# Patient Record
Sex: MALE | Race: OTHER | NOT HISPANIC OR LATINO | URBAN - METROPOLITAN AREA
[De-identification: names, ages, dates, MRNs, and addresses within clinical notes are randomized per-mention and may not be internally consistent; named-entity substitution may affect disease eponyms.]

---

## 2017-05-23 ENCOUNTER — OUTPATIENT (OUTPATIENT)
Dept: OUTPATIENT SERVICES | Facility: HOSPITAL | Age: 67
LOS: 1 days | Discharge: HOME | End: 2017-05-23

## 2017-05-23 DIAGNOSIS — Z01.818 ENCOUNTER FOR OTHER PREPROCEDURAL EXAMINATION: ICD-10-CM

## 2017-05-23 DIAGNOSIS — E78.5 HYPERLIPIDEMIA, UNSPECIFIED: ICD-10-CM

## 2017-05-23 DIAGNOSIS — Z87.442 PERSONAL HISTORY OF URINARY CALCULI: ICD-10-CM

## 2017-05-23 DIAGNOSIS — I34.0 NONRHEUMATIC MITRAL (VALVE) INSUFFICIENCY: ICD-10-CM

## 2017-05-23 DIAGNOSIS — Z79.82 LONG TERM (CURRENT) USE OF ASPIRIN: ICD-10-CM

## 2017-05-23 DIAGNOSIS — Z98.890 OTHER SPECIFIED POSTPROCEDURAL STATES: Chronic | ICD-10-CM

## 2017-05-23 DIAGNOSIS — M10.9 GOUT, UNSPECIFIED: ICD-10-CM

## 2017-05-23 DIAGNOSIS — K80.10 CALCULUS OF GALLBLADDER WITH CHRONIC CHOLECYSTITIS WITHOUT OBSTRUCTION: ICD-10-CM

## 2017-05-23 DIAGNOSIS — I70.90 UNSPECIFIED ATHEROSCLEROSIS: ICD-10-CM

## 2017-05-23 DIAGNOSIS — K80.18 CALCULUS OF GALLBLADDER WITH OTHER CHOLECYSTITIS WITHOUT OBSTRUCTION: ICD-10-CM

## 2017-05-23 LAB
ABO RH CONFIRMATION: SIGNIFICANT CHANGE UP
ALBUMIN SERPL ELPH-MCNC: 4.2 G/DL — SIGNIFICANT CHANGE UP (ref 3.3–5)
ALBUMIN SERPL ELPH-MCNC: 4.3 G/DL — SIGNIFICANT CHANGE UP (ref 3.3–5)
ALP SERPL-CCNC: 88 U/L — SIGNIFICANT CHANGE UP (ref 40–120)
ALP SERPL-CCNC: 88 U/L — SIGNIFICANT CHANGE UP (ref 40–120)
ALT FLD-CCNC: 39 U/L — SIGNIFICANT CHANGE UP (ref 12–78)
ALT FLD-CCNC: 40 U/L — SIGNIFICANT CHANGE UP (ref 12–78)
AMYLASE P1 CFR SERPL: 59 U/L — SIGNIFICANT CHANGE UP (ref 25–115)
ANION GAP SERPL CALC-SCNC: 8 MMOL/L — SIGNIFICANT CHANGE UP (ref 5–17)
APTT BLD: 31.3 SEC — SIGNIFICANT CHANGE UP (ref 27.5–37.4)
AST SERPL-CCNC: 16 U/L — SIGNIFICANT CHANGE UP (ref 15–37)
AST SERPL-CCNC: 17 U/L — SIGNIFICANT CHANGE UP (ref 15–37)
BASOPHILS # BLD AUTO: 0.1 K/UL — SIGNIFICANT CHANGE UP (ref 0–0.2)
BASOPHILS NFR BLD AUTO: 1.3 % — SIGNIFICANT CHANGE UP (ref 0–2)
BILIRUB DIRECT SERPL-MCNC: 0.2 MG/DL — SIGNIFICANT CHANGE UP (ref 0–0.2)
BILIRUB INDIRECT FLD-MCNC: 0.8 MG/DL — SIGNIFICANT CHANGE UP (ref 0.2–1)
BILIRUB SERPL-MCNC: 1 MG/DL — SIGNIFICANT CHANGE UP (ref 0.2–1.2)
BILIRUB SERPL-MCNC: 1 MG/DL — SIGNIFICANT CHANGE UP (ref 0.2–1.2)
BLD GP AB SCN SERPL QL: SIGNIFICANT CHANGE UP
BUN SERPL-MCNC: 17 MG/DL — SIGNIFICANT CHANGE UP (ref 7–23)
CALCIUM SERPL-MCNC: 9.6 MG/DL — SIGNIFICANT CHANGE UP (ref 8.5–10.1)
CHLORIDE SERPL-SCNC: 104 MMOL/L — SIGNIFICANT CHANGE UP (ref 96–108)
CO2 SERPL-SCNC: 27 MMOL/L — SIGNIFICANT CHANGE UP (ref 22–31)
CREAT SERPL-MCNC: 0.96 MG/DL — SIGNIFICANT CHANGE UP (ref 0.5–1.3)
EOSINOPHIL # BLD AUTO: 0.2 K/UL — SIGNIFICANT CHANGE UP (ref 0–0.5)
EOSINOPHIL NFR BLD AUTO: 2.7 % — SIGNIFICANT CHANGE UP (ref 0–6)
GLUCOSE SERPL-MCNC: 91 MG/DL — SIGNIFICANT CHANGE UP (ref 70–99)
HCT VFR BLD CALC: 47.2 % — SIGNIFICANT CHANGE UP (ref 39–50)
HGB BLD-MCNC: 16.3 G/DL — SIGNIFICANT CHANGE UP (ref 13–17)
INR BLD: 1.08 RATIO — SIGNIFICANT CHANGE UP (ref 0.88–1.16)
LIDOCAIN IGE QN: 131 U/L — SIGNIFICANT CHANGE UP (ref 73–393)
LYMPHOCYTES # BLD AUTO: 1.5 K/UL — SIGNIFICANT CHANGE UP (ref 1–3.3)
LYMPHOCYTES # BLD AUTO: 24.1 % — SIGNIFICANT CHANGE UP (ref 13–44)
MCHC RBC-ENTMCNC: 30 PG — SIGNIFICANT CHANGE UP (ref 27–34)
MCHC RBC-ENTMCNC: 34.5 GM/DL — SIGNIFICANT CHANGE UP (ref 32–36)
MCV RBC AUTO: 87 FL — SIGNIFICANT CHANGE UP (ref 80–100)
MONOCYTES # BLD AUTO: 0.4 K/UL — SIGNIFICANT CHANGE UP (ref 0–0.9)
MONOCYTES NFR BLD AUTO: 6.7 % — SIGNIFICANT CHANGE UP (ref 2–14)
NEUTROPHILS # BLD AUTO: 4.2 K/UL — SIGNIFICANT CHANGE UP (ref 1.8–7.4)
NEUTROPHILS NFR BLD AUTO: 65.3 % — SIGNIFICANT CHANGE UP (ref 43–77)
PLATELET # BLD AUTO: 202 K/UL — SIGNIFICANT CHANGE UP (ref 150–400)
POTASSIUM SERPL-MCNC: 4.5 MMOL/L — SIGNIFICANT CHANGE UP (ref 3.5–5.3)
POTASSIUM SERPL-SCNC: 4.5 MMOL/L — SIGNIFICANT CHANGE UP (ref 3.5–5.3)
PROT SERPL-MCNC: 7.7 GM/DL — SIGNIFICANT CHANGE UP (ref 6–8.3)
PROT SERPL-MCNC: 7.7 GM/DL — SIGNIFICANT CHANGE UP (ref 6–8.3)
PROTHROM AB SERPL-ACNC: 11.7 SEC — SIGNIFICANT CHANGE UP (ref 9.8–12.7)
RBC # BLD: 5.42 M/UL — SIGNIFICANT CHANGE UP (ref 4.2–5.8)
RBC # FLD: 11.6 % — SIGNIFICANT CHANGE UP (ref 10.3–14.5)
SODIUM SERPL-SCNC: 139 MMOL/L — SIGNIFICANT CHANGE UP (ref 135–145)
TYPE + AB SCN PNL BLD: SIGNIFICANT CHANGE UP
WBC # BLD: 6.4 K/UL — SIGNIFICANT CHANGE UP (ref 3.8–10.5)
WBC # FLD AUTO: 6.4 K/UL — SIGNIFICANT CHANGE UP (ref 3.8–10.5)

## 2017-05-23 NOTE — CHART NOTE - NSCHARTNOTEFT_GEN_A_CORE
Patient seen in PST today:    V/S: T-98.9, p-72, R-14, B/P-135/76, a2kza-31% on room air.    Ht: 69"     Wt: 85kgs    EZ sponges, holistic sheet, pamphlet, and day of procedure instructions provided and reviewed with patient.

## 2017-05-23 NOTE — ASU PATIENT PROFILE, ADULT - PSH
S/P colonoscopy with polypectomy  Baseline; viv polyps; 2012  S/P hernia repair  right inguinal; 5/2015

## 2017-05-23 NOTE — ASU PATIENT PROFILE, ADULT - PMH
Cholecystitis    Cholelithiasis    History of kidney stones    Hypercholesterolemia    Inguinal hernia, left    Psoriasis    Valvular heart disease

## 2017-05-31 RX ORDER — ACETAMINOPHEN 500 MG
975 TABLET ORAL ONCE
Qty: 0 | Refills: 0 | Status: COMPLETED | OUTPATIENT
Start: 2017-06-01 | End: 2017-06-01

## 2017-05-31 RX ORDER — SODIUM CHLORIDE 9 MG/ML
3 INJECTION INTRAMUSCULAR; INTRAVENOUS; SUBCUTANEOUS EVERY 8 HOURS
Qty: 0 | Refills: 0 | Status: DISCONTINUED | OUTPATIENT
Start: 2017-06-01 | End: 2017-06-16

## 2017-05-31 RX ORDER — FAMOTIDINE 10 MG/ML
20 INJECTION INTRAVENOUS ONCE
Qty: 0 | Refills: 0 | Status: COMPLETED | OUTPATIENT
Start: 2017-06-01 | End: 2017-06-01

## 2017-05-31 RX ORDER — OXYCODONE HYDROCHLORIDE 5 MG/1
10 TABLET ORAL ONCE
Qty: 0 | Refills: 0 | Status: DISCONTINUED | OUTPATIENT
Start: 2017-06-01 | End: 2017-06-01

## 2017-06-01 ENCOUNTER — OUTPATIENT (OUTPATIENT)
Dept: OUTPATIENT SERVICES | Facility: HOSPITAL | Age: 67
LOS: 1 days | Discharge: HOME | End: 2017-06-01
Payer: MEDICARE

## 2017-06-01 VITALS
RESPIRATION RATE: 16 BRPM | HEART RATE: 73 BPM | TEMPERATURE: 97 F | DIASTOLIC BLOOD PRESSURE: 80 MMHG | SYSTOLIC BLOOD PRESSURE: 155 MMHG | OXYGEN SATURATION: 99 %

## 2017-06-01 VITALS
OXYGEN SATURATION: 99 % | WEIGHT: 182.98 LBS | TEMPERATURE: 98 F | HEIGHT: 69 IN | HEART RATE: 59 BPM | RESPIRATION RATE: 16 BRPM | SYSTOLIC BLOOD PRESSURE: 128 MMHG | DIASTOLIC BLOOD PRESSURE: 80 MMHG

## 2017-06-01 DIAGNOSIS — Z98.890 OTHER SPECIFIED POSTPROCEDURAL STATES: Chronic | ICD-10-CM

## 2017-06-01 PROCEDURE — 88304 TISSUE EXAM BY PATHOLOGIST: CPT | Mod: 26

## 2017-06-01 RX ORDER — FENTANYL CITRATE 50 UG/ML
50 INJECTION INTRAVENOUS
Qty: 0 | Refills: 0 | Status: DISCONTINUED | OUTPATIENT
Start: 2017-06-01 | End: 2017-06-01

## 2017-06-01 RX ORDER — MEPERIDINE HYDROCHLORIDE 50 MG/ML
12.5 INJECTION INTRAMUSCULAR; INTRAVENOUS; SUBCUTANEOUS
Qty: 0 | Refills: 0 | Status: DISCONTINUED | OUTPATIENT
Start: 2017-06-01 | End: 2017-06-01

## 2017-06-01 RX ORDER — OXYCODONE HYDROCHLORIDE 5 MG/1
5 TABLET ORAL EVERY 4 HOURS
Qty: 0 | Refills: 0 | Status: DISCONTINUED | OUTPATIENT
Start: 2017-06-01 | End: 2017-06-01

## 2017-06-01 RX ORDER — ONDANSETRON 8 MG/1
4 TABLET, FILM COATED ORAL ONCE
Qty: 0 | Refills: 0 | Status: DISCONTINUED | OUTPATIENT
Start: 2017-06-01 | End: 2017-06-01

## 2017-06-01 RX ORDER — OXYCODONE HYDROCHLORIDE 5 MG/1
1 TABLET ORAL
Qty: 30 | Refills: 0
Start: 2017-06-01 | End: 2017-06-06

## 2017-06-01 RX ORDER — SODIUM CHLORIDE 9 MG/ML
1000 INJECTION INTRAMUSCULAR; INTRAVENOUS; SUBCUTANEOUS
Qty: 0 | Refills: 0 | Status: DISCONTINUED | OUTPATIENT
Start: 2017-06-01 | End: 2017-06-01

## 2017-06-01 RX ADMIN — OXYCODONE HYDROCHLORIDE 10 MILLIGRAM(S): 5 TABLET ORAL at 07:45

## 2017-06-01 RX ADMIN — OXYCODONE HYDROCHLORIDE 5 MILLIGRAM(S): 5 TABLET ORAL at 11:03

## 2017-06-01 RX ADMIN — FAMOTIDINE 20 MILLIGRAM(S): 10 INJECTION INTRAVENOUS at 07:46

## 2017-06-01 RX ADMIN — Medication 975 MILLIGRAM(S): at 07:46

## 2017-06-01 NOTE — BRIEF OPERATIVE NOTE - POST-OP DX
Calculus of gallbladder with cholecystitis without biliary obstruction, unspecified cholecystitis acuity  06/01/2017    Active  Daniel Lay

## 2017-06-01 NOTE — ASU DISCHARGE PLAN (ADULT/PEDIATRIC). - MEDICATION SUMMARY - MEDICATIONS TO TAKE
I will START or STAY ON the medications listed below when I get home from the hospital:    aspirin 81 mg oral delayed release tablet  -- 1 tab(s) by mouth once a day  -- pt instructed to stop 1 week prior to procedure, as per Dr. Burks.  -- Indication: For CHOLELITHIASIS CHOLECYSTITIS    oxyCODONE 5 mg oral tablet  -- 1 tab(s) by mouth every 4 hours, As Needed -for moderate pain MDD:6  -- Caution federal law prohibits the transfer of this drug to any person other  than the person for whom it was prescribed.  It is very important that you take or use this exactly as directed.  Do not skip doses or discontinue unless directed by your doctor.  May cause drowsiness.  Alcohol may intensify this effect.  Use care when operating dangerous machinery.  This prescription cannot be refilled.  Using more of this medication than prescribed may cause serious breathing problems.    -- Indication: For CHOLELITHIASIS CHOLECYSTITIS    Pravachol 40 mg oral tablet  -- 1 tab(s) by mouth once a day  -- Indication: For CHOLELITHIASIS CHOLECYSTITIS

## 2017-06-07 DIAGNOSIS — Z79.82 LONG TERM (CURRENT) USE OF ASPIRIN: ICD-10-CM

## 2017-06-07 DIAGNOSIS — Z87.442 PERSONAL HISTORY OF URINARY CALCULI: ICD-10-CM

## 2017-06-07 DIAGNOSIS — I70.90 UNSPECIFIED ATHEROSCLEROSIS: ICD-10-CM

## 2017-06-07 DIAGNOSIS — E78.5 HYPERLIPIDEMIA, UNSPECIFIED: ICD-10-CM

## 2017-06-07 DIAGNOSIS — I34.0 NONRHEUMATIC MITRAL (VALVE) INSUFFICIENCY: ICD-10-CM

## 2017-06-07 DIAGNOSIS — K80.10 CALCULUS OF GALLBLADDER WITH CHRONIC CHOLECYSTITIS WITHOUT OBSTRUCTION: ICD-10-CM

## 2017-06-07 DIAGNOSIS — M10.9 GOUT, UNSPECIFIED: ICD-10-CM

## 2017-06-07 LAB — SURGICAL PATHOLOGY FINAL REPORT - CH: SIGNIFICANT CHANGE UP

## 2018-03-15 PROBLEM — Z00.00 ENCOUNTER FOR PREVENTIVE HEALTH EXAMINATION: Status: ACTIVE | Noted: 2018-03-15

## 2018-04-18 ENCOUNTER — RESULT REVIEW (OUTPATIENT)
Age: 68
End: 2018-04-18

## 2018-04-30 ENCOUNTER — APPOINTMENT (OUTPATIENT)
Dept: DERMATOLOGY | Facility: CLINIC | Age: 68
End: 2018-04-30
Payer: MEDICARE

## 2018-04-30 VITALS — HEIGHT: 68 IN | BODY MASS INDEX: 28.04 KG/M2 | WEIGHT: 185 LBS

## 2018-04-30 DIAGNOSIS — H54.7 UNSPECIFIED VISUAL LOSS: ICD-10-CM

## 2018-04-30 DIAGNOSIS — L81.4 OTHER MELANIN HYPERPIGMENTATION: ICD-10-CM

## 2018-04-30 DIAGNOSIS — Z84.0 FAMILY HISTORY OF DISEASES OF THE SKIN AND SUBCUTANEOUS TISSUE: ICD-10-CM

## 2018-04-30 DIAGNOSIS — L82.1 OTHER SEBORRHEIC KERATOSIS: ICD-10-CM

## 2018-04-30 DIAGNOSIS — Z87.2 PERSONAL HISTORY OF DISEASES OF THE SKIN AND SUBCUTANEOUS TISSUE: ICD-10-CM

## 2018-04-30 PROCEDURE — 99203 OFFICE O/P NEW LOW 30 MIN: CPT | Mod: 25

## 2018-04-30 PROCEDURE — 17000 DESTRUCT PREMALG LESION: CPT

## 2018-04-30 RX ORDER — ASPIRIN 81 MG
81 TABLET, DELAYED RELEASE (ENTERIC COATED) ORAL
Refills: 0 | Status: ACTIVE | COMMUNITY

## 2018-04-30 RX ORDER — PRAVASTATIN SODIUM 40 MG/1
40 TABLET ORAL
Refills: 0 | Status: ACTIVE | COMMUNITY

## 2018-11-02 PROBLEM — I38 ENDOCARDITIS, VALVE UNSPECIFIED: Chronic | Status: ACTIVE | Noted: 2017-05-23

## 2018-11-02 PROBLEM — Z87.442 PERSONAL HISTORY OF URINARY CALCULI: Chronic | Status: ACTIVE | Noted: 2017-05-23

## 2018-11-02 PROBLEM — K40.90 UNILATERAL INGUINAL HERNIA, WITHOUT OBSTRUCTION OR GANGRENE, NOT SPECIFIED AS RECURRENT: Chronic | Status: ACTIVE | Noted: 2017-05-23

## 2018-11-02 PROBLEM — K81.9 CHOLECYSTITIS, UNSPECIFIED: Chronic | Status: ACTIVE | Noted: 2017-05-23

## 2018-11-02 PROBLEM — L40.9 PSORIASIS, UNSPECIFIED: Chronic | Status: ACTIVE | Noted: 2017-05-23

## 2018-11-02 PROBLEM — E78.00 PURE HYPERCHOLESTEROLEMIA, UNSPECIFIED: Chronic | Status: ACTIVE | Noted: 2017-05-23

## 2018-11-02 PROBLEM — K80.20 CALCULUS OF GALLBLADDER WITHOUT CHOLECYSTITIS WITHOUT OBSTRUCTION: Chronic | Status: ACTIVE | Noted: 2017-05-23

## 2018-11-05 ENCOUNTER — APPOINTMENT (OUTPATIENT)
Dept: DERMATOLOGY | Facility: CLINIC | Age: 68
End: 2018-11-05
Payer: MEDICARE

## 2018-11-05 DIAGNOSIS — L21.9 SEBORRHEIC DERMATITIS, UNSPECIFIED: ICD-10-CM

## 2018-11-05 DIAGNOSIS — L57.0 ACTINIC KERATOSIS: ICD-10-CM

## 2018-11-05 PROCEDURE — 17000 DESTRUCT PREMALG LESION: CPT

## 2018-11-05 PROCEDURE — 99213 OFFICE O/P EST LOW 20 MIN: CPT | Mod: 25

## 2019-03-28 ENCOUNTER — APPOINTMENT (OUTPATIENT)
Age: 69
End: 2019-03-28
Payer: MEDICARE

## 2019-03-28 VITALS
SYSTOLIC BLOOD PRESSURE: 138 MMHG | HEART RATE: 72 BPM | HEIGHT: 68 IN | WEIGHT: 184 LBS | DIASTOLIC BLOOD PRESSURE: 81 MMHG | OXYGEN SATURATION: 98 % | BODY MASS INDEX: 27.89 KG/M2

## 2019-03-28 DIAGNOSIS — E78.00 PURE HYPERCHOLESTEROLEMIA, UNSPECIFIED: ICD-10-CM

## 2019-03-28 PROCEDURE — 99204 OFFICE O/P NEW MOD 45 MIN: CPT

## 2019-03-28 NOTE — REVIEW OF SYSTEMS
[Constipation] : constipation [History of kidney stones] : history of kidney stones [Slow urine stream] : slow urine stream [Negative] : Heme/Lymph

## 2019-03-29 RX ORDER — CIPROFLOXACIN HYDROCHLORIDE 500 MG/1
500 TABLET, FILM COATED ORAL TWICE DAILY
Qty: 6 | Refills: 0 | Status: ACTIVE | COMMUNITY
Start: 2019-03-29 | End: 1900-01-01

## 2019-03-29 RX ORDER — BISACODYL 10 MG/1
19-7 SUPPOSITORY RECTAL
Qty: 1 | Refills: 0 | Status: ACTIVE | COMMUNITY
Start: 2019-03-29 | End: 1900-01-01

## 2019-04-01 PROBLEM — E78.00 HIGH CHOLESTEROL: Status: ACTIVE | Noted: 2019-03-28

## 2019-04-01 RX ORDER — MAGNESIUM OXIDE/MAG AA CHELATE 300 MG
CAPSULE ORAL
Refills: 0 | Status: ACTIVE | COMMUNITY

## 2019-04-01 RX ORDER — CHOLECALCIFEROL (VITAMIN D3) 25 MCG
TABLET ORAL
Refills: 0 | Status: ACTIVE | COMMUNITY

## 2019-04-01 NOTE — ASSESSMENT
[FreeTextEntry1] : 69 yo M with rise in PSA. Discussed with patient that PSA is imprecise test. PSA can be elevated due to benign prostate enlargement, prostate stimulation, sexual activity, urinary tract infection, prostatitis, as well as prostate cancer. There is no value for PSA which is diagnostic for prostate cancer, nor is there value which conclusively excludes prostate cancer. PSA simply stratifies risk for prostate cancer and diagnosis of prostate cancer requires prostate biopsy. \par \par Pt opts for biopsy. The risks, including bleeding, infection, urinary retention were discussed with the patient. Pt was instructed to take Cipro 500 mg BID for 3 days beginning the day prior to procedure. Pt will administer Fleets enema the morning of procedure. He will eat a light breakfast. All questions and concerns addressed.

## 2019-04-01 NOTE — HISTORY OF PRESENT ILLNESS
[FreeTextEntry1] : 68 year old man seen 03/28/2019  with complaint of elevated PSA. This began 3/5/2019, where it was found to be 3.6.  Prior, the PSA was 2.5 on 11/2018.\par It is associated with mild LUTS: weak stream, frequency, intermittency. \par \par No hematuria, no dysuria, no urgency, no straining. No incontinence. No fevers, no chills, no nausea, no vomiting, no flank pain.

## 2019-04-05 ENCOUNTER — APPOINTMENT (OUTPATIENT)
Dept: UROLOGY | Facility: CLINIC | Age: 69
End: 2019-04-05
Payer: MEDICARE

## 2019-04-05 ENCOUNTER — LABORATORY RESULT (OUTPATIENT)
Age: 69
End: 2019-04-05

## 2019-04-05 VITALS
HEART RATE: 62 BPM | BODY MASS INDEX: 27.89 KG/M2 | SYSTOLIC BLOOD PRESSURE: 119 MMHG | TEMPERATURE: 97.9 F | OXYGEN SATURATION: 98 % | WEIGHT: 184 LBS | HEIGHT: 68 IN | DIASTOLIC BLOOD PRESSURE: 76 MMHG

## 2019-04-05 VITALS
SYSTOLIC BLOOD PRESSURE: 196 MMHG | HEIGHT: 68 IN | WEIGHT: 184 LBS | DIASTOLIC BLOOD PRESSURE: 109 MMHG | BODY MASS INDEX: 27.89 KG/M2

## 2019-04-05 PROCEDURE — 76942 ECHO GUIDE FOR BIOPSY: CPT | Mod: 59

## 2019-04-05 PROCEDURE — 55700: CPT

## 2019-04-05 PROCEDURE — 76872 US TRANSRECTAL: CPT

## 2019-04-05 RX ORDER — TAMSULOSIN HYDROCHLORIDE 0.4 MG/1
0.4 CAPSULE ORAL
Qty: 30 | Refills: 5 | Status: ACTIVE | COMMUNITY
Start: 2019-04-05 | End: 1900-01-01

## 2019-04-07 ENCOUNTER — EMERGENCY (EMERGENCY)
Facility: HOSPITAL | Age: 69
LOS: 0 days | Discharge: HOME | End: 2019-04-07
Attending: EMERGENCY MEDICINE | Admitting: EMERGENCY MEDICINE
Payer: MEDICARE

## 2019-04-07 VITALS
SYSTOLIC BLOOD PRESSURE: 121 MMHG | DIASTOLIC BLOOD PRESSURE: 73 MMHG | OXYGEN SATURATION: 100 % | TEMPERATURE: 98 F | HEART RATE: 89 BPM | RESPIRATION RATE: 18 BRPM

## 2019-04-07 VITALS
DIASTOLIC BLOOD PRESSURE: 91 MMHG | SYSTOLIC BLOOD PRESSURE: 144 MMHG | OXYGEN SATURATION: 97 % | TEMPERATURE: 98 F | RESPIRATION RATE: 18 BRPM | HEART RATE: 89 BPM

## 2019-04-07 VITALS — HEIGHT: 69 IN | WEIGHT: 184.09 LBS

## 2019-04-07 VITALS — WEIGHT: 149.91 LBS | HEIGHT: 67 IN
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
## 2019-04-07 DIAGNOSIS — I08.9 RHEUMATIC MULTIPLE VALVE DISEASE, UNSPECIFIED: ICD-10-CM

## 2019-04-07 DIAGNOSIS — Z98.890 OTHER SPECIFIED POSTPROCEDURAL STATES: Chronic | ICD-10-CM

## 2019-04-07 DIAGNOSIS — Z98.890 OTHER SPECIFIED POSTPROCEDURAL STATES: ICD-10-CM

## 2019-04-07 DIAGNOSIS — I01.9 ACUTE RHEUMATIC HEART DISEASE, UNSPECIFIED: ICD-10-CM

## 2019-04-07 DIAGNOSIS — Z79.2 LONG TERM (CURRENT) USE OF ANTIBIOTICS: ICD-10-CM

## 2019-04-07 DIAGNOSIS — L40.9 PSORIASIS, UNSPECIFIED: ICD-10-CM
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
## 2019-04-07 DIAGNOSIS — Z87.442 PERSONAL HISTORY OF URINARY CALCULI: ICD-10-CM

## 2019-04-07 DIAGNOSIS — T83.011A BREAKDOWN (MECHANICAL) OF INDWELLING URETHRAL CATHETER, INITIAL ENCOUNTER: ICD-10-CM

## 2019-04-07 DIAGNOSIS — I38 ENDOCARDITIS, VALVE UNSPECIFIED: ICD-10-CM
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
## 2019-04-07 DIAGNOSIS — Z87.19 PERSONAL HISTORY OF OTHER DISEASES OF THE DIGESTIVE SYSTEM: ICD-10-CM

## 2019-04-07 DIAGNOSIS — Z79.899 OTHER LONG TERM (CURRENT) DRUG THERAPY: ICD-10-CM

## 2019-04-07 DIAGNOSIS — R33.8 OTHER RETENTION OF URINE: ICD-10-CM

## 2019-04-07 DIAGNOSIS — Z79.82 LONG TERM (CURRENT) USE OF ASPIRIN: ICD-10-CM

## 2019-04-07 DIAGNOSIS — E78.00 PURE HYPERCHOLESTEROLEMIA, UNSPECIFIED: ICD-10-CM

## 2019-04-07 DIAGNOSIS — R10.9 UNSPECIFIED ABDOMINAL PAIN: ICD-10-CM

## 2019-04-07 DIAGNOSIS — T83.098A OTHER MECHANICAL COMPLICATION OF OTHER URINARY CATHETER, INITIAL ENCOUNTER: ICD-10-CM

## 2019-04-07 LAB
ANION GAP SERPL CALC-SCNC: 10 MMOL/L — SIGNIFICANT CHANGE UP (ref 5–17)
APPEARANCE UR: CLEAR — SIGNIFICANT CHANGE UP
BILIRUB UR-MCNC: NEGATIVE — SIGNIFICANT CHANGE UP
BUN SERPL-MCNC: 13 MG/DL — SIGNIFICANT CHANGE UP (ref 7–23)
CALCIUM SERPL-MCNC: 9.2 MG/DL — SIGNIFICANT CHANGE UP (ref 8.5–10.1)
CHLORIDE SERPL-SCNC: 105 MMOL/L — SIGNIFICANT CHANGE UP (ref 96–108)
CO2 SERPL-SCNC: 23 MMOL/L — SIGNIFICANT CHANGE UP (ref 22–31)
COLOR SPEC: ABNORMAL
CREAT SERPL-MCNC: 1.05 MG/DL — SIGNIFICANT CHANGE UP (ref 0.5–1.3)
DIFF PNL FLD: ABNORMAL
GLUCOSE SERPL-MCNC: 114 MG/DL — HIGH (ref 70–99)
GLUCOSE UR QL: NEGATIVE MG/DL — SIGNIFICANT CHANGE UP
KETONES UR-MCNC: NEGATIVE — SIGNIFICANT CHANGE UP
LEUKOCYTE ESTERASE UR-ACNC: ABNORMAL
NITRITE UR-MCNC: NEGATIVE — SIGNIFICANT CHANGE UP
PH UR: 6.5 — SIGNIFICANT CHANGE UP (ref 5–8)
POTASSIUM SERPL-MCNC: 3.8 MMOL/L — SIGNIFICANT CHANGE UP (ref 3.5–5.3)
POTASSIUM SERPL-SCNC: 3.8 MMOL/L — SIGNIFICANT CHANGE UP (ref 3.5–5.3)
PROT UR-MCNC: 100 MG/DL
SODIUM SERPL-SCNC: 138 MMOL/L — SIGNIFICANT CHANGE UP (ref 135–145)
SP GR SPEC: 1 — LOW (ref 1.01–1.02)
UROBILINOGEN FLD QL: NEGATIVE MG/DL — SIGNIFICANT CHANGE UP

## 2019-04-07 PROCEDURE — 99283 EMERGENCY DEPT VISIT LOW MDM: CPT

## 2019-04-07 PROCEDURE — ZZZZZ: CPT

## 2019-04-07 PROCEDURE — 99284 EMERGENCY DEPT VISIT MOD MDM: CPT

## 2019-04-07 NOTE — ED ADULT NURSE NOTE - NSIMPLEMENTINTERV_GEN_ALL_ED
Implemented All Universal Safety Interventions:  [place] to call system. Call bell, personal items and telephone within reach. Instruct patient to call for assistance. Room bathroom lighting operational. Non-slip footwear when patient is off stretcher. Physically safe environment: no spills, clutter or unnecessary equipment. Stretcher in lowest position, wheels locked, appropriate side rails in place.

## 2019-04-07 NOTE — ED STATDOCS - CLINICAL SUMMARY MEDICAL DECISION MAKING FREE TEXT BOX
67 y/o male with blood clots in Payton catheter and abd pressure. Since Payton draining again, this is either a positional issue, or pt will need frequent flushing. Recommend flush education for patient, likely discharge.

## 2019-04-07 NOTE — ED STATDOCS - PHYSICAL EXAMINATION
Constitutional: mild distress AAOx3  Eyes: PERRLA EOMI  Head: Normocephalic atraumatic  Mouth: MMM  Cardiac: regular rate   Resp: Lungs CTAB  GI: Abd s/nt/nd +palpable bladder with tenderness   Neuro: CN2-12 intact  Skin: No rashes Constitutional: mild distress AAOx3  Eyes: PERRLA EOMI  Head: Normocephalic atraumatic  Mouth: MMM  Cardiac: regular rate   Resp: Lungs CTAB  GI: Abd s/nd +palpable bladder with tenderness no rebound or guarding   Neuro: CN2-12 intact  Skin: No rashes

## 2019-04-07 NOTE — ED STATDOCS - NS_ ATTENDINGSCRIBEDETAILS _ED_A_ED_FT
I, Mychal Parker MD,  performed the initial face to face bedside interview with this patient regarding history of present illness, review of symptoms and relevant past medical, social and family history.  I completed an independent physical examination.  I was the initial provider who evaluated this patient.  The history, relevant review of systems, past medical and surgical history, medical decision making, and physical examination was documented by the scribe in my presence and I attest to the accuracy of the documentation.

## 2019-04-07 NOTE — ED STATDOCS - GASTROINTESTINAL, MLM
abdomen soft, non-tender, and non-distended. Bowel sounds present. +Payton bag finished with blood tinged urine about 200 cc

## 2019-04-07 NOTE — ED STATDOCS - NS ED ROS FT
Constitutional: No fever or chills  Eyes: No visual changes  HEENT: No throat pain  CV: No chest pain  Resp: No SOB no cough  GI: no nausea or vomiting +suprapubic pain   : No dysuria +urinary retention   MSK: No musculoskeletal pain  Skin: No rash  Neuro: No headache

## 2019-04-07 NOTE — ED STATDOCS - CLINICAL SUMMARY MEDICAL DECISION MAKING FREE TEXT BOX
69 y/o male with hx HLD presents to ED with urinary retention. Pt had catheter placed Friday after prostate biopsy, unable to urinate since 2PM. No fever, n/v. Exam with palpable blader and suprapubic TTP. Will attempt to flush Payton, otherwise replace Payton ,check creatinine, urine, reassess. 67 y/o male with hx HLD presents to ED with urinary retention. Pt had catheter placed Friday after prostate biopsy, unable to urinate since 2PM. No fever, n/v. Exam with palpable blader and suprapubic TTP. Will attempt to flush Payton, otherwise replace Payton ,check creatinine, urine, reassess.    Urine output after catheter flushed.  Patient feeling better and ready for d/c

## 2019-04-07 NOTE — ED STATDOCS - ATTENDING CONTRIBUTION TO CARE
Attending Contribution to Care: I, Bina Murillo, performed the initial face to face bedside interview with this patient regarding history of present illness, review of symptoms and relevant past medical, social and family history.  I completed an independent physical examination.  I was the initial provider who evaluated this patient. I have signed out the follow up of any pending tests (i.e. labs, radiological studies) to the ACP.  I have communicated the patient’s plan of care and disposition with the ACP.

## 2019-04-07 NOTE — ED STATDOCS - OBJECTIVE STATEMENT
67 y/o male with PMHx cholecystitis, HLD, cholelithiasis, kidney stones, valvular heart disease, inguinal hernia s/p repair presents to the ED c/o urinary catheter complication. Pt reports prostate biopsy on 4/5/19 morning, then had catheter placed that afternoon. Pt rpeorts no initial complications but around 2PM today he noticed the catheter was clogged. Pt not making any urine. Urology: Zia 67 y/o male with PMHx cholecystitis, HLD, cholelithiasis, kidney stones, valvular heart disease, inguinal hernia s/p repair presents to the ED c/o urinary catheter complication. Pt reports prostate biopsy on 4/5/19 morning, then had catheter placed that afternoon. Pt reports no initial complications but around 2PM today he noticed the catheter was clogged. Pt not making any urine and experiencing suprapubic pain. Urology: Zia 67 y/o male with PMHx HLD presents to the ED c/o urinary catheter complication. Pt reports prostate biopsy on 4/5/19 morning, then had catheter placed that afternoon. Pt reports no initial complications but around 2PM today he noticed the catheter was clogged. Pt not making any urine and experiencing suprapubic pain. Urology: Zia

## 2019-04-07 NOTE — ED STATDOCS - OBJECTIVE STATEMENT
69 y/o male with PMHx HLD presents to the ED c/o urinary catheter complication. Pt reports prostate biopsy on 4/5/19 morning, then had catheter placed that afternoon. Pt was at [facility] a few hours ago for Payton catheter clogged around 2PM today. Payton catheter was flushed, pt was discharged. When pt went back home, pt's catheter was clogged again, noticed blood clots, and experienced some abd pressure. Payton catheter is now draining properly. Pt not making any urine and experiencing suprapubic pain. Finished Cipro prescription yesterday. Urology: Zia

## 2019-04-07 NOTE — ED STATDOCS - NSFOLLOWUPINSTRUCTIONS_ED_ALL_ED_FT
Urinary Retention in Men    WHAT YOU NEED TO KNOW:    What is urinary retention? Urinary retention is a condition that develops when your bladder does not empty completely when you urinate.         What causes urinary retention?     An enlarged prostate      Blockages, such as a stone, growth, or narrowing of your urethra      A weak bladder muscle      Nerve damage from diabetes, stroke, or spinal cord injury      Bladder diverticula, which are pockets of urine that form in your bladder and do not empty      Certain medicines, such as narcotics, antihistamines, or antidepressants    What are the signs and symptoms of urinary retention?     Frequent urination, or the urge to urinate right after you finish      An urge to urinate, but your urine does not come out or dribbles out slowly and weakly      Frequent urine leaks that happen during the day or while you sleep      Pain or pressure when you urinate      Pain or stiffness in your abdomen, lower back, hips, or upper thighs      Blood in your urine    How is urinary retention diagnosed? Your healthcare provider will ask about your health history and the medicines you take. He will press or tap on your lower abdomen. You may need any of the following tests:     A digital rectal exam is when healthcare providers carefully feel the size of your prostate.      A post void residual test will show how much urine is left in your bladder after you urinate. You will be asked to urinate and then healthcare providers will use a small ultrasound machine to check how much urine is left in your bladder.      Blood or urine tests may show infection or prostate specific antigen (PSA) levels. PSA may be elevated in prostate cancer.      An ultrasound uses sound waves to show pictures on a monitor. An ultrasound may be done to show bladder stones, infection, or other problems.      A CT scan, or CAT scan, is a type of x-ray that is taken of your prostate, kidneys, and bladder. The pictures may show what is causing your urinary retention. You may be given a dye before the pictures are taken to help healthcare providers see the pictures better. Tell the healthcare provider if you have ever had an allergic reaction to contrast dye.    How is urinary retention treated?     A Payton catheter is a tube put into your bladder to drain urine into a bag. Keep the bag below your waist. This will prevent urine from flowing back into your bladder and causing an infection or other problems. Also, keep the tube free of kinks so the urine will drain properly. Do not pull on the catheter. This can cause pain and bleeding, and may cause the catheter to come out.       Medicines can help decrease the size of your prostate, fight infection, and help you urinate more easily.      Surgery may be needed to treat the condition that is causing your urinary retention.     When should I contact my healthcare provider?     You have a fever.      You have pain when you urinate.      You have blood in your urine.      You have problems with your catheter.      You have questions or concerns about your condition or care.    When should I seek immediate care or call 911?     You have severe abdominal pain.      You are breathing faster than usual.      Your heartbeat is faster than usual.      Your face, hands, feet, or ankles are swollen.     CARE AGREEMENT:    You have the right to help plan your care. Learn about your health condition and how it may be treated. Discuss treatment options with your healthcare providers to decide what care you want to receive. You always have the right to refuse treatment.

## 2019-04-07 NOTE — ED STATDOCS - PROGRESS NOTE DETAILS
Patient initially seen and evaluated with ED attending at intake.  Payton was able to flush and had 900cc outflow of urine as per nursing.  He is feeling much better.  He has been taking cipro and will follow up with Dr. Lizarraga tomorrow -Courtney Rose PA-C

## 2019-04-07 NOTE — ED STATDOCS - PROGRESS NOTE DETAILS
Patient to be taught flushing education by RN.  He has follow up with Dr. Lizarraga tomorrow -Courtney Rose PA-C

## 2019-04-08 ENCOUNTER — APPOINTMENT (OUTPATIENT)
Age: 69
End: 2019-04-08
Payer: MEDICARE

## 2019-04-08 VITALS
SYSTOLIC BLOOD PRESSURE: 138 MMHG | DIASTOLIC BLOOD PRESSURE: 87 MMHG | OXYGEN SATURATION: 98 % | HEART RATE: 78 BPM | HEIGHT: 68 IN | TEMPERATURE: 98 F | WEIGHT: 184 LBS | BODY MASS INDEX: 27.89 KG/M2

## 2019-04-08 PROCEDURE — 51798 US URINE CAPACITY MEASURE: CPT

## 2019-04-08 PROCEDURE — 51700 IRRIGATION OF BLADDER: CPT

## 2019-04-15 ENCOUNTER — APPOINTMENT (OUTPATIENT)
Dept: UROLOGY | Facility: CLINIC | Age: 69
End: 2019-04-15

## 2019-04-16 ENCOUNTER — APPOINTMENT (OUTPATIENT)
Dept: UROLOGY | Facility: CLINIC | Age: 69
End: 2019-04-16
Payer: MEDICARE

## 2019-04-16 PROCEDURE — 99213 OFFICE O/P EST LOW 20 MIN: CPT

## 2019-04-16 NOTE — HISTORY OF PRESENT ILLNESS
[FreeTextEntry1] : 68 year old man seen 03/28/2019  with complaint of elevated PSA. This began 3/5/2019, where it was found to be 3.6.  Prior, the PSA was 2.5 on 11/2018. It is associated with mild LUTS: weak stream, frequency, intermittency. No hematuria, no dysuria, no urgency, no straining. No incontinence. No fevers, no chills, no nausea, no vomiting, no flank pain. \par \par 04/16/2019: Patient presents for follow up. He is s/p prostate biopsy (4/5/19). He reports some blood in stool and urine 2-3 days post procedure but now resolved.  symptoms and other medical  issues remain unchanged from above. No hematuria, no dysuria, no hesitancy, no straining. No incontinence. No fevers, no chills, no nausea, no vomiting, no flank pain. \par \par Prostate Biopsy Path (4/5/19): 12 cores negative for malignancy

## 2019-04-16 NOTE — ASSESSMENT
[FreeTextEntry1] : 69 yo M with rise in PSA, prostate biopsy was negative. Pt otherwise without complaint. Recommended repeat PSA in 6 months to continue to trend. Pt agrees.

## 2019-04-16 NOTE — REASON FOR VISIT
[Follow-up Visit ___] : a follow-up visit  for [unfilled] [Initial Visit ___] : [unfilled] is here today for an initial visit  for [unfilled]

## 2019-04-16 NOTE — PHYSICAL EXAM
[General Appearance - Well Developed] : well developed [General Appearance - Well Nourished] : well nourished [Normal Appearance] : normal appearance [Well Groomed] : well groomed [General Appearance - In No Acute Distress] : no acute distress [Abdomen Soft] : soft [Abdomen Tenderness] : non-tender [Costovertebral Angle Tenderness] : no ~M costovertebral angle tenderness [Urinary Bladder Findings] : the bladder was normal on palpation [Edema] : no peripheral edema [Respiration, Rhythm And Depth] : normal respiratory rhythm and effort [] : no respiratory distress [Exaggerated Use Of Accessory Muscles For Inspiration] : no accessory muscle use [Oriented To Time, Place, And Person] : oriented to person, place, and time [Mood] : the mood was normal [Affect] : the affect was normal [Not Anxious] : not anxious [Normal Station and Gait] : the gait and station were normal for the patient's age [No Focal Deficits] : no focal deficits [No Palpable Adenopathy] : no palpable adenopathy

## 2019-06-25 ENCOUNTER — APPOINTMENT (OUTPATIENT)
Dept: UROLOGY | Facility: CLINIC | Age: 69
End: 2019-06-25
Payer: MEDICARE

## 2019-06-25 DIAGNOSIS — N40.1 BENIGN PROSTATIC HYPERPLASIA WITH LOWER URINARY TRACT SYMPMS: ICD-10-CM

## 2019-06-25 DIAGNOSIS — N13.8 BENIGN PROSTATIC HYPERPLASIA WITH LOWER URINARY TRACT SYMPMS: ICD-10-CM

## 2019-06-25 PROCEDURE — 99213 OFFICE O/P EST LOW 20 MIN: CPT

## 2019-06-25 NOTE — ASSESSMENT
[FreeTextEntry1] : 69 yo M with rise in PSA, prostate biopsy was negative. Pt otherwise without complaint. LUTS well controlled with tamsulosin. Will continue. Recommended repeat PSA in 6 months to continue to trend. Pt agrees.

## 2019-06-25 NOTE — HISTORY OF PRESENT ILLNESS
[FreeTextEntry1] : 68 year old man seen 03/28/2019  with complaint of elevated PSA. This began 3/5/2019, where it was found to be 3.6.  Prior, the PSA was 2.5 on 11/2018. It is associated with mild LUTS: weak stream, frequency, intermittency. No hematuria, no dysuria, no urgency, no straining. No incontinence. No fevers, no chills, no nausea, no vomiting, no flank pain. \par \par 04/16/2019: Patient presents for follow up. He is s/p prostate biopsy (4/5/19). He reports some blood in stool and urine 2-3 days post procedure but now resolved.  symptoms and other medical  issues remain unchanged from above. No hematuria, no dysuria, no hesitancy, no straining. No incontinence. No fevers, no chills, no nausea, no vomiting, no flank pain. \par \par 06/25/2019: Patient presents for follow up. He had been placed on tamsulosin, and reports LUTS of frequency, urgency, resolved. Other medical  issues remain unchanged from above. No hematuria, no dysuria, no frequency, no urgency, no hesitancy, no straining. No incontinence. No fevers, no chills, no nausea, no vomiting, no flank pain. \par \par Prostate Biopsy Path (4/5/19): 12 cores negative for malignancy

## 2020-01-09 ENCOUNTER — APPOINTMENT (OUTPATIENT)
Age: 70
End: 2020-01-09
Payer: MEDICARE

## 2020-01-09 PROCEDURE — 99213 OFFICE O/P EST LOW 20 MIN: CPT

## 2020-01-10 NOTE — REVIEW OF SYSTEMS
[Constipation] : constipation [History of kidney stones] : history of kidney stones [Slow urine stream] : slow urine stream [Negative] : Psychiatric

## 2020-01-10 NOTE — ASSESSMENT
[FreeTextEntry1] : 67 yo M with rise in PSA, prostate biopsy was negative. PSA rise with low free PSA. Recommended MRI.

## 2020-01-10 NOTE — HISTORY OF PRESENT ILLNESS
[FreeTextEntry1] : 68 year old man seen 03/28/2019  with complaint of elevated PSA. This began 3/5/2019, where it was found to be 3.6.  Prior, the PSA was 2.5 on 11/2018. It is associated with mild LUTS: weak stream, frequency, intermittency. No hematuria, no dysuria, no urgency, no straining. No incontinence. No fevers, no chills, no nausea, no vomiting, no flank pain. \par \par 04/16/2019: Patient presents for follow up. He is s/p prostate biopsy (4/5/19). He reports some blood in stool and urine 2-3 days post procedure but now resolved.  symptoms and other medical  issues remain unchanged from above. No hematuria, no dysuria, no hesitancy, no straining. No incontinence. No fevers, no chills, no nausea, no vomiting, no flank pain. \par \par 06/25/2019: Patient presents for follow up. He had been placed on tamsulosin, and reports LUTS of frequency, urgency, resolved. Other medical  issues remain unchanged from above. No hematuria, no dysuria, no frequency, no urgency, no hesitancy, no straining. No incontinence. No fevers, no chills, no nausea, no vomiting, no flank pain. \par \par 01/09/2020: Patient presents for follow up. He reports  symptoms remain well controlled with alpha blocker. He obtained repeat PSA and is here to discuss. No hematuria, no dysuria, no frequency, no urgency, no hesitancy, no straining. No incontinence. No fevers, no chills, no nausea, no vomiting, no flank pain. He obtained repeat PSA, and is here to discuss. For some reason, lab reported two results from same day. 6.1 (10%) and 5.3.\par \par Prostate Biopsy Path (4/5/19): 12 cores negative for malignancy

## 2020-01-10 NOTE — PHYSICAL EXAM
[General Appearance - Well Developed] : well developed [Normal Appearance] : normal appearance [General Appearance - Well Nourished] : well nourished [Abdomen Soft] : soft [Well Groomed] : well groomed [General Appearance - In No Acute Distress] : no acute distress [Abdomen Tenderness] : non-tender [Urinary Bladder Findings] : the bladder was normal on palpation [Costovertebral Angle Tenderness] : no ~M costovertebral angle tenderness [Edema] : no peripheral edema [] : no respiratory distress [Oriented To Time, Place, And Person] : oriented to person, place, and time [Exaggerated Use Of Accessory Muscles For Inspiration] : no accessory muscle use [Respiration, Rhythm And Depth] : normal respiratory rhythm and effort [Affect] : the affect was normal [Not Anxious] : not anxious [Mood] : the mood was normal [Normal Station and Gait] : the gait and station were normal for the patient's age [No Palpable Adenopathy] : no palpable adenopathy [No Focal Deficits] : no focal deficits

## 2020-01-12 ENCOUNTER — FORM ENCOUNTER (OUTPATIENT)
Age: 70
End: 2020-01-12

## 2020-01-13 ENCOUNTER — APPOINTMENT (OUTPATIENT)
Dept: MRI IMAGING | Facility: CLINIC | Age: 70
End: 2020-01-13
Payer: MEDICARE

## 2020-01-13 ENCOUNTER — OUTPATIENT (OUTPATIENT)
Dept: OUTPATIENT SERVICES | Facility: HOSPITAL | Age: 70
LOS: 1 days | End: 2020-01-13
Payer: MEDICARE

## 2020-01-13 DIAGNOSIS — Z98.890 OTHER SPECIFIED POSTPROCEDURAL STATES: Chronic | ICD-10-CM

## 2020-01-13 DIAGNOSIS — R97.20 ELEVATED PROSTATE SPECIFIC ANTIGEN [PSA]: ICD-10-CM

## 2020-01-13 DIAGNOSIS — Z00.8 ENCOUNTER FOR OTHER GENERAL EXAMINATION: ICD-10-CM

## 2020-01-13 PROCEDURE — A9585: CPT

## 2020-01-13 PROCEDURE — 72197 MRI PELVIS W/O & W/DYE: CPT

## 2020-01-13 PROCEDURE — 72197 MRI PELVIS W/O & W/DYE: CPT | Mod: 26

## 2020-01-18 ENCOUNTER — TRANSCRIPTION ENCOUNTER (OUTPATIENT)
Age: 70
End: 2020-01-18

## 2020-02-03 ENCOUNTER — APPOINTMENT (OUTPATIENT)
Dept: UROLOGY | Facility: CLINIC | Age: 70
End: 2020-02-03
Payer: MEDICARE

## 2020-02-03 DIAGNOSIS — R97.20 ELEVATED PROSTATE, SPECIFIC ANTIGEN [PSA]: ICD-10-CM

## 2020-02-03 PROCEDURE — 99213 OFFICE O/P EST LOW 20 MIN: CPT

## 2020-02-10 PROBLEM — R97.20 ELEVATED PSA: Status: ACTIVE | Noted: 2019-03-29

## 2020-02-10 NOTE — PHYSICAL EXAM
[General Appearance - Well Developed] : well developed [Normal Appearance] : normal appearance [General Appearance - Well Nourished] : well nourished [Well Groomed] : well groomed [Abdomen Soft] : soft [General Appearance - In No Acute Distress] : no acute distress [Abdomen Tenderness] : non-tender [Costovertebral Angle Tenderness] : no ~M costovertebral angle tenderness [Urinary Bladder Findings] : the bladder was normal on palpation [Edema] : no peripheral edema [Exaggerated Use Of Accessory Muscles For Inspiration] : no accessory muscle use [] : no respiratory distress [Respiration, Rhythm And Depth] : normal respiratory rhythm and effort [Oriented To Time, Place, And Person] : oriented to person, place, and time [Affect] : the affect was normal [Normal Station and Gait] : the gait and station were normal for the patient's age [Not Anxious] : not anxious [Mood] : the mood was normal [No Focal Deficits] : no focal deficits [No Palpable Adenopathy] : no palpable adenopathy

## 2020-02-10 NOTE — ASSESSMENT
[FreeTextEntry1] : 69 yo M with rise in PSA, prostate biopsy was negative. MRI now shows PIRADS 5 lesion. Recommended MRI Fusion biopsy. Pt states that he has appointment for second opinion. Encouraged pt to do this. If he decides to continue care here, will discuss MRI-fusion biopsy again.

## 2020-02-10 NOTE — REVIEW OF SYSTEMS
[see HPI] : see HPI [Negative] : Heme/Lymph [Constipation] : constipation [History of kidney stones] : history of kidney stones [Slow urine stream] : slow urine stream

## 2020-02-10 NOTE — PHYSICAL EXAM
[General Appearance - Well Nourished] : well nourished [General Appearance - Well Developed] : well developed [Normal Appearance] : normal appearance [Well Groomed] : well groomed [Abdomen Tenderness] : non-tender [Abdomen Soft] : soft [General Appearance - In No Acute Distress] : no acute distress [Urinary Bladder Findings] : the bladder was normal on palpation [Costovertebral Angle Tenderness] : no ~M costovertebral angle tenderness [Edema] : no peripheral edema [Respiration, Rhythm And Depth] : normal respiratory rhythm and effort [Exaggerated Use Of Accessory Muscles For Inspiration] : no accessory muscle use [] : no respiratory distress [Oriented To Time, Place, And Person] : oriented to person, place, and time [Affect] : the affect was normal [Normal Station and Gait] : the gait and station were normal for the patient's age [Not Anxious] : not anxious [Mood] : the mood was normal [No Focal Deficits] : no focal deficits [No Palpable Adenopathy] : no palpable adenopathy

## 2020-02-10 NOTE — REVIEW OF SYSTEMS
[see HPI] : see HPI [Negative] : Heme/Lymph [History of kidney stones] : history of kidney stones [Constipation] : constipation [Slow urine stream] : slow urine stream

## 2020-02-10 NOTE — HISTORY OF PRESENT ILLNESS
[FreeTextEntry1] : 68 year old man seen 03/28/2019  with complaint of elevated PSA. This began 3/5/2019, where it was found to be 3.6.  Prior, the PSA was 2.5 on 11/2018. It is associated with mild LUTS: weak stream, frequency, intermittency. No hematuria, no dysuria, no urgency, no straining. No incontinence. No fevers, no chills, no nausea, no vomiting, no flank pain. \par \par 04/16/2019: Patient presents for follow up. He is s/p prostate biopsy (4/5/19). He reports some blood in stool and urine 2-3 days post procedure but now resolved.  symptoms and other medical  issues remain unchanged from above. No hematuria, no dysuria, no hesitancy, no straining. No incontinence. No fevers, no chills, no nausea, no vomiting, no flank pain. \par \par 06/25/2019: Patient presents for follow up. He had been placed on tamsulosin, and reports LUTS of frequency, urgency, resolved. Other medical  issues remain unchanged from above. No hematuria, no dysuria, no frequency, no urgency, no hesitancy, no straining. No incontinence. No fevers, no chills, no nausea, no vomiting, no flank pain. \par \par 01/09/2020: Patient presents for follow up. He reports  symptoms remain well controlled with alpha blocker. He obtained repeat PSA and is here to discuss. No hematuria, no dysuria, no frequency, no urgency, no hesitancy, no straining. No incontinence. No fevers, no chills, no nausea, no vomiting, no flank pain. He obtained repeat PSA, and is here to discuss. For some reason, lab reported two results from same day. 6.1 (10%) and 5.3.\par \par Prostate Biopsy Path (4/5/19): 12 cores negative for malignancy\par \par 02/03/2020: Patient presents for follow up. He obtained prostate MRI and is here to discuss. He denies new  symptoms and other medical  issues remain unchanged from above. No hematuria, no dysuria, no frequency, no urgency, no hesitancy, no straining. No incontinence. No fevers, no chills, no nausea, no vomiting, no flank pain. \par \par Prostate MRI (01/2020): 51 cc volume prostate, PIRADS 5, 1.6 mm lesion, no LÓPEZ, no SVI, no LAD

## 2020-02-13 ENCOUNTER — APPOINTMENT (OUTPATIENT)
Dept: UROLOGY | Facility: CLINIC | Age: 70
End: 2020-02-13

## 2021-01-01 ENCOUNTER — RESULT REVIEW (OUTPATIENT)
Age: 71
End: 2021-01-01

## 2021-01-01 ENCOUNTER — OUTPATIENT (OUTPATIENT)
Dept: OUTPATIENT SERVICES | Facility: HOSPITAL | Age: 71
LOS: 1 days | End: 2021-01-01
Payer: MEDICARE

## 2021-01-01 ENCOUNTER — APPOINTMENT (OUTPATIENT)
Dept: GASTROENTEROLOGY | Facility: AMBULATORY MEDICAL SERVICES | Age: 71
End: 2021-01-01
Payer: MEDICARE

## 2021-01-01 ENCOUNTER — APPOINTMENT (OUTPATIENT)
Dept: GASTROENTEROLOGY | Facility: CLINIC | Age: 71
End: 2021-01-01
Payer: MEDICARE

## 2021-01-01 ENCOUNTER — APPOINTMENT (OUTPATIENT)
Dept: CT IMAGING | Facility: CLINIC | Age: 71
End: 2021-01-01
Payer: MEDICARE

## 2021-01-01 ENCOUNTER — APPOINTMENT (OUTPATIENT)
Dept: DISASTER EMERGENCY | Facility: CLINIC | Age: 71
End: 2021-01-01

## 2021-01-01 ENCOUNTER — NON-APPOINTMENT (OUTPATIENT)
Age: 71
End: 2021-01-01

## 2021-01-01 ENCOUNTER — OUTPATIENT (OUTPATIENT)
Dept: INPATIENT UNIT | Facility: HOSPITAL | Age: 71
LOS: 1 days | Discharge: HOME | End: 2021-01-01
Payer: MEDICARE

## 2021-01-01 VITALS
TEMPERATURE: 98 F | SYSTOLIC BLOOD PRESSURE: 121 MMHG | OXYGEN SATURATION: 96 % | RESPIRATION RATE: 16 BRPM | DIASTOLIC BLOOD PRESSURE: 76 MMHG | HEART RATE: 68 BPM

## 2021-01-01 VITALS
SYSTOLIC BLOOD PRESSURE: 125 MMHG | RESPIRATION RATE: 16 BRPM | HEART RATE: 63 BPM | HEIGHT: 68 IN | OXYGEN SATURATION: 95 % | DIASTOLIC BLOOD PRESSURE: 86 MMHG | TEMPERATURE: 98 F | WEIGHT: 184.97 LBS

## 2021-01-01 VITALS — BODY MASS INDEX: 28.25 KG/M2 | WEIGHT: 180 LBS | HEIGHT: 67 IN

## 2021-01-01 VITALS
BODY MASS INDEX: 27.89 KG/M2 | WEIGHT: 184 LBS | SYSTOLIC BLOOD PRESSURE: 162 MMHG | HEIGHT: 68 IN | HEART RATE: 70 BPM | DIASTOLIC BLOOD PRESSURE: 87 MMHG

## 2021-01-01 DIAGNOSIS — E78.00 PURE HYPERCHOLESTEROLEMIA, UNSPECIFIED: ICD-10-CM

## 2021-01-01 DIAGNOSIS — F17.200 NICOTINE DEPENDENCE, UNSPECIFIED, UNCOMPLICATED: ICD-10-CM

## 2021-01-01 DIAGNOSIS — Z87.442 PERSONAL HISTORY OF URINARY CALCULI: ICD-10-CM

## 2021-01-01 DIAGNOSIS — Z98.890 OTHER SPECIFIED POSTPROCEDURAL STATES: Chronic | ICD-10-CM

## 2021-01-01 DIAGNOSIS — Z90.49 ACQUIRED ABSENCE OF OTHER SPECIFIED PARTS OF DIGESTIVE TRACT: Chronic | ICD-10-CM

## 2021-01-01 DIAGNOSIS — K40.90 UNILATERAL INGUINAL HERNIA, WITHOUT OBSTRUCTION OR GANGRENE, NOT SPECIFIED AS RECURRENT: ICD-10-CM

## 2021-01-01 DIAGNOSIS — D17.6 BENIGN LIPOMATOUS NEOPLASM OF SPERMATIC CORD: ICD-10-CM

## 2021-01-01 DIAGNOSIS — Z90.49 ACQUIRED ABSENCE OF OTHER SPECIFIED PARTS OF DIGESTIVE TRACT: ICD-10-CM

## 2021-01-01 DIAGNOSIS — I70.90 UNSPECIFIED ATHEROSCLEROSIS: ICD-10-CM

## 2021-01-01 DIAGNOSIS — L40.9 PSORIASIS, UNSPECIFIED: ICD-10-CM

## 2021-01-01 DIAGNOSIS — Z90.79 ACQUIRED ABSENCE OF OTHER GENITAL ORGAN(S): Chronic | ICD-10-CM

## 2021-01-01 DIAGNOSIS — M10.9 GOUT, UNSPECIFIED: ICD-10-CM

## 2021-01-01 DIAGNOSIS — Z87.891 PERSONAL HISTORY OF NICOTINE DEPENDENCE: ICD-10-CM

## 2021-01-01 DIAGNOSIS — Z86.010 PERSONAL HISTORY OF COLONIC POLYPS: ICD-10-CM

## 2021-01-01 DIAGNOSIS — Z01.818 ENCOUNTER FOR OTHER PREPROCEDURAL EXAMINATION: ICD-10-CM

## 2021-01-01 DIAGNOSIS — Z85.46 PERSONAL HISTORY OF MALIGNANT NEOPLASM OF PROSTATE: Chronic | ICD-10-CM

## 2021-01-01 DIAGNOSIS — I34.0 NONRHEUMATIC MITRAL (VALVE) INSUFFICIENCY: ICD-10-CM

## 2021-01-01 LAB
ANION GAP SERPL CALC-SCNC: 6 MMOL/L — SIGNIFICANT CHANGE UP (ref 5–17)
BASOPHILS # BLD AUTO: 0.04 K/UL — SIGNIFICANT CHANGE UP (ref 0–0.2)
BASOPHILS NFR BLD AUTO: 0.7 % — SIGNIFICANT CHANGE UP (ref 0–2)
BUN SERPL-MCNC: 19 MG/DL — SIGNIFICANT CHANGE UP (ref 7–23)
CALCIUM SERPL-MCNC: 9.1 MG/DL — SIGNIFICANT CHANGE UP (ref 8.5–10.1)
CHLORIDE SERPL-SCNC: 105 MMOL/L — SIGNIFICANT CHANGE UP (ref 96–108)
CO2 SERPL-SCNC: 26 MMOL/L — SIGNIFICANT CHANGE UP (ref 22–31)
CREAT SERPL-MCNC: 0.9 MG/DL — SIGNIFICANT CHANGE UP (ref 0.5–1.3)
EOSINOPHIL # BLD AUTO: 0.11 K/UL — SIGNIFICANT CHANGE UP (ref 0–0.5)
EOSINOPHIL NFR BLD AUTO: 2 % — SIGNIFICANT CHANGE UP (ref 0–6)
GLUCOSE SERPL-MCNC: 103 MG/DL — HIGH (ref 70–99)
HCT VFR BLD CALC: 46.4 % — SIGNIFICANT CHANGE UP (ref 39–50)
HGB BLD-MCNC: 15.2 G/DL — SIGNIFICANT CHANGE UP (ref 13–17)
IMM GRANULOCYTES NFR BLD AUTO: 1.7 % — HIGH (ref 0–1.5)
LYMPHOCYTES # BLD AUTO: 0.82 K/UL — LOW (ref 1–3.3)
LYMPHOCYTES # BLD AUTO: 15 % — SIGNIFICANT CHANGE UP (ref 13–44)
MCHC RBC-ENTMCNC: 28.4 PG — SIGNIFICANT CHANGE UP (ref 27–34)
MCHC RBC-ENTMCNC: 32.8 GM/DL — SIGNIFICANT CHANGE UP (ref 32–36)
MCV RBC AUTO: 86.6 FL — SIGNIFICANT CHANGE UP (ref 80–100)
MONOCYTES # BLD AUTO: 0.47 K/UL — SIGNIFICANT CHANGE UP (ref 0–0.9)
MONOCYTES NFR BLD AUTO: 8.6 % — SIGNIFICANT CHANGE UP (ref 2–14)
MRSA PCR RESULT.: SIGNIFICANT CHANGE UP
NEUTROPHILS # BLD AUTO: 3.92 K/UL — SIGNIFICANT CHANGE UP (ref 1.8–7.4)
NEUTROPHILS NFR BLD AUTO: 72 % — SIGNIFICANT CHANGE UP (ref 43–77)
PLATELET # BLD AUTO: 173 K/UL — SIGNIFICANT CHANGE UP (ref 150–400)
POTASSIUM SERPL-MCNC: 4.3 MMOL/L — SIGNIFICANT CHANGE UP (ref 3.5–5.3)
POTASSIUM SERPL-SCNC: 4.3 MMOL/L — SIGNIFICANT CHANGE UP (ref 3.5–5.3)
RBC # BLD: 5.36 M/UL — SIGNIFICANT CHANGE UP (ref 4.2–5.8)
RBC # FLD: 13.2 % — SIGNIFICANT CHANGE UP (ref 10.3–14.5)
S AUREUS DNA NOSE QL NAA+PROBE: SIGNIFICANT CHANGE UP
SARS-COV-2 N GENE NPH QL NAA+PROBE: NOT DETECTED
SARS-COV-2 N GENE NPH QL NAA+PROBE: NOT DETECTED
SODIUM SERPL-SCNC: 137 MMOL/L — SIGNIFICANT CHANGE UP (ref 135–145)
WBC # BLD: 5.45 K/UL — SIGNIFICANT CHANGE UP (ref 3.8–10.5)
WBC # FLD AUTO: 5.45 K/UL — SIGNIFICANT CHANGE UP (ref 3.8–10.5)

## 2021-01-01 PROCEDURE — 85025 COMPLETE CBC W/AUTO DIFF WBC: CPT

## 2021-01-01 PROCEDURE — 99202 OFFICE O/P NEW SF 15 MIN: CPT

## 2021-01-01 PROCEDURE — 87640 STAPH A DNA AMP PROBE: CPT

## 2021-01-01 PROCEDURE — 36415 COLL VENOUS BLD VENIPUNCTURE: CPT

## 2021-01-01 PROCEDURE — 88304 TISSUE EXAM BY PATHOLOGIST: CPT | Mod: 26

## 2021-01-01 PROCEDURE — 71271 CT THORAX LUNG CANCER SCR C-: CPT | Mod: 26,MH

## 2021-01-01 PROCEDURE — 80048 BASIC METABOLIC PNL TOTAL CA: CPT

## 2021-01-01 PROCEDURE — 87641 MR-STAPH DNA AMP PROBE: CPT

## 2021-01-01 PROCEDURE — 45385 COLONOSCOPY W/LESION REMOVAL: CPT

## 2021-01-01 PROCEDURE — 71271 CT THORAX LUNG CANCER SCR C-: CPT | Mod: MH

## 2021-01-01 PROCEDURE — C1781: CPT

## 2021-01-01 PROCEDURE — 45380 COLONOSCOPY AND BIOPSY: CPT | Mod: 59

## 2021-01-01 PROCEDURE — 93010 ELECTROCARDIOGRAM REPORT: CPT

## 2021-01-01 PROCEDURE — 88304 TISSUE EXAM BY PATHOLOGIST: CPT

## 2021-01-01 PROCEDURE — 93005 ELECTROCARDIOGRAM TRACING: CPT

## 2021-01-01 RX ORDER — FENTANYL CITRATE 50 UG/ML
50 INJECTION INTRAVENOUS
Refills: 0 | Status: DISCONTINUED | OUTPATIENT
Start: 2021-01-01 | End: 2021-01-01

## 2021-01-01 RX ORDER — SODIUM CHLORIDE 9 MG/ML
1000 INJECTION, SOLUTION INTRAVENOUS
Refills: 0 | Status: DISCONTINUED | OUTPATIENT
Start: 2021-01-01 | End: 2021-01-01

## 2021-01-01 RX ORDER — ONDANSETRON 8 MG/1
4 TABLET, FILM COATED ORAL ONCE
Refills: 0 | Status: DISCONTINUED | OUTPATIENT
Start: 2021-01-01 | End: 2021-01-01

## 2021-01-01 RX ORDER — SODIUM SULFATE, POTASSIUM SULFATE, MAGNESIUM SULFATE 17.5; 3.13; 1.6 G/ML; G/ML; G/ML
17.5-3.13-1.6 SOLUTION, CONCENTRATE ORAL
Qty: 1 | Refills: 0 | Status: ACTIVE | COMMUNITY
Start: 2021-01-01 | End: 1900-01-01

## 2021-01-01 RX ORDER — ASPIRIN/CALCIUM CARB/MAGNESIUM 324 MG
1 TABLET ORAL
Qty: 0 | Refills: 0 | DISCHARGE

## 2021-01-01 RX ORDER — OXYCODONE HYDROCHLORIDE 5 MG/1
5 TABLET ORAL ONCE
Refills: 0 | Status: DISCONTINUED | OUTPATIENT
Start: 2021-01-01 | End: 2021-01-01

## 2021-04-15 PROBLEM — Z86.010 PERSONAL HISTORY OF COLONIC POLYPS: Status: ACTIVE | Noted: 2021-01-01

## 2021-04-15 NOTE — HISTORY OF PRESENT ILLNESS
[FreeTextEntry1] : recently had total prostatectomy and radiation for prostate cancer. He has otherwise been well No rectall bleeding no change in bowel habits

## 2021-04-15 NOTE — PHYSICAL EXAM
[General Appearance - Alert] : alert [General Appearance - In No Acute Distress] : in no acute distress [Sclera] : the sclera and conjunctiva were normal [PERRL With Normal Accommodation] : pupils were equal in size, round, and reactive to light [Extraocular Movements] : extraocular movements were intact [Outer Ear] : the ears and nose were normal in appearance [Oropharynx] : the oropharynx was normal [Neck Appearance] : the appearance of the neck was normal [Neck Cervical Mass (___cm)] : no neck mass was observed [Jugular Venous Distention Increased] : there was no jugular-venous distention [Thyroid Diffuse Enlargement] : the thyroid was not enlarged [Thyroid Nodule] : there were no palpable thyroid nodules [Bowel Sounds] : normal bowel sounds [Auscultation Breath Sounds / Voice Sounds] : lungs were clear to auscultation bilaterally [Abdomen Tenderness] : non-tender [Abdomen Soft] : soft [] : no hepato-splenomegaly [Abdomen Mass (___ Cm)] : no abdominal mass palpated [No CVA Tenderness] : no ~M costovertebral angle tenderness [No Spinal Tenderness] : no spinal tenderness [Deep Tendon Reflexes (DTR)] : deep tendon reflexes were 2+ and symmetric [Sensation] : the sensory exam was normal to light touch and pinprick [No Focal Deficits] : no focal deficits [Oriented To Time, Place, And Person] : oriented to person, place, and time [Impaired Insight] : insight and judgment were intact [Affect] : the affect was normal

## 2021-05-05 PROBLEM — Z01.818 PREOP TESTING: Status: ACTIVE | Noted: 2021-01-01

## 2021-09-09 NOTE — ASU PATIENT PROFILE, ADULT - NSICDXPASTMEDICALHX_GEN_ALL_CORE_FT
PAST MEDICAL HISTORY:  Cholecystitis     Cholelithiasis     History of kidney stones     History of renal calculi     Hypercholesterolemia     Inguinal hernia, left     Prostate cancer s/p XRT 1-6-2020 to 1/27/2021    Psoriasis     Valvular heart disease

## 2021-09-09 NOTE — CHART NOTE - NSCHARTNOTEFT_GEN_A_CORE
71 year old male presents to PST for planned left inguinal hernia repair    Plan:  1. PST instructions given ; NPO status instructions to be given by ASU   2. Pt instructed to take following meds with sip of water : none   3. Pt instructed to take routine evening medications unless indicated   4. Stop NSAIDS ( Aspirin Alev Motrin Mobic Diclofenac), herbal supplements , MVI , Vitamin fish oil 7 days prior to surgery  unless   directed by surgeon or cardiologist;   5. Medical Optimization  with Dr Geraldo Burks   6. EZ wash instructions given & mupirocin instructions given  7. Labs EKG CXR as per surgeon request   8. Pt instructed to self quarantine after Covid test   9. Covid Testing scheduled Pt notified and aware  10. Pt denies covid symptoms shortness of breath fever cough

## 2021-09-09 NOTE — ASU PATIENT PROFILE, ADULT - NSICDXPASTSURGICALHX_GEN_ALL_CORE_FT
PAST SURGICAL HISTORY:  History of cholecystectomy 2017    History of lithotripsy     History of prostatectomy 5/2020    S/P colonoscopy with polypectomy Baseline; viv polyps; 2012    S/P hernia repair right inguinal; 5/2015

## 2021-09-16 NOTE — ASU PATIENT PROFILE, ADULT - IS PATIENT PREGNANT?
Dexamethasone Sodium  [] Manual Therapy             Phosphate 40-80 mAmin  [] Aquatic Therapy                   [x] Vasocompression/    [] Other:             Game Ready    Discharge Status:     [] Pt recovered from conditions. Treatment goals were met. [] Pt received maximum benefit. No further therapy indicated at this time. [] Pt to continue exercise/home instructions independently. [] Therapy interrupted due to:    [] Pt has 2 or more no shows/cancels, is discontinued per our policy. [] Pt has completed prescribed number of treatment sessions. [x] Other:  Self Discharge, symptoms have resolved. Electronically signed by Christophe Dickey PT on 9/12/2019 at 7:35 AM      If you have any questions or concerns, please don't hesitate to call.   Thank you for your referral.
not applicable (Male)

## 2021-09-16 NOTE — ASU DISCHARGE PLAN (ADULT/PEDIATRIC) - ASU DC SPECIAL INSTRUCTIONSFT
- Remove the dressing in POD 2.  - Keep stristrips in place.  - Wash the wound with soap and water daily from POD 2.  - Stristrips will fall by themselves.  - No heavy lifting more than 10 Ib for at least 8 weeks.  - Please come for follow up with Dr. Lay in his office after 2 weeks.

## 2021-09-16 NOTE — ASU DISCHARGE PLAN (ADULT/PEDIATRIC) - CARE PROVIDER_API CALL
Daniel Lay)  Surgery; Surgical Critical Care  [address] [place] [address]  Phone: [phone]  Fax: [phone]  Follow Up Time: 2 weeks

## 2021-09-16 NOTE — BRIEF OPERATIVE NOTE - NSICDXBRIEFPROCEDURE_GEN_ALL_CORE_FT
PROCEDURES:  Repair, hernia, inguinal, open, using mesh, adult 16-Sep-2021 09:26:27  Gideon Rodrigues

## 2021-10-14 PROBLEM — Z87.442 PERSONAL HISTORY OF URINARY CALCULI: Chronic | Status: ACTIVE | Noted: 2021-01-01

## 2021-10-14 PROBLEM — C61 MALIGNANT NEOPLASM OF PROSTATE: Chronic | Status: ACTIVE | Noted: 2021-01-01

## 2021-10-20 PROBLEM — Z87.891 FORMER SMOKER: Status: ACTIVE | Noted: 2018-04-30

## 2021-10-20 NOTE — HISTORY OF PRESENT ILLNESS
[TextBox_13] : Referred by Dr. Dwain Burks.\par \par Mr. PENNY is a 71 year old male with a history of high cholesterol, prostate CA s/p total prostatectomy and RT.\par \par He was called to review eligibility for Low-Dose CT lung cancer screening.  Reviewed and confirmed that the patient meets screening eligibility criteria:\par \par 71 years old \par \par Smoking Status: Former smoker\par \par Number of pack(s) per day: 1\par Number of years smoked: 40\par Number of pack years smokin\par \par Number of years since quitting smokin\par Quit year: \par \par Mr. PENNY denies any symptoms of lung cancer, including new cough, change in cough, hemoptysis, and unintentional weight loss.\par \par Mr. PENNY denies any personal history of lung cancer.  No lung cancer in a first degree relative.  Denies any history of lung disease or any history of occupational exposures.

## 2022-01-01 ENCOUNTER — INPATIENT (INPATIENT)
Facility: HOSPITAL | Age: 72
LOS: 32 days | DRG: 870 | End: 2022-04-02
Attending: INTERNAL MEDICINE | Admitting: FAMILY MEDICINE
Payer: MEDICARE

## 2022-01-01 VITALS
SYSTOLIC BLOOD PRESSURE: 169 MMHG | RESPIRATION RATE: 20 BRPM | HEIGHT: 68 IN | HEART RATE: 108 BPM | TEMPERATURE: 98 F | OXYGEN SATURATION: 98 % | DIASTOLIC BLOOD PRESSURE: 88 MMHG | WEIGHT: 179.9 LBS

## 2022-01-01 VITALS — RESPIRATION RATE: 34 BRPM | HEART RATE: 91 BPM | OXYGEN SATURATION: 62 %

## 2022-01-01 DIAGNOSIS — Z98.890 OTHER SPECIFIED POSTPROCEDURAL STATES: Chronic | ICD-10-CM

## 2022-01-01 DIAGNOSIS — U07.1 COVID-19: ICD-10-CM

## 2022-01-01 DIAGNOSIS — Z90.79 ACQUIRED ABSENCE OF OTHER GENITAL ORGAN(S): Chronic | ICD-10-CM

## 2022-01-01 DIAGNOSIS — Z90.49 ACQUIRED ABSENCE OF OTHER SPECIFIED PARTS OF DIGESTIVE TRACT: Chronic | ICD-10-CM

## 2022-01-01 LAB
24R-OH-CALCIDIOL SERPL-MCNC: 54.2 NG/ML — SIGNIFICANT CHANGE UP (ref 30–80)
ADD ON TEST-SPECIMEN IN LAB: SIGNIFICANT CHANGE UP
ALBUMIN SERPL ELPH-MCNC: 2.6 G/DL — LOW (ref 3.3–5)
ALBUMIN SERPL ELPH-MCNC: 2.6 G/DL — LOW (ref 3.3–5)
ALBUMIN SERPL ELPH-MCNC: 2.7 G/DL — LOW (ref 3.3–5)
ALBUMIN SERPL ELPH-MCNC: 2.8 G/DL — LOW (ref 3.3–5)
ALBUMIN SERPL ELPH-MCNC: 2.9 G/DL — LOW (ref 3.3–5)
ALBUMIN SERPL ELPH-MCNC: 2.9 G/DL — LOW (ref 3.3–5)
ALBUMIN SERPL ELPH-MCNC: 3 G/DL — LOW (ref 3.3–5)
ALBUMIN SERPL ELPH-MCNC: 3.3 G/DL — SIGNIFICANT CHANGE UP (ref 3.3–5)
ALDOST SERPL-MCNC: 24.7 NG/DL — HIGH
ALP SERPL-CCNC: 108 U/L — SIGNIFICANT CHANGE UP (ref 40–120)
ALP SERPL-CCNC: 109 U/L — SIGNIFICANT CHANGE UP (ref 40–120)
ALP SERPL-CCNC: 162 U/L — HIGH (ref 40–120)
ALP SERPL-CCNC: 68 U/L — SIGNIFICANT CHANGE UP (ref 40–120)
ALP SERPL-CCNC: 77 U/L — SIGNIFICANT CHANGE UP (ref 40–120)
ALP SERPL-CCNC: 78 U/L — SIGNIFICANT CHANGE UP (ref 40–120)
ALP SERPL-CCNC: 81 U/L — SIGNIFICANT CHANGE UP (ref 40–120)
ALP SERPL-CCNC: 82 U/L — SIGNIFICANT CHANGE UP (ref 40–120)
ALP SERPL-CCNC: 85 U/L — SIGNIFICANT CHANGE UP (ref 40–120)
ALP SERPL-CCNC: 95 U/L — SIGNIFICANT CHANGE UP (ref 40–120)
ALP SERPL-CCNC: 95 U/L — SIGNIFICANT CHANGE UP (ref 40–120)
ALT FLD-CCNC: 118 U/L — HIGH (ref 12–78)
ALT FLD-CCNC: 132 U/L — HIGH (ref 12–78)
ALT FLD-CCNC: 61 U/L — SIGNIFICANT CHANGE UP (ref 12–78)
ALT FLD-CCNC: 62 U/L — SIGNIFICANT CHANGE UP (ref 12–78)
ALT FLD-CCNC: 65 U/L — SIGNIFICANT CHANGE UP (ref 12–78)
ALT FLD-CCNC: 66 U/L — SIGNIFICANT CHANGE UP (ref 12–78)
ALT FLD-CCNC: 68 U/L — SIGNIFICANT CHANGE UP (ref 12–78)
ALT FLD-CCNC: 74 U/L — SIGNIFICANT CHANGE UP (ref 12–78)
ALT FLD-CCNC: 88 U/L — HIGH (ref 12–78)
ALT FLD-CCNC: 88 U/L — HIGH (ref 12–78)
ALT FLD-CCNC: 95 U/L — HIGH (ref 12–78)
ANION GAP SERPL CALC-SCNC: -1 MMOL/L — LOW (ref 5–17)
ANION GAP SERPL CALC-SCNC: 0 MMOL/L — LOW (ref 5–17)
ANION GAP SERPL CALC-SCNC: 1 MMOL/L — LOW (ref 5–17)
ANION GAP SERPL CALC-SCNC: 1 MMOL/L — LOW (ref 5–17)
ANION GAP SERPL CALC-SCNC: 2 MMOL/L — LOW (ref 5–17)
ANION GAP SERPL CALC-SCNC: 4 MMOL/L — LOW (ref 5–17)
ANION GAP SERPL CALC-SCNC: 5 MMOL/L — SIGNIFICANT CHANGE UP (ref 5–17)
ANION GAP SERPL CALC-SCNC: 6 MMOL/L — SIGNIFICANT CHANGE UP (ref 5–17)
ANION GAP SERPL CALC-SCNC: 7 MMOL/L — SIGNIFICANT CHANGE UP (ref 5–17)
ANION GAP SERPL CALC-SCNC: 8 MMOL/L — SIGNIFICANT CHANGE UP (ref 5–17)
ANION GAP SERPL CALC-SCNC: 9 MMOL/L — SIGNIFICANT CHANGE UP (ref 5–17)
ANION GAP SERPL CALC-SCNC: 9 MMOL/L — SIGNIFICANT CHANGE UP (ref 5–17)
APTT BLD: 25.3 SEC — LOW (ref 27.5–35.5)
APTT BLD: 35.9 SEC — HIGH (ref 27.5–35.5)
APTT BLD: 53.7 SEC — HIGH (ref 27.5–35.5)
APTT BLD: 69 SEC — HIGH (ref 27.5–35.5)
APTT BLD: 86.1 SEC — HIGH (ref 27.5–35.5)
AST SERPL-CCNC: 24 U/L — SIGNIFICANT CHANGE UP (ref 15–37)
AST SERPL-CCNC: 25 U/L — SIGNIFICANT CHANGE UP (ref 15–37)
AST SERPL-CCNC: 25 U/L — SIGNIFICANT CHANGE UP (ref 15–37)
AST SERPL-CCNC: 26 U/L — SIGNIFICANT CHANGE UP (ref 15–37)
AST SERPL-CCNC: 27 U/L — SIGNIFICANT CHANGE UP (ref 15–37)
AST SERPL-CCNC: 30 U/L — SIGNIFICANT CHANGE UP (ref 15–37)
AST SERPL-CCNC: 30 U/L — SIGNIFICANT CHANGE UP (ref 15–37)
AST SERPL-CCNC: 37 U/L — SIGNIFICANT CHANGE UP (ref 15–37)
AST SERPL-CCNC: 40 U/L — HIGH (ref 15–37)
AST SERPL-CCNC: 51 U/L — HIGH (ref 15–37)
AST SERPL-CCNC: 55 U/L — HIGH (ref 15–37)
BASE EXCESS BLDA CALC-SCNC: -1 MMOL/L — SIGNIFICANT CHANGE UP (ref -2–3)
BASE EXCESS BLDA CALC-SCNC: -2.1 MMOL/L — LOW (ref -2–3)
BASE EXCESS BLDA CALC-SCNC: 0.8 MMOL/L — SIGNIFICANT CHANGE UP (ref -2–3)
BASE EXCESS BLDA CALC-SCNC: 10.1 MMOL/L — HIGH (ref -2–3)
BASE EXCESS BLDA CALC-SCNC: 10.6 MMOL/L — HIGH (ref -2–3)
BASE EXCESS BLDA CALC-SCNC: 12.1 MMOL/L — HIGH (ref -2–3)
BASE EXCESS BLDA CALC-SCNC: 13.7 MMOL/L — HIGH (ref -2–3)
BASE EXCESS BLDA CALC-SCNC: 15.1 MMOL/L — HIGH (ref -2–3)
BASE EXCESS BLDA CALC-SCNC: 15.3 MMOL/L — HIGH (ref -2–3)
BASE EXCESS BLDA CALC-SCNC: 15.9 MMOL/L — HIGH (ref -2–3)
BASE EXCESS BLDA CALC-SCNC: 17.5 MMOL/L — HIGH (ref -2–3)
BASE EXCESS BLDA CALC-SCNC: 17.6 MMOL/L — HIGH (ref -2–3)
BASE EXCESS BLDA CALC-SCNC: 2.1 MMOL/L — SIGNIFICANT CHANGE UP (ref -2–3)
BASE EXCESS BLDA CALC-SCNC: 2.4 MMOL/L — SIGNIFICANT CHANGE UP (ref -2–3)
BASE EXCESS BLDA CALC-SCNC: 6.9 MMOL/L — HIGH (ref -2–3)
BASE EXCESS BLDA CALC-SCNC: SIGNIFICANT CHANGE UP MMOL/L (ref -2–3)
BASOPHILS # BLD AUTO: 0 K/UL — SIGNIFICANT CHANGE UP (ref 0–0.2)
BASOPHILS # BLD AUTO: 0.04 K/UL — SIGNIFICANT CHANGE UP (ref 0–0.2)
BASOPHILS NFR BLD AUTO: 0 % — SIGNIFICANT CHANGE UP (ref 0–2)
BASOPHILS NFR BLD AUTO: 0.4 % — SIGNIFICANT CHANGE UP (ref 0–2)
BILIRUB DIRECT SERPL-MCNC: 0.2 MG/DL — SIGNIFICANT CHANGE UP (ref 0–0.3)
BILIRUB DIRECT SERPL-MCNC: 0.3 MG/DL — SIGNIFICANT CHANGE UP (ref 0–0.3)
BILIRUB DIRECT SERPL-MCNC: 0.3 MG/DL — SIGNIFICANT CHANGE UP (ref 0–0.3)
BILIRUB DIRECT SERPL-MCNC: 0.4 MG/DL — HIGH (ref 0–0.3)
BILIRUB INDIRECT FLD-MCNC: 0.6 MG/DL — SIGNIFICANT CHANGE UP (ref 0.2–1)
BILIRUB INDIRECT FLD-MCNC: 0.6 MG/DL — SIGNIFICANT CHANGE UP (ref 0.2–1)
BILIRUB INDIRECT FLD-MCNC: 0.7 MG/DL — SIGNIFICANT CHANGE UP (ref 0.2–1)
BILIRUB INDIRECT FLD-MCNC: 0.7 MG/DL — SIGNIFICANT CHANGE UP (ref 0.2–1)
BILIRUB INDIRECT FLD-MCNC: 0.9 MG/DL — SIGNIFICANT CHANGE UP (ref 0.2–1)
BILIRUB INDIRECT FLD-MCNC: 1.1 MG/DL — HIGH (ref 0.2–1)
BILIRUB INDIRECT FLD-MCNC: 1.1 MG/DL — HIGH (ref 0.2–1)
BILIRUB SERPL-MCNC: 0.8 MG/DL — SIGNIFICANT CHANGE UP (ref 0.2–1.2)
BILIRUB SERPL-MCNC: 0.8 MG/DL — SIGNIFICANT CHANGE UP (ref 0.2–1.2)
BILIRUB SERPL-MCNC: 0.9 MG/DL — SIGNIFICANT CHANGE UP (ref 0.2–1.2)
BILIRUB SERPL-MCNC: 1 MG/DL — SIGNIFICANT CHANGE UP (ref 0.2–1.2)
BILIRUB SERPL-MCNC: 1.2 MG/DL — SIGNIFICANT CHANGE UP (ref 0.2–1.2)
BILIRUB SERPL-MCNC: 1.3 MG/DL — HIGH (ref 0.2–1.2)
BILIRUB SERPL-MCNC: 1.5 MG/DL — HIGH (ref 0.2–1.2)
BILIRUB SERPL-MCNC: 1.9 MG/DL — HIGH (ref 0.2–1.2)
BILIRUB SERPL-MCNC: 1.9 MG/DL — HIGH (ref 0.2–1.2)
BLD GP AB SCN SERPL QL: SIGNIFICANT CHANGE UP
BLOOD GAS COMMENTS ARTERIAL: SIGNIFICANT CHANGE UP
BUN SERPL-MCNC: 10 MG/DL — SIGNIFICANT CHANGE UP (ref 7–23)
BUN SERPL-MCNC: 12 MG/DL — SIGNIFICANT CHANGE UP (ref 7–23)
BUN SERPL-MCNC: 16 MG/DL — SIGNIFICANT CHANGE UP (ref 7–23)
BUN SERPL-MCNC: 17 MG/DL — SIGNIFICANT CHANGE UP (ref 7–23)
BUN SERPL-MCNC: 18 MG/DL — SIGNIFICANT CHANGE UP (ref 7–23)
BUN SERPL-MCNC: 19 MG/DL — SIGNIFICANT CHANGE UP (ref 7–23)
BUN SERPL-MCNC: 19 MG/DL — SIGNIFICANT CHANGE UP (ref 7–23)
BUN SERPL-MCNC: 20 MG/DL — SIGNIFICANT CHANGE UP (ref 7–23)
BUN SERPL-MCNC: 21 MG/DL — SIGNIFICANT CHANGE UP (ref 7–23)
BUN SERPL-MCNC: 22 MG/DL — SIGNIFICANT CHANGE UP (ref 7–23)
BUN SERPL-MCNC: 23 MG/DL — SIGNIFICANT CHANGE UP (ref 7–23)
BUN SERPL-MCNC: 24 MG/DL — HIGH (ref 7–23)
BUN SERPL-MCNC: 25 MG/DL — HIGH (ref 7–23)
BUN SERPL-MCNC: 27 MG/DL — HIGH (ref 7–23)
BUN SERPL-MCNC: 34 MG/DL — HIGH (ref 7–23)
BUN SERPL-MCNC: 63 MG/DL — HIGH (ref 7–23)
BUN SERPL-MCNC: 80 MG/DL — HIGH (ref 7–23)
CALCIUM SERPL-MCNC: 8.2 MG/DL — LOW (ref 8.5–10.1)
CALCIUM SERPL-MCNC: 8.2 MG/DL — LOW (ref 8.5–10.1)
CALCIUM SERPL-MCNC: 8.3 MG/DL — LOW (ref 8.5–10.1)
CALCIUM SERPL-MCNC: 8.3 MG/DL — LOW (ref 8.5–10.1)
CALCIUM SERPL-MCNC: 8.4 MG/DL — LOW (ref 8.5–10.1)
CALCIUM SERPL-MCNC: 8.4 MG/DL — LOW (ref 8.5–10.1)
CALCIUM SERPL-MCNC: 8.5 MG/DL — SIGNIFICANT CHANGE UP (ref 8.5–10.1)
CALCIUM SERPL-MCNC: 8.6 MG/DL — SIGNIFICANT CHANGE UP (ref 8.5–10.1)
CALCIUM SERPL-MCNC: 8.7 MG/DL — SIGNIFICANT CHANGE UP (ref 8.5–10.1)
CALCIUM SERPL-MCNC: 8.8 MG/DL — SIGNIFICANT CHANGE UP (ref 8.5–10.1)
CALCIUM SERPL-MCNC: 8.9 MG/DL — SIGNIFICANT CHANGE UP (ref 8.5–10.1)
CALCIUM SERPL-MCNC: 8.9 MG/DL — SIGNIFICANT CHANGE UP (ref 8.5–10.1)
CALCIUM SERPL-MCNC: 9 MG/DL — SIGNIFICANT CHANGE UP (ref 8.5–10.1)
CALCIUM SERPL-MCNC: 9.1 MG/DL — SIGNIFICANT CHANGE UP (ref 8.5–10.1)
CALCIUM SERPL-MCNC: 9.1 MG/DL — SIGNIFICANT CHANGE UP (ref 8.5–10.1)
CALCIUM SERPL-MCNC: 9.2 MG/DL — SIGNIFICANT CHANGE UP (ref 8.5–10.1)
CALCIUM SERPL-MCNC: 9.2 MG/DL — SIGNIFICANT CHANGE UP (ref 8.5–10.1)
CALCIUM SERPL-MCNC: 9.3 MG/DL — SIGNIFICANT CHANGE UP (ref 8.5–10.1)
CALCIUM SERPL-MCNC: 9.4 MG/DL — SIGNIFICANT CHANGE UP (ref 8.5–10.1)
CALCIUM SERPL-MCNC: 9.6 MG/DL — SIGNIFICANT CHANGE UP (ref 8.5–10.1)
CHLORIDE SERPL-SCNC: 100 MMOL/L — SIGNIFICANT CHANGE UP (ref 96–108)
CHLORIDE SERPL-SCNC: 101 MMOL/L — SIGNIFICANT CHANGE UP (ref 96–108)
CHLORIDE SERPL-SCNC: 102 MMOL/L — SIGNIFICANT CHANGE UP (ref 96–108)
CHLORIDE SERPL-SCNC: 104 MMOL/L — SIGNIFICANT CHANGE UP (ref 96–108)
CHLORIDE SERPL-SCNC: 105 MMOL/L — SIGNIFICANT CHANGE UP (ref 96–108)
CHLORIDE SERPL-SCNC: 105 MMOL/L — SIGNIFICANT CHANGE UP (ref 96–108)
CHLORIDE SERPL-SCNC: 106 MMOL/L — SIGNIFICANT CHANGE UP (ref 96–108)
CHLORIDE SERPL-SCNC: 106 MMOL/L — SIGNIFICANT CHANGE UP (ref 96–108)
CHLORIDE SERPL-SCNC: 107 MMOL/L — SIGNIFICANT CHANGE UP (ref 96–108)
CHLORIDE SERPL-SCNC: 108 MMOL/L — SIGNIFICANT CHANGE UP (ref 96–108)
CHLORIDE SERPL-SCNC: 94 MMOL/L — LOW (ref 96–108)
CHLORIDE SERPL-SCNC: 94 MMOL/L — LOW (ref 96–108)
CHLORIDE SERPL-SCNC: 95 MMOL/L — LOW (ref 96–108)
CHLORIDE SERPL-SCNC: 95 MMOL/L — LOW (ref 96–108)
CHLORIDE SERPL-SCNC: 96 MMOL/L — SIGNIFICANT CHANGE UP (ref 96–108)
CHLORIDE SERPL-SCNC: 97 MMOL/L — SIGNIFICANT CHANGE UP (ref 96–108)
CHLORIDE SERPL-SCNC: 98 MMOL/L — SIGNIFICANT CHANGE UP (ref 96–108)
CHLORIDE SERPL-SCNC: 98 MMOL/L — SIGNIFICANT CHANGE UP (ref 96–108)
CHOLEST SERPL-MCNC: 147 MG/DL — SIGNIFICANT CHANGE UP
CO2 BLDA-SCNC: 25 MMOL/L — HIGH (ref 19–24)
CO2 BLDA-SCNC: 27 MMOL/L — HIGH (ref 19–24)
CO2 BLDA-SCNC: 29 MMOL/L — HIGH (ref 19–24)
CO2 BLDA-SCNC: 29 MMOL/L — HIGH (ref 19–24)
CO2 BLDA-SCNC: 32 MMOL/L — HIGH (ref 19–24)
CO2 BLDA-SCNC: 40 MMOL/L — HIGH (ref 19–24)
CO2 BLDA-SCNC: 41 MMOL/L — HIGH (ref 19–24)
CO2 BLDA-SCNC: 42 MMOL/L — HIGH (ref 19–24)
CO2 BLDA-SCNC: 42 MMOL/L — HIGH (ref 19–24)
CO2 BLDA-SCNC: 46 MMOL/L — HIGH (ref 19–24)
CO2 BLDA-SCNC: 47 MMOL/L — HIGH (ref 19–24)
CO2 BLDA-SCNC: 47 MMOL/L — HIGH (ref 19–24)
CO2 BLDA-SCNC: 48 MMOL/L — HIGH (ref 19–24)
CO2 BLDA-SCNC: 49 MMOL/L — HIGH (ref 19–24)
CO2 BLDA-SCNC: 50 MMOL/L — HIGH (ref 19–24)
CO2 BLDA-SCNC: SIGNIFICANT CHANGE UP MMOL/L (ref 19–24)
CO2 SERPL-SCNC: 24 MMOL/L — SIGNIFICANT CHANGE UP (ref 22–31)
CO2 SERPL-SCNC: 25 MMOL/L — SIGNIFICANT CHANGE UP (ref 22–31)
CO2 SERPL-SCNC: 25 MMOL/L — SIGNIFICANT CHANGE UP (ref 22–31)
CO2 SERPL-SCNC: 26 MMOL/L — SIGNIFICANT CHANGE UP (ref 22–31)
CO2 SERPL-SCNC: 27 MMOL/L — SIGNIFICANT CHANGE UP (ref 22–31)
CO2 SERPL-SCNC: 28 MMOL/L — SIGNIFICANT CHANGE UP (ref 22–31)
CO2 SERPL-SCNC: 29 MMOL/L — SIGNIFICANT CHANGE UP (ref 22–31)
CO2 SERPL-SCNC: 29 MMOL/L — SIGNIFICANT CHANGE UP (ref 22–31)
CO2 SERPL-SCNC: 31 MMOL/L — SIGNIFICANT CHANGE UP (ref 22–31)
CO2 SERPL-SCNC: 31 MMOL/L — SIGNIFICANT CHANGE UP (ref 22–31)
CO2 SERPL-SCNC: 34 MMOL/L — HIGH (ref 22–31)
CO2 SERPL-SCNC: 38 MMOL/L — HIGH (ref 22–31)
CO2 SERPL-SCNC: 40 MMOL/L — HIGH (ref 22–31)
CO2 SERPL-SCNC: 40 MMOL/L — HIGH (ref 22–31)
CO2 SERPL-SCNC: 41 MMOL/L — HIGH (ref 22–31)
CO2 SERPL-SCNC: 42 MMOL/L — HIGH (ref 22–31)
CO2 SERPL-SCNC: 43 MMOL/L — HIGH (ref 22–31)
CO2 SERPL-SCNC: 44 MMOL/L — HIGH (ref 22–31)
CO2 SERPL-SCNC: 45 MMOL/L — CRITICAL HIGH (ref 22–31)
CO2 SERPL-SCNC: 45 MMOL/L — CRITICAL HIGH (ref 22–31)
CO2 SERPL-SCNC: >45 MMOL/L — CRITICAL HIGH (ref 22–31)
CREAT ?TM UR-MCNC: 68 MG/DL — SIGNIFICANT CHANGE UP
CREAT SERPL-MCNC: 0.31 MG/DL — LOW (ref 0.5–1.3)
CREAT SERPL-MCNC: 0.35 MG/DL — LOW (ref 0.5–1.3)
CREAT SERPL-MCNC: 0.35 MG/DL — LOW (ref 0.5–1.3)
CREAT SERPL-MCNC: 0.36 MG/DL — LOW (ref 0.5–1.3)
CREAT SERPL-MCNC: 0.38 MG/DL — LOW (ref 0.5–1.3)
CREAT SERPL-MCNC: 0.39 MG/DL — LOW (ref 0.5–1.3)
CREAT SERPL-MCNC: 0.4 MG/DL — LOW (ref 0.5–1.3)
CREAT SERPL-MCNC: 0.43 MG/DL — LOW (ref 0.5–1.3)
CREAT SERPL-MCNC: 0.44 MG/DL — LOW (ref 0.5–1.3)
CREAT SERPL-MCNC: 0.53 MG/DL — SIGNIFICANT CHANGE UP (ref 0.5–1.3)
CREAT SERPL-MCNC: 0.54 MG/DL — SIGNIFICANT CHANGE UP (ref 0.5–1.3)
CREAT SERPL-MCNC: 0.59 MG/DL — SIGNIFICANT CHANGE UP (ref 0.5–1.3)
CREAT SERPL-MCNC: 0.6 MG/DL — SIGNIFICANT CHANGE UP (ref 0.5–1.3)
CREAT SERPL-MCNC: 0.61 MG/DL — SIGNIFICANT CHANGE UP (ref 0.5–1.3)
CREAT SERPL-MCNC: 0.61 MG/DL — SIGNIFICANT CHANGE UP (ref 0.5–1.3)
CREAT SERPL-MCNC: 0.64 MG/DL — SIGNIFICANT CHANGE UP (ref 0.5–1.3)
CREAT SERPL-MCNC: 0.65 MG/DL — SIGNIFICANT CHANGE UP (ref 0.5–1.3)
CREAT SERPL-MCNC: 0.67 MG/DL — SIGNIFICANT CHANGE UP (ref 0.5–1.3)
CREAT SERPL-MCNC: 0.67 MG/DL — SIGNIFICANT CHANGE UP (ref 0.5–1.3)
CREAT SERPL-MCNC: 0.74 MG/DL — SIGNIFICANT CHANGE UP (ref 0.5–1.3)
CREAT SERPL-MCNC: 0.76 MG/DL — SIGNIFICANT CHANGE UP (ref 0.5–1.3)
CREAT SERPL-MCNC: 0.79 MG/DL — SIGNIFICANT CHANGE UP (ref 0.5–1.3)
CREAT SERPL-MCNC: 0.79 MG/DL — SIGNIFICANT CHANGE UP (ref 0.5–1.3)
CREAT SERPL-MCNC: 0.8 MG/DL — SIGNIFICANT CHANGE UP (ref 0.5–1.3)
CREAT SERPL-MCNC: 0.8 MG/DL — SIGNIFICANT CHANGE UP (ref 0.5–1.3)
CREAT SERPL-MCNC: 0.84 MG/DL — SIGNIFICANT CHANGE UP (ref 0.5–1.3)
CREAT SERPL-MCNC: 0.85 MG/DL — SIGNIFICANT CHANGE UP (ref 0.5–1.3)
CREAT SERPL-MCNC: 0.86 MG/DL — SIGNIFICANT CHANGE UP (ref 0.5–1.3)
CREAT SERPL-MCNC: 0.88 MG/DL — SIGNIFICANT CHANGE UP (ref 0.5–1.3)
CREAT SERPL-MCNC: 0.89 MG/DL — SIGNIFICANT CHANGE UP (ref 0.5–1.3)
CREAT SERPL-MCNC: 0.9 MG/DL — SIGNIFICANT CHANGE UP (ref 0.5–1.3)
CREAT SERPL-MCNC: 1.03 MG/DL — SIGNIFICANT CHANGE UP (ref 0.5–1.3)
CREAT SERPL-MCNC: 1.74 MG/DL — HIGH (ref 0.5–1.3)
CREAT SERPL-MCNC: 2.74 MG/DL — HIGH (ref 0.5–1.3)
CRP SERPL-MCNC: 40 MG/L — HIGH
CRP SERPL-MCNC: 48 MG/L — HIGH
CRP SERPL-MCNC: 79 MG/L — HIGH
CRP SERPL-MCNC: 8 MG/L — HIGH
CRP SERPL-MCNC: 99 MG/L — HIGH
CRP SERPL-MCNC: <3 MG/L — SIGNIFICANT CHANGE UP
CULTURE RESULTS: SIGNIFICANT CHANGE UP
D DIMER BLD IA.RAPID-MCNC: 324 NG/ML DDU — HIGH
D DIMER BLD IA.RAPID-MCNC: 365 NG/ML DDU — HIGH
D DIMER BLD IA.RAPID-MCNC: 378 NG/ML DDU — HIGH
D DIMER BLD IA.RAPID-MCNC: 538 NG/ML DDU — HIGH
D DIMER BLD IA.RAPID-MCNC: 567 NG/ML DDU — HIGH
D DIMER BLD IA.RAPID-MCNC: 622 NG/ML DDU — HIGH
D DIMER BLD IA.RAPID-MCNC: 733 NG/ML DDU — HIGH
EGFR: 100 ML/MIN/1.73M2 — SIGNIFICANT CHANGE UP
EGFR: 100 ML/MIN/1.73M2 — SIGNIFICANT CHANGE UP
EGFR: 101 ML/MIN/1.73M2 — SIGNIFICANT CHANGE UP
EGFR: 102 ML/MIN/1.73M2 — SIGNIFICANT CHANGE UP
EGFR: 103 ML/MIN/1.73M2 — SIGNIFICANT CHANGE UP
EGFR: 103 ML/MIN/1.73M2 — SIGNIFICANT CHANGE UP
EGFR: 104 ML/MIN/1.73M2 — SIGNIFICANT CHANGE UP
EGFR: 104 ML/MIN/1.73M2 — SIGNIFICANT CHANGE UP
EGFR: 107 ML/MIN/1.73M2 — SIGNIFICANT CHANGE UP
EGFR: 108 ML/MIN/1.73M2 — SIGNIFICANT CHANGE UP
EGFR: 114 ML/MIN/1.73M2 — SIGNIFICANT CHANGE UP
EGFR: 115 ML/MIN/1.73M2 — SIGNIFICANT CHANGE UP
EGFR: 117 ML/MIN/1.73M2 — SIGNIFICANT CHANGE UP
EGFR: 118 ML/MIN/1.73M2 — SIGNIFICANT CHANGE UP
EGFR: 119 ML/MIN/1.73M2 — SIGNIFICANT CHANGE UP
EGFR: 121 ML/MIN/1.73M2 — SIGNIFICANT CHANGE UP
EGFR: 122 ML/MIN/1.73M2 — SIGNIFICANT CHANGE UP
EGFR: 122 ML/MIN/1.73M2 — SIGNIFICANT CHANGE UP
EGFR: 127 ML/MIN/1.73M2 — SIGNIFICANT CHANGE UP
EGFR: 24 ML/MIN/1.73M2 — LOW
EGFR: 42 ML/MIN/1.73M2 — LOW
EGFR: 92 ML/MIN/1.73M2 — SIGNIFICANT CHANGE UP
EGFR: 92 ML/MIN/1.73M2 — SIGNIFICANT CHANGE UP
EGFR: 93 ML/MIN/1.73M2 — SIGNIFICANT CHANGE UP
EGFR: 94 ML/MIN/1.73M2 — SIGNIFICANT CHANGE UP
EGFR: 95 ML/MIN/1.73M2 — SIGNIFICANT CHANGE UP
EGFR: 95 ML/MIN/1.73M2 — SIGNIFICANT CHANGE UP
EGFR: 96 ML/MIN/1.73M2 — SIGNIFICANT CHANGE UP
EGFR: 96 ML/MIN/1.73M2 — SIGNIFICANT CHANGE UP
EGFR: 97 ML/MIN/1.73M2 — SIGNIFICANT CHANGE UP
EGFR: 97 ML/MIN/1.73M2 — SIGNIFICANT CHANGE UP
EOSINOPHIL # BLD AUTO: 0 K/UL — SIGNIFICANT CHANGE UP (ref 0–0.5)
EOSINOPHIL # BLD AUTO: 0.06 K/UL — SIGNIFICANT CHANGE UP (ref 0–0.5)
EOSINOPHIL NFR BLD AUTO: 0 % — SIGNIFICANT CHANGE UP (ref 0–6)
EOSINOPHIL NFR BLD AUTO: 0.6 % — SIGNIFICANT CHANGE UP (ref 0–6)
ERYTHROCYTE [SEDIMENTATION RATE] IN BLOOD: 3 MM/HR — SIGNIFICANT CHANGE UP (ref 0–20)
ERYTHROCYTE [SEDIMENTATION RATE] IN BLOOD: 66 MM/HR — HIGH (ref 0–20)
FERRITIN SERPL-MCNC: 1055 NG/ML — HIGH (ref 30–400)
FERRITIN SERPL-MCNC: 1068 NG/ML — HIGH (ref 30–400)
FERRITIN SERPL-MCNC: 1102 NG/ML — HIGH (ref 30–400)
FERRITIN SERPL-MCNC: 1176 NG/ML — HIGH (ref 30–400)
FERRITIN SERPL-MCNC: 1224 NG/ML — HIGH (ref 30–400)
FERRITIN SERPL-MCNC: 972 NG/ML — HIGH (ref 30–400)
FERRITIN SERPL-MCNC: 973 NG/ML — HIGH (ref 30–400)
FLUAV AG NPH QL: SIGNIFICANT CHANGE UP
FLUBV AG NPH QL: SIGNIFICANT CHANGE UP
FUNGITELL: <31 PG/ML — SIGNIFICANT CHANGE UP
GAS PNL BLDA: SIGNIFICANT CHANGE UP
GLUCOSE SERPL-MCNC: 100 MG/DL — HIGH (ref 70–99)
GLUCOSE SERPL-MCNC: 107 MG/DL — HIGH (ref 70–99)
GLUCOSE SERPL-MCNC: 108 MG/DL — HIGH (ref 70–99)
GLUCOSE SERPL-MCNC: 108 MG/DL — HIGH (ref 70–99)
GLUCOSE SERPL-MCNC: 112 MG/DL — HIGH (ref 70–99)
GLUCOSE SERPL-MCNC: 113 MG/DL — HIGH (ref 70–99)
GLUCOSE SERPL-MCNC: 116 MG/DL — HIGH (ref 70–99)
GLUCOSE SERPL-MCNC: 120 MG/DL — HIGH (ref 70–99)
GLUCOSE SERPL-MCNC: 121 MG/DL — HIGH (ref 70–99)
GLUCOSE SERPL-MCNC: 129 MG/DL — HIGH (ref 70–99)
GLUCOSE SERPL-MCNC: 143 MG/DL — HIGH (ref 70–99)
GLUCOSE SERPL-MCNC: 144 MG/DL — HIGH (ref 70–99)
GLUCOSE SERPL-MCNC: 145 MG/DL — HIGH (ref 70–99)
GLUCOSE SERPL-MCNC: 145 MG/DL — HIGH (ref 70–99)
GLUCOSE SERPL-MCNC: 146 MG/DL — HIGH (ref 70–99)
GLUCOSE SERPL-MCNC: 154 MG/DL — HIGH (ref 70–99)
GLUCOSE SERPL-MCNC: 155 MG/DL — HIGH (ref 70–99)
GLUCOSE SERPL-MCNC: 156 MG/DL — HIGH (ref 70–99)
GLUCOSE SERPL-MCNC: 162 MG/DL — HIGH (ref 70–99)
GLUCOSE SERPL-MCNC: 164 MG/DL — HIGH (ref 70–99)
GLUCOSE SERPL-MCNC: 165 MG/DL — HIGH (ref 70–99)
GLUCOSE SERPL-MCNC: 169 MG/DL — HIGH (ref 70–99)
GLUCOSE SERPL-MCNC: 170 MG/DL — HIGH (ref 70–99)
GLUCOSE SERPL-MCNC: 171 MG/DL — HIGH (ref 70–99)
GLUCOSE SERPL-MCNC: 176 MG/DL — HIGH (ref 70–99)
GLUCOSE SERPL-MCNC: 177 MG/DL — HIGH (ref 70–99)
GLUCOSE SERPL-MCNC: 179 MG/DL — HIGH (ref 70–99)
GLUCOSE SERPL-MCNC: 98 MG/DL — SIGNIFICANT CHANGE UP (ref 70–99)
GRAM STN FLD: SIGNIFICANT CHANGE UP
HCO3 BLDA-SCNC: 24 MMOL/L — SIGNIFICANT CHANGE UP (ref 21–28)
HCO3 BLDA-SCNC: 26 MMOL/L — SIGNIFICANT CHANGE UP (ref 21–28)
HCO3 BLDA-SCNC: 27 MMOL/L — SIGNIFICANT CHANGE UP (ref 21–28)
HCO3 BLDA-SCNC: 27 MMOL/L — SIGNIFICANT CHANGE UP (ref 21–28)
HCO3 BLDA-SCNC: 30 MMOL/L — HIGH (ref 21–28)
HCO3 BLDA-SCNC: 38 MMOL/L — HIGH (ref 21–28)
HCO3 BLDA-SCNC: 39 MMOL/L — HIGH (ref 21–28)
HCO3 BLDA-SCNC: 39 MMOL/L — HIGH (ref 21–28)
HCO3 BLDA-SCNC: 40 MMOL/L — HIGH (ref 21–28)
HCO3 BLDA-SCNC: 43 MMOL/L — HIGH (ref 21–28)
HCO3 BLDA-SCNC: 44 MMOL/L — HIGH (ref 21–28)
HCO3 BLDA-SCNC: 45 MMOL/L — CRITICAL HIGH (ref 21–28)
HCO3 BLDA-SCNC: 45 MMOL/L — CRITICAL HIGH (ref 21–28)
HCO3 BLDA-SCNC: 46 MMOL/L — CRITICAL HIGH (ref 21–28)
HCO3 BLDA-SCNC: 48 MMOL/L — CRITICAL HIGH (ref 21–28)
HCO3 BLDA-SCNC: SIGNIFICANT CHANGE UP MMOL/L (ref 21–28)
HCT VFR BLD CALC: 23.9 % — LOW (ref 39–50)
HCT VFR BLD CALC: 24.3 % — LOW (ref 39–50)
HCT VFR BLD CALC: 24.9 % — LOW (ref 39–50)
HCT VFR BLD CALC: 27.2 % — LOW (ref 39–50)
HCT VFR BLD CALC: 27.5 % — LOW (ref 39–50)
HCT VFR BLD CALC: 27.9 % — LOW (ref 39–50)
HCT VFR BLD CALC: 28.6 % — LOW (ref 39–50)
HCT VFR BLD CALC: 29.4 % — LOW (ref 39–50)
HCT VFR BLD CALC: 29.9 % — LOW (ref 39–50)
HCT VFR BLD CALC: 30.3 % — LOW (ref 39–50)
HCT VFR BLD CALC: 30.4 % — LOW (ref 39–50)
HCT VFR BLD CALC: 32.9 % — LOW (ref 39–50)
HCT VFR BLD CALC: 34.8 % — LOW (ref 39–50)
HCT VFR BLD CALC: 36.9 % — LOW (ref 39–50)
HCT VFR BLD CALC: 37.4 % — LOW (ref 39–50)
HCT VFR BLD CALC: 40.2 % — SIGNIFICANT CHANGE UP (ref 39–50)
HCT VFR BLD CALC: 40.3 % — SIGNIFICANT CHANGE UP (ref 39–50)
HCT VFR BLD CALC: 40.5 % — SIGNIFICANT CHANGE UP (ref 39–50)
HCT VFR BLD CALC: 40.6 % — SIGNIFICANT CHANGE UP (ref 39–50)
HCT VFR BLD CALC: 40.8 % — SIGNIFICANT CHANGE UP (ref 39–50)
HCT VFR BLD CALC: 41.5 % — SIGNIFICANT CHANGE UP (ref 39–50)
HCT VFR BLD CALC: 41.8 % — SIGNIFICANT CHANGE UP (ref 39–50)
HCT VFR BLD CALC: 42.2 % — SIGNIFICANT CHANGE UP (ref 39–50)
HCT VFR BLD CALC: 42.3 % — SIGNIFICANT CHANGE UP (ref 39–50)
HCT VFR BLD CALC: 44.2 % — SIGNIFICANT CHANGE UP (ref 39–50)
HCT VFR BLD CALC: 45.2 % — SIGNIFICANT CHANGE UP (ref 39–50)
HCT VFR BLD CALC: 46 % — SIGNIFICANT CHANGE UP (ref 39–50)
HCT VFR BLD CALC: 47 % — SIGNIFICANT CHANGE UP (ref 39–50)
HCT VFR BLD CALC: 48.4 % — SIGNIFICANT CHANGE UP (ref 39–50)
HCV AB S/CO SERPL IA: 0.08 S/CO — SIGNIFICANT CHANGE UP (ref 0–0.99)
HCV AB SERPL-IMP: SIGNIFICANT CHANGE UP
HDLC SERPL-MCNC: 37 MG/DL — LOW
HGB BLD-MCNC: 10 G/DL — LOW (ref 13–17)
HGB BLD-MCNC: 10.4 G/DL — LOW (ref 13–17)
HGB BLD-MCNC: 11 G/DL — LOW (ref 13–17)
HGB BLD-MCNC: 11.7 G/DL — LOW (ref 13–17)
HGB BLD-MCNC: 12.6 G/DL — LOW (ref 13–17)
HGB BLD-MCNC: 13.2 G/DL — SIGNIFICANT CHANGE UP (ref 13–17)
HGB BLD-MCNC: 13.3 G/DL — SIGNIFICANT CHANGE UP (ref 13–17)
HGB BLD-MCNC: 13.5 G/DL — SIGNIFICANT CHANGE UP (ref 13–17)
HGB BLD-MCNC: 13.8 G/DL — SIGNIFICANT CHANGE UP (ref 13–17)
HGB BLD-MCNC: 13.9 G/DL — SIGNIFICANT CHANGE UP (ref 13–17)
HGB BLD-MCNC: 14 G/DL — SIGNIFICANT CHANGE UP (ref 13–17)
HGB BLD-MCNC: 14.3 G/DL — SIGNIFICANT CHANGE UP (ref 13–17)
HGB BLD-MCNC: 15 G/DL — SIGNIFICANT CHANGE UP (ref 13–17)
HGB BLD-MCNC: 15.1 G/DL — SIGNIFICANT CHANGE UP (ref 13–17)
HGB BLD-MCNC: 15.5 G/DL — SIGNIFICANT CHANGE UP (ref 13–17)
HGB BLD-MCNC: 16.4 G/DL — SIGNIFICANT CHANGE UP (ref 13–17)
HGB BLD-MCNC: 16.5 G/DL — SIGNIFICANT CHANGE UP (ref 13–17)
HGB BLD-MCNC: 7.1 G/DL — LOW (ref 13–17)
HGB BLD-MCNC: 7.2 G/DL — LOW (ref 13–17)
HGB BLD-MCNC: 7.4 G/DL — LOW (ref 13–17)
HGB BLD-MCNC: 8 G/DL — LOW (ref 13–17)
HGB BLD-MCNC: 8.3 G/DL — LOW (ref 13–17)
HGB BLD-MCNC: 8.5 G/DL — LOW (ref 13–17)
HGB BLD-MCNC: 8.6 G/DL — LOW (ref 13–17)
HGB BLD-MCNC: 9 G/DL — LOW (ref 13–17)
HGB BLD-MCNC: 9 G/DL — LOW (ref 13–17)
HGB BLD-MCNC: 9.5 G/DL — LOW (ref 13–17)
IMM GRANULOCYTES NFR BLD AUTO: 3.9 % — HIGH (ref 0–1.5)
INR BLD: 1.15 RATIO — SIGNIFICANT CHANGE UP (ref 0.88–1.16)
INR BLD: 1.16 RATIO — SIGNIFICANT CHANGE UP (ref 0.88–1.16)
INR BLD: 1.17 RATIO — HIGH (ref 0.88–1.16)
INR BLD: 1.17 RATIO — HIGH (ref 0.88–1.16)
INR BLD: 1.18 RATIO — HIGH (ref 0.88–1.16)
INR BLD: 1.19 RATIO — HIGH (ref 0.88–1.16)
INR BLD: 1.2 RATIO — HIGH (ref 0.88–1.16)
INR BLD: 1.21 RATIO — HIGH (ref 0.88–1.16)
INR BLD: 1.22 RATIO — HIGH (ref 0.88–1.16)
INR BLD: 1.22 RATIO — HIGH (ref 0.88–1.16)
INR BLD: 1.23 RATIO — HIGH (ref 0.88–1.16)
INR BLD: 1.23 RATIO — HIGH (ref 0.88–1.16)
INR BLD: 1.3 RATIO — HIGH (ref 0.88–1.16)
LACTATE SERPL-SCNC: 3.4 MMOL/L — HIGH (ref 0.7–2)
LDH SERPL L TO P-CCNC: 493 U/L — HIGH (ref 84–241)
LDH SERPL L TO P-CCNC: 571 U/L — HIGH (ref 84–241)
LDH SERPL L TO P-CCNC: 583 U/L — HIGH (ref 84–241)
LDH SERPL L TO P-CCNC: 589 U/L — HIGH (ref 84–241)
LDH SERPL L TO P-CCNC: 614 U/L — HIGH (ref 84–241)
LDH SERPL L TO P-CCNC: 628 U/L — HIGH (ref 84–241)
LDH SERPL L TO P-CCNC: 652 U/L — HIGH (ref 84–241)
LIPID PNL WITH DIRECT LDL SERPL: 56 MG/DL — SIGNIFICANT CHANGE UP
LYMPHOCYTES # BLD AUTO: 0.25 K/UL — LOW (ref 1–3.3)
LYMPHOCYTES # BLD AUTO: 0.47 K/UL — LOW (ref 1–3.3)
LYMPHOCYTES # BLD AUTO: 0.49 K/UL — LOW (ref 1–3.3)
LYMPHOCYTES # BLD AUTO: 0.53 K/UL — LOW (ref 1–3.3)
LYMPHOCYTES # BLD AUTO: 0.57 K/UL — LOW (ref 1–3.3)
LYMPHOCYTES # BLD AUTO: 2 % — LOW (ref 13–44)
LYMPHOCYTES # BLD AUTO: 2 % — LOW (ref 13–44)
LYMPHOCYTES # BLD AUTO: 4 % — LOW (ref 13–44)
LYMPHOCYTES # BLD AUTO: 5 % — LOW (ref 13–44)
LYMPHOCYTES # BLD AUTO: 5.2 % — LOW (ref 13–44)
MAGNESIUM SERPL-MCNC: 2.5 MG/DL — SIGNIFICANT CHANGE UP (ref 1.6–2.6)
MAGNESIUM SERPL-MCNC: 2.6 MG/DL — SIGNIFICANT CHANGE UP (ref 1.6–2.6)
MAGNESIUM SERPL-MCNC: 2.7 MG/DL — HIGH (ref 1.6–2.6)
MAGNESIUM SERPL-MCNC: 2.8 MG/DL — HIGH (ref 1.6–2.6)
MAGNESIUM SERPL-MCNC: 2.9 MG/DL — HIGH (ref 1.6–2.6)
MAGNESIUM SERPL-MCNC: 3.2 MG/DL — HIGH (ref 1.6–2.6)
MAGNESIUM SERPL-MCNC: 3.3 MG/DL — HIGH (ref 1.6–2.6)
MCHC RBC-ENTMCNC: 28.1 GM/DL — LOW (ref 32–36)
MCHC RBC-ENTMCNC: 28.7 PG — SIGNIFICANT CHANGE UP (ref 27–34)
MCHC RBC-ENTMCNC: 28.8 PG — SIGNIFICANT CHANGE UP (ref 27–34)
MCHC RBC-ENTMCNC: 28.8 PG — SIGNIFICANT CHANGE UP (ref 27–34)
MCHC RBC-ENTMCNC: 28.9 PG — SIGNIFICANT CHANGE UP (ref 27–34)
MCHC RBC-ENTMCNC: 29 PG — SIGNIFICANT CHANGE UP (ref 27–34)
MCHC RBC-ENTMCNC: 29.1 PG — SIGNIFICANT CHANGE UP (ref 27–34)
MCHC RBC-ENTMCNC: 29.2 PG — SIGNIFICANT CHANGE UP (ref 27–34)
MCHC RBC-ENTMCNC: 29.3 PG — SIGNIFICANT CHANGE UP (ref 27–34)
MCHC RBC-ENTMCNC: 29.4 GM/DL — LOW (ref 32–36)
MCHC RBC-ENTMCNC: 29.4 PG — SIGNIFICANT CHANGE UP (ref 27–34)
MCHC RBC-ENTMCNC: 29.5 PG — SIGNIFICANT CHANGE UP (ref 27–34)
MCHC RBC-ENTMCNC: 29.6 GM/DL — LOW (ref 32–36)
MCHC RBC-ENTMCNC: 29.6 PG — SIGNIFICANT CHANGE UP (ref 27–34)
MCHC RBC-ENTMCNC: 29.7 GM/DL — LOW (ref 32–36)
MCHC RBC-ENTMCNC: 29.7 GM/DL — LOW (ref 32–36)
MCHC RBC-ENTMCNC: 29.7 PG — SIGNIFICANT CHANGE UP (ref 27–34)
MCHC RBC-ENTMCNC: 29.8 PG — SIGNIFICANT CHANGE UP (ref 27–34)
MCHC RBC-ENTMCNC: 29.8 PG — SIGNIFICANT CHANGE UP (ref 27–34)
MCHC RBC-ENTMCNC: 29.9 PG — SIGNIFICANT CHANGE UP (ref 27–34)
MCHC RBC-ENTMCNC: 30.2 GM/DL — LOW (ref 32–36)
MCHC RBC-ENTMCNC: 30.5 PG — SIGNIFICANT CHANGE UP (ref 27–34)
MCHC RBC-ENTMCNC: 30.6 GM/DL — LOW (ref 32–36)
MCHC RBC-ENTMCNC: 30.8 GM/DL — LOW (ref 32–36)
MCHC RBC-ENTMCNC: 31.3 GM/DL — LOW (ref 32–36)
MCHC RBC-ENTMCNC: 31.5 GM/DL — LOW (ref 32–36)
MCHC RBC-ENTMCNC: 31.6 GM/DL — LOW (ref 32–36)
MCHC RBC-ENTMCNC: 31.6 GM/DL — LOW (ref 32–36)
MCHC RBC-ENTMCNC: 31.8 GM/DL — LOW (ref 32–36)
MCHC RBC-ENTMCNC: 32.8 GM/DL — SIGNIFICANT CHANGE UP (ref 32–36)
MCHC RBC-ENTMCNC: 32.9 GM/DL — SIGNIFICANT CHANGE UP (ref 32–36)
MCHC RBC-ENTMCNC: 33 GM/DL — SIGNIFICANT CHANGE UP (ref 32–36)
MCHC RBC-ENTMCNC: 33.1 GM/DL — SIGNIFICANT CHANGE UP (ref 32–36)
MCHC RBC-ENTMCNC: 33.2 GM/DL — SIGNIFICANT CHANGE UP (ref 32–36)
MCHC RBC-ENTMCNC: 33.3 GM/DL — SIGNIFICANT CHANGE UP (ref 32–36)
MCHC RBC-ENTMCNC: 33.4 GM/DL — SIGNIFICANT CHANGE UP (ref 32–36)
MCHC RBC-ENTMCNC: 33.7 GM/DL — SIGNIFICANT CHANGE UP (ref 32–36)
MCHC RBC-ENTMCNC: 33.7 GM/DL — SIGNIFICANT CHANGE UP (ref 32–36)
MCHC RBC-ENTMCNC: 33.9 GM/DL — SIGNIFICANT CHANGE UP (ref 32–36)
MCHC RBC-ENTMCNC: 34 GM/DL — SIGNIFICANT CHANGE UP (ref 32–36)
MCHC RBC-ENTMCNC: 34.1 GM/DL — SIGNIFICANT CHANGE UP (ref 32–36)
MCHC RBC-ENTMCNC: 34.2 GM/DL — SIGNIFICANT CHANGE UP (ref 32–36)
MCHC RBC-ENTMCNC: 34.9 GM/DL — SIGNIFICANT CHANGE UP (ref 32–36)
MCV RBC AUTO: 100.8 FL — HIGH (ref 80–100)
MCV RBC AUTO: 103.8 FL — HIGH (ref 80–100)
MCV RBC AUTO: 84.7 FL — SIGNIFICANT CHANGE UP (ref 80–100)
MCV RBC AUTO: 84.9 FL — SIGNIFICANT CHANGE UP (ref 80–100)
MCV RBC AUTO: 85 FL — SIGNIFICANT CHANGE UP (ref 80–100)
MCV RBC AUTO: 85.3 FL — SIGNIFICANT CHANGE UP (ref 80–100)
MCV RBC AUTO: 85.4 FL — SIGNIFICANT CHANGE UP (ref 80–100)
MCV RBC AUTO: 85.8 FL — SIGNIFICANT CHANGE UP (ref 80–100)
MCV RBC AUTO: 86 FL — SIGNIFICANT CHANGE UP (ref 80–100)
MCV RBC AUTO: 86.3 FL — SIGNIFICANT CHANGE UP (ref 80–100)
MCV RBC AUTO: 86.4 FL — SIGNIFICANT CHANGE UP (ref 80–100)
MCV RBC AUTO: 86.6 FL — SIGNIFICANT CHANGE UP (ref 80–100)
MCV RBC AUTO: 86.9 FL — SIGNIFICANT CHANGE UP (ref 80–100)
MCV RBC AUTO: 87.2 FL — SIGNIFICANT CHANGE UP (ref 80–100)
MCV RBC AUTO: 87.2 FL — SIGNIFICANT CHANGE UP (ref 80–100)
MCV RBC AUTO: 87.6 FL — SIGNIFICANT CHANGE UP (ref 80–100)
MCV RBC AUTO: 88.8 FL — SIGNIFICANT CHANGE UP (ref 80–100)
MCV RBC AUTO: 91.3 FL — SIGNIFICANT CHANGE UP (ref 80–100)
MCV RBC AUTO: 92 FL — SIGNIFICANT CHANGE UP (ref 80–100)
MCV RBC AUTO: 92.2 FL — SIGNIFICANT CHANGE UP (ref 80–100)
MCV RBC AUTO: 92.6 FL — SIGNIFICANT CHANGE UP (ref 80–100)
MCV RBC AUTO: 92.7 FL — SIGNIFICANT CHANGE UP (ref 80–100)
MCV RBC AUTO: 94.4 FL — SIGNIFICANT CHANGE UP (ref 80–100)
MCV RBC AUTO: 96.5 FL — SIGNIFICANT CHANGE UP (ref 80–100)
MCV RBC AUTO: 97.4 FL — SIGNIFICANT CHANGE UP (ref 80–100)
MCV RBC AUTO: 97.5 FL — SIGNIFICANT CHANGE UP (ref 80–100)
MCV RBC AUTO: 97.5 FL — SIGNIFICANT CHANGE UP (ref 80–100)
MCV RBC AUTO: 98.4 FL — SIGNIFICANT CHANGE UP (ref 80–100)
MCV RBC AUTO: 99.6 FL — SIGNIFICANT CHANGE UP (ref 80–100)
MONOCYTES # BLD AUTO: 0.28 K/UL — SIGNIFICANT CHANGE UP (ref 0–0.9)
MONOCYTES # BLD AUTO: 0.28 K/UL — SIGNIFICANT CHANGE UP (ref 0–0.9)
MONOCYTES # BLD AUTO: 0.4 K/UL — SIGNIFICANT CHANGE UP (ref 0–0.9)
MONOCYTES # BLD AUTO: 0.51 K/UL — SIGNIFICANT CHANGE UP (ref 0–0.9)
MONOCYTES # BLD AUTO: 0.74 K/UL — SIGNIFICANT CHANGE UP (ref 0–0.9)
MONOCYTES NFR BLD AUTO: 2 % — SIGNIFICANT CHANGE UP (ref 2–14)
MONOCYTES NFR BLD AUTO: 3 % — SIGNIFICANT CHANGE UP (ref 2–14)
MONOCYTES NFR BLD AUTO: 3 % — SIGNIFICANT CHANGE UP (ref 2–14)
MONOCYTES NFR BLD AUTO: 3.9 % — SIGNIFICANT CHANGE UP (ref 2–14)
MONOCYTES NFR BLD AUTO: 4 % — SIGNIFICANT CHANGE UP (ref 2–14)
NEUTROPHILS # BLD AUTO: 11.93 K/UL — HIGH (ref 1.8–7.4)
NEUTROPHILS # BLD AUTO: 13.28 K/UL — HIGH (ref 1.8–7.4)
NEUTROPHILS # BLD AUTO: 22.62 K/UL — HIGH (ref 1.8–7.4)
NEUTROPHILS # BLD AUTO: 8.68 K/UL — HIGH (ref 1.8–7.4)
NEUTROPHILS # BLD AUTO: 8.86 K/UL — HIGH (ref 1.8–7.4)
NEUTROPHILS NFR BLD AUTO: 86 % — HIGH (ref 43–77)
NEUTROPHILS NFR BLD AUTO: 92 % — HIGH (ref 43–77)
NEUTROPHILS NFR BLD AUTO: 92 % — HIGH (ref 43–77)
NEUTROPHILS NFR BLD AUTO: 93 % — HIGH (ref 43–77)
NEUTROPHILS NFR BLD AUTO: 94 % — HIGH (ref 43–77)
NON HDL CHOLESTEROL: 110 MG/DL — SIGNIFICANT CHANGE UP
NRBC # BLD: 1 /100 WBCS — HIGH (ref 0–0)
NRBC # BLD: 2 /100 WBCS — HIGH (ref 0–0)
NRBC # BLD: SIGNIFICANT CHANGE UP /100 WBCS (ref 0–0)
OSMOLALITY SERPL: 284 MOS/KG — SIGNIFICANT CHANGE UP (ref 280–301)
OSMOLALITY UR: 609 MOSM/KG — SIGNIFICANT CHANGE UP (ref 50–1200)
PCO2 BLDA: 34 MMHG — LOW (ref 35–48)
PCO2 BLDA: 35 MMHG — SIGNIFICANT CHANGE UP (ref 35–48)
PCO2 BLDA: 59 MMHG — HIGH (ref 35–48)
PCO2 BLDA: 60 MMHG — HIGH (ref 35–48)
PCO2 BLDA: 64 MMHG — HIGH (ref 35–48)
PCO2 BLDA: 66 MMHG — HIGH (ref 35–48)
PCO2 BLDA: 67 MMHG — HIGH (ref 35–48)
PCO2 BLDA: 68 MMHG — HIGH (ref 35–48)
PCO2 BLDA: 75 MMHG — CRITICAL HIGH (ref 35–48)
PCO2 BLDA: 76 MMHG — CRITICAL HIGH (ref 35–48)
PCO2 BLDA: 77 MMHG — CRITICAL HIGH (ref 35–48)
PCO2 BLDA: 78 MMHG — CRITICAL HIGH (ref 35–48)
PCO2 BLDA: 84 MMHG — CRITICAL HIGH (ref 35–48)
PCO2 BLDA: 86 MMHG — CRITICAL HIGH (ref 35–48)
PCO2 BLDA: 99 MMHG — CRITICAL HIGH (ref 35–48)
PCO2 BLDA: >125 MMHG — SIGNIFICANT CHANGE UP (ref 35–48)
PH BLDA: 7.18 — CRITICAL LOW (ref 7.35–7.45)
PH BLDA: 7.2 — CRITICAL LOW (ref 7.35–7.45)
PH BLDA: 7.23 — LOW (ref 7.35–7.45)
PH BLDA: 7.27 — LOW (ref 7.35–7.45)
PH BLDA: 7.31 — LOW (ref 7.35–7.45)
PH BLDA: 7.33 — LOW (ref 7.35–7.45)
PH BLDA: 7.35 — SIGNIFICANT CHANGE UP (ref 7.35–7.45)
PH BLDA: 7.36 — SIGNIFICANT CHANGE UP (ref 7.35–7.45)
PH BLDA: 7.36 — SIGNIFICANT CHANGE UP (ref 7.35–7.45)
PH BLDA: 7.37 — SIGNIFICANT CHANGE UP (ref 7.35–7.45)
PH BLDA: 7.37 — SIGNIFICANT CHANGE UP (ref 7.35–7.45)
PH BLDA: 7.38 — SIGNIFICANT CHANGE UP (ref 7.35–7.45)
PH BLDA: 7.39 — SIGNIFICANT CHANGE UP (ref 7.35–7.45)
PH BLDA: 7.4 — SIGNIFICANT CHANGE UP (ref 7.35–7.45)
PH BLDA: 7.46 — HIGH (ref 7.35–7.45)
PH BLDA: 7.47 — HIGH (ref 7.35–7.45)
PHOSPHATE SERPL-MCNC: 2 MG/DL — LOW (ref 2.5–4.5)
PHOSPHATE SERPL-MCNC: 2.2 MG/DL — LOW (ref 2.5–4.5)
PHOSPHATE SERPL-MCNC: 2.5 MG/DL — SIGNIFICANT CHANGE UP (ref 2.5–4.5)
PHOSPHATE SERPL-MCNC: 2.6 MG/DL — SIGNIFICANT CHANGE UP (ref 2.5–4.5)
PHOSPHATE SERPL-MCNC: 2.7 MG/DL — SIGNIFICANT CHANGE UP (ref 2.5–4.5)
PHOSPHATE SERPL-MCNC: 2.8 MG/DL — SIGNIFICANT CHANGE UP (ref 2.5–4.5)
PHOSPHATE SERPL-MCNC: 2.9 MG/DL — SIGNIFICANT CHANGE UP (ref 2.5–4.5)
PHOSPHATE SERPL-MCNC: 3 MG/DL — SIGNIFICANT CHANGE UP (ref 2.5–4.5)
PHOSPHATE SERPL-MCNC: 3.3 MG/DL — SIGNIFICANT CHANGE UP (ref 2.5–4.5)
PHOSPHATE SERPL-MCNC: 3.4 MG/DL — SIGNIFICANT CHANGE UP (ref 2.5–4.5)
PHOSPHATE SERPL-MCNC: 3.5 MG/DL — SIGNIFICANT CHANGE UP (ref 2.5–4.5)
PHOSPHATE SERPL-MCNC: 3.6 MG/DL — SIGNIFICANT CHANGE UP (ref 2.5–4.5)
PHOSPHATE SERPL-MCNC: 5.6 MG/DL — HIGH (ref 2.5–4.5)
PHOSPHATE SERPL-MCNC: 6.2 MG/DL — HIGH (ref 2.5–4.5)
PLATELET # BLD AUTO: 101 K/UL — LOW (ref 150–400)
PLATELET # BLD AUTO: 110 K/UL — LOW (ref 150–400)
PLATELET # BLD AUTO: 110 K/UL — LOW (ref 150–400)
PLATELET # BLD AUTO: 124 K/UL — LOW (ref 150–400)
PLATELET # BLD AUTO: 125 K/UL — LOW (ref 150–400)
PLATELET # BLD AUTO: 128 K/UL — LOW (ref 150–400)
PLATELET # BLD AUTO: 140 K/UL — LOW (ref 150–400)
PLATELET # BLD AUTO: 144 K/UL — LOW (ref 150–400)
PLATELET # BLD AUTO: 144 K/UL — LOW (ref 150–400)
PLATELET # BLD AUTO: 146 K/UL — LOW (ref 150–400)
PLATELET # BLD AUTO: 149 K/UL — LOW (ref 150–400)
PLATELET # BLD AUTO: 152 K/UL — SIGNIFICANT CHANGE UP (ref 150–400)
PLATELET # BLD AUTO: 153 K/UL — SIGNIFICANT CHANGE UP (ref 150–400)
PLATELET # BLD AUTO: 158 K/UL — SIGNIFICANT CHANGE UP (ref 150–400)
PLATELET # BLD AUTO: 164 K/UL — SIGNIFICANT CHANGE UP (ref 150–400)
PLATELET # BLD AUTO: 164 K/UL — SIGNIFICANT CHANGE UP (ref 150–400)
PLATELET # BLD AUTO: 165 K/UL — SIGNIFICANT CHANGE UP (ref 150–400)
PLATELET # BLD AUTO: 166 K/UL — SIGNIFICANT CHANGE UP (ref 150–400)
PLATELET # BLD AUTO: 187 K/UL — SIGNIFICANT CHANGE UP (ref 150–400)
PLATELET # BLD AUTO: 216 K/UL — SIGNIFICANT CHANGE UP (ref 150–400)
PLATELET # BLD AUTO: 220 K/UL — SIGNIFICANT CHANGE UP (ref 150–400)
PLATELET # BLD AUTO: 224 K/UL — SIGNIFICANT CHANGE UP (ref 150–400)
PLATELET # BLD AUTO: 259 K/UL — SIGNIFICANT CHANGE UP (ref 150–400)
PLATELET # BLD AUTO: 272 K/UL — SIGNIFICANT CHANGE UP (ref 150–400)
PLATELET # BLD AUTO: 294 K/UL — SIGNIFICANT CHANGE UP (ref 150–400)
PLATELET # BLD AUTO: 299 K/UL — SIGNIFICANT CHANGE UP (ref 150–400)
PLATELET # BLD AUTO: 333 K/UL — SIGNIFICANT CHANGE UP (ref 150–400)
PLATELET # BLD AUTO: 92 K/UL — LOW (ref 150–400)
PLATELET # BLD AUTO: 99 K/UL — LOW (ref 150–400)
PO2 BLDA: 109 MMHG — HIGH (ref 83–108)
PO2 BLDA: 47 MMHG — CRITICAL LOW (ref 83–108)
PO2 BLDA: 53 MMHG — LOW (ref 83–108)
PO2 BLDA: 55 MMHG — LOW (ref 83–108)
PO2 BLDA: 62 MMHG — LOW (ref 83–108)
PO2 BLDA: 65 MMHG — LOW (ref 83–108)
PO2 BLDA: 71 MMHG — LOW (ref 83–108)
PO2 BLDA: 74 MMHG — LOW (ref 83–108)
PO2 BLDA: 77 MMHG — LOW (ref 83–108)
PO2 BLDA: 80 MMHG — LOW (ref 83–108)
PO2 BLDA: 82 MMHG — LOW (ref 83–108)
PO2 BLDA: 84 MMHG — SIGNIFICANT CHANGE UP (ref 83–108)
PO2 BLDA: 90 MMHG — SIGNIFICANT CHANGE UP (ref 83–108)
PO2 BLDA: 91 MMHG — SIGNIFICANT CHANGE UP (ref 83–108)
PO2 BLDA: 93 MMHG — SIGNIFICANT CHANGE UP (ref 83–108)
PO2 BLDA: 97 MMHG — SIGNIFICANT CHANGE UP (ref 83–108)
POTASSIUM SERPL-MCNC: 3.5 MMOL/L — SIGNIFICANT CHANGE UP (ref 3.5–5.3)
POTASSIUM SERPL-MCNC: 3.5 MMOL/L — SIGNIFICANT CHANGE UP (ref 3.5–5.3)
POTASSIUM SERPL-MCNC: 3.6 MMOL/L — SIGNIFICANT CHANGE UP (ref 3.5–5.3)
POTASSIUM SERPL-MCNC: 3.7 MMOL/L — SIGNIFICANT CHANGE UP (ref 3.5–5.3)
POTASSIUM SERPL-MCNC: 3.8 MMOL/L — SIGNIFICANT CHANGE UP (ref 3.5–5.3)
POTASSIUM SERPL-MCNC: 3.8 MMOL/L — SIGNIFICANT CHANGE UP (ref 3.5–5.3)
POTASSIUM SERPL-MCNC: 3.9 MMOL/L — SIGNIFICANT CHANGE UP (ref 3.5–5.3)
POTASSIUM SERPL-MCNC: 4.2 MMOL/L — SIGNIFICANT CHANGE UP (ref 3.5–5.3)
POTASSIUM SERPL-MCNC: 4.3 MMOL/L — SIGNIFICANT CHANGE UP (ref 3.5–5.3)
POTASSIUM SERPL-MCNC: 4.4 MMOL/L — SIGNIFICANT CHANGE UP (ref 3.5–5.3)
POTASSIUM SERPL-MCNC: 4.5 MMOL/L — SIGNIFICANT CHANGE UP (ref 3.5–5.3)
POTASSIUM SERPL-MCNC: 4.7 MMOL/L — SIGNIFICANT CHANGE UP (ref 3.5–5.3)
POTASSIUM SERPL-MCNC: 4.9 MMOL/L — SIGNIFICANT CHANGE UP (ref 3.5–5.3)
POTASSIUM SERPL-MCNC: 4.9 MMOL/L — SIGNIFICANT CHANGE UP (ref 3.5–5.3)
POTASSIUM SERPL-MCNC: 5 MMOL/L — SIGNIFICANT CHANGE UP (ref 3.5–5.3)
POTASSIUM SERPL-MCNC: 5.1 MMOL/L — SIGNIFICANT CHANGE UP (ref 3.5–5.3)
POTASSIUM SERPL-MCNC: 5.1 MMOL/L — SIGNIFICANT CHANGE UP (ref 3.5–5.3)
POTASSIUM SERPL-MCNC: 5.2 MMOL/L — SIGNIFICANT CHANGE UP (ref 3.5–5.3)
POTASSIUM SERPL-SCNC: 3.5 MMOL/L — SIGNIFICANT CHANGE UP (ref 3.5–5.3)
POTASSIUM SERPL-SCNC: 3.5 MMOL/L — SIGNIFICANT CHANGE UP (ref 3.5–5.3)
POTASSIUM SERPL-SCNC: 3.6 MMOL/L — SIGNIFICANT CHANGE UP (ref 3.5–5.3)
POTASSIUM SERPL-SCNC: 3.7 MMOL/L — SIGNIFICANT CHANGE UP (ref 3.5–5.3)
POTASSIUM SERPL-SCNC: 3.8 MMOL/L — SIGNIFICANT CHANGE UP (ref 3.5–5.3)
POTASSIUM SERPL-SCNC: 3.8 MMOL/L — SIGNIFICANT CHANGE UP (ref 3.5–5.3)
POTASSIUM SERPL-SCNC: 3.9 MMOL/L — SIGNIFICANT CHANGE UP (ref 3.5–5.3)
POTASSIUM SERPL-SCNC: 4.2 MMOL/L — SIGNIFICANT CHANGE UP (ref 3.5–5.3)
POTASSIUM SERPL-SCNC: 4.3 MMOL/L — SIGNIFICANT CHANGE UP (ref 3.5–5.3)
POTASSIUM SERPL-SCNC: 4.4 MMOL/L — SIGNIFICANT CHANGE UP (ref 3.5–5.3)
POTASSIUM SERPL-SCNC: 4.5 MMOL/L — SIGNIFICANT CHANGE UP (ref 3.5–5.3)
POTASSIUM SERPL-SCNC: 4.7 MMOL/L — SIGNIFICANT CHANGE UP (ref 3.5–5.3)
POTASSIUM SERPL-SCNC: 4.9 MMOL/L — SIGNIFICANT CHANGE UP (ref 3.5–5.3)
POTASSIUM SERPL-SCNC: 4.9 MMOL/L — SIGNIFICANT CHANGE UP (ref 3.5–5.3)
POTASSIUM SERPL-SCNC: 5 MMOL/L — SIGNIFICANT CHANGE UP (ref 3.5–5.3)
POTASSIUM SERPL-SCNC: 5.1 MMOL/L — SIGNIFICANT CHANGE UP (ref 3.5–5.3)
POTASSIUM SERPL-SCNC: 5.1 MMOL/L — SIGNIFICANT CHANGE UP (ref 3.5–5.3)
POTASSIUM SERPL-SCNC: 5.2 MMOL/L — SIGNIFICANT CHANGE UP (ref 3.5–5.3)
PROCALCITONIN SERPL-MCNC: 0.11 NG/ML — HIGH (ref 0.02–0.1)
PROCALCITONIN SERPL-MCNC: 0.18 NG/ML — HIGH (ref 0.02–0.1)
PROT SERPL-MCNC: 5.8 GM/DL — LOW (ref 6–8.3)
PROT SERPL-MCNC: 5.8 GM/DL — LOW (ref 6–8.3)
PROT SERPL-MCNC: 5.9 GM/DL — LOW (ref 6–8.3)
PROT SERPL-MCNC: 6.3 GM/DL — SIGNIFICANT CHANGE UP (ref 6–8.3)
PROT SERPL-MCNC: 6.6 GM/DL — SIGNIFICANT CHANGE UP (ref 6–8.3)
PROT SERPL-MCNC: 6.7 GM/DL — SIGNIFICANT CHANGE UP (ref 6–8.3)
PROT SERPL-MCNC: 6.8 GM/DL — SIGNIFICANT CHANGE UP (ref 6–8.3)
PROT SERPL-MCNC: 6.9 GM/DL — SIGNIFICANT CHANGE UP (ref 6–8.3)
PROT SERPL-MCNC: 7 GM/DL — SIGNIFICANT CHANGE UP (ref 6–8.3)
PROT SERPL-MCNC: 7.1 GM/DL — SIGNIFICANT CHANGE UP (ref 6–8.3)
PROT SERPL-MCNC: 7.9 GM/DL — SIGNIFICANT CHANGE UP (ref 6–8.3)
PROTHROM AB SERPL-ACNC: 13.4 SEC — SIGNIFICANT CHANGE UP (ref 10.5–13.4)
PROTHROM AB SERPL-ACNC: 13.5 SEC — HIGH (ref 10.5–13.4)
PROTHROM AB SERPL-ACNC: 13.6 SEC — HIGH (ref 10.5–13.4)
PROTHROM AB SERPL-ACNC: 13.6 SEC — HIGH (ref 10.5–13.4)
PROTHROM AB SERPL-ACNC: 13.7 SEC — HIGH (ref 10.5–13.4)
PROTHROM AB SERPL-ACNC: 13.8 SEC — HIGH (ref 10.5–13.4)
PROTHROM AB SERPL-ACNC: 13.9 SEC — HIGH (ref 10.5–13.4)
PROTHROM AB SERPL-ACNC: 14.1 SEC — HIGH (ref 10.5–13.4)
PROTHROM AB SERPL-ACNC: 14.2 SEC — HIGH (ref 10.5–13.4)
PROTHROM AB SERPL-ACNC: 14.2 SEC — HIGH (ref 10.5–13.4)
PROTHROM AB SERPL-ACNC: 14.3 SEC — HIGH (ref 10.5–13.4)
PROTHROM AB SERPL-ACNC: 14.3 SEC — HIGH (ref 10.5–13.4)
PROTHROM AB SERPL-ACNC: 15.1 SEC — HIGH (ref 10.5–13.4)
RAPID RVP RESULT: DETECTED
RBC # BLD: 2.4 M/UL — LOW (ref 4.2–5.8)
RBC # BLD: 2.41 M/UL — LOW (ref 4.2–5.8)
RBC # BLD: 2.53 M/UL — LOW (ref 4.2–5.8)
RBC # BLD: 2.79 M/UL — LOW (ref 4.2–5.8)
RBC # BLD: 2.82 M/UL — LOW (ref 4.2–5.8)
RBC # BLD: 2.89 M/UL — LOW (ref 4.2–5.8)
RBC # BLD: 2.92 M/UL — LOW (ref 4.2–5.8)
RBC # BLD: 3.02 M/UL — LOW (ref 4.2–5.8)
RBC # BLD: 3.03 M/UL — LOW (ref 4.2–5.8)
RBC # BLD: 3.25 M/UL — LOW (ref 4.2–5.8)
RBC # BLD: 3.28 M/UL — LOW (ref 4.2–5.8)
RBC # BLD: 3.57 M/UL — LOW (ref 4.2–5.8)
RBC # BLD: 3.81 M/UL — LOW (ref 4.2–5.8)
RBC # BLD: 4.04 M/UL — LOW (ref 4.2–5.8)
RBC # BLD: 4.29 M/UL — SIGNIFICANT CHANGE UP (ref 4.2–5.8)
RBC # BLD: 4.54 M/UL — SIGNIFICANT CHANGE UP (ref 4.2–5.8)
RBC # BLD: 4.59 M/UL — SIGNIFICANT CHANGE UP (ref 4.2–5.8)
RBC # BLD: 4.66 M/UL — SIGNIFICANT CHANGE UP (ref 4.2–5.8)
RBC # BLD: 4.72 M/UL — SIGNIFICANT CHANGE UP (ref 4.2–5.8)
RBC # BLD: 4.78 M/UL — SIGNIFICANT CHANGE UP (ref 4.2–5.8)
RBC # BLD: 4.79 M/UL — SIGNIFICANT CHANGE UP (ref 4.2–5.8)
RBC # BLD: 4.84 M/UL — SIGNIFICANT CHANGE UP (ref 4.2–5.8)
RBC # BLD: 4.85 M/UL — SIGNIFICANT CHANGE UP (ref 4.2–5.8)
RBC # BLD: 4.92 M/UL — SIGNIFICANT CHANGE UP (ref 4.2–5.8)
RBC # BLD: 5.18 M/UL — SIGNIFICANT CHANGE UP (ref 4.2–5.8)
RBC # BLD: 5.24 M/UL — SIGNIFICANT CHANGE UP (ref 4.2–5.8)
RBC # BLD: 5.36 M/UL — SIGNIFICANT CHANGE UP (ref 4.2–5.8)
RBC # BLD: 5.55 M/UL — SIGNIFICANT CHANGE UP (ref 4.2–5.8)
RBC # BLD: 5.67 M/UL — SIGNIFICANT CHANGE UP (ref 4.2–5.8)
RBC # FLD: 12.9 % — SIGNIFICANT CHANGE UP (ref 10.3–14.5)
RBC # FLD: 13 % — SIGNIFICANT CHANGE UP (ref 10.3–14.5)
RBC # FLD: 13.2 % — SIGNIFICANT CHANGE UP (ref 10.3–14.5)
RBC # FLD: 13.4 % — SIGNIFICANT CHANGE UP (ref 10.3–14.5)
RBC # FLD: 13.5 % — SIGNIFICANT CHANGE UP (ref 10.3–14.5)
RBC # FLD: 13.7 % — SIGNIFICANT CHANGE UP (ref 10.3–14.5)
RBC # FLD: 13.8 % — SIGNIFICANT CHANGE UP (ref 10.3–14.5)
RBC # FLD: 13.9 % — SIGNIFICANT CHANGE UP (ref 10.3–14.5)
RBC # FLD: 13.9 % — SIGNIFICANT CHANGE UP (ref 10.3–14.5)
RBC # FLD: 14.1 % — SIGNIFICANT CHANGE UP (ref 10.3–14.5)
RBC # FLD: 14.2 % — SIGNIFICANT CHANGE UP (ref 10.3–14.5)
RBC # FLD: 14.4 % — SIGNIFICANT CHANGE UP (ref 10.3–14.5)
RBC # FLD: 14.6 % — HIGH (ref 10.3–14.5)
RBC # FLD: 15.2 % — HIGH (ref 10.3–14.5)
RBC # FLD: 15.8 % — HIGH (ref 10.3–14.5)
RBC # FLD: 15.9 % — HIGH (ref 10.3–14.5)
RBC # FLD: 15.9 % — HIGH (ref 10.3–14.5)
RBC # FLD: 16 % — HIGH (ref 10.3–14.5)
RSV RNA NPH QL NAA+NON-PROBE: SIGNIFICANT CHANGE UP
SAO2 % BLDA: 100 % — SIGNIFICANT CHANGE UP
SAO2 % BLDA: 100 % — SIGNIFICANT CHANGE UP
SAO2 % BLDA: 79 % — SIGNIFICANT CHANGE UP
SAO2 % BLDA: 84 % — SIGNIFICANT CHANGE UP
SAO2 % BLDA: 88 % — SIGNIFICANT CHANGE UP
SAO2 % BLDA: 94 % — SIGNIFICANT CHANGE UP
SAO2 % BLDA: 94 % — SIGNIFICANT CHANGE UP
SAO2 % BLDA: 95 % — SIGNIFICANT CHANGE UP
SAO2 % BLDA: 98 % — SIGNIFICANT CHANGE UP
SAO2 % BLDA: 99 % — SIGNIFICANT CHANGE UP
SAO2 % BLDA: 99 % — SIGNIFICANT CHANGE UP
SARS-COV-2 RNA SPEC QL NAA+PROBE: DETECTED
SODIUM SERPL-SCNC: 130 MMOL/L — LOW (ref 135–145)
SODIUM SERPL-SCNC: 131 MMOL/L — LOW (ref 135–145)
SODIUM SERPL-SCNC: 132 MMOL/L — LOW (ref 135–145)
SODIUM SERPL-SCNC: 132 MMOL/L — LOW (ref 135–145)
SODIUM SERPL-SCNC: 133 MMOL/L — LOW (ref 135–145)
SODIUM SERPL-SCNC: 134 MMOL/L — LOW (ref 135–145)
SODIUM SERPL-SCNC: 135 MMOL/L — SIGNIFICANT CHANGE UP (ref 135–145)
SODIUM SERPL-SCNC: 136 MMOL/L — SIGNIFICANT CHANGE UP (ref 135–145)
SODIUM SERPL-SCNC: 137 MMOL/L — SIGNIFICANT CHANGE UP (ref 135–145)
SODIUM SERPL-SCNC: 138 MMOL/L — SIGNIFICANT CHANGE UP (ref 135–145)
SODIUM SERPL-SCNC: 139 MMOL/L — SIGNIFICANT CHANGE UP (ref 135–145)
SODIUM SERPL-SCNC: 140 MMOL/L — SIGNIFICANT CHANGE UP (ref 135–145)
SODIUM SERPL-SCNC: 141 MMOL/L — SIGNIFICANT CHANGE UP (ref 135–145)
SODIUM SERPL-SCNC: 142 MMOL/L — SIGNIFICANT CHANGE UP (ref 135–145)
SODIUM UR-SCNC: 74 MMOL/L — SIGNIFICANT CHANGE UP
SPECIMEN SOURCE: SIGNIFICANT CHANGE UP
TRIGL SERPL-MCNC: 201 MG/DL — HIGH
TRIGL SERPL-MCNC: 269 MG/DL — HIGH
TRIGL SERPL-MCNC: 303 MG/DL — HIGH
TRIGL SERPL-MCNC: 530 MG/DL — HIGH
TROPONIN I, HIGH SENSITIVITY RESULT: 6.4 NG/L — SIGNIFICANT CHANGE UP
VANCOMYCIN TROUGH SERPL-MCNC: 10.8 UG/ML — SIGNIFICANT CHANGE UP (ref 10–20)
VANCOMYCIN TROUGH SERPL-MCNC: 24.2 UG/ML — HIGH (ref 10–20)
VANCOMYCIN TROUGH SERPL-MCNC: 6 UG/ML — LOW (ref 10–20)
WBC # BLD: 10.29 K/UL — SIGNIFICANT CHANGE UP (ref 3.8–10.5)
WBC # BLD: 12.4 K/UL — HIGH (ref 3.8–10.5)
WBC # BLD: 12.4 K/UL — HIGH (ref 3.8–10.5)
WBC # BLD: 12.69 K/UL — HIGH (ref 3.8–10.5)
WBC # BLD: 12.74 K/UL — HIGH (ref 3.8–10.5)
WBC # BLD: 12.79 K/UL — HIGH (ref 3.8–10.5)
WBC # BLD: 13.59 K/UL — HIGH (ref 3.8–10.5)
WBC # BLD: 13.9 K/UL — HIGH (ref 3.8–10.5)
WBC # BLD: 14.05 K/UL — HIGH (ref 3.8–10.5)
WBC # BLD: 14.13 K/UL — HIGH (ref 3.8–10.5)
WBC # BLD: 14.44 K/UL — HIGH (ref 3.8–10.5)
WBC # BLD: 15.12 K/UL — HIGH (ref 3.8–10.5)
WBC # BLD: 15.19 K/UL — HIGH (ref 3.8–10.5)
WBC # BLD: 15.57 K/UL — HIGH (ref 3.8–10.5)
WBC # BLD: 15.94 K/UL — HIGH (ref 3.8–10.5)
WBC # BLD: 16.29 K/UL — HIGH (ref 3.8–10.5)
WBC # BLD: 16.99 K/UL — HIGH (ref 3.8–10.5)
WBC # BLD: 17.66 K/UL — HIGH (ref 3.8–10.5)
WBC # BLD: 18.29 K/UL — HIGH (ref 3.8–10.5)
WBC # BLD: 20.14 K/UL — HIGH (ref 3.8–10.5)
WBC # BLD: 20.3 K/UL — HIGH (ref 3.8–10.5)
WBC # BLD: 20.59 K/UL — HIGH (ref 3.8–10.5)
WBC # BLD: 20.77 K/UL — HIGH (ref 3.8–10.5)
WBC # BLD: 21.14 K/UL — HIGH (ref 3.8–10.5)
WBC # BLD: 24.59 K/UL — HIGH (ref 3.8–10.5)
WBC # BLD: 28.18 K/UL — HIGH (ref 3.8–10.5)
WBC # BLD: 32.85 K/UL — HIGH (ref 3.8–10.5)
WBC # BLD: 9.44 K/UL — SIGNIFICANT CHANGE UP (ref 3.8–10.5)
WBC # BLD: 9.95 K/UL — SIGNIFICANT CHANGE UP (ref 3.8–10.5)
WBC # FLD AUTO: 10.29 K/UL — SIGNIFICANT CHANGE UP (ref 3.8–10.5)
WBC # FLD AUTO: 12.4 K/UL — HIGH (ref 3.8–10.5)
WBC # FLD AUTO: 12.4 K/UL — HIGH (ref 3.8–10.5)
WBC # FLD AUTO: 12.69 K/UL — HIGH (ref 3.8–10.5)
WBC # FLD AUTO: 12.74 K/UL — HIGH (ref 3.8–10.5)
WBC # FLD AUTO: 12.79 K/UL — HIGH (ref 3.8–10.5)
WBC # FLD AUTO: 13.59 K/UL — HIGH (ref 3.8–10.5)
WBC # FLD AUTO: 13.9 K/UL — HIGH (ref 3.8–10.5)
WBC # FLD AUTO: 14.05 K/UL — HIGH (ref 3.8–10.5)
WBC # FLD AUTO: 14.13 K/UL — HIGH (ref 3.8–10.5)
WBC # FLD AUTO: 14.44 K/UL — HIGH (ref 3.8–10.5)
WBC # FLD AUTO: 15.12 K/UL — HIGH (ref 3.8–10.5)
WBC # FLD AUTO: 15.19 K/UL — HIGH (ref 3.8–10.5)
WBC # FLD AUTO: 15.57 K/UL — HIGH (ref 3.8–10.5)
WBC # FLD AUTO: 15.94 K/UL — HIGH (ref 3.8–10.5)
WBC # FLD AUTO: 16.29 K/UL — HIGH (ref 3.8–10.5)
WBC # FLD AUTO: 16.99 K/UL — HIGH (ref 3.8–10.5)
WBC # FLD AUTO: 17.66 K/UL — HIGH (ref 3.8–10.5)
WBC # FLD AUTO: 18.29 K/UL — HIGH (ref 3.8–10.5)
WBC # FLD AUTO: 20.14 K/UL — HIGH (ref 3.8–10.5)
WBC # FLD AUTO: 20.3 K/UL — HIGH (ref 3.8–10.5)
WBC # FLD AUTO: 20.59 K/UL — HIGH (ref 3.8–10.5)
WBC # FLD AUTO: 20.77 K/UL — HIGH (ref 3.8–10.5)
WBC # FLD AUTO: 21.14 K/UL — HIGH (ref 3.8–10.5)
WBC # FLD AUTO: 24.59 K/UL — HIGH (ref 3.8–10.5)
WBC # FLD AUTO: 28.18 K/UL — HIGH (ref 3.8–10.5)
WBC # FLD AUTO: 32.85 K/UL — HIGH (ref 3.8–10.5)
WBC # FLD AUTO: 9.44 K/UL — SIGNIFICANT CHANGE UP (ref 3.8–10.5)
WBC # FLD AUTO: 9.95 K/UL — SIGNIFICANT CHANGE UP (ref 3.8–10.5)

## 2022-01-01 PROCEDURE — C9113: CPT

## 2022-01-01 PROCEDURE — 99291 CRITICAL CARE FIRST HOUR: CPT

## 2022-01-01 PROCEDURE — 99232 SBSQ HOSP IP/OBS MODERATE 35: CPT

## 2022-01-01 PROCEDURE — 99233 SBSQ HOSP IP/OBS HIGH 50: CPT

## 2022-01-01 PROCEDURE — 99292 CRITICAL CARE ADDL 30 MIN: CPT

## 2022-01-01 PROCEDURE — 97116 GAIT TRAINING THERAPY: CPT | Mod: GP

## 2022-01-01 PROCEDURE — 82088 ASSAY OF ALDOSTERONE: CPT

## 2022-01-01 PROCEDURE — 36600 WITHDRAWAL OF ARTERIAL BLOOD: CPT

## 2022-01-01 PROCEDURE — 83880 ASSAY OF NATRIURETIC PEPTIDE: CPT

## 2022-01-01 PROCEDURE — 97162 PT EVAL MOD COMPLEX 30 MIN: CPT | Mod: GP

## 2022-01-01 PROCEDURE — 82565 ASSAY OF CREATININE: CPT

## 2022-01-01 PROCEDURE — 76705 ECHO EXAM OF ABDOMEN: CPT

## 2022-01-01 PROCEDURE — 80053 COMPREHEN METABOLIC PANEL: CPT

## 2022-01-01 PROCEDURE — 36415 COLL VENOUS BLD VENIPUNCTURE: CPT

## 2022-01-01 PROCEDURE — 86803 HEPATITIS C AB TEST: CPT

## 2022-01-01 PROCEDURE — 71045 X-RAY EXAM CHEST 1 VIEW: CPT | Mod: 26

## 2022-01-01 PROCEDURE — 87070 CULTURE OTHR SPECIMN AEROBIC: CPT

## 2022-01-01 PROCEDURE — 82248 BILIRUBIN DIRECT: CPT

## 2022-01-01 PROCEDURE — 85025 COMPLETE CBC W/AUTO DIFF WBC: CPT

## 2022-01-01 PROCEDURE — 99291 CRITICAL CARE FIRST HOUR: CPT | Mod: CS

## 2022-01-01 PROCEDURE — 71045 X-RAY EXAM CHEST 1 VIEW: CPT

## 2022-01-01 PROCEDURE — 94640 AIRWAY INHALATION TREATMENT: CPT

## 2022-01-01 PROCEDURE — 83735 ASSAY OF MAGNESIUM: CPT

## 2022-01-01 PROCEDURE — 80076 HEPATIC FUNCTION PANEL: CPT

## 2022-01-01 PROCEDURE — 97530 THERAPEUTIC ACTIVITIES: CPT | Mod: GP

## 2022-01-01 PROCEDURE — 82803 BLOOD GASES ANY COMBINATION: CPT

## 2022-01-01 PROCEDURE — 83930 ASSAY OF BLOOD OSMOLALITY: CPT

## 2022-01-01 PROCEDURE — 84300 ASSAY OF URINE SODIUM: CPT

## 2022-01-01 PROCEDURE — 83615 LACTATE (LD) (LDH) ENZYME: CPT

## 2022-01-01 PROCEDURE — 82306 VITAMIN D 25 HYDROXY: CPT

## 2022-01-01 PROCEDURE — 71275 CT ANGIOGRAPHY CHEST: CPT

## 2022-01-01 PROCEDURE — 94002 VENT MGMT INPAT INIT DAY: CPT

## 2022-01-01 PROCEDURE — 82570 ASSAY OF URINE CREATININE: CPT

## 2022-01-01 PROCEDURE — 83605 ASSAY OF LACTIC ACID: CPT

## 2022-01-01 PROCEDURE — 80048 BASIC METABOLIC PNL TOTAL CA: CPT

## 2022-01-01 PROCEDURE — 93306 TTE W/DOPPLER COMPLETE: CPT | Mod: 26

## 2022-01-01 PROCEDURE — 80061 LIPID PANEL: CPT

## 2022-01-01 PROCEDURE — 84478 ASSAY OF TRIGLYCERIDES: CPT

## 2022-01-01 PROCEDURE — 85730 THROMBOPLASTIN TIME PARTIAL: CPT

## 2022-01-01 PROCEDURE — 87449 NOS EACH ORGANISM AG IA: CPT

## 2022-01-01 PROCEDURE — U0005: CPT

## 2022-01-01 PROCEDURE — 84100 ASSAY OF PHOSPHORUS: CPT

## 2022-01-01 PROCEDURE — 99223 1ST HOSP IP/OBS HIGH 75: CPT

## 2022-01-01 PROCEDURE — 86901 BLOOD TYPING SEROLOGIC RH(D): CPT

## 2022-01-01 PROCEDURE — 94003 VENT MGMT INPAT SUBQ DAY: CPT

## 2022-01-01 PROCEDURE — 86900 BLOOD TYPING SEROLOGIC ABO: CPT

## 2022-01-01 PROCEDURE — 87040 BLOOD CULTURE FOR BACTERIA: CPT

## 2022-01-01 PROCEDURE — 85027 COMPLETE CBC AUTOMATED: CPT

## 2022-01-01 PROCEDURE — 86140 C-REACTIVE PROTEIN: CPT

## 2022-01-01 PROCEDURE — 71275 CT ANGIOGRAPHY CHEST: CPT | Mod: 26

## 2022-01-01 PROCEDURE — P9016: CPT

## 2022-01-01 PROCEDURE — 83935 ASSAY OF URINE OSMOLALITY: CPT

## 2022-01-01 PROCEDURE — 36430 TRANSFUSION BLD/BLD COMPNT: CPT

## 2022-01-01 PROCEDURE — 82728 ASSAY OF FERRITIN: CPT

## 2022-01-01 PROCEDURE — 93306 TTE W/DOPPLER COMPLETE: CPT

## 2022-01-01 PROCEDURE — 86850 RBC ANTIBODY SCREEN: CPT

## 2022-01-01 PROCEDURE — 76705 ECHO EXAM OF ABDOMEN: CPT | Mod: 26

## 2022-01-01 PROCEDURE — 84145 PROCALCITONIN (PCT): CPT

## 2022-01-01 PROCEDURE — 80202 ASSAY OF VANCOMYCIN: CPT

## 2022-01-01 PROCEDURE — 85610 PROTHROMBIN TIME: CPT

## 2022-01-01 PROCEDURE — 85379 FIBRIN DEGRADATION QUANT: CPT

## 2022-01-01 PROCEDURE — 94660 CPAP INITIATION&MGMT: CPT

## 2022-01-01 PROCEDURE — 85652 RBC SED RATE AUTOMATED: CPT

## 2022-01-01 PROCEDURE — U0003: CPT

## 2022-01-01 PROCEDURE — 71045 X-RAY EXAM CHEST 1 VIEW: CPT | Mod: 26,76

## 2022-01-01 PROCEDURE — 86923 COMPATIBILITY TEST ELECTRIC: CPT

## 2022-01-01 RX ORDER — ROCURONIUM BROMIDE 10 MG/ML
8 VIAL (ML) INTRAVENOUS
Qty: 500 | Refills: 0 | Status: DISCONTINUED | OUTPATIENT
Start: 2022-01-01 | End: 2022-01-01

## 2022-01-01 RX ORDER — FUROSEMIDE 40 MG
20 TABLET ORAL ONCE
Refills: 0 | Status: COMPLETED | OUTPATIENT
Start: 2022-01-01 | End: 2022-01-01

## 2022-01-01 RX ORDER — CISATRACURIUM BESYLATE 2 MG/ML
3 INJECTION INTRAVENOUS
Qty: 200 | Refills: 0 | Status: DISCONTINUED | OUTPATIENT
Start: 2022-01-01 | End: 2022-01-01

## 2022-01-01 RX ORDER — CISATRACURIUM BESYLATE 2 MG/ML
10 INJECTION INTRAVENOUS ONCE
Refills: 0 | Status: COMPLETED | OUTPATIENT
Start: 2022-01-01 | End: 2022-01-01

## 2022-01-01 RX ORDER — ALBUTEROL 90 UG/1
2 AEROSOL, METERED ORAL EVERY 6 HOURS
Refills: 0 | Status: DISCONTINUED | OUTPATIENT
Start: 2022-01-01 | End: 2022-01-01

## 2022-01-01 RX ORDER — ROCURONIUM BROMIDE 10 MG/ML
50 VIAL (ML) INTRAVENOUS ONCE
Refills: 0 | Status: COMPLETED | OUTPATIENT
Start: 2022-01-01 | End: 2022-01-01

## 2022-01-01 RX ORDER — CEFEPIME 1 G/1
INJECTION, POWDER, FOR SOLUTION INTRAMUSCULAR; INTRAVENOUS
Refills: 0 | Status: DISCONTINUED | OUTPATIENT
Start: 2022-01-01 | End: 2022-01-01

## 2022-01-01 RX ORDER — MIDAZOLAM HYDROCHLORIDE 1 MG/ML
2 INJECTION, SOLUTION INTRAMUSCULAR; INTRAVENOUS ONCE
Refills: 0 | Status: DISCONTINUED | OUTPATIENT
Start: 2022-01-01 | End: 2022-01-01

## 2022-01-01 RX ORDER — PROPOFOL 10 MG/ML
25 INJECTION, EMULSION INTRAVENOUS
Qty: 1000 | Refills: 0 | Status: DISCONTINUED | OUTPATIENT
Start: 2022-01-01 | End: 2022-01-01

## 2022-01-01 RX ORDER — MORPHINE SULFATE 50 MG/1
2 CAPSULE, EXTENDED RELEASE ORAL EVERY 4 HOURS
Refills: 0 | Status: DISCONTINUED | OUTPATIENT
Start: 2022-01-01 | End: 2022-01-01

## 2022-01-01 RX ORDER — MEROPENEM 1 G/30ML
1000 INJECTION INTRAVENOUS EVERY 8 HOURS
Refills: 0 | Status: DISCONTINUED | OUTPATIENT
Start: 2022-01-01 | End: 2022-01-01

## 2022-01-01 RX ORDER — CEFEPIME 1 G/1
1000 INJECTION, POWDER, FOR SOLUTION INTRAMUSCULAR; INTRAVENOUS EVERY 12 HOURS
Refills: 0 | Status: DISCONTINUED | OUTPATIENT
Start: 2022-01-01 | End: 2022-01-01

## 2022-01-01 RX ORDER — FENTANYL CITRATE 50 UG/ML
0.5 INJECTION INTRAVENOUS
Qty: 5000 | Refills: 0 | Status: DISCONTINUED | OUTPATIENT
Start: 2022-01-01 | End: 2022-01-01

## 2022-01-01 RX ORDER — PROPOFOL 10 MG/ML
10 INJECTION, EMULSION INTRAVENOUS
Qty: 1000 | Refills: 0 | Status: DISCONTINUED | OUTPATIENT
Start: 2022-01-01 | End: 2022-01-01

## 2022-01-01 RX ORDER — ACETAMINOPHEN 500 MG
2 TABLET ORAL
Qty: 0 | Refills: 0 | DISCHARGE

## 2022-01-01 RX ORDER — TOCILIZUMAB 20 MG/ML
800 INJECTION, SOLUTION, CONCENTRATE INTRAVENOUS ONCE
Refills: 0 | Status: DISCONTINUED | OUTPATIENT
Start: 2022-01-01 | End: 2022-01-01

## 2022-01-01 RX ORDER — DEXMEDETOMIDINE HYDROCHLORIDE IN 0.9% SODIUM CHLORIDE 4 UG/ML
1 INJECTION INTRAVENOUS
Qty: 400 | Refills: 0 | Status: DISCONTINUED | OUTPATIENT
Start: 2022-01-01 | End: 2022-01-01

## 2022-01-01 RX ORDER — SENNA PLUS 8.6 MG/1
2 TABLET ORAL AT BEDTIME
Refills: 0 | Status: DISCONTINUED | OUTPATIENT
Start: 2022-01-01 | End: 2022-01-01

## 2022-01-01 RX ORDER — MORPHINE SULFATE 50 MG/1
1 CAPSULE, EXTENDED RELEASE ORAL EVERY 4 HOURS
Refills: 0 | Status: DISCONTINUED | OUTPATIENT
Start: 2022-01-01 | End: 2022-01-01

## 2022-01-01 RX ORDER — FENTANYL CITRATE 50 UG/ML
50 INJECTION INTRAVENOUS ONCE
Refills: 0 | Status: DISCONTINUED | OUTPATIENT
Start: 2022-01-01 | End: 2022-01-01

## 2022-01-01 RX ORDER — HEPARIN SODIUM 5000 [USP'U]/ML
3000 INJECTION INTRAVENOUS; SUBCUTANEOUS EVERY 6 HOURS
Refills: 0 | Status: DISCONTINUED | OUTPATIENT
Start: 2022-01-01 | End: 2022-01-01

## 2022-01-01 RX ORDER — TOCILIZUMAB 20 MG/ML
650 INJECTION, SOLUTION, CONCENTRATE INTRAVENOUS ONCE
Refills: 0 | Status: COMPLETED | OUTPATIENT
Start: 2022-01-01 | End: 2022-01-01

## 2022-01-01 RX ORDER — MIDAZOLAM HYDROCHLORIDE 1 MG/ML
0.02 INJECTION, SOLUTION INTRAMUSCULAR; INTRAVENOUS
Qty: 100 | Refills: 0 | Status: DISCONTINUED | OUTPATIENT
Start: 2022-01-01 | End: 2022-01-01

## 2022-01-01 RX ORDER — LANOLIN ALCOHOL/MO/W.PET/CERES
3 CREAM (GRAM) TOPICAL AT BEDTIME
Refills: 0 | Status: DISCONTINUED | OUTPATIENT
Start: 2022-01-01 | End: 2022-01-01

## 2022-01-01 RX ORDER — REMDESIVIR 5 MG/ML
100 INJECTION INTRAVENOUS EVERY 24 HOURS
Refills: 0 | Status: COMPLETED | OUTPATIENT
Start: 2022-01-01 | End: 2022-01-01

## 2022-01-01 RX ORDER — NOREPINEPHRINE BITARTRATE/D5W 8 MG/250ML
0.05 PLASTIC BAG, INJECTION (ML) INTRAVENOUS
Qty: 8 | Refills: 0 | Status: DISCONTINUED | OUTPATIENT
Start: 2022-01-01 | End: 2022-01-01

## 2022-01-01 RX ORDER — DEXMEDETOMIDINE HYDROCHLORIDE IN 0.9% SODIUM CHLORIDE 4 UG/ML
0.5 INJECTION INTRAVENOUS
Qty: 200 | Refills: 0 | Status: DISCONTINUED | OUTPATIENT
Start: 2022-01-01 | End: 2022-01-01

## 2022-01-01 RX ORDER — REMDESIVIR 5 MG/ML
INJECTION INTRAVENOUS
Refills: 0 | Status: COMPLETED | OUTPATIENT
Start: 2022-01-01 | End: 2022-01-01

## 2022-01-01 RX ORDER — MORPHINE SULFATE 50 MG/1
1 CAPSULE, EXTENDED RELEASE ORAL ONCE
Refills: 0 | Status: DISCONTINUED | OUTPATIENT
Start: 2022-01-01 | End: 2022-01-01

## 2022-01-01 RX ORDER — MIDODRINE HYDROCHLORIDE 2.5 MG/1
10 TABLET ORAL EVERY 8 HOURS
Refills: 0 | Status: DISCONTINUED | OUTPATIENT
Start: 2022-01-01 | End: 2022-01-01

## 2022-01-01 RX ORDER — DEXAMETHASONE 0.5 MG/5ML
6 ELIXIR ORAL DAILY
Refills: 0 | Status: DISCONTINUED | OUTPATIENT
Start: 2022-01-01 | End: 2022-01-01

## 2022-01-01 RX ORDER — TIOTROPIUM BROMIDE 18 UG/1
1 CAPSULE ORAL; RESPIRATORY (INHALATION) DAILY
Refills: 0 | Status: DISCONTINUED | OUTPATIENT
Start: 2022-01-01 | End: 2022-01-01

## 2022-01-01 RX ORDER — ROCURONIUM BROMIDE 10 MG/ML
100 VIAL (ML) INTRAVENOUS ONCE
Refills: 0 | Status: COMPLETED | OUTPATIENT
Start: 2022-01-01 | End: 2022-01-01

## 2022-01-01 RX ORDER — CEFEPIME 1 G/1
1000 INJECTION, POWDER, FOR SOLUTION INTRAMUSCULAR; INTRAVENOUS ONCE
Refills: 0 | Status: DISCONTINUED | OUTPATIENT
Start: 2022-01-01 | End: 2022-01-01

## 2022-01-01 RX ORDER — VANCOMYCIN HCL 1 G
1250 VIAL (EA) INTRAVENOUS EVERY 12 HOURS
Refills: 0 | Status: DISCONTINUED | OUTPATIENT
Start: 2022-01-01 | End: 2022-01-01

## 2022-01-01 RX ORDER — ENOXAPARIN SODIUM 100 MG/ML
40 INJECTION SUBCUTANEOUS ONCE
Refills: 0 | Status: COMPLETED | OUTPATIENT
Start: 2022-01-01 | End: 2022-01-01

## 2022-01-01 RX ORDER — DEXAMETHASONE 0.5 MG/5ML
6 ELIXIR ORAL ONCE
Refills: 0 | Status: COMPLETED | OUTPATIENT
Start: 2022-01-01 | End: 2022-01-01

## 2022-01-01 RX ORDER — SODIUM CHLORIDE 9 MG/ML
1000 INJECTION, SOLUTION INTRAVENOUS
Refills: 0 | Status: DISCONTINUED | OUTPATIENT
Start: 2022-01-01 | End: 2022-01-01

## 2022-01-01 RX ORDER — RNA INGREDIENT BNT-162B2 0.23 G/1.8ML
0.3 INJECTION, SUSPENSION INTRAMUSCULAR
Qty: 0 | Refills: 0 | DISCHARGE

## 2022-01-01 RX ORDER — TADALAFIL 10 MG/1
1 TABLET, FILM COATED ORAL
Qty: 0 | Refills: 0 | DISCHARGE

## 2022-01-01 RX ORDER — CEFEPIME 1 G/1
2000 INJECTION, POWDER, FOR SOLUTION INTRAMUSCULAR; INTRAVENOUS EVERY 12 HOURS
Refills: 0 | Status: COMPLETED | OUTPATIENT
Start: 2022-01-01 | End: 2022-01-01

## 2022-01-01 RX ORDER — ALPRAZOLAM 0.25 MG
0.25 TABLET ORAL EVERY 8 HOURS
Refills: 0 | Status: DISCONTINUED | OUTPATIENT
Start: 2022-01-01 | End: 2022-01-01

## 2022-01-01 RX ORDER — ENOXAPARIN SODIUM 100 MG/ML
80 INJECTION SUBCUTANEOUS EVERY 12 HOURS
Refills: 0 | Status: DISCONTINUED | OUTPATIENT
Start: 2022-01-01 | End: 2022-01-01

## 2022-01-01 RX ORDER — CHOLECALCIFEROL (VITAMIN D3) 125 MCG
1 CAPSULE ORAL
Qty: 0 | Refills: 0 | DISCHARGE

## 2022-01-01 RX ORDER — MIDAZOLAM HYDROCHLORIDE 1 MG/ML
5 INJECTION, SOLUTION INTRAMUSCULAR; INTRAVENOUS ONCE
Refills: 0 | Status: DISCONTINUED | OUTPATIENT
Start: 2022-01-01 | End: 2022-01-01

## 2022-01-01 RX ORDER — POLYETHYLENE GLYCOL 3350 17 G/17G
17 POWDER, FOR SOLUTION ORAL DAILY
Refills: 0 | Status: DISCONTINUED | OUTPATIENT
Start: 2022-01-01 | End: 2022-01-01

## 2022-01-01 RX ORDER — VANCOMYCIN HCL 1 G
1000 VIAL (EA) INTRAVENOUS EVERY 12 HOURS
Refills: 0 | Status: DISCONTINUED | OUTPATIENT
Start: 2022-01-01 | End: 2022-01-01

## 2022-01-01 RX ORDER — VECURONIUM BROMIDE 20 MG/1
10 INJECTION, POWDER, FOR SOLUTION INTRAVENOUS ONCE
Refills: 0 | Status: DISCONTINUED | OUTPATIENT
Start: 2022-01-01 | End: 2022-01-01

## 2022-01-01 RX ORDER — SODIUM,POTASSIUM PHOSPHATES 278-250MG
1 POWDER IN PACKET (EA) ORAL ONCE
Refills: 0 | Status: COMPLETED | OUTPATIENT
Start: 2022-01-01 | End: 2022-01-01

## 2022-01-01 RX ORDER — REMDESIVIR 5 MG/ML
200 INJECTION INTRAVENOUS EVERY 24 HOURS
Refills: 0 | Status: COMPLETED | OUTPATIENT
Start: 2022-01-01 | End: 2022-01-01

## 2022-01-01 RX ORDER — SODIUM CHLORIDE 9 MG/ML
10 INJECTION INTRAMUSCULAR; INTRAVENOUS; SUBCUTANEOUS
Refills: 0 | Status: DISCONTINUED | OUTPATIENT
Start: 2022-01-01 | End: 2022-01-01

## 2022-01-01 RX ORDER — FENTANYL CITRATE 50 UG/ML
0.75 INJECTION INTRAVENOUS
Qty: 2500 | Refills: 0 | Status: DISCONTINUED | OUTPATIENT
Start: 2022-01-01 | End: 2022-01-01

## 2022-01-01 RX ORDER — CHLORHEXIDINE GLUCONATE 213 G/1000ML
1 SOLUTION TOPICAL
Refills: 0 | Status: DISCONTINUED | OUTPATIENT
Start: 2022-01-01 | End: 2022-01-01

## 2022-01-01 RX ORDER — CEFEPIME 1 G/1
2000 INJECTION, POWDER, FOR SOLUTION INTRAMUSCULAR; INTRAVENOUS EVERY 12 HOURS
Refills: 0 | Status: DISCONTINUED | OUTPATIENT
Start: 2022-01-01 | End: 2022-01-01

## 2022-01-01 RX ORDER — ONDANSETRON 8 MG/1
4 TABLET, FILM COATED ORAL EVERY 8 HOURS
Refills: 0 | Status: DISCONTINUED | OUTPATIENT
Start: 2022-01-01 | End: 2022-01-01

## 2022-01-01 RX ORDER — HEPARIN SODIUM 5000 [USP'U]/ML
INJECTION INTRAVENOUS; SUBCUTANEOUS
Qty: 25000 | Refills: 0 | Status: DISCONTINUED | OUTPATIENT
Start: 2022-01-01 | End: 2022-01-01

## 2022-01-01 RX ORDER — ACETAMINOPHEN 500 MG
650 TABLET ORAL EVERY 6 HOURS
Refills: 0 | Status: DISCONTINUED | OUTPATIENT
Start: 2022-01-01 | End: 2022-01-01

## 2022-01-01 RX ORDER — BUDESONIDE AND FORMOTEROL FUMARATE DIHYDRATE 160; 4.5 UG/1; UG/1
2 AEROSOL RESPIRATORY (INHALATION)
Refills: 0 | Status: DISCONTINUED | OUTPATIENT
Start: 2022-01-01 | End: 2022-01-01

## 2022-01-01 RX ORDER — PANTOPRAZOLE SODIUM 20 MG/1
40 TABLET, DELAYED RELEASE ORAL DAILY
Refills: 0 | Status: DISCONTINUED | OUTPATIENT
Start: 2022-01-01 | End: 2022-01-01

## 2022-01-01 RX ORDER — CEFEPIME 1 G/1
1000 INJECTION, POWDER, FOR SOLUTION INTRAMUSCULAR; INTRAVENOUS ONCE
Refills: 0 | Status: COMPLETED | OUTPATIENT
Start: 2022-01-01 | End: 2022-01-01

## 2022-01-01 RX ORDER — OXYMETAZOLINE HYDROCHLORIDE 0.5 MG/ML
2 SPRAY NASAL
Refills: 0 | Status: DISCONTINUED | OUTPATIENT
Start: 2022-01-01 | End: 2022-01-01

## 2022-01-01 RX ORDER — ENOXAPARIN SODIUM 100 MG/ML
40 INJECTION SUBCUTANEOUS DAILY
Refills: 0 | Status: DISCONTINUED | OUTPATIENT
Start: 2022-01-01 | End: 2022-01-01

## 2022-01-01 RX ORDER — MORPHINE SULFATE 50 MG/1
2 CAPSULE, EXTENDED RELEASE ORAL ONCE
Refills: 0 | Status: DISCONTINUED | OUTPATIENT
Start: 2022-01-01 | End: 2022-01-01

## 2022-01-01 RX ORDER — UBIDECARENONE 100 MG
0 CAPSULE ORAL
Qty: 0 | Refills: 0 | DISCHARGE

## 2022-01-01 RX ORDER — VANCOMYCIN HCL 1 G
1000 VIAL (EA) INTRAVENOUS ONCE
Refills: 0 | Status: COMPLETED | OUTPATIENT
Start: 2022-01-01 | End: 2022-01-01

## 2022-01-01 RX ORDER — CHLORHEXIDINE GLUCONATE 213 G/1000ML
15 SOLUTION TOPICAL EVERY 12 HOURS
Refills: 0 | Status: DISCONTINUED | OUTPATIENT
Start: 2022-01-01 | End: 2022-01-01

## 2022-01-01 RX ORDER — ATORVASTATIN CALCIUM 80 MG/1
10 TABLET, FILM COATED ORAL AT BEDTIME
Refills: 0 | Status: DISCONTINUED | OUTPATIENT
Start: 2022-01-01 | End: 2022-01-01

## 2022-01-01 RX ORDER — MIDAZOLAM HYDROCHLORIDE 1 MG/ML
2 INJECTION, SOLUTION INTRAMUSCULAR; INTRAVENOUS
Refills: 0 | Status: DISCONTINUED | OUTPATIENT
Start: 2022-01-01 | End: 2022-01-01

## 2022-01-01 RX ORDER — HEPARIN SODIUM 5000 [USP'U]/ML
6500 INJECTION INTRAVENOUS; SUBCUTANEOUS EVERY 6 HOURS
Refills: 0 | Status: DISCONTINUED | OUTPATIENT
Start: 2022-01-01 | End: 2022-01-01

## 2022-01-01 RX ORDER — MIDAZOLAM HYDROCHLORIDE 1 MG/ML
0.1 INJECTION, SOLUTION INTRAMUSCULAR; INTRAVENOUS
Qty: 100 | Refills: 0 | Status: DISCONTINUED | OUTPATIENT
Start: 2022-01-01 | End: 2022-01-01

## 2022-01-01 RX ORDER — SODIUM,POTASSIUM PHOSPHATES 278-250MG
2 POWDER IN PACKET (EA) ORAL ONCE
Refills: 0 | Status: COMPLETED | OUTPATIENT
Start: 2022-01-01 | End: 2022-01-01

## 2022-01-01 RX ORDER — SODIUM CHLORIDE 0.65 %
1 AEROSOL, SPRAY (ML) NASAL
Refills: 0 | Status: DISCONTINUED | OUTPATIENT
Start: 2022-01-01 | End: 2022-01-01

## 2022-01-01 RX ORDER — SODIUM CHLORIDE 9 MG/ML
1000 INJECTION INTRAMUSCULAR; INTRAVENOUS; SUBCUTANEOUS ONCE
Refills: 0 | Status: COMPLETED | OUTPATIENT
Start: 2022-01-01 | End: 2022-01-01

## 2022-01-01 RX ORDER — ALPRAZOLAM 0.25 MG
0.25 TABLET ORAL ONCE
Refills: 0 | Status: DISCONTINUED | OUTPATIENT
Start: 2022-01-01 | End: 2022-01-01

## 2022-01-01 RX ADMIN — Medication 1200 MILLIGRAM(S): at 21:21

## 2022-01-01 RX ADMIN — Medication 250 MILLIGRAM(S): at 21:33

## 2022-01-01 RX ADMIN — CHLORHEXIDINE GLUCONATE 15 MILLILITER(S): 213 SOLUTION TOPICAL at 18:48

## 2022-01-01 RX ADMIN — SENNA PLUS 2 TABLET(S): 8.6 TABLET ORAL at 21:50

## 2022-01-01 RX ADMIN — Medication 1200 MILLIGRAM(S): at 09:27

## 2022-01-01 RX ADMIN — CHLORHEXIDINE GLUCONATE 15 MILLILITER(S): 213 SOLUTION TOPICAL at 05:00

## 2022-01-01 RX ADMIN — MIDODRINE HYDROCHLORIDE 10 MILLIGRAM(S): 2.5 TABLET ORAL at 21:30

## 2022-01-01 RX ADMIN — CEFEPIME 100 MILLIGRAM(S): 1 INJECTION, POWDER, FOR SOLUTION INTRAMUSCULAR; INTRAVENOUS at 21:35

## 2022-01-01 RX ADMIN — ALBUTEROL 2 PUFF(S): 90 AEROSOL, METERED ORAL at 20:49

## 2022-01-01 RX ADMIN — CHLORHEXIDINE GLUCONATE 15 MILLILITER(S): 213 SOLUTION TOPICAL at 06:07

## 2022-01-01 RX ADMIN — PANTOPRAZOLE SODIUM 40 MILLIGRAM(S): 20 TABLET, DELAYED RELEASE ORAL at 11:13

## 2022-01-01 RX ADMIN — ATORVASTATIN CALCIUM 10 MILLIGRAM(S): 80 TABLET, FILM COATED ORAL at 21:19

## 2022-01-01 RX ADMIN — ENOXAPARIN SODIUM 80 MILLIGRAM(S): 100 INJECTION SUBCUTANEOUS at 10:18

## 2022-01-01 RX ADMIN — ATORVASTATIN CALCIUM 10 MILLIGRAM(S): 80 TABLET, FILM COATED ORAL at 20:53

## 2022-01-01 RX ADMIN — ENOXAPARIN SODIUM 80 MILLIGRAM(S): 100 INJECTION SUBCUTANEOUS at 11:16

## 2022-01-01 RX ADMIN — Medication 50 MILLIGRAM(S): at 02:00

## 2022-01-01 RX ADMIN — Medication 10 MILLIGRAM(S): at 14:15

## 2022-01-01 RX ADMIN — ENOXAPARIN SODIUM 80 MILLIGRAM(S): 100 INJECTION SUBCUTANEOUS at 11:08

## 2022-01-01 RX ADMIN — Medication 20 MILLIGRAM(S): at 21:52

## 2022-01-01 RX ADMIN — MEROPENEM 100 MILLIGRAM(S): 1 INJECTION INTRAVENOUS at 21:22

## 2022-01-01 RX ADMIN — Medication 1200 MILLIGRAM(S): at 09:35

## 2022-01-01 RX ADMIN — TIOTROPIUM BROMIDE 1 CAPSULE(S): 18 CAPSULE ORAL; RESPIRATORY (INHALATION) at 10:39

## 2022-01-01 RX ADMIN — Medication 1 PACKET(S): at 08:58

## 2022-01-01 RX ADMIN — Medication 1200 MILLIGRAM(S): at 22:28

## 2022-01-01 RX ADMIN — MEROPENEM 100 MILLIGRAM(S): 1 INJECTION INTRAVENOUS at 05:04

## 2022-01-01 RX ADMIN — ALBUTEROL 2 PUFF(S): 90 AEROSOL, METERED ORAL at 09:40

## 2022-01-01 RX ADMIN — Medication 166.67 MILLIGRAM(S): at 21:14

## 2022-01-01 RX ADMIN — Medication 1200 MILLIGRAM(S): at 22:05

## 2022-01-01 RX ADMIN — MIDODRINE HYDROCHLORIDE 10 MILLIGRAM(S): 2.5 TABLET ORAL at 14:00

## 2022-01-01 RX ADMIN — CEFEPIME 100 MILLIGRAM(S): 1 INJECTION, POWDER, FOR SOLUTION INTRAMUSCULAR; INTRAVENOUS at 11:47

## 2022-01-01 RX ADMIN — ENOXAPARIN SODIUM 80 MILLIGRAM(S): 100 INJECTION SUBCUTANEOUS at 21:24

## 2022-01-01 RX ADMIN — ENOXAPARIN SODIUM 80 MILLIGRAM(S): 100 INJECTION SUBCUTANEOUS at 10:35

## 2022-01-01 RX ADMIN — FENTANYL CITRATE 2.07 MICROGRAM(S)/KG/HR: 50 INJECTION INTRAVENOUS at 12:00

## 2022-01-01 RX ADMIN — SENNA PLUS 2 TABLET(S): 8.6 TABLET ORAL at 22:04

## 2022-01-01 RX ADMIN — Medication 30 MILLIGRAM(S): at 11:15

## 2022-01-01 RX ADMIN — MIDAZOLAM HYDROCHLORIDE 1.66 MG/KG/HR: 1 INJECTION, SOLUTION INTRAMUSCULAR; INTRAVENOUS at 11:14

## 2022-01-01 RX ADMIN — FENTANYL CITRATE 2.07 MICROGRAM(S)/KG/HR: 50 INJECTION INTRAVENOUS at 10:56

## 2022-01-01 RX ADMIN — MIDAZOLAM HYDROCHLORIDE 1.66 MG/KG/HR: 1 INJECTION, SOLUTION INTRAMUSCULAR; INTRAVENOUS at 01:21

## 2022-01-01 RX ADMIN — Medication 2 MILLIGRAM(S): at 20:14

## 2022-01-01 RX ADMIN — CHLORHEXIDINE GLUCONATE 15 MILLILITER(S): 213 SOLUTION TOPICAL at 17:46

## 2022-01-01 RX ADMIN — MIDODRINE HYDROCHLORIDE 10 MILLIGRAM(S): 2.5 TABLET ORAL at 05:04

## 2022-01-01 RX ADMIN — MEROPENEM 100 MILLIGRAM(S): 1 INJECTION INTRAVENOUS at 05:14

## 2022-01-01 RX ADMIN — MORPHINE SULFATE 2 MILLIGRAM(S): 50 CAPSULE, EXTENDED RELEASE ORAL at 11:25

## 2022-01-01 RX ADMIN — PANTOPRAZOLE SODIUM 40 MILLIGRAM(S): 20 TABLET, DELAYED RELEASE ORAL at 10:57

## 2022-01-01 RX ADMIN — MEROPENEM 100 MILLIGRAM(S): 1 INJECTION INTRAVENOUS at 21:29

## 2022-01-01 RX ADMIN — SODIUM CHLORIDE 50 MILLILITER(S): 9 INJECTION, SOLUTION INTRAVENOUS at 05:56

## 2022-01-01 RX ADMIN — Medication 2 MILLIGRAM(S): at 22:23

## 2022-01-01 RX ADMIN — Medication 1200 MILLIGRAM(S): at 21:36

## 2022-01-01 RX ADMIN — CHLORHEXIDINE GLUCONATE 1 APPLICATION(S): 213 SOLUTION TOPICAL at 05:41

## 2022-01-01 RX ADMIN — Medication 166.67 MILLIGRAM(S): at 11:35

## 2022-01-01 RX ADMIN — Medication 2 MILLIGRAM(S): at 21:53

## 2022-01-01 RX ADMIN — Medication 1200 MILLIGRAM(S): at 10:47

## 2022-01-01 RX ADMIN — POLYETHYLENE GLYCOL 3350 17 GRAM(S): 17 POWDER, FOR SOLUTION ORAL at 11:38

## 2022-01-01 RX ADMIN — PROPOFOL 12.4 MICROGRAM(S)/KG/MIN: 10 INJECTION, EMULSION INTRAVENOUS at 08:39

## 2022-01-01 RX ADMIN — Medication 1 MILLIGRAM(S): at 03:17

## 2022-01-01 RX ADMIN — Medication 1 APPLICATION(S): at 18:29

## 2022-01-01 RX ADMIN — ENOXAPARIN SODIUM 80 MILLIGRAM(S): 100 INJECTION SUBCUTANEOUS at 09:28

## 2022-01-01 RX ADMIN — ENOXAPARIN SODIUM 80 MILLIGRAM(S): 100 INJECTION SUBCUTANEOUS at 21:09

## 2022-01-01 RX ADMIN — ENOXAPARIN SODIUM 80 MILLIGRAM(S): 100 INJECTION SUBCUTANEOUS at 11:22

## 2022-01-01 RX ADMIN — MIDODRINE HYDROCHLORIDE 10 MILLIGRAM(S): 2.5 TABLET ORAL at 15:58

## 2022-01-01 RX ADMIN — Medication 0.25 MILLIGRAM(S): at 01:08

## 2022-01-01 RX ADMIN — ATORVASTATIN CALCIUM 10 MILLIGRAM(S): 80 TABLET, FILM COATED ORAL at 21:54

## 2022-01-01 RX ADMIN — MIDODRINE HYDROCHLORIDE 10 MILLIGRAM(S): 2.5 TABLET ORAL at 05:16

## 2022-01-01 RX ADMIN — ATORVASTATIN CALCIUM 10 MILLIGRAM(S): 80 TABLET, FILM COATED ORAL at 21:50

## 2022-01-01 RX ADMIN — CEFEPIME 100 MILLIGRAM(S): 1 INJECTION, POWDER, FOR SOLUTION INTRAMUSCULAR; INTRAVENOUS at 21:53

## 2022-01-01 RX ADMIN — CEFEPIME 100 MILLIGRAM(S): 1 INJECTION, POWDER, FOR SOLUTION INTRAMUSCULAR; INTRAVENOUS at 21:05

## 2022-01-01 RX ADMIN — TIOTROPIUM BROMIDE 1 CAPSULE(S): 18 CAPSULE ORAL; RESPIRATORY (INHALATION) at 09:54

## 2022-01-01 RX ADMIN — PROPOFOL 12.4 MICROGRAM(S)/KG/MIN: 10 INJECTION, EMULSION INTRAVENOUS at 02:58

## 2022-01-01 RX ADMIN — ENOXAPARIN SODIUM 80 MILLIGRAM(S): 100 INJECTION SUBCUTANEOUS at 21:50

## 2022-01-01 RX ADMIN — ALBUTEROL 2 PUFF(S): 90 AEROSOL, METERED ORAL at 09:15

## 2022-01-01 RX ADMIN — PANTOPRAZOLE SODIUM 40 MILLIGRAM(S): 20 TABLET, DELAYED RELEASE ORAL at 09:29

## 2022-01-01 RX ADMIN — POLYETHYLENE GLYCOL 3350 17 GRAM(S): 17 POWDER, FOR SOLUTION ORAL at 11:21

## 2022-01-01 RX ADMIN — SENNA PLUS 2 TABLET(S): 8.6 TABLET ORAL at 20:37

## 2022-01-01 RX ADMIN — Medication 40 MILLIGRAM(S): at 21:12

## 2022-01-01 RX ADMIN — CHLORHEXIDINE GLUCONATE 15 MILLILITER(S): 213 SOLUTION TOPICAL at 06:57

## 2022-01-01 RX ADMIN — PANTOPRAZOLE SODIUM 40 MILLIGRAM(S): 20 TABLET, DELAYED RELEASE ORAL at 10:08

## 2022-01-01 RX ADMIN — ENOXAPARIN SODIUM 80 MILLIGRAM(S): 100 INJECTION SUBCUTANEOUS at 21:32

## 2022-01-01 RX ADMIN — ENOXAPARIN SODIUM 80 MILLIGRAM(S): 100 INJECTION SUBCUTANEOUS at 22:09

## 2022-01-01 RX ADMIN — ENOXAPARIN SODIUM 80 MILLIGRAM(S): 100 INJECTION SUBCUTANEOUS at 21:43

## 2022-01-01 RX ADMIN — MORPHINE SULFATE 2 MILLIGRAM(S): 50 CAPSULE, EXTENDED RELEASE ORAL at 10:35

## 2022-01-01 RX ADMIN — ENOXAPARIN SODIUM 80 MILLIGRAM(S): 100 INJECTION SUBCUTANEOUS at 11:12

## 2022-01-01 RX ADMIN — CHLORHEXIDINE GLUCONATE 15 MILLILITER(S): 213 SOLUTION TOPICAL at 18:30

## 2022-01-01 RX ADMIN — REMDESIVIR 500 MILLIGRAM(S): 5 INJECTION INTRAVENOUS at 11:11

## 2022-01-01 RX ADMIN — PANTOPRAZOLE SODIUM 40 MILLIGRAM(S): 20 TABLET, DELAYED RELEASE ORAL at 09:22

## 2022-01-01 RX ADMIN — Medication 40 MILLIGRAM(S): at 09:36

## 2022-01-01 RX ADMIN — ENOXAPARIN SODIUM 80 MILLIGRAM(S): 100 INJECTION SUBCUTANEOUS at 21:11

## 2022-01-01 RX ADMIN — PROPOFOL 12.4 MICROGRAM(S)/KG/MIN: 10 INJECTION, EMULSION INTRAVENOUS at 16:25

## 2022-01-01 RX ADMIN — Medication 250 MILLIGRAM(S): at 10:36

## 2022-01-01 RX ADMIN — TIOTROPIUM BROMIDE 1 CAPSULE(S): 18 CAPSULE ORAL; RESPIRATORY (INHALATION) at 09:00

## 2022-01-01 RX ADMIN — SENNA PLUS 2 TABLET(S): 8.6 TABLET ORAL at 22:00

## 2022-01-01 RX ADMIN — BUDESONIDE AND FORMOTEROL FUMARATE DIHYDRATE 2 PUFF(S): 160; 4.5 AEROSOL RESPIRATORY (INHALATION) at 18:37

## 2022-01-01 RX ADMIN — DEXMEDETOMIDINE HYDROCHLORIDE IN 0.9% SODIUM CHLORIDE 20.7 MICROGRAM(S)/KG/HR: 4 INJECTION INTRAVENOUS at 00:30

## 2022-01-01 RX ADMIN — Medication 20 MILLIGRAM(S): at 21:19

## 2022-01-01 RX ADMIN — FENTANYL CITRATE 6.21 MICROGRAM(S)/KG/HR: 50 INJECTION INTRAVENOUS at 10:25

## 2022-01-01 RX ADMIN — Medication 2 MILLIGRAM(S): at 17:23

## 2022-01-01 RX ADMIN — Medication 1 APPLICATION(S): at 12:01

## 2022-01-01 RX ADMIN — MIDAZOLAM HYDROCHLORIDE 2 MILLIGRAM(S): 1 INJECTION, SOLUTION INTRAMUSCULAR; INTRAVENOUS at 08:34

## 2022-01-01 RX ADMIN — Medication 650 MILLIGRAM(S): at 21:00

## 2022-01-01 RX ADMIN — PANTOPRAZOLE SODIUM 40 MILLIGRAM(S): 20 TABLET, DELAYED RELEASE ORAL at 09:01

## 2022-01-01 RX ADMIN — Medication 650 MILLIGRAM(S): at 10:08

## 2022-01-01 RX ADMIN — CHLORHEXIDINE GLUCONATE 15 MILLILITER(S): 213 SOLUTION TOPICAL at 05:16

## 2022-01-01 RX ADMIN — BUDESONIDE AND FORMOTEROL FUMARATE DIHYDRATE 2 PUFF(S): 160; 4.5 AEROSOL RESPIRATORY (INHALATION) at 21:09

## 2022-01-01 RX ADMIN — Medication 40 MILLIGRAM(S): at 11:20

## 2022-01-01 RX ADMIN — ENOXAPARIN SODIUM 80 MILLIGRAM(S): 100 INJECTION SUBCUTANEOUS at 10:12

## 2022-01-01 RX ADMIN — MIDAZOLAM HYDROCHLORIDE 1.66 MG/KG/HR: 1 INJECTION, SOLUTION INTRAMUSCULAR; INTRAVENOUS at 09:53

## 2022-01-01 RX ADMIN — ALBUTEROL 2 PUFF(S): 90 AEROSOL, METERED ORAL at 21:15

## 2022-01-01 RX ADMIN — MEROPENEM 100 MILLIGRAM(S): 1 INJECTION INTRAVENOUS at 13:57

## 2022-01-01 RX ADMIN — Medication 40 MILLIGRAM(S): at 21:25

## 2022-01-01 RX ADMIN — Medication 2 MILLIGRAM(S): at 13:39

## 2022-01-01 RX ADMIN — MORPHINE SULFATE 1 MILLIGRAM(S): 50 CAPSULE, EXTENDED RELEASE ORAL at 05:56

## 2022-01-01 RX ADMIN — ATORVASTATIN CALCIUM 10 MILLIGRAM(S): 80 TABLET, FILM COATED ORAL at 21:11

## 2022-01-01 RX ADMIN — ALBUTEROL 2 PUFF(S): 90 AEROSOL, METERED ORAL at 08:07

## 2022-01-01 RX ADMIN — SENNA PLUS 2 TABLET(S): 8.6 TABLET ORAL at 21:39

## 2022-01-01 RX ADMIN — ENOXAPARIN SODIUM 80 MILLIGRAM(S): 100 INJECTION SUBCUTANEOUS at 21:29

## 2022-01-01 RX ADMIN — BUDESONIDE AND FORMOTEROL FUMARATE DIHYDRATE 2 PUFF(S): 160; 4.5 AEROSOL RESPIRATORY (INHALATION) at 21:21

## 2022-01-01 RX ADMIN — Medication 1 APPLICATION(S): at 06:49

## 2022-01-01 RX ADMIN — HEPARIN SODIUM 1700 UNIT(S)/HR: 5000 INJECTION INTRAVENOUS; SUBCUTANEOUS at 10:01

## 2022-01-01 RX ADMIN — Medication 40 MILLIGRAM(S): at 22:03

## 2022-01-01 RX ADMIN — PROPOFOL 4.97 MICROGRAM(S)/KG/MIN: 10 INJECTION, EMULSION INTRAVENOUS at 04:26

## 2022-01-01 RX ADMIN — MORPHINE SULFATE 1 MILLIGRAM(S): 50 CAPSULE, EXTENDED RELEASE ORAL at 03:30

## 2022-01-01 RX ADMIN — Medication 2 MILLIGRAM(S): at 05:08

## 2022-01-01 RX ADMIN — TIOTROPIUM BROMIDE 1 CAPSULE(S): 18 CAPSULE ORAL; RESPIRATORY (INHALATION) at 08:06

## 2022-01-01 RX ADMIN — MEROPENEM 100 MILLIGRAM(S): 1 INJECTION INTRAVENOUS at 15:02

## 2022-01-01 RX ADMIN — ENOXAPARIN SODIUM 80 MILLIGRAM(S): 100 INJECTION SUBCUTANEOUS at 10:59

## 2022-01-01 RX ADMIN — ENOXAPARIN SODIUM 80 MILLIGRAM(S): 100 INJECTION SUBCUTANEOUS at 11:21

## 2022-01-01 RX ADMIN — HEPARIN SODIUM 1700 UNIT(S)/HR: 5000 INJECTION INTRAVENOUS; SUBCUTANEOUS at 20:25

## 2022-01-01 RX ADMIN — DEXMEDETOMIDINE HYDROCHLORIDE IN 0.9% SODIUM CHLORIDE 20.7 MICROGRAM(S)/KG/HR: 4 INJECTION INTRAVENOUS at 21:19

## 2022-01-01 RX ADMIN — ALBUTEROL 2 PUFF(S): 90 AEROSOL, METERED ORAL at 04:16

## 2022-01-01 RX ADMIN — Medication 166.67 MILLIGRAM(S): at 21:27

## 2022-01-01 RX ADMIN — ENOXAPARIN SODIUM 80 MILLIGRAM(S): 100 INJECTION SUBCUTANEOUS at 21:19

## 2022-01-01 RX ADMIN — FENTANYL CITRATE 6.21 MICROGRAM(S)/KG/HR: 50 INJECTION INTRAVENOUS at 06:17

## 2022-01-01 RX ADMIN — Medication 40 MILLIGRAM(S): at 21:32

## 2022-01-01 RX ADMIN — BUDESONIDE AND FORMOTEROL FUMARATE DIHYDRATE 2 PUFF(S): 160; 4.5 AEROSOL RESPIRATORY (INHALATION) at 20:53

## 2022-01-01 RX ADMIN — Medication 40 MILLIGRAM(S): at 21:38

## 2022-01-01 RX ADMIN — Medication 1200 MILLIGRAM(S): at 20:38

## 2022-01-01 RX ADMIN — Medication 1200 MILLIGRAM(S): at 21:39

## 2022-01-01 RX ADMIN — MIDODRINE HYDROCHLORIDE 10 MILLIGRAM(S): 2.5 TABLET ORAL at 13:33

## 2022-01-01 RX ADMIN — Medication 1200 MILLIGRAM(S): at 11:41

## 2022-01-01 RX ADMIN — BUDESONIDE AND FORMOTEROL FUMARATE DIHYDRATE 2 PUFF(S): 160; 4.5 AEROSOL RESPIRATORY (INHALATION) at 21:06

## 2022-01-01 RX ADMIN — CEFEPIME 100 MILLIGRAM(S): 1 INJECTION, POWDER, FOR SOLUTION INTRAMUSCULAR; INTRAVENOUS at 11:08

## 2022-01-01 RX ADMIN — BUDESONIDE AND FORMOTEROL FUMARATE DIHYDRATE 2 PUFF(S): 160; 4.5 AEROSOL RESPIRATORY (INHALATION) at 21:01

## 2022-01-01 RX ADMIN — MORPHINE SULFATE 2 MILLIGRAM(S): 50 CAPSULE, EXTENDED RELEASE ORAL at 00:53

## 2022-01-01 RX ADMIN — TIOTROPIUM BROMIDE 1 CAPSULE(S): 18 CAPSULE ORAL; RESPIRATORY (INHALATION) at 09:13

## 2022-01-01 RX ADMIN — REMDESIVIR 500 MILLIGRAM(S): 5 INJECTION INTRAVENOUS at 18:25

## 2022-01-01 RX ADMIN — Medication 1200 MILLIGRAM(S): at 21:25

## 2022-01-01 RX ADMIN — PANTOPRAZOLE SODIUM 40 MILLIGRAM(S): 20 TABLET, DELAYED RELEASE ORAL at 10:20

## 2022-01-01 RX ADMIN — CHLORHEXIDINE GLUCONATE 15 MILLILITER(S): 213 SOLUTION TOPICAL at 05:41

## 2022-01-01 RX ADMIN — MORPHINE SULFATE 1 MILLIGRAM(S): 50 CAPSULE, EXTENDED RELEASE ORAL at 01:48

## 2022-01-01 RX ADMIN — Medication 650 MILLIGRAM(S): at 01:00

## 2022-01-01 RX ADMIN — Medication 1200 MILLIGRAM(S): at 21:52

## 2022-01-01 RX ADMIN — ENOXAPARIN SODIUM 80 MILLIGRAM(S): 100 INJECTION SUBCUTANEOUS at 21:47

## 2022-01-01 RX ADMIN — PROPOFOL 12.4 MICROGRAM(S)/KG/MIN: 10 INJECTION, EMULSION INTRAVENOUS at 07:47

## 2022-01-01 RX ADMIN — CEFEPIME 100 MILLIGRAM(S): 1 INJECTION, POWDER, FOR SOLUTION INTRAMUSCULAR; INTRAVENOUS at 21:43

## 2022-01-01 RX ADMIN — MORPHINE SULFATE 1 MILLIGRAM(S): 50 CAPSULE, EXTENDED RELEASE ORAL at 03:02

## 2022-01-01 RX ADMIN — OXYMETAZOLINE HYDROCHLORIDE 2 SPRAY(S): 0.5 SPRAY NASAL at 10:31

## 2022-01-01 RX ADMIN — ENOXAPARIN SODIUM 80 MILLIGRAM(S): 100 INJECTION SUBCUTANEOUS at 21:15

## 2022-01-01 RX ADMIN — CHLORHEXIDINE GLUCONATE 1 APPLICATION(S): 213 SOLUTION TOPICAL at 06:32

## 2022-01-01 RX ADMIN — MIDODRINE HYDROCHLORIDE 10 MILLIGRAM(S): 2.5 TABLET ORAL at 15:04

## 2022-01-01 RX ADMIN — ATORVASTATIN CALCIUM 10 MILLIGRAM(S): 80 TABLET, FILM COATED ORAL at 21:25

## 2022-01-01 RX ADMIN — TOCILIZUMAB 100 MILLIGRAM(S): 20 INJECTION, SOLUTION, CONCENTRATE INTRAVENOUS at 12:39

## 2022-01-01 RX ADMIN — MEROPENEM 100 MILLIGRAM(S): 1 INJECTION INTRAVENOUS at 22:08

## 2022-01-01 RX ADMIN — MIDODRINE HYDROCHLORIDE 10 MILLIGRAM(S): 2.5 TABLET ORAL at 05:41

## 2022-01-01 RX ADMIN — Medication 1200 MILLIGRAM(S): at 21:43

## 2022-01-01 RX ADMIN — FENTANYL CITRATE 2.07 MICROGRAM(S)/KG/HR: 50 INJECTION INTRAVENOUS at 22:20

## 2022-01-01 RX ADMIN — Medication 1200 MILLIGRAM(S): at 10:31

## 2022-01-01 RX ADMIN — SODIUM CHLORIDE 50 MILLILITER(S): 9 INJECTION, SOLUTION INTRAVENOUS at 21:21

## 2022-01-01 RX ADMIN — ENOXAPARIN SODIUM 80 MILLIGRAM(S): 100 INJECTION SUBCUTANEOUS at 21:54

## 2022-01-01 RX ADMIN — ENOXAPARIN SODIUM 80 MILLIGRAM(S): 100 INJECTION SUBCUTANEOUS at 21:39

## 2022-01-01 RX ADMIN — MIDODRINE HYDROCHLORIDE 10 MILLIGRAM(S): 2.5 TABLET ORAL at 13:40

## 2022-01-01 RX ADMIN — Medication 1200 MILLIGRAM(S): at 10:52

## 2022-01-01 RX ADMIN — ATORVASTATIN CALCIUM 10 MILLIGRAM(S): 80 TABLET, FILM COATED ORAL at 20:37

## 2022-01-01 RX ADMIN — Medication 650 MILLIGRAM(S): at 20:01

## 2022-01-01 RX ADMIN — Medication 250 MILLIGRAM(S): at 21:52

## 2022-01-01 RX ADMIN — POLYETHYLENE GLYCOL 3350 17 GRAM(S): 17 POWDER, FOR SOLUTION ORAL at 11:02

## 2022-01-01 RX ADMIN — Medication 166.67 MILLIGRAM(S): at 10:50

## 2022-01-01 RX ADMIN — PANTOPRAZOLE SODIUM 40 MILLIGRAM(S): 20 TABLET, DELAYED RELEASE ORAL at 09:07

## 2022-01-01 RX ADMIN — TIOTROPIUM BROMIDE 1 CAPSULE(S): 18 CAPSULE ORAL; RESPIRATORY (INHALATION) at 09:07

## 2022-01-01 RX ADMIN — BUDESONIDE AND FORMOTEROL FUMARATE DIHYDRATE 2 PUFF(S): 160; 4.5 AEROSOL RESPIRATORY (INHALATION) at 09:06

## 2022-01-01 RX ADMIN — Medication 166.67 MILLIGRAM(S): at 09:23

## 2022-01-01 RX ADMIN — CEFEPIME 1000 MILLIGRAM(S): 1 INJECTION, POWDER, FOR SOLUTION INTRAMUSCULAR; INTRAVENOUS at 09:12

## 2022-01-01 RX ADMIN — MORPHINE SULFATE 2 MILLIGRAM(S): 50 CAPSULE, EXTENDED RELEASE ORAL at 14:43

## 2022-01-01 RX ADMIN — ENOXAPARIN SODIUM 80 MILLIGRAM(S): 100 INJECTION SUBCUTANEOUS at 10:55

## 2022-01-01 RX ADMIN — Medication 1200 MILLIGRAM(S): at 08:41

## 2022-01-01 RX ADMIN — Medication 1200 MILLIGRAM(S): at 11:10

## 2022-01-01 RX ADMIN — PROPOFOL 12.4 MICROGRAM(S)/KG/MIN: 10 INJECTION, EMULSION INTRAVENOUS at 04:35

## 2022-01-01 RX ADMIN — BUDESONIDE AND FORMOTEROL FUMARATE DIHYDRATE 2 PUFF(S): 160; 4.5 AEROSOL RESPIRATORY (INHALATION) at 20:50

## 2022-01-01 RX ADMIN — DEXMEDETOMIDINE HYDROCHLORIDE IN 0.9% SODIUM CHLORIDE 20.7 MICROGRAM(S)/KG/HR: 4 INJECTION INTRAVENOUS at 06:47

## 2022-01-01 RX ADMIN — CHLORHEXIDINE GLUCONATE 1 APPLICATION(S): 213 SOLUTION TOPICAL at 06:58

## 2022-01-01 RX ADMIN — CHLORHEXIDINE GLUCONATE 15 MILLILITER(S): 213 SOLUTION TOPICAL at 06:36

## 2022-01-01 RX ADMIN — Medication 20 MILLIGRAM(S): at 21:30

## 2022-01-01 RX ADMIN — Medication 6 MILLIGRAM(S): at 09:28

## 2022-01-01 RX ADMIN — ENOXAPARIN SODIUM 80 MILLIGRAM(S): 100 INJECTION SUBCUTANEOUS at 10:48

## 2022-01-01 RX ADMIN — Medication 20 MILLIGRAM(S): at 09:54

## 2022-01-01 RX ADMIN — Medication 166.67 MILLIGRAM(S): at 21:28

## 2022-01-01 RX ADMIN — Medication 40 MILLIGRAM(S): at 22:04

## 2022-01-01 RX ADMIN — Medication 2 MILLIGRAM(S): at 17:20

## 2022-01-01 RX ADMIN — PANTOPRAZOLE SODIUM 40 MILLIGRAM(S): 20 TABLET, DELAYED RELEASE ORAL at 11:02

## 2022-01-01 RX ADMIN — CEFEPIME 100 MILLIGRAM(S): 1 INJECTION, POWDER, FOR SOLUTION INTRAMUSCULAR; INTRAVENOUS at 10:07

## 2022-01-01 RX ADMIN — ONDANSETRON 4 MILLIGRAM(S): 8 TABLET, FILM COATED ORAL at 05:17

## 2022-01-01 RX ADMIN — TIOTROPIUM BROMIDE 1 CAPSULE(S): 18 CAPSULE ORAL; RESPIRATORY (INHALATION) at 07:59

## 2022-01-01 RX ADMIN — Medication 6 MILLIGRAM(S): at 08:42

## 2022-01-01 RX ADMIN — Medication 20 MILLIGRAM(S): at 09:01

## 2022-01-01 RX ADMIN — CISATRACURIUM BESYLATE 14.9 MICROGRAM(S)/KG/MIN: 2 INJECTION INTRAVENOUS at 06:18

## 2022-01-01 RX ADMIN — PROPOFOL 12.4 MICROGRAM(S)/KG/MIN: 10 INJECTION, EMULSION INTRAVENOUS at 13:37

## 2022-01-01 RX ADMIN — MEROPENEM 100 MILLIGRAM(S): 1 INJECTION INTRAVENOUS at 21:31

## 2022-01-01 RX ADMIN — Medication 40 MILLIGRAM(S): at 11:09

## 2022-01-01 RX ADMIN — MIDODRINE HYDROCHLORIDE 10 MILLIGRAM(S): 2.5 TABLET ORAL at 06:07

## 2022-01-01 RX ADMIN — TIOTROPIUM BROMIDE 1 CAPSULE(S): 18 CAPSULE ORAL; RESPIRATORY (INHALATION) at 09:10

## 2022-01-01 RX ADMIN — MIDAZOLAM HYDROCHLORIDE 1.66 MG/KG/HR: 1 INJECTION, SOLUTION INTRAMUSCULAR; INTRAVENOUS at 17:34

## 2022-01-01 RX ADMIN — FENTANYL CITRATE 2.07 MICROGRAM(S)/KG/HR: 50 INJECTION INTRAVENOUS at 09:50

## 2022-01-01 RX ADMIN — BUDESONIDE AND FORMOTEROL FUMARATE DIHYDRATE 2 PUFF(S): 160; 4.5 AEROSOL RESPIRATORY (INHALATION) at 09:15

## 2022-01-01 RX ADMIN — CHLORHEXIDINE GLUCONATE 15 MILLILITER(S): 213 SOLUTION TOPICAL at 05:36

## 2022-01-01 RX ADMIN — MEROPENEM 100 MILLIGRAM(S): 1 INJECTION INTRAVENOUS at 13:54

## 2022-01-01 RX ADMIN — POLYETHYLENE GLYCOL 3350 17 GRAM(S): 17 POWDER, FOR SOLUTION ORAL at 11:42

## 2022-01-01 RX ADMIN — BUDESONIDE AND FORMOTEROL FUMARATE DIHYDRATE 2 PUFF(S): 160; 4.5 AEROSOL RESPIRATORY (INHALATION) at 10:40

## 2022-01-01 RX ADMIN — Medication 40 MILLIGRAM(S): at 21:19

## 2022-01-01 RX ADMIN — Medication 20 MILLIGRAM(S): at 21:10

## 2022-01-01 RX ADMIN — PROPOFOL 12.4 MICROGRAM(S)/KG/MIN: 10 INJECTION, EMULSION INTRAVENOUS at 13:56

## 2022-01-01 RX ADMIN — FENTANYL CITRATE 2.07 MICROGRAM(S)/KG/HR: 50 INJECTION INTRAVENOUS at 14:19

## 2022-01-01 RX ADMIN — MEROPENEM 100 MILLIGRAM(S): 1 INJECTION INTRAVENOUS at 21:25

## 2022-01-01 RX ADMIN — MORPHINE SULFATE 2 MILLIGRAM(S): 50 CAPSULE, EXTENDED RELEASE ORAL at 05:36

## 2022-01-01 RX ADMIN — CISATRACURIUM BESYLATE 14.9 MICROGRAM(S)/KG/MIN: 2 INJECTION INTRAVENOUS at 10:50

## 2022-01-01 RX ADMIN — Medication 2 MILLIGRAM(S): at 11:25

## 2022-01-01 RX ADMIN — Medication 40 MILLIGRAM(S): at 15:19

## 2022-01-01 RX ADMIN — ATORVASTATIN CALCIUM 10 MILLIGRAM(S): 80 TABLET, FILM COATED ORAL at 21:47

## 2022-01-01 RX ADMIN — Medication 20 MILLIGRAM(S): at 09:07

## 2022-01-01 RX ADMIN — PROPOFOL 12.4 MICROGRAM(S)/KG/MIN: 10 INJECTION, EMULSION INTRAVENOUS at 06:25

## 2022-01-01 RX ADMIN — MIDAZOLAM HYDROCHLORIDE 2 MILLIGRAM(S): 1 INJECTION, SOLUTION INTRAMUSCULAR; INTRAVENOUS at 23:08

## 2022-01-01 RX ADMIN — CHLORHEXIDINE GLUCONATE 1 APPLICATION(S): 213 SOLUTION TOPICAL at 05:16

## 2022-01-01 RX ADMIN — Medication 1 APPLICATION(S): at 23:25

## 2022-01-01 RX ADMIN — REMDESIVIR 500 MILLIGRAM(S): 5 INJECTION INTRAVENOUS at 15:25

## 2022-01-01 RX ADMIN — Medication 250 MILLIGRAM(S): at 09:35

## 2022-01-01 RX ADMIN — CEFEPIME 100 MILLIGRAM(S): 1 INJECTION, POWDER, FOR SOLUTION INTRAMUSCULAR; INTRAVENOUS at 09:28

## 2022-01-01 RX ADMIN — Medication 3 MILLIGRAM(S): at 21:12

## 2022-01-01 RX ADMIN — PROPOFOL 12.4 MICROGRAM(S)/KG/MIN: 10 INJECTION, EMULSION INTRAVENOUS at 11:35

## 2022-01-01 RX ADMIN — Medication 1 APPLICATION(S): at 06:50

## 2022-01-01 RX ADMIN — Medication 1 APPLICATION(S): at 18:02

## 2022-01-01 RX ADMIN — Medication 40 MILLIGRAM(S): at 21:22

## 2022-01-01 RX ADMIN — CISATRACURIUM BESYLATE 14.9 MICROGRAM(S)/KG/MIN: 2 INJECTION INTRAVENOUS at 22:08

## 2022-01-01 RX ADMIN — Medication 40 MILLIGRAM(S): at 10:48

## 2022-01-01 RX ADMIN — CEFEPIME 100 MILLIGRAM(S): 1 INJECTION, POWDER, FOR SOLUTION INTRAMUSCULAR; INTRAVENOUS at 09:37

## 2022-01-01 RX ADMIN — BUDESONIDE AND FORMOTEROL FUMARATE DIHYDRATE 2 PUFF(S): 160; 4.5 AEROSOL RESPIRATORY (INHALATION) at 09:40

## 2022-01-01 RX ADMIN — MIDAZOLAM HYDROCHLORIDE 5 MILLIGRAM(S): 1 INJECTION, SOLUTION INTRAMUSCULAR; INTRAVENOUS at 02:42

## 2022-01-01 RX ADMIN — CHLORHEXIDINE GLUCONATE 15 MILLILITER(S): 213 SOLUTION TOPICAL at 17:34

## 2022-01-01 RX ADMIN — Medication 7.76 MICROGRAM(S)/KG/MIN: at 09:30

## 2022-01-01 RX ADMIN — Medication 650 MILLIGRAM(S): at 04:00

## 2022-01-01 RX ADMIN — Medication 1200 MILLIGRAM(S): at 21:19

## 2022-01-01 RX ADMIN — ENOXAPARIN SODIUM 80 MILLIGRAM(S): 100 INJECTION SUBCUTANEOUS at 21:05

## 2022-01-01 RX ADMIN — Medication 166.67 MILLIGRAM(S): at 21:15

## 2022-01-01 RX ADMIN — PROPOFOL 12.4 MICROGRAM(S)/KG/MIN: 10 INJECTION, EMULSION INTRAVENOUS at 14:46

## 2022-01-01 RX ADMIN — PANTOPRAZOLE SODIUM 40 MILLIGRAM(S): 20 TABLET, DELAYED RELEASE ORAL at 11:35

## 2022-01-01 RX ADMIN — SENNA PLUS 2 TABLET(S): 8.6 TABLET ORAL at 21:20

## 2022-01-01 RX ADMIN — MEROPENEM 100 MILLIGRAM(S): 1 INJECTION INTRAVENOUS at 14:13

## 2022-01-01 RX ADMIN — MIDODRINE HYDROCHLORIDE 10 MILLIGRAM(S): 2.5 TABLET ORAL at 13:26

## 2022-01-01 RX ADMIN — MIDODRINE HYDROCHLORIDE 10 MILLIGRAM(S): 2.5 TABLET ORAL at 13:30

## 2022-01-01 RX ADMIN — POLYETHYLENE GLYCOL 3350 17 GRAM(S): 17 POWDER, FOR SOLUTION ORAL at 09:00

## 2022-01-01 RX ADMIN — MIDODRINE HYDROCHLORIDE 10 MILLIGRAM(S): 2.5 TABLET ORAL at 09:11

## 2022-01-01 RX ADMIN — ENOXAPARIN SODIUM 80 MILLIGRAM(S): 100 INJECTION SUBCUTANEOUS at 11:36

## 2022-01-01 RX ADMIN — CHLORHEXIDINE GLUCONATE 15 MILLILITER(S): 213 SOLUTION TOPICAL at 18:15

## 2022-01-01 RX ADMIN — HEPARIN SODIUM 1700 UNIT(S)/HR: 5000 INJECTION INTRAVENOUS; SUBCUTANEOUS at 04:26

## 2022-01-01 RX ADMIN — ENOXAPARIN SODIUM 80 MILLIGRAM(S): 100 INJECTION SUBCUTANEOUS at 21:22

## 2022-01-01 RX ADMIN — ATORVASTATIN CALCIUM 10 MILLIGRAM(S): 80 TABLET, FILM COATED ORAL at 21:08

## 2022-01-01 RX ADMIN — ENOXAPARIN SODIUM 80 MILLIGRAM(S): 100 INJECTION SUBCUTANEOUS at 21:52

## 2022-01-01 RX ADMIN — REMDESIVIR 500 MILLIGRAM(S): 5 INJECTION INTRAVENOUS at 10:58

## 2022-01-01 RX ADMIN — HEPARIN SODIUM 1700 UNIT(S)/HR: 5000 INJECTION INTRAVENOUS; SUBCUTANEOUS at 07:19

## 2022-01-01 RX ADMIN — TIOTROPIUM BROMIDE 1 CAPSULE(S): 18 CAPSULE ORAL; RESPIRATORY (INHALATION) at 07:48

## 2022-01-01 RX ADMIN — HEPARIN SODIUM 1700 UNIT(S)/HR: 5000 INJECTION INTRAVENOUS; SUBCUTANEOUS at 02:53

## 2022-01-01 RX ADMIN — FENTANYL CITRATE 2.07 MICROGRAM(S)/KG/HR: 50 INJECTION INTRAVENOUS at 23:18

## 2022-01-01 RX ADMIN — CEFEPIME 100 MILLIGRAM(S): 1 INJECTION, POWDER, FOR SOLUTION INTRAMUSCULAR; INTRAVENOUS at 22:01

## 2022-01-01 RX ADMIN — DEXMEDETOMIDINE HYDROCHLORIDE IN 0.9% SODIUM CHLORIDE 20.7 MICROGRAM(S)/KG/HR: 4 INJECTION INTRAVENOUS at 02:10

## 2022-01-01 RX ADMIN — PROPOFOL 12.4 MICROGRAM(S)/KG/MIN: 10 INJECTION, EMULSION INTRAVENOUS at 11:03

## 2022-01-01 RX ADMIN — BUDESONIDE AND FORMOTEROL FUMARATE DIHYDRATE 2 PUFF(S): 160; 4.5 AEROSOL RESPIRATORY (INHALATION) at 07:49

## 2022-01-01 RX ADMIN — DEXMEDETOMIDINE HYDROCHLORIDE IN 0.9% SODIUM CHLORIDE 20.7 MICROGRAM(S)/KG/HR: 4 INJECTION INTRAVENOUS at 20:06

## 2022-01-01 RX ADMIN — ENOXAPARIN SODIUM 80 MILLIGRAM(S): 100 INJECTION SUBCUTANEOUS at 10:56

## 2022-01-01 RX ADMIN — Medication 1200 MILLIGRAM(S): at 11:09

## 2022-01-01 RX ADMIN — MIDODRINE HYDROCHLORIDE 10 MILLIGRAM(S): 2.5 TABLET ORAL at 21:20

## 2022-01-01 RX ADMIN — Medication 40 MILLIGRAM(S): at 09:03

## 2022-01-01 RX ADMIN — Medication 250 MILLIGRAM(S): at 10:28

## 2022-01-01 RX ADMIN — MIDAZOLAM HYDROCHLORIDE 1.66 MG/KG/HR: 1 INJECTION, SOLUTION INTRAMUSCULAR; INTRAVENOUS at 10:56

## 2022-01-01 RX ADMIN — ENOXAPARIN SODIUM 80 MILLIGRAM(S): 100 INJECTION SUBCUTANEOUS at 21:37

## 2022-01-01 RX ADMIN — POLYETHYLENE GLYCOL 3350 17 GRAM(S): 17 POWDER, FOR SOLUTION ORAL at 10:47

## 2022-01-01 RX ADMIN — FENTANYL CITRATE 6.21 MICROGRAM(S)/KG/HR: 50 INJECTION INTRAVENOUS at 07:17

## 2022-01-01 RX ADMIN — ENOXAPARIN SODIUM 80 MILLIGRAM(S): 100 INJECTION SUBCUTANEOUS at 09:35

## 2022-01-01 RX ADMIN — MIDAZOLAM HYDROCHLORIDE 8.28 MG/KG/HR: 1 INJECTION, SOLUTION INTRAMUSCULAR; INTRAVENOUS at 07:18

## 2022-01-01 RX ADMIN — CISATRACURIUM BESYLATE 14.9 MICROGRAM(S)/KG/MIN: 2 INJECTION INTRAVENOUS at 19:25

## 2022-01-01 RX ADMIN — Medication 40 MILLIGRAM(S): at 09:22

## 2022-01-01 RX ADMIN — ENOXAPARIN SODIUM 40 MILLIGRAM(S): 100 INJECTION SUBCUTANEOUS at 11:10

## 2022-01-01 RX ADMIN — ENOXAPARIN SODIUM 80 MILLIGRAM(S): 100 INJECTION SUBCUTANEOUS at 10:29

## 2022-01-01 RX ADMIN — MIDAZOLAM HYDROCHLORIDE 2 MILLIGRAM(S): 1 INJECTION, SOLUTION INTRAMUSCULAR; INTRAVENOUS at 00:15

## 2022-01-01 RX ADMIN — CEFEPIME 1000 MILLIGRAM(S): 1 INJECTION, POWDER, FOR SOLUTION INTRAMUSCULAR; INTRAVENOUS at 06:06

## 2022-01-01 RX ADMIN — PROPOFOL 12.4 MICROGRAM(S)/KG/MIN: 10 INJECTION, EMULSION INTRAVENOUS at 15:59

## 2022-01-01 RX ADMIN — Medication 6 MILLIGRAM(S): at 18:34

## 2022-01-01 RX ADMIN — BUDESONIDE AND FORMOTEROL FUMARATE DIHYDRATE 2 PUFF(S): 160; 4.5 AEROSOL RESPIRATORY (INHALATION) at 19:13

## 2022-01-01 RX ADMIN — Medication 40 MILLIGRAM(S): at 11:41

## 2022-01-01 RX ADMIN — PROPOFOL 12.4 MICROGRAM(S)/KG/MIN: 10 INJECTION, EMULSION INTRAVENOUS at 02:30

## 2022-01-01 RX ADMIN — Medication 1200 MILLIGRAM(S): at 10:12

## 2022-01-01 RX ADMIN — Medication 1200 MILLIGRAM(S): at 21:38

## 2022-01-01 RX ADMIN — FENTANYL CITRATE 6.21 MICROGRAM(S)/KG/HR: 50 INJECTION INTRAVENOUS at 08:41

## 2022-01-01 RX ADMIN — SODIUM CHLORIDE 50 MILLILITER(S): 9 INJECTION, SOLUTION INTRAVENOUS at 21:20

## 2022-01-01 RX ADMIN — SENNA PLUS 2 TABLET(S): 8.6 TABLET ORAL at 21:08

## 2022-01-01 RX ADMIN — MORPHINE SULFATE 2 MILLIGRAM(S): 50 CAPSULE, EXTENDED RELEASE ORAL at 06:23

## 2022-01-01 RX ADMIN — POLYETHYLENE GLYCOL 3350 17 GRAM(S): 17 POWDER, FOR SOLUTION ORAL at 10:31

## 2022-01-01 RX ADMIN — MIDAZOLAM HYDROCHLORIDE 2 MILLIGRAM(S): 1 INJECTION, SOLUTION INTRAMUSCULAR; INTRAVENOUS at 00:03

## 2022-01-01 RX ADMIN — Medication 1 SPRAY(S): at 00:53

## 2022-01-01 RX ADMIN — ALBUTEROL 2 PUFF(S): 90 AEROSOL, METERED ORAL at 08:59

## 2022-01-01 RX ADMIN — Medication 40 MILLIGRAM(S): at 04:02

## 2022-01-01 RX ADMIN — TIOTROPIUM BROMIDE 1 CAPSULE(S): 18 CAPSULE ORAL; RESPIRATORY (INHALATION) at 08:59

## 2022-01-01 RX ADMIN — POLYETHYLENE GLYCOL 3350 17 GRAM(S): 17 POWDER, FOR SOLUTION ORAL at 11:17

## 2022-01-01 RX ADMIN — Medication 1 APPLICATION(S): at 12:03

## 2022-01-01 RX ADMIN — SENNA PLUS 2 TABLET(S): 8.6 TABLET ORAL at 22:03

## 2022-01-01 RX ADMIN — ATORVASTATIN CALCIUM 10 MILLIGRAM(S): 80 TABLET, FILM COATED ORAL at 21:38

## 2022-01-01 RX ADMIN — Medication 1200 MILLIGRAM(S): at 09:34

## 2022-01-01 RX ADMIN — Medication 1200 MILLIGRAM(S): at 10:56

## 2022-01-01 RX ADMIN — Medication 3 MILLIGRAM(S): at 22:05

## 2022-01-01 RX ADMIN — CHLORHEXIDINE GLUCONATE 15 MILLILITER(S): 213 SOLUTION TOPICAL at 15:11

## 2022-01-01 RX ADMIN — ALBUTEROL 2 PUFF(S): 90 AEROSOL, METERED ORAL at 09:07

## 2022-01-01 RX ADMIN — SENNA PLUS 2 TABLET(S): 8.6 TABLET ORAL at 21:13

## 2022-01-01 RX ADMIN — Medication 1200 MILLIGRAM(S): at 21:34

## 2022-01-01 RX ADMIN — Medication 1 APPLICATION(S): at 06:08

## 2022-01-01 RX ADMIN — MORPHINE SULFATE 1 MILLIGRAM(S): 50 CAPSULE, EXTENDED RELEASE ORAL at 23:42

## 2022-01-01 RX ADMIN — PROPOFOL 12.4 MICROGRAM(S)/KG/MIN: 10 INJECTION, EMULSION INTRAVENOUS at 04:23

## 2022-01-01 RX ADMIN — BUDESONIDE AND FORMOTEROL FUMARATE DIHYDRATE 2 PUFF(S): 160; 4.5 AEROSOL RESPIRATORY (INHALATION) at 09:55

## 2022-01-01 RX ADMIN — POLYETHYLENE GLYCOL 3350 17 GRAM(S): 17 POWDER, FOR SOLUTION ORAL at 10:12

## 2022-01-01 RX ADMIN — Medication 166.67 MILLIGRAM(S): at 09:00

## 2022-01-01 RX ADMIN — CEFEPIME 100 MILLIGRAM(S): 1 INJECTION, POWDER, FOR SOLUTION INTRAMUSCULAR; INTRAVENOUS at 21:51

## 2022-01-01 RX ADMIN — BUDESONIDE AND FORMOTEROL FUMARATE DIHYDRATE 2 PUFF(S): 160; 4.5 AEROSOL RESPIRATORY (INHALATION) at 08:29

## 2022-01-01 RX ADMIN — MIDAZOLAM HYDROCHLORIDE 1.66 MG/KG/HR: 1 INJECTION, SOLUTION INTRAMUSCULAR; INTRAVENOUS at 18:03

## 2022-01-01 RX ADMIN — REMDESIVIR 500 MILLIGRAM(S): 5 INJECTION INTRAVENOUS at 14:33

## 2022-01-01 RX ADMIN — REMDESIVIR 500 MILLIGRAM(S): 5 INJECTION INTRAVENOUS at 15:47

## 2022-01-01 RX ADMIN — Medication 1200 MILLIGRAM(S): at 21:11

## 2022-01-01 RX ADMIN — PROPOFOL 12.4 MICROGRAM(S)/KG/MIN: 10 INJECTION, EMULSION INTRAVENOUS at 13:40

## 2022-01-01 RX ADMIN — MIDAZOLAM HYDROCHLORIDE 1.66 MG/KG/HR: 1 INJECTION, SOLUTION INTRAMUSCULAR; INTRAVENOUS at 09:20

## 2022-01-01 RX ADMIN — ALBUTEROL 2 PUFF(S): 90 AEROSOL, METERED ORAL at 01:24

## 2022-01-01 RX ADMIN — Medication 2 MILLIGRAM(S): at 03:03

## 2022-01-01 RX ADMIN — SENNA PLUS 2 TABLET(S): 8.6 TABLET ORAL at 21:01

## 2022-01-01 RX ADMIN — POLYETHYLENE GLYCOL 3350 17 GRAM(S): 17 POWDER, FOR SOLUTION ORAL at 09:36

## 2022-01-01 RX ADMIN — ENOXAPARIN SODIUM 80 MILLIGRAM(S): 100 INJECTION SUBCUTANEOUS at 21:00

## 2022-01-01 RX ADMIN — CHLORHEXIDINE GLUCONATE 15 MILLILITER(S): 213 SOLUTION TOPICAL at 06:32

## 2022-01-01 RX ADMIN — CHLORHEXIDINE GLUCONATE 15 MILLILITER(S): 213 SOLUTION TOPICAL at 18:03

## 2022-01-01 RX ADMIN — Medication 20 MILLIGRAM(S): at 21:08

## 2022-01-01 RX ADMIN — MIDAZOLAM HYDROCHLORIDE 1.66 MG/KG/HR: 1 INJECTION, SOLUTION INTRAMUSCULAR; INTRAVENOUS at 19:13

## 2022-01-01 RX ADMIN — BUDESONIDE AND FORMOTEROL FUMARATE DIHYDRATE 2 PUFF(S): 160; 4.5 AEROSOL RESPIRATORY (INHALATION) at 09:00

## 2022-01-01 RX ADMIN — POLYETHYLENE GLYCOL 3350 17 GRAM(S): 17 POWDER, FOR SOLUTION ORAL at 09:03

## 2022-01-01 RX ADMIN — Medication 2 MILLIGRAM(S): at 10:25

## 2022-01-01 RX ADMIN — Medication 40 MILLIGRAM(S): at 21:21

## 2022-01-01 RX ADMIN — MIDODRINE HYDROCHLORIDE 10 MILLIGRAM(S): 2.5 TABLET ORAL at 21:11

## 2022-01-01 RX ADMIN — PROPOFOL 12.4 MICROGRAM(S)/KG/MIN: 10 INJECTION, EMULSION INTRAVENOUS at 20:51

## 2022-01-01 RX ADMIN — Medication 1200 MILLIGRAM(S): at 22:04

## 2022-01-01 RX ADMIN — Medication 1200 MILLIGRAM(S): at 22:01

## 2022-01-01 RX ADMIN — REMDESIVIR 500 MILLIGRAM(S): 5 INJECTION INTRAVENOUS at 11:38

## 2022-01-01 RX ADMIN — HEPARIN SODIUM 3000 UNIT(S): 5000 INJECTION INTRAVENOUS; SUBCUTANEOUS at 20:27

## 2022-01-01 RX ADMIN — Medication 1200 MILLIGRAM(S): at 11:21

## 2022-01-01 RX ADMIN — ENOXAPARIN SODIUM 80 MILLIGRAM(S): 100 INJECTION SUBCUTANEOUS at 10:52

## 2022-01-01 RX ADMIN — BUDESONIDE AND FORMOTEROL FUMARATE DIHYDRATE 2 PUFF(S): 160; 4.5 AEROSOL RESPIRATORY (INHALATION) at 21:13

## 2022-01-01 RX ADMIN — Medication 250 MILLIGRAM(S): at 21:31

## 2022-01-01 RX ADMIN — MORPHINE SULFATE 2 MILLIGRAM(S): 50 CAPSULE, EXTENDED RELEASE ORAL at 01:23

## 2022-01-01 RX ADMIN — PROPOFOL 12.4 MICROGRAM(S)/KG/MIN: 10 INJECTION, EMULSION INTRAVENOUS at 19:58

## 2022-01-01 RX ADMIN — Medication 650 MILLIGRAM(S): at 10:30

## 2022-01-01 RX ADMIN — MEROPENEM 100 MILLIGRAM(S): 1 INJECTION INTRAVENOUS at 21:13

## 2022-01-01 RX ADMIN — Medication 1200 MILLIGRAM(S): at 21:01

## 2022-01-01 RX ADMIN — ATORVASTATIN CALCIUM 10 MILLIGRAM(S): 80 TABLET, FILM COATED ORAL at 22:04

## 2022-01-01 RX ADMIN — Medication 10 MILLIGRAM(S): at 04:33

## 2022-01-01 RX ADMIN — ATORVASTATIN CALCIUM 10 MILLIGRAM(S): 80 TABLET, FILM COATED ORAL at 22:05

## 2022-01-01 RX ADMIN — ATORVASTATIN CALCIUM 10 MILLIGRAM(S): 80 TABLET, FILM COATED ORAL at 21:36

## 2022-01-01 RX ADMIN — BUDESONIDE AND FORMOTEROL FUMARATE DIHYDRATE 2 PUFF(S): 160; 4.5 AEROSOL RESPIRATORY (INHALATION) at 20:29

## 2022-01-01 RX ADMIN — MIDODRINE HYDROCHLORIDE 10 MILLIGRAM(S): 2.5 TABLET ORAL at 21:47

## 2022-01-01 RX ADMIN — CEFEPIME 100 MILLIGRAM(S): 1 INJECTION, POWDER, FOR SOLUTION INTRAMUSCULAR; INTRAVENOUS at 11:17

## 2022-01-01 RX ADMIN — CEFEPIME 100 MILLIGRAM(S): 1 INJECTION, POWDER, FOR SOLUTION INTRAMUSCULAR; INTRAVENOUS at 22:02

## 2022-01-01 RX ADMIN — CEFEPIME 100 MILLIGRAM(S): 1 INJECTION, POWDER, FOR SOLUTION INTRAMUSCULAR; INTRAVENOUS at 21:14

## 2022-01-01 RX ADMIN — CHLORHEXIDINE GLUCONATE 1 APPLICATION(S): 213 SOLUTION TOPICAL at 06:08

## 2022-01-01 RX ADMIN — Medication 40 MILLIGRAM(S): at 08:41

## 2022-01-01 RX ADMIN — Medication 100 MILLIGRAM(S): at 11:17

## 2022-01-01 RX ADMIN — MEROPENEM 100 MILLIGRAM(S): 1 INJECTION INTRAVENOUS at 06:57

## 2022-01-01 RX ADMIN — ATORVASTATIN CALCIUM 10 MILLIGRAM(S): 80 TABLET, FILM COATED ORAL at 21:52

## 2022-01-01 RX ADMIN — Medication 20 MILLIGRAM(S): at 09:27

## 2022-01-01 RX ADMIN — BUDESONIDE AND FORMOTEROL FUMARATE DIHYDRATE 2 PUFF(S): 160; 4.5 AEROSOL RESPIRATORY (INHALATION) at 12:35

## 2022-01-01 RX ADMIN — FENTANYL CITRATE 2.07 MICROGRAM(S)/KG/HR: 50 INJECTION INTRAVENOUS at 02:01

## 2022-01-01 RX ADMIN — BUDESONIDE AND FORMOTEROL FUMARATE DIHYDRATE 2 PUFF(S): 160; 4.5 AEROSOL RESPIRATORY (INHALATION) at 08:07

## 2022-01-01 RX ADMIN — MIDODRINE HYDROCHLORIDE 10 MILLIGRAM(S): 2.5 TABLET ORAL at 14:52

## 2022-01-01 RX ADMIN — MEROPENEM 100 MILLIGRAM(S): 1 INJECTION INTRAVENOUS at 15:11

## 2022-01-01 RX ADMIN — Medication 40 MILLIGRAM(S): at 11:47

## 2022-01-01 RX ADMIN — CHLORHEXIDINE GLUCONATE 15 MILLILITER(S): 213 SOLUTION TOPICAL at 05:04

## 2022-01-01 RX ADMIN — CHLORHEXIDINE GLUCONATE 1 APPLICATION(S): 213 SOLUTION TOPICAL at 05:37

## 2022-01-01 RX ADMIN — CISATRACURIUM BESYLATE 14.9 MICROGRAM(S)/KG/MIN: 2 INJECTION INTRAVENOUS at 04:26

## 2022-01-01 RX ADMIN — ENOXAPARIN SODIUM 80 MILLIGRAM(S): 100 INJECTION SUBCUTANEOUS at 11:48

## 2022-01-01 RX ADMIN — PROPOFOL 4.97 MICROGRAM(S)/KG/MIN: 10 INJECTION, EMULSION INTRAVENOUS at 02:40

## 2022-01-01 RX ADMIN — DEXMEDETOMIDINE HYDROCHLORIDE IN 0.9% SODIUM CHLORIDE 20.7 MICROGRAM(S)/KG/HR: 4 INJECTION INTRAVENOUS at 04:06

## 2022-01-01 RX ADMIN — Medication 1 APPLICATION(S): at 05:18

## 2022-01-01 RX ADMIN — BUDESONIDE AND FORMOTEROL FUMARATE DIHYDRATE 2 PUFF(S): 160; 4.5 AEROSOL RESPIRATORY (INHALATION) at 20:30

## 2022-01-01 RX ADMIN — Medication 20 MILLIGRAM(S): at 11:13

## 2022-01-01 RX ADMIN — ENOXAPARIN SODIUM 80 MILLIGRAM(S): 100 INJECTION SUBCUTANEOUS at 09:23

## 2022-01-01 RX ADMIN — Medication 20 MILLIGRAM(S): at 10:31

## 2022-01-01 RX ADMIN — CEFEPIME 1000 MILLIGRAM(S): 1 INJECTION, POWDER, FOR SOLUTION INTRAMUSCULAR; INTRAVENOUS at 18:32

## 2022-01-01 RX ADMIN — ENOXAPARIN SODIUM 80 MILLIGRAM(S): 100 INJECTION SUBCUTANEOUS at 22:04

## 2022-01-01 RX ADMIN — MIDODRINE HYDROCHLORIDE 10 MILLIGRAM(S): 2.5 TABLET ORAL at 13:04

## 2022-01-01 RX ADMIN — CHLORHEXIDINE GLUCONATE 15 MILLILITER(S): 213 SOLUTION TOPICAL at 17:22

## 2022-01-01 RX ADMIN — ATORVASTATIN CALCIUM 10 MILLIGRAM(S): 80 TABLET, FILM COATED ORAL at 21:01

## 2022-01-01 RX ADMIN — Medication 1 SPRAY(S): at 21:19

## 2022-01-01 RX ADMIN — MIDAZOLAM HYDROCHLORIDE 2 MILLIGRAM(S): 1 INJECTION, SOLUTION INTRAMUSCULAR; INTRAVENOUS at 21:19

## 2022-01-01 RX ADMIN — FENTANYL CITRATE 2.07 MICROGRAM(S)/KG/HR: 50 INJECTION INTRAVENOUS at 12:15

## 2022-01-01 RX ADMIN — CISATRACURIUM BESYLATE 14.9 MICROGRAM(S)/KG/MIN: 2 INJECTION INTRAVENOUS at 07:42

## 2022-01-01 RX ADMIN — FENTANYL CITRATE 2.07 MICROGRAM(S)/KG/HR: 50 INJECTION INTRAVENOUS at 01:42

## 2022-01-01 RX ADMIN — CISATRACURIUM BESYLATE 14.9 MICROGRAM(S)/KG/MIN: 2 INJECTION INTRAVENOUS at 21:15

## 2022-01-01 RX ADMIN — SENNA PLUS 2 TABLET(S): 8.6 TABLET ORAL at 21:53

## 2022-01-01 RX ADMIN — ALBUTEROL 2 PUFF(S): 90 AEROSOL, METERED ORAL at 16:22

## 2022-01-01 RX ADMIN — FENTANYL CITRATE 2.07 MICROGRAM(S)/KG/HR: 50 INJECTION INTRAVENOUS at 03:34

## 2022-01-01 RX ADMIN — Medication 6 MILLIGRAM(S): at 05:22

## 2022-01-01 RX ADMIN — ENOXAPARIN SODIUM 80 MILLIGRAM(S): 100 INJECTION SUBCUTANEOUS at 21:12

## 2022-01-01 RX ADMIN — MORPHINE SULFATE 1 MILLIGRAM(S): 50 CAPSULE, EXTENDED RELEASE ORAL at 23:57

## 2022-01-01 RX ADMIN — MORPHINE SULFATE 2 MILLIGRAM(S): 50 CAPSULE, EXTENDED RELEASE ORAL at 16:29

## 2022-01-01 RX ADMIN — MIDAZOLAM HYDROCHLORIDE 1.66 MG/KG/HR: 1 INJECTION, SOLUTION INTRAMUSCULAR; INTRAVENOUS at 18:12

## 2022-01-01 RX ADMIN — Medication 1 APPLICATION(S): at 23:46

## 2022-01-01 RX ADMIN — MEROPENEM 100 MILLIGRAM(S): 1 INJECTION INTRAVENOUS at 13:55

## 2022-01-01 RX ADMIN — BUDESONIDE AND FORMOTEROL FUMARATE DIHYDRATE 2 PUFF(S): 160; 4.5 AEROSOL RESPIRATORY (INHALATION) at 20:28

## 2022-01-01 RX ADMIN — Medication 0.25 MILLIGRAM(S): at 20:38

## 2022-01-01 RX ADMIN — CHLORHEXIDINE GLUCONATE 1 APPLICATION(S): 213 SOLUTION TOPICAL at 05:17

## 2022-01-01 RX ADMIN — Medication 1 APPLICATION(S): at 12:15

## 2022-01-01 RX ADMIN — Medication 40 MILLIGRAM(S): at 10:17

## 2022-01-01 RX ADMIN — Medication 20 MILLIGRAM(S): at 08:58

## 2022-01-01 RX ADMIN — MIDODRINE HYDROCHLORIDE 10 MILLIGRAM(S): 2.5 TABLET ORAL at 21:08

## 2022-01-01 RX ADMIN — MIDAZOLAM HYDROCHLORIDE 1.66 MG/KG/HR: 1 INJECTION, SOLUTION INTRAMUSCULAR; INTRAVENOUS at 10:07

## 2022-01-01 RX ADMIN — MORPHINE SULFATE 1 MILLIGRAM(S): 50 CAPSULE, EXTENDED RELEASE ORAL at 06:11

## 2022-01-01 RX ADMIN — DEXMEDETOMIDINE HYDROCHLORIDE IN 0.9% SODIUM CHLORIDE 20.7 MICROGRAM(S)/KG/HR: 4 INJECTION INTRAVENOUS at 15:01

## 2022-01-01 RX ADMIN — SENNA PLUS 2 TABLET(S): 8.6 TABLET ORAL at 21:11

## 2022-01-01 RX ADMIN — DEXMEDETOMIDINE HYDROCHLORIDE IN 0.9% SODIUM CHLORIDE 20.7 MICROGRAM(S)/KG/HR: 4 INJECTION INTRAVENOUS at 18:29

## 2022-01-01 RX ADMIN — ATORVASTATIN CALCIUM 10 MILLIGRAM(S): 80 TABLET, FILM COATED ORAL at 21:13

## 2022-01-01 RX ADMIN — POLYETHYLENE GLYCOL 3350 17 GRAM(S): 17 POWDER, FOR SOLUTION ORAL at 12:15

## 2022-01-01 RX ADMIN — MIDODRINE HYDROCHLORIDE 10 MILLIGRAM(S): 2.5 TABLET ORAL at 13:14

## 2022-01-01 RX ADMIN — TIOTROPIUM BROMIDE 1 CAPSULE(S): 18 CAPSULE ORAL; RESPIRATORY (INHALATION) at 09:51

## 2022-01-01 RX ADMIN — ENOXAPARIN SODIUM 80 MILLIGRAM(S): 100 INJECTION SUBCUTANEOUS at 10:32

## 2022-01-01 RX ADMIN — POLYETHYLENE GLYCOL 3350 17 GRAM(S): 17 POWDER, FOR SOLUTION ORAL at 11:36

## 2022-01-01 RX ADMIN — ENOXAPARIN SODIUM 80 MILLIGRAM(S): 100 INJECTION SUBCUTANEOUS at 21:18

## 2022-01-01 RX ADMIN — MIDODRINE HYDROCHLORIDE 10 MILLIGRAM(S): 2.5 TABLET ORAL at 20:52

## 2022-01-01 RX ADMIN — PROPOFOL 12.4 MICROGRAM(S)/KG/MIN: 10 INJECTION, EMULSION INTRAVENOUS at 21:08

## 2022-01-01 RX ADMIN — FENTANYL CITRATE 6.21 MICROGRAM(S)/KG/HR: 50 INJECTION INTRAVENOUS at 02:30

## 2022-01-01 RX ADMIN — ATORVASTATIN CALCIUM 10 MILLIGRAM(S): 80 TABLET, FILM COATED ORAL at 22:00

## 2022-01-01 RX ADMIN — CISATRACURIUM BESYLATE 14.9 MICROGRAM(S)/KG/MIN: 2 INJECTION INTRAVENOUS at 16:37

## 2022-01-01 RX ADMIN — ENOXAPARIN SODIUM 80 MILLIGRAM(S): 100 INJECTION SUBCUTANEOUS at 22:03

## 2022-01-01 RX ADMIN — CISATRACURIUM BESYLATE 14.9 MICROGRAM(S)/KG/MIN: 2 INJECTION INTRAVENOUS at 05:32

## 2022-01-01 RX ADMIN — ALBUTEROL 2 PUFF(S): 90 AEROSOL, METERED ORAL at 21:31

## 2022-01-01 RX ADMIN — CHLORHEXIDINE GLUCONATE 1 APPLICATION(S): 213 SOLUTION TOPICAL at 05:05

## 2022-01-01 RX ADMIN — BUDESONIDE AND FORMOTEROL FUMARATE DIHYDRATE 2 PUFF(S): 160; 4.5 AEROSOL RESPIRATORY (INHALATION) at 09:11

## 2022-01-01 RX ADMIN — SENNA PLUS 2 TABLET(S): 8.6 TABLET ORAL at 21:29

## 2022-01-01 RX ADMIN — Medication 40 MILLIGRAM(S): at 10:12

## 2022-01-01 RX ADMIN — MORPHINE SULFATE 1 MILLIGRAM(S): 50 CAPSULE, EXTENDED RELEASE ORAL at 01:59

## 2022-01-01 RX ADMIN — PROPOFOL 12.4 MICROGRAM(S)/KG/MIN: 10 INJECTION, EMULSION INTRAVENOUS at 09:22

## 2022-01-01 RX ADMIN — MIDODRINE HYDROCHLORIDE 10 MILLIGRAM(S): 2.5 TABLET ORAL at 21:12

## 2022-01-01 RX ADMIN — BUDESONIDE AND FORMOTEROL FUMARATE DIHYDRATE 2 PUFF(S): 160; 4.5 AEROSOL RESPIRATORY (INHALATION) at 20:51

## 2022-01-01 RX ADMIN — Medication 10 MILLIGRAM(S): at 18:00

## 2022-01-01 RX ADMIN — PANTOPRAZOLE SODIUM 40 MILLIGRAM(S): 20 TABLET, DELAYED RELEASE ORAL at 10:27

## 2022-01-01 RX ADMIN — ENOXAPARIN SODIUM 40 MILLIGRAM(S): 100 INJECTION SUBCUTANEOUS at 10:48

## 2022-01-01 RX ADMIN — Medication 20 MILLIGRAM(S): at 11:03

## 2022-01-01 RX ADMIN — POLYETHYLENE GLYCOL 3350 17 GRAM(S): 17 POWDER, FOR SOLUTION ORAL at 08:41

## 2022-01-01 RX ADMIN — ENOXAPARIN SODIUM 80 MILLIGRAM(S): 100 INJECTION SUBCUTANEOUS at 21:26

## 2022-01-01 RX ADMIN — Medication 1200 MILLIGRAM(S): at 11:15

## 2022-01-01 RX ADMIN — Medication 20 MILLIGRAM(S): at 13:06

## 2022-01-01 RX ADMIN — TIOTROPIUM BROMIDE 1 CAPSULE(S): 18 CAPSULE ORAL; RESPIRATORY (INHALATION) at 09:40

## 2022-01-01 RX ADMIN — MIDAZOLAM HYDROCHLORIDE 1.66 MG/KG/HR: 1 INJECTION, SOLUTION INTRAMUSCULAR; INTRAVENOUS at 10:59

## 2022-01-01 RX ADMIN — SENNA PLUS 2 TABLET(S): 8.6 TABLET ORAL at 21:51

## 2022-01-01 RX ADMIN — CHLORHEXIDINE GLUCONATE 1 APPLICATION(S): 213 SOLUTION TOPICAL at 05:01

## 2022-01-01 RX ADMIN — POLYETHYLENE GLYCOL 3350 17 GRAM(S): 17 POWDER, FOR SOLUTION ORAL at 09:22

## 2022-01-01 RX ADMIN — TIOTROPIUM BROMIDE 1 CAPSULE(S): 18 CAPSULE ORAL; RESPIRATORY (INHALATION) at 08:29

## 2022-01-01 RX ADMIN — MIDAZOLAM HYDROCHLORIDE 1.66 MG/KG/HR: 1 INJECTION, SOLUTION INTRAMUSCULAR; INTRAVENOUS at 18:20

## 2022-01-01 RX ADMIN — MEROPENEM 100 MILLIGRAM(S): 1 INJECTION INTRAVENOUS at 06:20

## 2022-01-01 RX ADMIN — SENNA PLUS 2 TABLET(S): 8.6 TABLET ORAL at 21:38

## 2022-01-01 RX ADMIN — ENOXAPARIN SODIUM 80 MILLIGRAM(S): 100 INJECTION SUBCUTANEOUS at 08:41

## 2022-01-01 RX ADMIN — POLYETHYLENE GLYCOL 3350 17 GRAM(S): 17 POWDER, FOR SOLUTION ORAL at 10:59

## 2022-01-01 RX ADMIN — FENTANYL CITRATE 6.21 MICROGRAM(S)/KG/HR: 50 INJECTION INTRAVENOUS at 21:14

## 2022-01-01 RX ADMIN — ENOXAPARIN SODIUM 80 MILLIGRAM(S): 100 INJECTION SUBCUTANEOUS at 21:35

## 2022-01-01 RX ADMIN — MIDAZOLAM HYDROCHLORIDE 1.66 MG/KG/HR: 1 INJECTION, SOLUTION INTRAMUSCULAR; INTRAVENOUS at 02:02

## 2022-01-01 RX ADMIN — PROPOFOL 12.4 MICROGRAM(S)/KG/MIN: 10 INJECTION, EMULSION INTRAVENOUS at 08:27

## 2022-01-01 RX ADMIN — PROPOFOL 12.4 MICROGRAM(S)/KG/MIN: 10 INJECTION, EMULSION INTRAVENOUS at 09:24

## 2022-01-01 RX ADMIN — CEFEPIME 1000 MILLIGRAM(S): 1 INJECTION, POWDER, FOR SOLUTION INTRAMUSCULAR; INTRAVENOUS at 20:52

## 2022-01-01 RX ADMIN — Medication 650 MILLIGRAM(S): at 03:05

## 2022-01-01 RX ADMIN — MIDAZOLAM HYDROCHLORIDE 1.66 MG/KG/HR: 1 INJECTION, SOLUTION INTRAMUSCULAR; INTRAVENOUS at 03:00

## 2022-01-01 RX ADMIN — PROPOFOL 12.4 MICROGRAM(S)/KG/MIN: 10 INJECTION, EMULSION INTRAVENOUS at 18:25

## 2022-01-01 RX ADMIN — ENOXAPARIN SODIUM 80 MILLIGRAM(S): 100 INJECTION SUBCUTANEOUS at 11:29

## 2022-01-01 RX ADMIN — PROPOFOL 12.4 MICROGRAM(S)/KG/MIN: 10 INJECTION, EMULSION INTRAVENOUS at 04:05

## 2022-01-01 RX ADMIN — Medication 40 MILLIGRAM(S): at 21:00

## 2022-01-01 RX ADMIN — MORPHINE SULFATE 2 MILLIGRAM(S): 50 CAPSULE, EXTENDED RELEASE ORAL at 06:06

## 2022-01-01 RX ADMIN — CHLORHEXIDINE GLUCONATE 15 MILLILITER(S): 213 SOLUTION TOPICAL at 18:04

## 2022-01-01 RX ADMIN — Medication 40 MILLIGRAM(S): at 11:22

## 2022-01-01 RX ADMIN — ATORVASTATIN CALCIUM 10 MILLIGRAM(S): 80 TABLET, FILM COATED ORAL at 21:34

## 2022-01-01 RX ADMIN — Medication 10 MILLIGRAM(S): at 15:47

## 2022-01-01 RX ADMIN — FENTANYL CITRATE 6.21 MICROGRAM(S)/KG/HR: 50 INJECTION INTRAVENOUS at 06:57

## 2022-01-01 RX ADMIN — FENTANYL CITRATE 6.21 MICROGRAM(S)/KG/HR: 50 INJECTION INTRAVENOUS at 23:49

## 2022-01-01 RX ADMIN — CEFEPIME 100 MILLIGRAM(S): 1 INJECTION, POWDER, FOR SOLUTION INTRAMUSCULAR; INTRAVENOUS at 21:36

## 2022-01-01 RX ADMIN — SENNA PLUS 2 TABLET(S): 8.6 TABLET ORAL at 21:25

## 2022-01-01 RX ADMIN — FENTANYL CITRATE 2.07 MICROGRAM(S)/KG/HR: 50 INJECTION INTRAVENOUS at 01:19

## 2022-01-01 RX ADMIN — Medication 1 APPLICATION(S): at 12:30

## 2022-01-01 RX ADMIN — Medication 650 MILLIGRAM(S): at 18:07

## 2022-01-01 RX ADMIN — DEXMEDETOMIDINE HYDROCHLORIDE IN 0.9% SODIUM CHLORIDE 20.7 MICROGRAM(S)/KG/HR: 4 INJECTION INTRAVENOUS at 15:37

## 2022-01-01 RX ADMIN — Medication 166.67 MILLIGRAM(S): at 09:01

## 2022-01-01 RX ADMIN — PROPOFOL 12.4 MICROGRAM(S)/KG/MIN: 10 INJECTION, EMULSION INTRAVENOUS at 23:36

## 2022-01-01 RX ADMIN — Medication 100 MILLIGRAM(S): at 09:34

## 2022-01-01 RX ADMIN — MIDODRINE HYDROCHLORIDE 10 MILLIGRAM(S): 2.5 TABLET ORAL at 21:54

## 2022-01-01 RX ADMIN — Medication 1 APPLICATION(S): at 09:13

## 2022-01-01 RX ADMIN — MORPHINE SULFATE 2 MILLIGRAM(S): 50 CAPSULE, EXTENDED RELEASE ORAL at 21:49

## 2022-01-01 RX ADMIN — Medication 100 MILLIGRAM(S): at 10:48

## 2022-01-01 RX ADMIN — Medication 1200 MILLIGRAM(S): at 21:50

## 2022-01-01 RX ADMIN — ATORVASTATIN CALCIUM 10 MILLIGRAM(S): 80 TABLET, FILM COATED ORAL at 22:01

## 2022-01-01 RX ADMIN — REMDESIVIR 500 MILLIGRAM(S): 5 INJECTION INTRAVENOUS at 14:49

## 2022-01-01 RX ADMIN — POLYETHYLENE GLYCOL 3350 17 GRAM(S): 17 POWDER, FOR SOLUTION ORAL at 11:15

## 2022-01-01 RX ADMIN — SENNA PLUS 2 TABLET(S): 8.6 TABLET ORAL at 21:47

## 2022-01-01 RX ADMIN — SENNA PLUS 2 TABLET(S): 8.6 TABLET ORAL at 21:22

## 2022-01-01 RX ADMIN — Medication 1200 MILLIGRAM(S): at 09:29

## 2022-01-01 RX ADMIN — DEXMEDETOMIDINE HYDROCHLORIDE IN 0.9% SODIUM CHLORIDE 20.7 MICROGRAM(S)/KG/HR: 4 INJECTION INTRAVENOUS at 18:00

## 2022-01-01 RX ADMIN — CHLORHEXIDINE GLUCONATE 15 MILLILITER(S): 213 SOLUTION TOPICAL at 17:17

## 2022-01-01 RX ADMIN — PROPOFOL 12.4 MICROGRAM(S)/KG/MIN: 10 INJECTION, EMULSION INTRAVENOUS at 23:13

## 2022-01-01 RX ADMIN — Medication 2 MILLIGRAM(S): at 05:05

## 2022-01-01 RX ADMIN — Medication 20 MILLIGRAM(S): at 13:58

## 2022-01-01 RX ADMIN — ATORVASTATIN CALCIUM 10 MILLIGRAM(S): 80 TABLET, FILM COATED ORAL at 21:20

## 2022-01-01 RX ADMIN — MIDAZOLAM HYDROCHLORIDE 2 MILLIGRAM(S): 1 INJECTION, SOLUTION INTRAMUSCULAR; INTRAVENOUS at 03:27

## 2022-01-01 RX ADMIN — PROPOFOL 12.4 MICROGRAM(S)/KG/MIN: 10 INJECTION, EMULSION INTRAVENOUS at 23:21

## 2022-01-01 RX ADMIN — BUDESONIDE AND FORMOTEROL FUMARATE DIHYDRATE 2 PUFF(S): 160; 4.5 AEROSOL RESPIRATORY (INHALATION) at 21:04

## 2022-01-01 RX ADMIN — Medication 1 APPLICATION(S): at 17:31

## 2022-01-01 RX ADMIN — POLYETHYLENE GLYCOL 3350 17 GRAM(S): 17 POWDER, FOR SOLUTION ORAL at 11:09

## 2022-01-01 RX ADMIN — DEXMEDETOMIDINE HYDROCHLORIDE IN 0.9% SODIUM CHLORIDE 10.4 MICROGRAM(S)/KG/HR: 4 INJECTION INTRAVENOUS at 03:09

## 2022-01-01 RX ADMIN — DEXMEDETOMIDINE HYDROCHLORIDE IN 0.9% SODIUM CHLORIDE 20.7 MICROGRAM(S)/KG/HR: 4 INJECTION INTRAVENOUS at 12:02

## 2022-01-01 RX ADMIN — BUDESONIDE AND FORMOTEROL FUMARATE DIHYDRATE 2 PUFF(S): 160; 4.5 AEROSOL RESPIRATORY (INHALATION) at 09:52

## 2022-01-01 RX ADMIN — SENNA PLUS 2 TABLET(S): 8.6 TABLET ORAL at 22:09

## 2022-01-01 RX ADMIN — REMDESIVIR 500 MILLIGRAM(S): 5 INJECTION INTRAVENOUS at 12:19

## 2022-01-01 RX ADMIN — Medication 40 MILLIGRAM(S): at 11:00

## 2022-01-01 RX ADMIN — MIDODRINE HYDROCHLORIDE 10 MILLIGRAM(S): 2.5 TABLET ORAL at 05:05

## 2022-01-01 RX ADMIN — Medication 3 MILLIGRAM(S): at 21:17

## 2022-01-01 RX ADMIN — ENOXAPARIN SODIUM 80 MILLIGRAM(S): 100 INJECTION SUBCUTANEOUS at 10:20

## 2022-01-01 RX ADMIN — ATORVASTATIN CALCIUM 10 MILLIGRAM(S): 80 TABLET, FILM COATED ORAL at 21:53

## 2022-01-01 RX ADMIN — DEXMEDETOMIDINE HYDROCHLORIDE IN 0.9% SODIUM CHLORIDE 20.7 MICROGRAM(S)/KG/HR: 4 INJECTION INTRAVENOUS at 21:32

## 2022-01-01 RX ADMIN — PANTOPRAZOLE SODIUM 40 MILLIGRAM(S): 20 TABLET, DELAYED RELEASE ORAL at 09:12

## 2022-01-01 RX ADMIN — CEFEPIME 100 MILLIGRAM(S): 1 INJECTION, POWDER, FOR SOLUTION INTRAMUSCULAR; INTRAVENOUS at 09:53

## 2022-01-01 RX ADMIN — ATORVASTATIN CALCIUM 10 MILLIGRAM(S): 80 TABLET, FILM COATED ORAL at 21:39

## 2022-01-01 RX ADMIN — ATORVASTATIN CALCIUM 10 MILLIGRAM(S): 80 TABLET, FILM COATED ORAL at 21:22

## 2022-01-01 RX ADMIN — PROPOFOL 12.4 MICROGRAM(S)/KG/MIN: 10 INJECTION, EMULSION INTRAVENOUS at 12:21

## 2022-01-01 RX ADMIN — PROPOFOL 12.4 MICROGRAM(S)/KG/MIN: 10 INJECTION, EMULSION INTRAVENOUS at 05:05

## 2022-01-01 RX ADMIN — DEXMEDETOMIDINE HYDROCHLORIDE IN 0.9% SODIUM CHLORIDE 20.7 MICROGRAM(S)/KG/HR: 4 INJECTION INTRAVENOUS at 17:52

## 2022-01-01 RX ADMIN — POLYETHYLENE GLYCOL 3350 17 GRAM(S): 17 POWDER, FOR SOLUTION ORAL at 11:23

## 2022-01-01 RX ADMIN — Medication 2 PACKET(S): at 07:41

## 2022-01-01 RX ADMIN — ENOXAPARIN SODIUM 80 MILLIGRAM(S): 100 INJECTION SUBCUTANEOUS at 21:53

## 2022-01-01 RX ADMIN — TIOTROPIUM BROMIDE 1 CAPSULE(S): 18 CAPSULE ORAL; RESPIRATORY (INHALATION) at 16:21

## 2022-01-01 RX ADMIN — POLYETHYLENE GLYCOL 3350 17 GRAM(S): 17 POWDER, FOR SOLUTION ORAL at 13:03

## 2022-01-01 RX ADMIN — DEXMEDETOMIDINE HYDROCHLORIDE IN 0.9% SODIUM CHLORIDE 20.7 MICROGRAM(S)/KG/HR: 4 INJECTION INTRAVENOUS at 09:35

## 2022-01-01 RX ADMIN — SODIUM CHLORIDE 2000 MILLILITER(S): 9 INJECTION INTRAMUSCULAR; INTRAVENOUS; SUBCUTANEOUS at 09:22

## 2022-01-01 RX ADMIN — ALBUTEROL 2 PUFF(S): 90 AEROSOL, METERED ORAL at 21:09

## 2022-01-01 RX ADMIN — Medication 3 MILLIGRAM(S): at 22:04

## 2022-01-01 RX ADMIN — Medication 166.67 MILLIGRAM(S): at 22:08

## 2022-01-01 RX ADMIN — BUDESONIDE AND FORMOTEROL FUMARATE DIHYDRATE 2 PUFF(S): 160; 4.5 AEROSOL RESPIRATORY (INHALATION) at 16:22

## 2022-01-01 RX ADMIN — CHLORHEXIDINE GLUCONATE 15 MILLILITER(S): 213 SOLUTION TOPICAL at 17:30

## 2022-01-01 RX ADMIN — MEROPENEM 100 MILLIGRAM(S): 1 INJECTION INTRAVENOUS at 21:33

## 2022-01-01 RX ADMIN — ATORVASTATIN CALCIUM 10 MILLIGRAM(S): 80 TABLET, FILM COATED ORAL at 21:29

## 2022-01-01 RX ADMIN — PROPOFOL 12.4 MICROGRAM(S)/KG/MIN: 10 INJECTION, EMULSION INTRAVENOUS at 21:26

## 2022-01-01 RX ADMIN — ATORVASTATIN CALCIUM 10 MILLIGRAM(S): 80 TABLET, FILM COATED ORAL at 21:43

## 2022-01-01 RX ADMIN — Medication 1 SPRAY(S): at 11:06

## 2022-01-01 RX ADMIN — PROPOFOL 12.4 MICROGRAM(S)/KG/MIN: 10 INJECTION, EMULSION INTRAVENOUS at 00:26

## 2022-01-01 RX ADMIN — Medication 40 MILLIGRAM(S): at 10:35

## 2022-01-01 RX ADMIN — DEXMEDETOMIDINE HYDROCHLORIDE IN 0.9% SODIUM CHLORIDE 20.7 MICROGRAM(S)/KG/HR: 4 INJECTION INTRAVENOUS at 01:49

## 2022-01-01 RX ADMIN — ENOXAPARIN SODIUM 80 MILLIGRAM(S): 100 INJECTION SUBCUTANEOUS at 09:08

## 2022-01-01 RX ADMIN — ALBUTEROL 2 PUFF(S): 90 AEROSOL, METERED ORAL at 19:14

## 2022-01-01 RX ADMIN — ENOXAPARIN SODIUM 80 MILLIGRAM(S): 100 INJECTION SUBCUTANEOUS at 09:54

## 2022-01-01 RX ADMIN — CHLORHEXIDINE GLUCONATE 1 APPLICATION(S): 213 SOLUTION TOPICAL at 05:38

## 2022-01-01 RX ADMIN — BUDESONIDE AND FORMOTEROL FUMARATE DIHYDRATE 2 PUFF(S): 160; 4.5 AEROSOL RESPIRATORY (INHALATION) at 21:19

## 2022-01-01 RX ADMIN — MIDAZOLAM HYDROCHLORIDE 1.66 MG/KG/HR: 1 INJECTION, SOLUTION INTRAMUSCULAR; INTRAVENOUS at 02:35

## 2022-01-01 RX ADMIN — BUDESONIDE AND FORMOTEROL FUMARATE DIHYDRATE 2 PUFF(S): 160; 4.5 AEROSOL RESPIRATORY (INHALATION) at 07:59

## 2022-01-01 RX ADMIN — Medication 7.76 MICROGRAM(S)/KG/MIN: at 13:28

## 2022-01-01 RX ADMIN — CISATRACURIUM BESYLATE 14.9 MICROGRAM(S)/KG/MIN: 2 INJECTION INTRAVENOUS at 18:12

## 2022-01-01 RX ADMIN — ENOXAPARIN SODIUM 80 MILLIGRAM(S): 100 INJECTION SUBCUTANEOUS at 09:01

## 2022-01-01 RX ADMIN — CEFEPIME 100 MILLIGRAM(S): 1 INJECTION, POWDER, FOR SOLUTION INTRAMUSCULAR; INTRAVENOUS at 10:30

## 2022-01-01 RX ADMIN — MIDAZOLAM HYDROCHLORIDE 2 MILLIGRAM(S): 1 INJECTION, SOLUTION INTRAMUSCULAR; INTRAVENOUS at 05:16

## 2022-01-01 RX ADMIN — Medication 1200 MILLIGRAM(S): at 11:48

## 2022-01-01 RX ADMIN — HEPARIN SODIUM 1500 UNIT(S)/HR: 5000 INJECTION INTRAVENOUS; SUBCUTANEOUS at 13:08

## 2022-01-01 RX ADMIN — Medication 40 MILLIGRAM(S): at 10:52

## 2022-01-01 RX ADMIN — ALBUTEROL 2 PUFF(S): 90 AEROSOL, METERED ORAL at 09:56

## 2022-01-01 RX ADMIN — Medication 1 APPLICATION(S): at 23:50

## 2022-01-01 RX ADMIN — PROPOFOL 12.4 MICROGRAM(S)/KG/MIN: 10 INJECTION, EMULSION INTRAVENOUS at 19:57

## 2022-01-01 RX ADMIN — CHLORHEXIDINE GLUCONATE 15 MILLILITER(S): 213 SOLUTION TOPICAL at 05:38

## 2022-01-01 RX ADMIN — REMDESIVIR 500 MILLIGRAM(S): 5 INJECTION INTRAVENOUS at 11:58

## 2022-01-01 RX ADMIN — POLYETHYLENE GLYCOL 3350 17 GRAM(S): 17 POWDER, FOR SOLUTION ORAL at 09:07

## 2022-01-01 RX ADMIN — FENTANYL CITRATE 6.21 MICROGRAM(S)/KG/HR: 50 INJECTION INTRAVENOUS at 16:06

## 2022-01-01 RX ADMIN — MIDODRINE HYDROCHLORIDE 10 MILLIGRAM(S): 2.5 TABLET ORAL at 05:36

## 2022-01-01 RX ADMIN — MIDODRINE HYDROCHLORIDE 10 MILLIGRAM(S): 2.5 TABLET ORAL at 21:52

## 2022-01-01 RX ADMIN — MIDODRINE HYDROCHLORIDE 10 MILLIGRAM(S): 2.5 TABLET ORAL at 21:25

## 2022-01-01 RX ADMIN — PROPOFOL 12.4 MICROGRAM(S)/KG/MIN: 10 INJECTION, EMULSION INTRAVENOUS at 07:22

## 2022-01-01 RX ADMIN — ATORVASTATIN CALCIUM 10 MILLIGRAM(S): 80 TABLET, FILM COATED ORAL at 22:09

## 2022-01-01 RX ADMIN — Medication 2 MILLIGRAM(S): at 00:47

## 2022-01-01 RX ADMIN — MEROPENEM 100 MILLIGRAM(S): 1 INJECTION INTRAVENOUS at 05:37

## 2022-01-01 RX ADMIN — Medication 1200 MILLIGRAM(S): at 10:59

## 2022-01-01 RX ADMIN — SODIUM CHLORIDE 50 MILLILITER(S): 9 INJECTION, SOLUTION INTRAVENOUS at 15:37

## 2022-01-01 RX ADMIN — Medication 650 MILLIGRAM(S): at 18:13

## 2022-01-01 RX ADMIN — ENOXAPARIN SODIUM 80 MILLIGRAM(S): 100 INJECTION SUBCUTANEOUS at 22:51

## 2022-01-01 RX ADMIN — FENTANYL CITRATE 6.21 MICROGRAM(S)/KG/HR: 50 INJECTION INTRAVENOUS at 14:50

## 2022-01-01 RX ADMIN — ENOXAPARIN SODIUM 40 MILLIGRAM(S): 100 INJECTION SUBCUTANEOUS at 15:01

## 2022-01-01 RX ADMIN — BUDESONIDE AND FORMOTEROL FUMARATE DIHYDRATE 2 PUFF(S): 160; 4.5 AEROSOL RESPIRATORY (INHALATION) at 21:30

## 2022-01-01 RX ADMIN — DEXMEDETOMIDINE HYDROCHLORIDE IN 0.9% SODIUM CHLORIDE 20.7 MICROGRAM(S)/KG/HR: 4 INJECTION INTRAVENOUS at 04:40

## 2022-01-01 RX ADMIN — PROPOFOL 12.4 MICROGRAM(S)/KG/MIN: 10 INJECTION, EMULSION INTRAVENOUS at 06:08

## 2022-01-01 RX ADMIN — DEXMEDETOMIDINE HYDROCHLORIDE IN 0.9% SODIUM CHLORIDE 20.7 MICROGRAM(S)/KG/HR: 4 INJECTION INTRAVENOUS at 06:50

## 2022-01-01 RX ADMIN — ALBUTEROL 2 PUFF(S): 90 AEROSOL, METERED ORAL at 09:11

## 2022-01-01 RX ADMIN — POLYETHYLENE GLYCOL 3350 17 GRAM(S): 17 POWDER, FOR SOLUTION ORAL at 10:57

## 2022-01-01 RX ADMIN — MIDAZOLAM HYDROCHLORIDE 1.66 MG/KG/HR: 1 INJECTION, SOLUTION INTRAMUSCULAR; INTRAVENOUS at 02:40

## 2022-01-01 RX ADMIN — PROPOFOL 4.97 MICROGRAM(S)/KG/MIN: 10 INJECTION, EMULSION INTRAVENOUS at 18:04

## 2022-01-01 RX ADMIN — Medication 1200 MILLIGRAM(S): at 21:12

## 2022-01-01 RX ADMIN — ENOXAPARIN SODIUM 80 MILLIGRAM(S): 100 INJECTION SUBCUTANEOUS at 11:03

## 2022-01-01 RX ADMIN — MIDAZOLAM HYDROCHLORIDE 2 MILLIGRAM(S): 1 INJECTION, SOLUTION INTRAMUSCULAR; INTRAVENOUS at 20:54

## 2022-01-01 RX ADMIN — PROPOFOL 4.97 MICROGRAM(S)/KG/MIN: 10 INJECTION, EMULSION INTRAVENOUS at 18:05

## 2022-01-01 RX ADMIN — Medication 650 MILLIGRAM(S): at 00:31

## 2022-01-01 RX ADMIN — POLYETHYLENE GLYCOL 3350 17 GRAM(S): 17 POWDER, FOR SOLUTION ORAL at 11:48

## 2022-01-01 RX ADMIN — ENOXAPARIN SODIUM 80 MILLIGRAM(S): 100 INJECTION SUBCUTANEOUS at 11:43

## 2022-01-01 RX ADMIN — MORPHINE SULFATE 2 MILLIGRAM(S): 50 CAPSULE, EXTENDED RELEASE ORAL at 21:19

## 2022-01-01 RX ADMIN — Medication 1 APPLICATION(S): at 20:53

## 2022-01-01 RX ADMIN — Medication 40 MILLIGRAM(S): at 10:26

## 2022-01-01 RX ADMIN — MEROPENEM 100 MILLIGRAM(S): 1 INJECTION INTRAVENOUS at 05:16

## 2022-01-01 RX ADMIN — Medication 1 APPLICATION(S): at 21:56

## 2022-01-01 RX ADMIN — FENTANYL CITRATE 2.07 MICROGRAM(S)/KG/HR: 50 INJECTION INTRAVENOUS at 08:35

## 2022-01-01 RX ADMIN — Medication 250 MILLIGRAM(S): at 10:16

## 2022-01-01 RX ADMIN — PROPOFOL 12.4 MICROGRAM(S)/KG/MIN: 10 INJECTION, EMULSION INTRAVENOUS at 23:49

## 2022-01-01 RX ADMIN — PANTOPRAZOLE SODIUM 40 MILLIGRAM(S): 20 TABLET, DELAYED RELEASE ORAL at 09:53

## 2022-01-01 RX ADMIN — CHLORHEXIDINE GLUCONATE 1 APPLICATION(S): 213 SOLUTION TOPICAL at 06:36

## 2022-01-01 RX ADMIN — Medication 2 MILLIGRAM(S): at 08:55

## 2022-01-01 RX ADMIN — Medication 1 APPLICATION(S): at 10:54

## 2022-01-01 RX ADMIN — TIOTROPIUM BROMIDE 1 CAPSULE(S): 18 CAPSULE ORAL; RESPIRATORY (INHALATION) at 09:05

## 2022-01-01 RX ADMIN — TIOTROPIUM BROMIDE 1 CAPSULE(S): 18 CAPSULE ORAL; RESPIRATORY (INHALATION) at 08:49

## 2022-01-01 RX ADMIN — MEROPENEM 100 MILLIGRAM(S): 1 INJECTION INTRAVENOUS at 13:29

## 2022-01-01 RX ADMIN — BUDESONIDE AND FORMOTEROL FUMARATE DIHYDRATE 2 PUFF(S): 160; 4.5 AEROSOL RESPIRATORY (INHALATION) at 08:49

## 2022-01-01 RX ADMIN — Medication 40 MILLIGRAM(S): at 22:09

## 2022-01-01 RX ADMIN — FENTANYL CITRATE 2.07 MICROGRAM(S)/KG/HR: 50 INJECTION INTRAVENOUS at 23:20

## 2022-01-01 RX ADMIN — MEROPENEM 100 MILLIGRAM(S): 1 INJECTION INTRAVENOUS at 05:56

## 2022-01-01 RX ADMIN — ENOXAPARIN SODIUM 80 MILLIGRAM(S): 100 INJECTION SUBCUTANEOUS at 09:02

## 2022-01-01 RX ADMIN — Medication 6 MILLIGRAM(S): at 10:54

## 2022-01-01 RX ADMIN — MIDAZOLAM HYDROCHLORIDE 2 MILLIGRAM(S): 1 INJECTION, SOLUTION INTRAMUSCULAR; INTRAVENOUS at 12:57

## 2022-01-01 RX ADMIN — Medication 20 MILLIGRAM(S): at 11:35

## 2022-01-01 RX ADMIN — MIDODRINE HYDROCHLORIDE 10 MILLIGRAM(S): 2.5 TABLET ORAL at 05:00

## 2022-01-01 RX ADMIN — CISATRACURIUM BESYLATE 10 MILLIGRAM(S): 2 INJECTION INTRAVENOUS at 10:12

## 2022-01-01 RX ADMIN — CEFEPIME 100 MILLIGRAM(S): 1 INJECTION, POWDER, FOR SOLUTION INTRAMUSCULAR; INTRAVENOUS at 10:33

## 2022-01-01 RX ADMIN — SENNA PLUS 2 TABLET(S): 8.6 TABLET ORAL at 20:52

## 2022-01-01 RX ADMIN — PROPOFOL 12.4 MICROGRAM(S)/KG/MIN: 10 INJECTION, EMULSION INTRAVENOUS at 21:51

## 2022-01-01 RX ADMIN — Medication 1 APPLICATION(S): at 19:18

## 2022-02-28 NOTE — H&P ADULT - NSHPPHYSICALEXAM_GEN_ALL_CORE
Vital Signs Last 24 Hrs  T(C): 36.7 (28 Feb 2022 08:17), Max: 36.7 (28 Feb 2022 08:17)  T(F): 98.1 (28 Feb 2022 08:17), Max: 98.1 (28 Feb 2022 08:17)  HR: 108 (28 Feb 2022 08:17) (108 - 108)  BP: 169/88 (28 Feb 2022 08:17) (169/88 - 169/88)  RR: 20 (28 Feb 2022 08:17) (20 - 20)  SpO2: 98% (28 Feb 2022 08:17) (98% - 98%)      General: Well developed; well nourished; in no acute distress, on NC  Eyes: PERRLA, EOMI; conjunctiva and sclera clear  Head: Normocephalic; atraumatic  ENMT: No nasal discharge; airway clear  Neck: Supple; non tender; no masses  Respiratory: Coarse breath sounds at bases b/l. No wheezing   Cardiovascular: Regular rate and rhythm. S1 and S2 Normal;  Gastrointestinal: Soft non-tender non-distended; Normal bowel sounds  Genitourinary: No  suprapubic  tenderness  Extremities: Normal range of motion, No  edema  Vascular: Peripheral pulses palpable 2+ bilaterally  Neurological: Alert and oriented x3, non focal   Skin: Warm and dry. No acute rash  Lymph Nodes: No acute cervical adenopathy  Musculoskeletal: Normal muscle tone and strength   Psychiatric: Cooperative and appropriate

## 2022-02-28 NOTE — ED PROVIDER NOTE - OBJECTIVE STATEMENT
72 yo pt with PMH for high cholesterol, kidney stone, valvular heart dz, prostate ca presents to ED for SOB.  Pt states he was sick starting feb 19, 2022 and tested positive for covid19 on 2/21/22.  Pt has had 2 shots of pfizer vaccine with second one in april 2021.  pt wife also sick at home with covid19.  Pt notices that he is more sob with exertion and o2 sats drop to mid 70's after walking.  No travel, no n/v/d.

## 2022-02-28 NOTE — ED ADULT TRIAGE NOTE - CHIEF COMPLAINT QUOTE
Pt tested + covid with [facility] rapid test last monday with worsening dyspnea over past few days. Pt hypoxic at home with moderate relief of symptoms with NRB mask by EMS.

## 2022-02-28 NOTE — PATIENT PROFILE ADULT - FALL HARM RISK - UNIVERSAL INTERVENTIONS
Bed in lowest position, wheels locked, appropriate side rails in place/Call bell, personal items and telephone in reach/Instruct patient to call for assistance before getting out of bed or chair/Non-slip footwear when patient is out of bed/[place] to call system/Physically safe environment - no spills, clutter or unnecessary equipment/Purposeful Proactive Rounding/Room/bathroom lighting operational, light cord in reach

## 2022-02-28 NOTE — ED PROVIDER NOTE - PROGRESS NOTE DETAILS
Ty Whitt: discussed with Dr. Vivas for admission. Ty Whitt: discussed with Dr. Vivas for admission.    Pt updated on results of tests and plan

## 2022-02-28 NOTE — H&P ADULT - ASSESSMENT
70 yo pt with PMH of HLD,  kidney stone, valvular heart dz, prostate ca s/p Radiation admitted for:     1. Acute  Hypoxic respiratory failure due to  Sespsis/ COVID 19 PNA  Admit to med surge  Continuous pulse ox   Lactate elevated, improved with IVF Bolus  C/w IV dexamethasone and Remdesivir  F/u BCX  Supportive care: Tylenol, Micunex, Tessalon, Albuterol  Trend proinflammatory markers, D-Dimers mildly elevated, if trending  up will need further workup   ID eval       2. HLD  C/w Statins  Monitor LFTS      3. Mildly elevated LFTs  Likely related to viral infection   Trend daily       4. DVT PPX: Lovenox         Total time 60 min

## 2022-02-28 NOTE — ED PROVIDER NOTE - CRITICAL CARE ATTENDING CONTRIBUTION TO CARE
Elements for critical care include direct patient care (not related to procedure), additional history taking, interpretation of diagnostic studies, documentation, consultation with other physicians

## 2022-02-28 NOTE — H&P ADULT - HISTORY OF PRESENT ILLNESS
72 yo pt with PMH of HLD,  kidney stone, valvular heart dz, prostate ca s/p Radiation presented  to ED c/o progressive  SOB.  Pt reports he got sich with runny nose and cough 2/19 and tested positive for covid19 on 2/21.  Pt reports that he did have fevers but resolved  but SOB got worse, had difficulty breathing with minimal exertion. he checked his O2 and was below 80.  No CP, no palpitations, no leg pain.   Pt is s/p  2 shots of Pfizer  vaccine with second one in april 2021. No Booster.   Pts wife also sick at home with covid19.   In ED: Pt is hypoxic down to 70s on RA, was initially placed on NRB, later weaned of to 5L NC. Pt received IV dexamethasone and Remdesivir. S/p 1L IVF

## 2022-02-28 NOTE — ED ADULT NURSE REASSESSMENT NOTE - NS ED NURSE REASSESS COMMENT FT1
Pt A+Ox4. VSS. Pt becomes SOB on exertion. At rest pt is comfortable after getting decadron. Sat 92-94 on 5L. Occasional cough, tessalon given as ordered. RSR on monitor. Monitored closely. Call bell in reach.

## 2022-03-01 NOTE — CONSULT NOTE ADULT - SUBJECTIVE AND OBJECTIVE BOX
Patient is a 71y old  Male who presents with a chief complaint of SOB    HPI:  72 y/o male with h/o HLD,  kidney stone, valvular heart disease, prostate Ca s/p radiation was admitted on2/28 for progressive  SOB.  Pt reports that on 2/19 he got sick with runny nose, fever and cough and tested positive for covid-19 on 2/21. His fever improved, but his SOB got worse, had difficulty breathing with minimal exertion. He checked his O2 and was below 80. In ER he received remdesivir.     He received 2 shots of Pfizer  vaccine with second one in April 2021. No Booster.       PMH: as above  PSH: as above  Meds: per reconciliation sheet, noted below  MEDICATIONS  (STANDING):  atorvastatin 10 milliGRAM(s) Oral at bedtime  dexAMETHasone  Injectable 6 milliGRAM(s) IV Push daily  enoxaparin Injectable 40 milliGRAM(s) SubCutaneous daily  guaiFENesin ER 1200 milliGRAM(s) Oral every 12 hours  remdesivir  IVPB   IV Intermittent   remdesivir  IVPB 100 milliGRAM(s) IV Intermittent every 24 hours    MEDICATIONS  (PRN):  acetaminophen     Tablet .. 650 milliGRAM(s) Oral every 6 hours PRN Temp greater or equal to 38C (100.4F), Mild Pain (1 - 3)  ALBUTerol    90 MICROgram(s) HFA Inhaler 2 Puff(s) Inhalation every 6 hours PRN Shortness of Breath and/or Wheezing  aluminum hydroxide/magnesium hydroxide/simethicone Suspension 30 milliLiter(s) Oral every 4 hours PRN Dyspepsia  benzonatate 100 milliGRAM(s) Oral every 8 hours PRN Cough  melatonin 3 milliGRAM(s) Oral at bedtime PRN Insomnia  ondansetron Injectable 4 milliGRAM(s) IV Push every 8 hours PRN Nausea and/or Vomiting    Allergies    No Known Allergies    Intolerances      Social: no smoking, no alcohol, no illegal drugs; no recent travel, no exposure to TB  Pts wife also sick at home with covid-19  FAMILY HISTORY:  FHx: stroke (Father)      no history of premature cardiovascular disease in first degree relatives    ROS: the patient denies fever, no chills, no HA, no seizures, no dizziness, no sore throat, no nasal congestion, no blurry vision, no CP, no palpitations, has SOB, has cough, no abdominal pain, no diarrhea, no N/V, no dysuria, no leg pain, no claudication, no rash, no joint aches, no rectal pain or bleeding, no night sweats; has increased weakness  All other systems reviewed and are negative    Vital Signs Last 24 Hrs  T(C): 36 (01 Mar 2022 08:06), Max: 36.9 (28 Feb 2022 10:19)  T(F): 96.8 (01 Mar 2022 08:06), Max: 98.5 (28 Feb 2022 10:19)  HR: 78 (01 Mar 2022 08:06) (78 - 100)  BP: 125/63 (01 Mar 2022 08:06) (125/63 - 171/86)  BP(mean): 93 (01 Mar 2022 05:07) (83 - 99)  RR: 18 (01 Mar 2022 08:06) (18 - 25)  SpO2: 96% (01 Mar 2022 08:06) (85% - 98%)  Daily     Daily     PE:    Constitutional:  No acute distress  HEENT: NC/AT, EOMI, PERRLA, conjunctivae clear; ears and nose atraumatic; pharynx benign  Neck: supple; thyroid not palpable  Back: no tenderness  Respiratory: respiratory effort normal; crackles at bases  Cardiovascular: S1S2 regular, no murmurs  Abdomen: soft, not tender, not distended, positive BS; no liver or spleen organomegaly  Genitourinary: no suprapubic tenderness  Lymphatic: no LN palpable  Musculoskeletal: no muscle tenderness, no joint swelling or tenderness  Extremities: no pedal edema  Neurological/ Psychiatric: AxOx3, judgement and insight normal; moving all extremities  Skin: no rashes; no palpable lesions    Labs: all available labs reviewed                        13.2   9.95  )-----------( 259      ( 01 Mar 2022 07:00 )             40.2     03-01    139  |  108  |  19  ----------------------------<  164<H>  4.5   |  26  |  0.88    Ca    9.3      01 Mar 2022 07:00    TPro  7.1  /  Alb  2.9<L>  /  TBili  0.8  /  DBili  0.2  /  AST  34  /  ALT  85<H>  /  AlkPhos  72  03-01     LIVER FUNCTIONS - ( 01 Mar 2022 07:00 )  Alb: 2.9 g/dL / Pro: 7.1 gm/dL / ALK PHOS: 72 U/L / ALT: 85 U/L / AST: 34 U/L / GGT: x           COVID (02-28 @ 08:16)  Detected      Radiology: all available radiological tests reviewed    < from: Xray Chest 1 View- PORTABLE-Urgent (02.28.22 @ 09:00) >  IMPRESSION: Mild bibasilar infiltrates left greater than right.  < end of copied text >    Advanced directives addressed: full resuscitation

## 2022-03-01 NOTE — PROGRESS NOTE ADULT - SUBJECTIVE AND OBJECTIVE BOX
CC: SOB (01 Mar 2022 10:03)    HPI:  72 yo pt with PMH of HLD,  kidney stone, valvular heart dz, prostate ca s/p Radiation presented  to ED c/o progressive  SOB.  Pt reports he got sich with runny nose and cough 2/19 and tested positive for covid19 on 2/21.  Pt reports that he did have fevers but resolved  but SOB got worse, had difficulty breathing with minimal exertion. he checked his O2 and was below 80.  No CP, no palpitations, no leg pain.   Pt is s/p  2 shots of Pfizer  vaccine with second one in april 2021. No Booster.   Pts wife also sick at home with covid19.   In ED: Pt is hypoxic down to 70s on RA, was initially placed on NRB, later weaned of to 5L NC. Pt received IV dexamethasone and Remdesivir. S/p 1L IVF (28 Feb 2022 10:13)    INTERVAL HPI/ OVERNIGHT EVENTS: Pt was seen and examined, overnight events noted, Pt reports that feels more SOB today, especially this am also was lightheaded, feels better now, on NRB. Denies CP. OK PO intake. POC discussed Pt agrees with CT     Vital Signs Last 24 Hrs  T(C): 36 (01 Mar 2022 08:06), Max: 36 (28 Feb 2022 22:15)  T(F): 96.8 (01 Mar 2022 08:06), Max: 96.8 (28 Feb 2022 22:15)  HR: 78 (01 Mar 2022 08:06) (78 - 95)  BP: 125/63 (01 Mar 2022 08:06) (125/63 - 144/85)  BP(mean): 93 (01 Mar 2022 05:07) (83 - 99)  RR: 18 (01 Mar 2022 08:06) (18 - 20)  SpO2: 96% (01 Mar 2022 08:06) (88% - 98%)        REVIEW OF SYSTEMS:  All other review of systems is negative unless indicated above.      PHYSICAL EXAM:  General: Well developed; well nourished; in no acute distress, on NRB  Eyes: EOMI; conjunctiva and sclera clear  Head: Normocephalic; atraumatic  ENMT: No nasal discharge; airway clear  Neck: Supple; non tender; no masses  Respiratory: Coarse breath sounds at bases b/l. No wheezing   Cardiovascular: Regular rate and rhythm. S1 and S2 Normal;  Gastrointestinal: Soft non-tender non-distended; Normal bowel sounds  Genitourinary: No  suprapubic  tenderness  Extremities: Normal range of motion, No  edema  Vascular: Peripheral pulses palpable 2+ bilaterally  Neurological: Alert and oriented x3, non focal   Skin: Warm and dry. No acute rash, face flashed   Musculoskeletal: Normal muscle tone and strength   Psychiatric: Cooperative and appropriate      LABS:                           13.2   9.95  )-----------( 259      ( 01 Mar 2022 07:00 )             40.2     03-01    139  |  108  |  19  ----------------------------<  164<H>  4.5   |  26  |  0.88    Ca    9.3      01 Mar 2022 07:00    TPro  7.1  /  Alb  2.9<L>  /  TBili  0.8  /  DBili  0.2  /  AST  34  /  ALT  85<H>  /  AlkPhos  72  03-01    LIVER FUNCTIONS - ( 01 Mar 2022 07:00 )  Alb: 2.9 g/dL / Pro: 7.1 gm/dL / ALK PHOS: 72 U/L / ALT: 85 U/L / AST: 34 U/L / GGT: x           PT/INR - ( 01 Mar 2022 07:00 )   PT: 14.2 sec;   INR: 1.22 ratio    PTT - ( 28 Feb 2022 08:35 )  PTT:25.3 sec                              13.2   9.95  )-----------( 259      ( 01 Mar 2022 07:00 )             40.2     01 Mar 2022 07:00    139    |  108    |  19     ----------------------------<  164    4.5     |  26     |  0.88     Ca    9.3        01 Mar 2022 07:00    TPro  7.1    /  Alb  2.9    /  TBili  0.8    /  DBili  0.2    /  AST  34     /  ALT  85     /  AlkPhos  72     01 Mar 2022 07:00    PT/INR - ( 01 Mar 2022 07:00 )   PT: 14.2 sec;   INR: 1.22 ratio    PTT - ( 28 Feb 2022 08:35 )  PTT:25.3 sec    LIVER FUNCTIONS - ( 01 Mar 2022 07:00 )  Alb: 2.9 g/dL / Pro: 7.1 gm/dL / ALK PHOS: 72 U/L / ALT: 85 U/L / AST: 34 U/L / GGT: x               MEDICATIONS  (STANDING):  atorvastatin 10 milliGRAM(s) Oral at bedtime  dexAMETHasone  Injectable 6 milliGRAM(s) IV Push daily  enoxaparin Injectable 40 milliGRAM(s) SubCutaneous daily  guaiFENesin ER 1200 milliGRAM(s) Oral every 12 hours  remdesivir  IVPB   IV Intermittent   remdesivir  IVPB 100 milliGRAM(s) IV Intermittent every 24 hours    MEDICATIONS  (PRN):  acetaminophen     Tablet .. 650 milliGRAM(s) Oral every 6 hours PRN Temp greater or equal to 38C (100.4F), Mild Pain (1 - 3)  ALBUTerol    90 MICROgram(s) HFA Inhaler 2 Puff(s) Inhalation every 6 hours PRN Shortness of Breath and/or Wheezing  aluminum hydroxide/magnesium hydroxide/simethicone Suspension 30 milliLiter(s) Oral every 4 hours PRN Dyspepsia  benzonatate 100 milliGRAM(s) Oral every 8 hours PRN Cough  melatonin 3 milliGRAM(s) Oral at bedtime PRN Insomnia  ondansetron Injectable 4 milliGRAM(s) IV Push every 8 hours PRN Nausea and/or Vomiting        RADIOLOGY & ADDITIONAL TESTS:      ACC: [id number] EXAM:  XR CHEST PORTABLE URGENT 1V                        PROCEDURE DATE:  02/28/2022    INTERPRETATION:  AP semierect chest on February 28, 2022 at 8:53 AM.   Patient is short of breath with cough and fever.    Heart normal for projection.    Present film shows mild bibasilar infiltrates left greater than right   which are new since CAT scan of October 26, 2021.    IMPRESSION: Mild bibasilar infiltrates left greater than right.          ACC: [id number] EXAM:  XR CHEST PORTABLE IMMED 1V                        PROCEDURE DATE:  03/01/2022      INTERPRETATION:  Clinical history: 71-year-old male, Covid.    Single, rotated view of the chest is compared to 2/28/2022 and   demonstrates moderate right interstitial infiltrate, markedly increased.   Patchy infiltrate at the left base also more prominent.    Cardiac silhouette and pulmonary vasculature are within normal limits   with no lobar consolidation, effusion, pneumothorax or acute osseous   finding.    IMPRESSION:  Moderate interstitial infiltrates, new on the right and increased at the   left base.

## 2022-03-01 NOTE — PROGRESS NOTE ADULT - ASSESSMENT
72 yo pt with PMH of HLD,  kidney stone, valvular heart dz, prostate ca s/p Radiation admitted for:     1. Acute  Hypoxic respiratory failure due to  Sespsis/ COVID 19 PNA  Overnight had few episodes of desaturation, now on NRB, sats in low 90s   transfer to [facility] surge with  Continuous pulse ox   On presentation Lactate elevated, improved with IVF Bolus  F/u BCX: NGTD   D-Dimer is  trending up, will order CTA to r/o PE   C/w IV dexamethasone and Remdesivir DAy 2   Supportive care: Tylenol, Micunex, Tessalon, Albuterol  Trend proinflammatory markers, D-Dimers mildly elevated, if trending  up will need further workup   D/w Dr Brody      2. HLD  C/w Statins  Monitor LFTS      3. Mildly elevated LFTs  Likely related to viral infection   Trend daily       4. DVT PPX: Lovenox       Dispo: transfer to med surg with continuous pulse ox. CTA stat, Pulm eval

## 2022-03-01 NOTE — CONSULT NOTE ADULT - ASSESSMENT
70 y/o male with h/o HLD,  kidney stone, valvular heart disease, prostate Ca s/p radiation was admitted on2/28 for progressive  SOB.  Pt reports that on 2/19 he got sick with runny nose, fever and cough and tested positive for covid-19 on 2/21. His fever improved, but his SOB got worse, had difficulty breathing with minimal exertion. He checked his O2 and was below 80. In ER he received remdesivir.    1. Acute respiratory failure. COVID-19 breakthrough viral syndrome. Multifocal pneumonia.   -respiratory frail; no COVID booster  -remdesivir risks and benefits reviewed with patient and he agreed with the use of the antiviral medication  -start remdesivir protocol  -O2 therapy  -steroids  -AC  -droplet isolation  -respiratory care  -old chart reviewed to assess prior cultures  -monitor temps  -f/u CBC  -supportive care  2. Other issues:   -care per medicine

## 2022-03-02 NOTE — PHARMACOTHERAPY INTERVENTION NOTE - COMMENTS
Med history complete, reviewed medications and allergies with patients wife via phone and confirmed medication list with doctor first med profile, all medication related questions answered
per protocol dose 8 mg/kg for weight > 30 kg
as per covid protocol

## 2022-03-02 NOTE — CONSULT NOTE ADULT - ASSESSMENT
1) COVID Pneumonia  2) Hypoxemic Respiratory Failure  3) Pulmonary Embolism  4) Dyspnea   5) Combined Pulmonary Fibrosis/Emphysema   6) Abnormal CT Chest       72 yo pt with PMH of HLD,  kidney stone, valvular heart dz, prostate ca s/p Radiation presented  to ED c/o progressive SOB.    Diagnosed with COVID Pneumonia.   CTPE revealed pulmonary emboli in the right upper lobe pulmonary artery and bilateral segmental and subsegmental branches.   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   Not seen Pulmonary in the past.   Currently on NRB, Solumedrol 40mg daily, Remdesivir  Lovenox 80 q 12  Independently reviewed CT Chest   Ordered ABG     Continue Spiriva/Symbicort   Will discuss further with hospitalist but benefit from HFNC (to be discussed with RT)

## 2022-03-02 NOTE — PROGRESS NOTE ADULT - ASSESSMENT
70 y/o male with h/o HLD,  kidney stone, valvular heart disease, prostate Ca s/p radiation was admitted on2/28 for progressive  SOB.  Pt reports that on 2/19 he got sick with runny nose, fever and cough and tested positive for covid-19 on 2/21. His fever improved, but his SOB got worse, had difficulty breathing with minimal exertion. He checked his O2 and was below 80. In ER he received remdesivir.    1. Acute respiratory failure. COVID-19 breakthrough viral syndrome. Multifocal pneumonia. PE.  -respiratory frail; no COVID booster  -respiratory is worse  -on remdesivir protocol # 2  -O2 therapy  -steroids  -AC  -droplet isolation  -respiratory care  -give torsiluzumab infusion  -continue antiviral therapy  -monitor temps  -f/u CBC  -supportive care    d/w Dr. Butt  2. Other issues:   -care per medicine   72 y/o male with h/o HLD,  kidney stone, valvular heart disease, prostate Ca s/p radiation was admitted on2/28 for progressive  SOB.  Pt reports that on 2/19 he got sick with runny nose, fever and cough and tested positive for covid-19 on 2/21. His fever improved, but his SOB got worse, had difficulty breathing with minimal exertion. He checked his O2 and was below 80. In ER he received remdesivir.    1. Acute respiratory failure. COVID-19 breakthrough viral syndrome. Multifocal pneumonia. PE.  -respiratory frail; no COVID booster  -respiratory is worse  -on remdesivir protocol # 2  -O2 therapy  -steroids  -AC  -droplet isolation  -respiratory care  -give tociluzumab infusion  -treatment options d/w patient; risks and benefits reviewed  -continue antiviral therapy  -monitor temps  -f/u CBC  -supportive care    d/w Dr. Butt  2. Other issues:   -care per medicine

## 2022-03-02 NOTE — PROGRESS NOTE ADULT - ASSESSMENT
70 yo pt with PMH of HLD,  kidney stone, valvular heart dz, prostate ca s/p Radiation admitted for:     1. Acute  Hypoxic respiratory failure due to  Sespsis/ COVID 19 PNA  Overnight had few episodes of desaturation, now on NRB, sats in low 90s   transfer to [facility] with  Continuous pulse ox   On presentation Lactate elevated, improved with IVF Bolus  F/u BCX: NGTD   D-Dimer is  trending up, will order CTA to r/o PE   C/w IV dexamethasone and Remdesivir  (tentative last dose 3/10; Tentative last ramona.   Supportive care: Tylenol, Micunex, Tessalon, Albuterol  Trend proinflammatory markers, D-Dimers mildly elevated, if trending  up will need further workup       2. HLD  C/w Statins  Monitor LFTS      3. Mildly elevated LFTs  Likely related to viral infection   Trend daily       4. DVT PPX: Lovenox       Chronically ill appearing. Too soon to guage progress. Continue to follow closely.         70 yo pt with PMH of HLD,  kidney stone, valvular heart dz, prostate ca s/p Radiation admitted for:     1. Acute  Hypoxic respiratory failure due to  Sespsis/ COVID 19 PNA and Acute Bilateral Pulmonary Embolism which could be provoked by the COVID-19 infection  Overnight had few episodes of desaturation, now on NRB, sats in low 90s   transfer to [facility] with  Continuous pulse ox   On presentation Lactate elevated, improved with IVF Bolus. Lactic Acidosis due to hypoxia/sepsis.   F/u BCX: NGTD   D-Dimer elevated-CTA Chest with bilateral PE->Therapeutic Lovenox  C/w IV dexamethasone and Remdesivir  (tentative last dose 3/5); May require additional 5 day course depending on clinical course.   Supportive care: Tylenol, Micunex, Tessalon, Albuterol  Switched to High Flow 3/2 because of worsening hypoxia  ID on board. Appreciate recs  Actemra administered x 1 3/2 for rapidly progressively worsening hypoxia due to COVID-19 pneumonia. EUA explained in detail and patient verbalized understanding and agreed to administration.         2. HLD  C/w Statins  Monitor LFTS      3. Mildly elevated LFTs  Likely related to viral infection   Trend daily       4. DVT PPX: Lovenox       Chronically ill appearing. Condition guarded.

## 2022-03-02 NOTE — PROGRESS NOTE ADULT - SUBJECTIVE AND OBJECTIVE BOX
CC: SOB (01 Mar 2022 10:03)    HPI:  72 yo pt with PMH of HLD,  kidney stone, valvular heart dz, prostate ca s/p Radiation presented  to ED c/o progressive  SOB.  Pt reports he got sich with runny nose and cough 2/19 and tested positive for covid19 on 2/21.  Pt reports that he did have fevers but resolved  but SOB got worse, had difficulty breathing with minimal exertion. he checked his O2 and was below 80.  No CP, no palpitations, no leg pain.   Pt is s/p  2 shots of Pfizer  vaccine with second one in april 2021. No Booster.   Pts wife also sick at home with covid19.   In ED: Pt is hypoxic down to 70s on RA, was initially placed on NRB, later weaned of to 5L NC. Pt received IV dexamethasone and Remdesivir. S/p 1L IVF (28 Feb 2022 10:13)    Subjective: Mild interval improvement in SOB but still significant. Decreased appetite    INTERVAL HPI/ OVERNIGHT EVENTS: Pt was seen and examined, overnight events noted, Pt reports that feels more SOB today, especially this am also was lightheaded, feels better now, on NRB. Denies CP. OK PO intake. POC discussed Pt agrees with CT    3/2: Worsening hypoxia on NRB. Switched to HiHjefii      Vital Signs Last 24 Hrs  T(C): 37.1 (03-02-22 @ 08:22), Max: 37.1 (03-02-22 @ 08:22)  HR: 86 (03-02-22 @ 10:08) (85 - 100)  BP: 143/70 (03-02-22 @ 08:22) (127/65 - 143/70)  RR: 22 (03-02-22 @ 13:53) (18 - 22)  SpO2: 96% (03-02-22 @ 13:53) (90% - 97%)        REVIEW OF SYSTEMS:  All other review of systems is negative unless indicated above.      PHYSICAL EXAM:  General: Well developed; well nourished; in no acute distress, on NRB  Eyes: EOMI; conjunctiva and sclera clear  Head: Normocephalic; atraumatic  ENMT: No nasal discharge; airway clear  Neck: Supple; non tender; no masses  Respiratory: Coarse breath sounds at bases b/l. No wheezing   Cardiovascular: Regular rate and rhythm. S1 and S2 Normal;  Gastrointestinal: Soft non-tender non-distended; Normal bowel sounds  Genitourinary: No  suprapubic  tenderness  Extremities: Normal range of motion, No  edema  Vascular: Peripheral pulses palpable 2+ bilaterally  Neurological: Alert and oriented x3, non focal   Skin: Warm and dry. No acute rash, face flashed   Musculoskeletal: Normal muscle tone and strength   Psychiatric: Cooperative and appropriate      LABS:                         13.5   12.69 )-----------( 272      ( 02 Mar 2022 08:58 )             40.5     03-02    139  |  107  |  20  ----------------------------<  93  3.8   |  25  |  0.79    Ca    8.9      02 Mar 2022 08:57    TPro  6.7  /  Alb  2.7<L>  /  TBili  0.8  /  DBili  0.2  /  AST  24  /  ALT  68  /  AlkPhos  77  03-02    SARS-CoV-2: Detected (28 Feb 2022 08:16)    CAPILLARY BLOOD GLUCOSE          Culture - Blood (collected 28 Feb 2022 08:35)  Source: .Blood None  Preliminary Report (01 Mar 2022 13:02):    No growth to date.                            13.2   9.95  )-----------( 259      ( 01 Mar 2022 07:00 )             40.2     03-01    139  |  108  |  19  ----------------------------<  164<H>  4.5   |  26  |  0.88    Ca    9.3      01 Mar 2022 07:00    TPro  7.1  /  Alb  2.9<L>  /  TBili  0.8  /  DBili  0.2  /  AST  34  /  ALT  85<H>  /  AlkPhos  72  03-01    LIVER FUNCTIONS - ( 01 Mar 2022 07:00 )  Alb: 2.9 g/dL / Pro: 7.1 gm/dL / ALK PHOS: 72 U/L / ALT: 85 U/L / AST: 34 U/L / GGT: x           PT/INR - ( 01 Mar 2022 07:00 )   PT: 14.2 sec;   INR: 1.22 ratio    PTT - ( 28 Feb 2022 08:35 )  PTT:25.3 sec                              13.2   9.95  )-----------( 259      ( 01 Mar 2022 07:00 )             40.2     01 Mar 2022 07:00    139    |  108    |  19     ----------------------------<  164    4.5     |  26     |  0.88     Ca    9.3        01 Mar 2022 07:00    TPro  7.1    /  Alb  2.9    /  TBili  0.8    /  DBili  0.2    /  AST  34     /  ALT  85     /  AlkPhos  72     01 Mar 2022 07:00    PT/INR - ( 01 Mar 2022 07:00 )   PT: 14.2 sec;   INR: 1.22 ratio    PTT - ( 28 Feb 2022 08:35 )  PTT:25.3 sec    LIVER FUNCTIONS - ( 01 Mar 2022 07:00 )  Alb: 2.9 g/dL / Pro: 7.1 gm/dL / ALK PHOS: 72 U/L / ALT: 85 U/L / AST: 34 U/L / GGT: x               MEDICATIONS  (STANDING):  atorvastatin 10 milliGRAM(s) Oral at bedtime  dexAMETHasone  Injectable 6 milliGRAM(s) IV Push daily  enoxaparin Injectable 40 milliGRAM(s) SubCutaneous daily  guaiFENesin ER 1200 milliGRAM(s) Oral every 12 hours  remdesivir  IVPB   IV Intermittent   remdesivir  IVPB 100 milliGRAM(s) IV Intermittent every 24 hours    MEDICATIONS  (PRN):  acetaminophen     Tablet .. 650 milliGRAM(s) Oral every 6 hours PRN Temp greater or equal to 38C (100.4F), Mild Pain (1 - 3)  ALBUTerol    90 MICROgram(s) HFA Inhaler 2 Puff(s) Inhalation every 6 hours PRN Shortness of Breath and/or Wheezing  aluminum hydroxide/magnesium hydroxide/simethicone Suspension 30 milliLiter(s) Oral every 4 hours PRN Dyspepsia  benzonatate 100 milliGRAM(s) Oral every 8 hours PRN Cough  melatonin 3 milliGRAM(s) Oral at bedtime PRN Insomnia  ondansetron Injectable 4 milliGRAM(s) IV Push every 8 hours PRN Nausea and/or Vomiting        RADIOLOGY & ADDITIONAL TESTS:      ACC: [id number] EXAM:  XR CHEST PORTABLE URGENT 1V                        PROCEDURE DATE:  02/28/2022    INTERPRETATION:  AP semierect chest on February 28, 2022 at 8:53 AM.   Patient is short of breath with cough and fever.    Heart normal for projection.    Present film shows mild bibasilar infiltrates left greater than right   which are new since CAT scan of October 26, 2021.    IMPRESSION: Mild bibasilar infiltrates left greater than right.          ACC: [id number] EXAM:  XR CHEST PORTABLE IMMED 1V                        PROCEDURE DATE:  03/01/2022      INTERPRETATION:  Clinical history: 71-year-old male, Covid.    Single, rotated view of the chest is compared to 2/28/2022 and   demonstrates moderate right interstitial infiltrate, markedly increased.   Patchy infiltrate at the left base also more prominent.    Cardiac silhouette and pulmonary vasculature are within normal limits   with no lobar consolidation, effusion, pneumothorax or acute osseous   finding.    IMPRESSION:  Moderate interstitial infiltrates, new on the right and increased at the   left base.

## 2022-03-02 NOTE — PROGRESS NOTE ADULT - SUBJECTIVE AND OBJECTIVE BOX
Date of service: 03-02-22 @ 10:34    Sitting in bed in NAD  More SOB  Placed on high flow O2    ROS: no fever or chills; poorly verbal    MEDICATIONS  (STANDING):  atorvastatin 10 milliGRAM(s) Oral at bedtime  budesonide 160 MICROgram(s)/formoterol 4.5 MICROgram(s) Inhaler 2 Puff(s) Inhalation two times a day  dexAMETHasone  Injectable 6 milliGRAM(s) IV Push daily  enoxaparin Injectable 80 milliGRAM(s) SubCutaneous every 12 hours  guaiFENesin ER 1200 milliGRAM(s) Oral every 12 hours  remdesivir  IVPB   IV Intermittent   remdesivir  IVPB 100 milliGRAM(s) IV Intermittent every 24 hours  tiotropium 18 MICROgram(s) Capsule 1 Capsule(s) Inhalation daily    Vital Signs Last 24 Hrs  T(C): 37.1 (02 Mar 2022 08:22), Max: 37.1 (02 Mar 2022 08:22)  T(F): 98.7 (02 Mar 2022 08:22), Max: 98.7 (02 Mar 2022 08:22)  HR: 86 (02 Mar 2022 10:08) (85 - 100)  BP: 143/70 (02 Mar 2022 08:22) (127/65 - 143/74)  BP(mean): 87 (01 Mar 2022 16:27) (87 - 87)  RR: 20 (02 Mar 2022 09:21) (18 - 20)  SpO2: 93% (02 Mar 2022 10:08) (90% - 97%)     Physical exam:    Constitutional:  No acute distress  HEENT: NC/AT, EOMI, PERRLA, conjunctivae clear; ears and nose atraumatic  Neck: supple; thyroid not palpable  Back: no tenderness  Respiratory: respiratory effort normal; crackles at bases  Cardiovascular: S1S2 regular, no murmurs  Abdomen: soft, not tender, not distended, positive BS  Genitourinary: no suprapubic tenderness  Lymphatic: no LN palpable  Musculoskeletal: no muscle tenderness, no joint swelling or tenderness  Extremities: no pedal edema  Neurological/ Psychiatric: Alert; moving all extremities  Skin: no rashes; no palpable lesions    Labs: reviewed                        13.2   9.95  )-----------( 259      ( 01 Mar 2022 07:00 )             40.2     03-02    x   |  x   |  x   ----------------------------<  x   x    |  x   |  0.79    Ca    9.3      01 Mar 2022 07:00    TPro  6.7  /  Alb  2.7<L>  /  TBili  0.8  /  DBili  0.2  /  AST  24  /  ALT  68  /  AlkPhos  77  03-02    Ferritin, Serum: 1055 ng/mL (03-01-22 @ 07:00)  C-Reactive Protein, Serum: 48 mg/L (03-01-22 @ 07:00)  D-Dimer Assay, Quantitative: 567 ng/mL DDU (03-01-22 @ 07:00)  D-Dimer Assay, Quantitative: 365 ng/mL DDU (02-28-22 @ 11:59)  C-Reactive Protein, Serum: 79 mg/L (02-28-22 @ 08:35)  D-Dimer Assay, Quantitative: 324 ng/mL DDU (02-28-22 @ 08:35)  Ferritin, Serum: 1224 ng/mL (02-28-22 @ 08:35)                        13.2   9.95  )-----------( 259      ( 01 Mar 2022 07:00 )             40.2     03-01    139  |  108  |  19  ----------------------------<  164<H>  4.5   |  26  |  0.88    Ca    9.3      01 Mar 2022 07:00    TPro  7.1  /  Alb  2.9<L>  /  TBili  0.8  /  DBili  0.2  /  AST  34  /  ALT  85<H>  /  AlkPhos  72  03-01     LIVER FUNCTIONS - ( 01 Mar 2022 07:00 )  Alb: 2.9 g/dL / Pro: 7.1 gm/dL / ALK PHOS: 72 U/L / ALT: 85 U/L / AST: 34 U/L / GGT: x           COVID (02-28 @ 08:16)  Detected      Culture - Blood (collected 28 Feb 2022 08:35)  Source: .Blood None  Preliminary Report (01 Mar 2022 13:02):    No growth to date.    Radiology: all available radiological tests reviewed    < from: Xray Chest 1 View- PORTABLE-Urgent (02.28.22 @ 09:00) >  IMPRESSION: Mild bibasilar infiltrates left greater than right.  < end of copied text >    < from: CT Angio Chest PE Protocol w/ IV Cont (03.01.22 @ 14:14) >  Pulmonary emboli in the right upper lobe pulmonary artery and bilateral   segmental and subsegmental branches. This was discussed with Dr. Hazel   at 2:36 PM 3/1/2022.  Diffuse lung abnormality consistent with COVID.  < end of copied text >      Advanced directives addressed: full resuscitation

## 2022-03-02 NOTE — CONSULT NOTE ADULT - SUBJECTIVE AND OBJECTIVE BOX
Patient is a 71y old  Male who presents with a chief complaint of SOB (01 Mar 2022 13:52)      HPI:  72 yo pt with PMH of HLD,  kidney stone, valvular heart dz, prostate ca s/p Radiation presented  to ED c/o progressive SOB.    Diagnosed with COVID Pneumonia.   CTPE revealed pulmonary emboli in the right upper lobe pulmonary artery and bilateral segmental and subsegmental branches.   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   Currently on NRB         PAST MEDICAL & SURGICAL HISTORY:  Hypercholesterolemia    Cholelithiasis    Cholecystitis    Valvular heart disease    Psoriasis    History of kidney stones    Inguinal hernia, left    Prostate cancer  s/p XRT 1-6-2020 to 1/27/2021    History of renal calculi    S/P hernia repair  right inguinal; 5/2015    S/P colonoscopy with polypectomy  Baseline; viv polyps; 2012    History of cholecystectomy  2017    History of prostatectomy  5/2020    History of lithotripsy        PREVIOUS DIAGNOSTIC TESTING:      MEDICATIONS  (STANDING):  atorvastatin 10 milliGRAM(s) Oral at bedtime  budesonide 160 MICROgram(s)/formoterol 4.5 MICROgram(s) Inhaler 2 Puff(s) Inhalation two times a day  enoxaparin Injectable 80 milliGRAM(s) SubCutaneous every 12 hours  guaiFENesin ER 1200 milliGRAM(s) Oral every 12 hours  methylPREDNISolone sodium succinate Injectable 40 milliGRAM(s) IV Push daily  remdesivir  IVPB   IV Intermittent   remdesivir  IVPB 100 milliGRAM(s) IV Intermittent every 24 hours  tiotropium 18 MICROgram(s) Capsule 1 Capsule(s) Inhalation daily    MEDICATIONS  (PRN):  acetaminophen     Tablet .. 650 milliGRAM(s) Oral every 6 hours PRN Temp greater or equal to 38C (100.4F), Mild Pain (1 - 3)  ALBUTerol    90 MICROgram(s) HFA Inhaler 2 Puff(s) Inhalation every 6 hours PRN Shortness of Breath and/or Wheezing  aluminum hydroxide/magnesium hydroxide/simethicone Suspension 30 milliLiter(s) Oral every 4 hours PRN Dyspepsia  benzonatate 100 milliGRAM(s) Oral every 8 hours PRN Cough  melatonin 3 milliGRAM(s) Oral at bedtime PRN Insomnia  ondansetron Injectable 4 milliGRAM(s) IV Push every 8 hours PRN Nausea and/or Vomiting      FAMILY HISTORY:  FHx: stroke (Father)        SOCIAL HISTORY:  ***    REVIEW OF SYSTEM:  dyspnea    Vital Signs Last 24 Hrs  T(C): 36.8 (01 Mar 2022 20:35), Max: 36.8 (01 Mar 2022 20:35)  T(F): 98.3 (01 Mar 2022 20:35), Max: 98.3 (01 Mar 2022 20:35)  HR: 95 (01 Mar 2022 20:35) (78 - 100)  BP: 127/65 (01 Mar 2022 20:35) (125/63 - 143/74)  BP(mean): 87 (01 Mar 2022 16:27) (87 - 87)  RR: 18 (01 Mar 2022 20:35) (18 - 18)  SpO2: 97% (01 Mar 2022 20:35) (90% - 97%)    I&O's Summary    PHYSICAL EXAM  General Appearance: cooperative, no acute distress,   HEENT: PERRL, conjunctiva clear, EOM's intact, non injected pharynx, no exudate, TM   normal  Neck: Supple, , no adenopathy, thyroid: not enlarged, no carotid bruit or JVD  Back: Symmetric, no  tenderness,no soft tissue tenderness  Lungs: Clear to auscultation bilateral,no adventitious breath sounds, normal   expiratory phase  Heart: Regular rate and rhythm, S1, S2 normal, no murmur, rub or gallop  Abdomen: Soft, non-tender, bowel sounds active , no hepatosplenomegaly  Extremities: no cyanosis or edema, no joint swelling  Skin: Skin color, texture normal, no rashes   Neurologic: Alert and oriented X3 , cranial nerves intact, sensory and motor normal,    ECG:    LABS:                          13.2   9.95  )-----------( 259      ( 01 Mar 2022 07:00 )             40.2     03-01    139  |  108  |  19  ----------------------------<  164<H>  4.5   |  26  |  0.88    Ca    9.3      01 Mar 2022 07:00    TPro  7.1  /  Alb  2.9<L>  /  TBili  0.8  /  DBili  0.2  /  AST  34  /  ALT  85<H>  /  AlkPhos  72  03-01          Pro BNP  292 03-01 @ 07:00  D Dimer  567 03-01 @ 07:00  Pro BNP  -- 02-28 @ 11:59  D Dimer  365 02-28 @ 11:59  Pro BNP  -- 02-28 @ 08:35  D Dimer  324 02-28 @ 08:35    PT/INR - ( 01 Mar 2022 07:00 )   PT: 14.2 sec;   INR: 1.22 ratio         PTT - ( 28 Feb 2022 08:35 )  PTT:25.3 sec          RADIOLOGY & ADDITIONAL STUDIES:

## 2022-03-03 NOTE — PROGRESS NOTE ADULT - SUBJECTIVE AND OBJECTIVE BOX
Date of service: 03-03-22 @ 09:21    SItting in bed in NAD  Has dry cough  Has SOB with light exercise    ROS: no fever or chills; denies dizziness, no HA, no abdominal pain, no diarrhea or constipation; no dysuria, no legs pain, no rashes    MEDICATIONS  (STANDING):  atorvastatin 10 milliGRAM(s) Oral at bedtime  budesonide 160 MICROgram(s)/formoterol 4.5 MICROgram(s) Inhaler 2 Puff(s) Inhalation two times a day  dexAMETHasone  Injectable 6 milliGRAM(s) IV Push daily  enoxaparin Injectable 80 milliGRAM(s) SubCutaneous every 12 hours  guaiFENesin ER 1200 milliGRAM(s) Oral every 12 hours  remdesivir  IVPB   IV Intermittent   remdesivir  IVPB 100 milliGRAM(s) IV Intermittent every 24 hours  tiotropium 18 MICROgram(s) Capsule 1 Capsule(s) Inhalation daily    Vital Signs Last 24 Hrs  T(C): 36.8 (03 Mar 2022 08:38), Max: 36.8 (03 Mar 2022 08:38)  T(F): 98.2 (03 Mar 2022 08:38), Max: 98.2 (03 Mar 2022 08:38)  HR: 92 (03 Mar 2022 08:38) (86 - 92)  BP: 111/72 (03 Mar 2022 08:38) (111/72 - 140/74)  BP(mean): --  RR: 22 (03 Mar 2022 08:38) (20 - 22)  SpO2: 92% (03 Mar 2022 08:38) (90% - 98%)     Physical exam:    Constitutional:  No acute distress  HEENT: NC/AT, EOMI, PERRLA, conjunctivae clear; ears and nose atraumatic  Neck: supple; thyroid not palpable  Back: no tenderness  Respiratory: respiratory effort normal; crackles at bases  Cardiovascular: S1S2 regular, no murmurs  Abdomen: soft, not tender, not distended, positive BS  Genitourinary: no suprapubic tenderness  Lymphatic: no LN palpable  Musculoskeletal: no muscle tenderness, no joint swelling or tenderness  Extremities: no pedal edema  Neurological/ Psychiatric: Alert; moving all extremities  Skin: no rashes; no palpable lesions    Labs: reviewed                        14.3   10.29 )-----------( 299      ( 03 Mar 2022 07:18 )             41.8     03-03    139  |  106  |  21  ----------------------------<  98  3.8   |  24  |  0.76    Ca    9.1      03 Mar 2022 07:18  Phos  3.0     03-03  Mg     2.9     03-03    TPro  6.8  /  Alb  2.8<L>  /  TBili  1.0  /  DBili  0.3  /  AST  30  /  ALT  62  /  AlkPhos  82  03-03    Ferritin, Serum: 1055 ng/mL (03-01-22 @ 07:00)  C-Reactive Protein, Serum: 48 mg/L (03-01-22 @ 07:00)  D-Dimer Assay, Quantitative: 567 ng/mL DDU (03-01-22 @ 07:00)  D-Dimer Assay, Quantitative: 365 ng/mL DDU (02-28-22 @ 11:59)  C-Reactive Protein, Serum: 79 mg/L (02-28-22 @ 08:35)  D-Dimer Assay, Quantitative: 324 ng/mL DDU (02-28-22 @ 08:35)  Ferritin, Serum: 1224 ng/mL (02-28-22 @ 08:35)                        13.2   9.95  )-----------( 259      ( 01 Mar 2022 07:00 )             40.2     03-02    x   |  x   |  x   ----------------------------<  x   x    |  x   |  0.79    Ca    9.3      01 Mar 2022 07:00    TPro  6.7  /  Alb  2.7<L>  /  TBili  0.8  /  DBili  0.2  /  AST  24  /  ALT  68  /  AlkPhos  77  03-02    Ferritin, Serum: 1055 ng/mL (03-01-22 @ 07:00)  C-Reactive Protein, Serum: 48 mg/L (03-01-22 @ 07:00)  D-Dimer Assay, Quantitative: 567 ng/mL DDU (03-01-22 @ 07:00)  D-Dimer Assay, Quantitative: 365 ng/mL DDU (02-28-22 @ 11:59)  C-Reactive Protein, Serum: 79 mg/L (02-28-22 @ 08:35)  D-Dimer Assay, Quantitative: 324 ng/mL DDU (02-28-22 @ 08:35)  Ferritin, Serum: 1224 ng/mL (02-28-22 @ 08:35)                        13.2   9.95  )-----------( 259      ( 01 Mar 2022 07:00 )             40.2     03-01    139  |  108  |  19  ----------------------------<  164<H>  4.5   |  26  |  0.88    Ca    9.3      01 Mar 2022 07:00    TPro  7.1  /  Alb  2.9<L>  /  TBili  0.8  /  DBili  0.2  /  AST  34  /  ALT  85<H>  /  AlkPhos  72  03-01     LIVER FUNCTIONS - ( 01 Mar 2022 07:00 )  Alb: 2.9 g/dL / Pro: 7.1 gm/dL / ALK PHOS: 72 U/L / ALT: 85 U/L / AST: 34 U/L / GGT: x           COVID (02-28 @ 08:16)  Detected      Culture - Blood (collected 28 Feb 2022 08:35)  Source: .Blood None  Preliminary Report (01 Mar 2022 13:02):    No growth to date.    Radiology: all available radiological tests reviewed    < from: Xray Chest 1 View- PORTABLE-Urgent (02.28.22 @ 09:00) >  IMPRESSION: Mild bibasilar infiltrates left greater than right.  < end of copied text >    < from: CT Angio Chest PE Protocol w/ IV Cont (03.01.22 @ 14:14) >  Pulmonary emboli in the right upper lobe pulmonary artery and bilateral   segmental and subsegmental branches. This was discussed with Dr. Hazel   at 2:36 PM 3/1/2022.  Diffuse lung abnormality consistent with COVID.  < end of copied text >      Advanced directives addressed: full resuscitation

## 2022-03-03 NOTE — PROGRESS NOTE ADULT - SUBJECTIVE AND OBJECTIVE BOX
Chief Complaint:     Interval History:     ROS: Multi system review is comprehensively negative x 10 systems except as above     Physical Exam:      Gen:   HEENT:  Neck:   Chest:  CVS:   Abd:   Ext:   Skin:   Neuro:     Labs:    Micro:    Imaging:    Cardiac Testing:    Medications:       Chief Complaint: Dyspnea    Interval History: No acute events overnight. No new complaints. Continues to report dyspnea. Diminished appetite. Some generalized malaise. No chest pain or tightness. No significant cough. No chest pain. No abdominal complaints or genitourinary complaints. Remains profoundly hypoxic, on HFNC 50L, 100% FIO2. S/P tocilizumab yesterday 3/2. Day 3 of dexamethasone and remdesivir.     ROS: Multi system review is comprehensively negative x 10 systems except as above     Physical Exam:  T(F): 98.2 (03 Mar 2022 08:38), Max: 98.2 (03 Mar 2022 08:38)  HR: 79 (03 Mar 2022 10:06) (79 - 92)  BP: 111/72 (03 Mar 2022 08:38) (111/72 - 140/74)  RR: 22 (03 Mar 2022 08:38) (20 - 22)  SpO2: 93% (03 Mar 2022 15:00) (88% - 98%) on HFNC 50L/100%    Gen: Comfortable appearing at rest despite the O2 requirement  HEENT: NCAT PERRL EOMI  Neck: Supple, no JVD  Chest: Normal resp effort at rest, fairly clear lungs B/L  CVS: S1 S2 normal, RRR  Abd: +BS, soft NT ND   Ext: No edema or calf tenderness  Skin: Warm, dry, intact  Neuro: A+Ox3, no focal deficits    Labs:    CBC 3/3                        14.3   10.29 )-------( 299             41.8     BMP 3/3    139  |  106  |  21  ---------------------<  98  3.8   |  24  |  0.76    Ca 9.1   Phos 3.0   Mg 2.9    LFTs 3/3:  TPro  6.8  /  Alb  2.8  /  TBili  1.0  /  DBili  0.3  /  AST  30  /  ALT  62  /  AlkPhos  82      Coags 3/3:  PT: 15.1 sec;   INR: 1.30 ratio      ABG 3/2:  pH 7.46  pH, pCO2: 34, pO2: 62, HCO3: 24, Base Excess: 0.8, SaO2: 94       Procalcitonin 0.11  Lactate 3.4 --> 2.1      CRP 79 --> 48 --> 99   --> 589  Ferritin 1224 --> 909  D-dimer 324 --> 567    Micro:  Blood culture 2/28: Negative  COVID19 PCR 2/28: Positive    Imaging:  CTA chest W/ 3/1: Pulmonary emboli involving truncus anterior, as well as segmental and subsegmental branches in the upper lobes and right lower lobe. Normal caliber main pulmonary artery. Patent trachea and bronchi. Emphysema. New   peripheral predominant ground-glass and consolidative opacities in all lobes, most severe in the right lower and middle lobes where it is more diffuse. Unremarkable pleura. Normal heart size. Unremarkable pericardium. Normal caliber aorta. Coronary artery calcified plaque.    CXR 2/28: Mild bibasilar infiltrates left greater than right which are new     Cardiac Testing:    Medications:       Chief Complaint: Dyspnea    Interval History: No acute events overnight. No new complaints. Continues to report dyspnea. Diminished appetite. Some generalized malaise. No chest pain or tightness. No significant cough. No chest pain. No abdominal complaints or genitourinary complaints. Remains profoundly hypoxic, on HFNC 50L, 100% FIO2. S/P tocilizumab yesterday 3/2. Day 3 of dexamethasone and remdesivir.     ROS: Multi system review is comprehensively negative x 10 systems except as above     Physical Exam:  T(F): 98.2 (03 Mar 2022 08:38), Max: 98.2 (03 Mar 2022 08:38)  HR: 79 (03 Mar 2022 10:06) (79 - 92)  BP: 111/72 (03 Mar 2022 08:38) (111/72 - 140/74)  RR: 22 (03 Mar 2022 08:38) (20 - 22)  SpO2: 93% (03 Mar 2022 15:00) (88% - 98%) on HFNC 50L/100%    Gen: Comfortable appearing at rest despite the O2 requirement  HEENT: NCAT PERRL EOMI  Neck: Supple, no JVD  Chest: Normal resp effort at rest, fairly clear lungs B/L  CVS: S1 S2 normal, RRR  Abd: +BS, soft NT ND   Ext: No edema or calf tenderness  Skin: Warm, dry, intact  Neuro: A+Ox3, no focal deficits    Labs:    CBC 3/3                        14.3   10.29 )-------( 299             41.8     BMP 3/3    139  |  106  |  21  ---------------------<  98  3.8   |  24  |  0.76    Ca 9.1   Phos 3.0   Mg 2.9    LFTs 3/3:  TPro  6.8  /  Alb  2.8  /  TBili  1.0  /  DBili  0.3  /  AST  30  /  ALT  62  /  AlkPhos  82      Coags 3/3:  PT: 15.1 sec;   INR: 1.30 ratio      Trop (-)  proBNP 292    ABG 3/2:  pH 7.46  pH, pCO2: 34, pO2: 62, HCO3: 24, Base Excess: 0.8, SaO2: 94       Procalcitonin 0.11  Lactate 3.4 --> 2.1      CRP 79 --> 48 --> 99   --> 589  Ferritin 1224 --> 909  D-dimer 324 --> 567    Micro:  Blood culture 2/28: Negative  COVID19 PCR 2/28: Positive    Imaging:  CTA chest W/ 3/1: Pulmonary emboli involving truncus anterior, as well as segmental and subsegmental branches in the upper lobes and right lower lobe. Normal caliber main pulmonary artery. Patent trachea and bronchi. Emphysema. New peripheral predominant ground-glass and consolidative opacities in all lobes, most severe in the right lower and middle lobes where it is more diffuse. Unremarkable pleura. Normal heart size. Unremarkable pericardium. Normal caliber aorta. Coronary artery calcified plaque.    CXR 2/28: Mild bibasilar infiltrates left greater than right which are new     Cardiac Testing:  EKG 2/28: Sinus tachycardia, rate 100    Medications:  MEDICATIONS  (STANDING):  atorvastatin 10 milliGRAM(s) Oral at bedtime  budesonide 160 MICROgram(s)/formoterol 4.5 MICROgram(s) Inhaler 2 Puff(s) Inhalation two times a day  dexAMETHasone  Injectable 6 milliGRAM(s) IV Push daily  enoxaparin Injectable 80 milliGRAM(s) SubCutaneous every 12 hours  guaiFENesin ER 1200 milliGRAM(s) Oral every 12 hours  remdesivir  IVPB 100 milliGRAM(s) IV Intermittent every 24 hours  tiotropium 18 MICROgram(s) Capsule 1 Capsule(s) Inhalation daily    MEDICATIONS  (PRN):  acetaminophen     Tablet .. 650 milliGRAM(s) Oral every 6 hours PRN Temp greater or equal to 38C (100.4F), Mild Pain (1 - 3)  ALBUTerol    90 MICROgram(s) HFA Inhaler 2 Puff(s) Inhalation every 6 hours PRN Shortness of Breath and/or Wheezing  aluminum hydroxide/magnesium hydroxide/simethicone Suspension 30 milliLiter(s) Oral every 4 hours PRN Dyspepsia  benzonatate 100 milliGRAM(s) Oral every 8 hours PRN Cough  melatonin 3 milliGRAM(s) Oral at bedtime PRN Insomnia  ondansetron Injectable 4 milliGRAM(s) IV Push every 8 hours PRN Nausea and/or Vomiting  sodium chloride 0.65% Nasal 1 Spray(s) Both Nostrils two times a day PRN Nasal Congestion

## 2022-03-03 NOTE — PROGRESS NOTE ADULT - ASSESSMENT
Diet:  DVT px:  Code Status:  Dispo:   71 year-old man with HLD, nephrolithiasis, extensive past tobacco use history, COVID-19 vaccinated x 2 but no booster, initially developed mild runny nose and cough 2/19, tested positive for COVID-19 2/21, and since then has developed progressively worsening dyspnea, presented to the ED 2/28 for further evaluation and management. In the ED patient was noted to have profound hypoxia, SPO2 70s with ambulation, placed on supplemental oxygen. CXR with mild bibasilar infiltrates. Patient was placed on IV dexamethasone and remdesivir and admitted to Medicine.    COVID-19 pneumonia, acute hypoxic respiratory failure  Remains medically acute with guarded prognosis. SPO2 92-94% on HFNC 50L/100% FiO2. S/p tocilizumab 3/2. Dexamethasone day 3 today. Remdsivir day 3 today.   - Increase steroids to 0.5mg/kg q 12 (patient with underlying emphysema, extensive past tobacco use, possible additional undiagnosed chronic lungs disease)  - Continue remdesivir day 3  - Continue oxygen supplementation  - Encourage incentive spirometry  - Prone positioning    Multiple pulmonary emboli  In setting of COVID. On full-dose Lovenox 1mg/kg q12.  - Continue Lovenox 1mg/kg q12    Likely emphysema  Extensive tobacco use history. CT chest with bullae to suggest emphysema.   - Continue Symbicort and Spiriva  - Increase steroids to solumedrol 0.5mg/kg q12    HLD  Chronic, stable  - Continue statin      Diet: Regular  DVT px: Full dose LMWH for PE  Code Status: Full  Dispo: To be determined pending further clinical assessment

## 2022-03-03 NOTE — PROGRESS NOTE ADULT - ASSESSMENT
72 y/o male with h/o HLD,  kidney stone, valvular heart disease, prostate Ca s/p radiation was admitted on2/28 for progressive  SOB.  Pt reports that on 2/19 he got sick with runny nose, fever and cough and tested positive for covid-19 on 2/21. His fever improved, but his SOB got worse, had difficulty breathing with minimal exertion. He checked his O2 and was below 80. In ER he received remdesivir.    1. Acute respiratory failure. COVID-19 breakthrough viral syndrome. Multifocal pneumonia. PE.  -respiratory frail; no COVID booster  -respiratory is worse  -on remdesivir protocol # 3  -tolerating abx well so far; no side effects noted  -inflammatory markers ar elevated  -O2 therapy  -steroids  -AC  -droplet isolation  -respiratory care  -given tociluzumab infusion on 3/2  -continue antiviral therapy  -monitor temps  -f/u CBC  -supportive care    2. Other issues:   -care per medicine    d/w medicine

## 2022-03-04 NOTE — PROGRESS NOTE ADULT - SUBJECTIVE AND OBJECTIVE BOX
Chief Complaint: Dyspnea    Interval History: No acute events overnight. No new complaints. Continues to report dyspnea. Diminished appetite. Some generalized malaise. No chest pain or tightness. No significant cough. No chest pain. Mild constipation but no abdominal discomfort, nausea, etc. No genitourinary complaints. Remains profoundly hypoxic, on HFNC 50L, 100% FIO2. S/P tocilizumab 3/2. Yesterday PM, switched from Dexamethasone 6mg IV daily to Solumedrol 0.5mg/kg q12h. Remdesivir day 4 today.     ROS: Multi system review is comprehensively negative x 10 systems except as above     Physical Exam:  T(F): 97.6 (04 Mar 2022 07:52), Max: 97.6 (04 Mar 2022 07:52)  HR: 83 (04 Mar 2022 17:44) (83 - 89)  BP: 117/61 (04 Mar 2022 07:52) (117/61 - 132/75)  RR: 20 (04 Mar 2022 07:52) (20 - 20)  SpO2: 95% (04 Mar 2022 17:44) (90% - 95%) on HFNC 50L/100% FIO2    Gen: Comfortable appearing at rest despite the O2 requirement  HEENT: NCAT PERRL EOMI  Neck: Supple, no JVD  Chest: Normal resp effort at rest, fairly clear lungs B/L  CVS: S1 S2 normal, RRR  Abd: +BS, soft NT ND   Ext: No edema or calf tenderness  Skin: Warm, dry, intact  Neuro: A+Ox3, no focal deficits    Labs:    CBC 3/4                        15.1   14.13 )--------( 333             45.2     BMP 3/4    137  |  106  |  24  ---------------------< 164  4.3   |   24   |  0.90    Ca 8.8   Phos 3.0 (3/3)    Mg 2.9 (3/3)    LFTs 3/4:  TPro  7.0  /  Alb  2.8  /  TBili  0.8  /  DBili  0.2  /  AST  25  /  ALT  66  /  AlkPhos  81      PT/INR 3/4:  PT: 14.3 sec;   INR: 1.23 ratio      Trop (-)  proBNP 292    ABG 3/2:  pH 7.46  pH, pCO2: 34, pO2: 62, HCO3: 24, Base Excess: 0.8, SaO2: 94       Procalcitonin 0.11  Lactate 3.4 --> 2.1      CRP 79 --> 48 --> 99 --> 40   --> 583  Ferritin 1224 --> 972  D-dimer 324 --> 733    Micro:  Blood culture 2/28: Negative  COVID19 PCR 2/28: Positive    Imaging:  CTA chest W/ 3/1: Pulmonary emboli involving truncus anterior, as well as segmental and subsegmental branches in the upper lobes and right lower lobe. Normal caliber main pulmonary artery. Patent trachea and bronchi. Emphysema. New peripheral predominant ground-glass and consolidative opacities in all lobes, most severe in the right lower and middle lobes where it is more diffuse. Unremarkable pleura. Normal heart size. Unremarkable pericardium. Normal caliber aorta. Coronary artery calcified plaque.    CXR 2/28: Mild bibasilar infiltrates left greater than right which are new     Cardiac Testing:  EKG 2/28: Sinus tachycardia, rate 100    Medications:  MEDICATIONS  (STANDING):  atorvastatin 10 milliGRAM(s) Oral at bedtime  budesonide 160 MICROgram(s)/formoterol 4.5 MICROgram(s) Inhaler 2 Puff(s) Inhalation two times a day  enoxaparin Injectable 80 milliGRAM(s) SubCutaneous every 12 hours  guaiFENesin ER 1200 milliGRAM(s) Oral every 12 hours  methylPREDNISolone sodium succinate Injectable 40 milliGRAM(s) IV Push every 12 hours  polyethylene glycol 3350 17 Gram(s) Oral daily  senna 2 Tablet(s) Oral at bedtime  tiotropium 18 MICROgram(s) Capsule 1 Capsule(s) Inhalation daily    MEDICATIONS  (PRN):  acetaminophen     Tablet .. 650 milliGRAM(s) Oral every 6 hours PRN Temp greater or equal to 38C (100.4F), Mild Pain (1 - 3)  ALBUTerol    90 MICROgram(s) HFA Inhaler 2 Puff(s) Inhalation every 6 hours PRN Shortness of Breath and/or Wheezing  aluminum hydroxide/magnesium hydroxide/simethicone Suspension 30 milliLiter(s) Oral every 4 hours PRN Dyspepsia  benzonatate 100 milliGRAM(s) Oral every 8 hours PRN Cough  melatonin 3 milliGRAM(s) Oral at bedtime PRN Insomnia  ondansetron Injectable 4 milliGRAM(s) IV Push every 8 hours PRN Nausea and/or Vomiting  sodium chloride 0.65% Nasal 1 Spray(s) Both Nostrils two times a day PRN Nasal Congestion

## 2022-03-04 NOTE — PROGRESS NOTE ADULT - ASSESSMENT
71 year-old man with HLD, nephrolithiasis, extensive past tobacco use history, COVID-19 vaccinated x 2 but no booster, initially developed mild runny nose and cough 2/19, tested positive for COVID-19 2/21, and since then has developed progressively worsening dyspnea, presented to the ED 2/28 for further evaluation and management. In the ED patient was noted to have profound hypoxia, SPO2 70s with ambulation, placed on supplemental oxygen. CXR with mild bibasilar infiltrates. Patient was placed on IV dexamethasone and remdesivir and admitted to Medicine.    COVID-19 pneumonia, acute hypoxic respiratory failure  Remains medically acute. SPO2 95% on HFNC 50L/100% FiO2. S/p tocilizumab 3/2. Switched from dexamethasone 6mg IV daily to Solumedrol 0.5mg/kg q12 yesterday 3/3. Remains on Remdsivir, day 4 today.   - Continue Solumedrol 0.5mg/kg q 12 (patient with underlying emphysema, extensive past tobacco use, possible additional undiagnosed chronic lungs disease)  - Continue remdesivir, day 4  - Continue oxygen supplementation, wean as tolerated goal SPO2 88-92% or greater  - Encourage incentive spirometry  - Prone positioning    Multiple pulmonary emboli  In setting of COVID. On full-dose Lovenox 1mg/kg q12.  - Continue Lovenox 1mg/kg q12    Likely emphysema  Extensive tobacco use history. CT chest with bullae to suggest emphysema.   - Continue Symbicort and Spiriva  - Continue Solumedrol 0.5mg/kg q12    HLD  Chronic, stable  - Continue statin      Diet: Regular  DVT px: Full dose LMWH for PE  Code Status: Full  Dispo: To be determined pending further clinical assessment

## 2022-03-04 NOTE — DIETITIAN INITIAL EVALUATION ADULT. - OTHER INFO
71 year-old man with HLD, nephrolithiasis, extensive past tobacco use history, COVID-19 vaccinated x 2 but no booster, initially developed mild runny nose and cough 2/19, tested positive for COVID-19 2/21, and since then has developed progressively worsening dyspnea, presented to the ED 2/28 for further evaluation and management. In the ED patient was noted to have profound hypoxia, SPO2 70s with ambulation, placed on supplemental oxygen. CXR with mild bibasilar infiltrates. Patient was placed on IV dexamethasone and remdesivir and admitted to Medicine. Covid +, hypoxia, on hi-flow 02.    Per RN, pt drinking but not eating since admit, 4 days ago. Bed wt per flowsheet: 183# 3/2/22, IBW: 154#, pt reports no significant wt changes. Pt says he will start eating today, ordered salmon, broccoli, mashed. On Regular diet, will add Ensure Enlive, Gelatein, pt willing. NFPE reveals no muscle/fat wasting.  See below recommendations.

## 2022-03-04 NOTE — DIETITIAN INITIAL EVALUATION ADULT. - ADD RECOMMEND
1. Continue Regular diet to optimize po/nutrient intake. 2. Add Ensure Enlive BID, Gelatein BID to help pt meet his ENN and maintain healthy wt 3. MVI w/ minerals daily to ensure 100% RDA met 4. Monitor bowel movements, if no BM for >3 days, consider implementing bowel regimen. 5. RDN will continue to monitor PO intake, labs, hydration, and wt prn.

## 2022-03-04 NOTE — PROGRESS NOTE ADULT - SUBJECTIVE AND OBJECTIVE BOX
Date of service: 03-04-22 @ 10:49    Sitting in bed in NAD  On high flow O2  Has dry cough  SOB with movements    ROS: no fever or chills; denies dizziness, no HA, no abdominal pain, no diarrhea or constipation; no dysuria, no legs pain, no rashes    MEDICATIONS  (STANDING):  atorvastatin 10 milliGRAM(s) Oral at bedtime  budesonide 160 MICROgram(s)/formoterol 4.5 MICROgram(s) Inhaler 2 Puff(s) Inhalation two times a day  enoxaparin Injectable 80 milliGRAM(s) SubCutaneous every 12 hours  guaiFENesin ER 1200 milliGRAM(s) Oral every 12 hours  methylPREDNISolone sodium succinate Injectable 40 milliGRAM(s) IV Push every 12 hours  remdesivir  IVPB   IV Intermittent   remdesivir  IVPB 100 milliGRAM(s) IV Intermittent every 24 hours  tiotropium 18 MICROgram(s) Capsule 1 Capsule(s) Inhalation daily    Vital Signs Last 24 Hrs  T(C): 36.4 (04 Mar 2022 07:52), Max: 36.4 (04 Mar 2022 07:52)  T(F): 97.6 (04 Mar 2022 07:52), Max: 97.6 (04 Mar 2022 07:52)  HR: 83 (04 Mar 2022 07:52) (83 - 89)  BP: 117/61 (04 Mar 2022 07:52) (117/61 - 132/75)  BP(mean): --  RR: 20 (04 Mar 2022 07:52) (20 - 20)  SpO2: 90% (04 Mar 2022 07:52) (90% - 93%)     Physical exam:    Constitutional:  No acute distress  HEENT: NC/AT, EOMI, PERRLA, conjunctivae clear; ears and nose atraumatic  Neck: supple; thyroid not palpable  Back: no tenderness  Respiratory: respiratory effort normal; crackles at bases  Cardiovascular: S1S2 regular, no murmurs  Abdomen: soft, not tender, not distended, positive BS  Genitourinary: no suprapubic tenderness  Lymphatic: no LN palpable  Musculoskeletal: no muscle tenderness, no joint swelling or tenderness  Extremities: no pedal edema  Neurological/ Psychiatric: Alert; moving all extremities  Skin: no rashes; no palpable lesions    Labs: reviewed                        15.1   14.13 )-----------( 333      ( 04 Mar 2022 08:34 )             45.2     03-04    137  |  106  |  24<H>  ----------------------------<  164<H>  4.3   |  24  |  0.90    Ca    8.8      04 Mar 2022 08:34  Phos  3.0     03-03  Mg     2.9     03-03    TPro  7.0  /  Alb  2.8<L>  /  TBili  0.8  /  DBili  0.2  /  AST  25  /  ALT  66  /  AlkPhos  81  03-04    D-Dimer Assay, Quantitative: 733 ng/mL DDU (03-04-22 @ 08:34)  Ferritin, Serum: 909 ng/mL (03-03-22 @ 09:38)  C-Reactive Protein, Serum: 99 mg/L (03-03-22 @ 07:18)  Ferritin, Serum: 1055 ng/mL (03-01-22 @ 07:00)  C-Reactive Protein, Serum: 48 mg/L (03-01-22 @ 07:00)  D-Dimer Assay, Quantitative: 567 ng/mL DDU (03-01-22 @ 07:00)  D-Dimer Assay, Quantitative: 365 ng/mL DDU (02-28-22 @ 11:59)  C-Reactive Protein, Serum: 79 mg/L (02-28-22 @ 08:35)  D-Dimer Assay, Quantitative: 324 ng/mL DDU (02-28-22 @ 08:35)  Ferritin, Serum: 1224 ng/mL (02-28-22 @ 08:35)                        13.2   9.95  )-----------( 259      ( 01 Mar 2022 07:00 )             40.2     03-02    x   |  x   |  x   ----------------------------<  x   x    |  x   |  0.79    Ca    9.3      01 Mar 2022 07:00    TPro  6.7  /  Alb  2.7<L>  /  TBili  0.8  /  DBili  0.2  /  AST  24  /  ALT  68  /  AlkPhos  77  03-02    Ferritin, Serum: 1055 ng/mL (03-01-22 @ 07:00)  C-Reactive Protein, Serum: 48 mg/L (03-01-22 @ 07:00)  D-Dimer Assay, Quantitative: 567 ng/mL DDU (03-01-22 @ 07:00)  D-Dimer Assay, Quantitative: 365 ng/mL DDU (02-28-22 @ 11:59)  C-Reactive Protein, Serum: 79 mg/L (02-28-22 @ 08:35)  D-Dimer Assay, Quantitative: 324 ng/mL DDU (02-28-22 @ 08:35)  Ferritin, Serum: 1224 ng/mL (02-28-22 @ 08:35)                        13.2   9.95  )-----------( 259      ( 01 Mar 2022 07:00 )             40.2     03-01    139  |  108  |  19  ----------------------------<  164<H>  4.5   |  26  |  0.88    Ca    9.3      01 Mar 2022 07:00    TPro  7.1  /  Alb  2.9<L>  /  TBili  0.8  /  DBili  0.2  /  AST  34  /  ALT  85<H>  /  AlkPhos  72  03-01     LIVER FUNCTIONS - ( 01 Mar 2022 07:00 )  Alb: 2.9 g/dL / Pro: 7.1 gm/dL / ALK PHOS: 72 U/L / ALT: 85 U/L / AST: 34 U/L / GGT: x           COVID (02-28 @ 08:16)  Detected      Culture - Blood (collected 28 Feb 2022 08:35)  Source: .Blood None  Preliminary Report (01 Mar 2022 13:02):    No growth to date.    Radiology: all available radiological tests reviewed    < from: Xray Chest 1 View- PORTABLE-Urgent (02.28.22 @ 09:00) >  IMPRESSION: Mild bibasilar infiltrates left greater than right.  < end of copied text >    < from: CT Angio Chest PE Protocol w/ IV Cont (03.01.22 @ 14:14) >  Pulmonary emboli in the right upper lobe pulmonary artery and bilateral   segmental and subsegmental branches. This was discussed with Dr. Hazel   at 2:36 PM 3/1/2022.  Diffuse lung abnormality consistent with COVID.  < end of copied text >      Advanced directives addressed: full resuscitation

## 2022-03-04 NOTE — PROGRESS NOTE ADULT - ASSESSMENT
72 y/o male with h/o HLD,  kidney stone, valvular heart disease, prostate Ca s/p radiation was admitted on2/28 for progressive  SOB.  Pt reports that on 2/19 he got sick with runny nose, fever and cough and tested positive for covid-19 on 2/21. His fever improved, but his SOB got worse, had difficulty breathing with minimal exertion. He checked his O2 and was below 80. In ER he received remdesivir.    1. Acute respiratory failure. COVID-19 breakthrough viral syndrome. Multifocal pneumonia. PE.  -respiratory frail; no COVID booster  -respiratory is frail  -given tociluzumab infusion on 3/2  -on remdesivir protocol # 4  -tolerating abx well so far; no side effects noted  -inflammatory markers are elevated  -O2 therapy  -steroids  -AC  -droplet isolation  -respiratory care  -continue antiviral therapy  -monitor temps  -f/u CBC  -supportive care    2. Other issues:   -care per medicine    d/w medicine

## 2022-03-04 NOTE — DIETITIAN INITIAL EVALUATION ADULT. - PERTINENT MEDS FT
MEDICATIONS  (STANDING):  atorvastatin 10 milliGRAM(s) Oral at bedtime  budesonide 160 MICROgram(s)/formoterol 4.5 MICROgram(s) Inhaler 2 Puff(s) Inhalation two times a day  enoxaparin Injectable 80 milliGRAM(s) SubCutaneous every 12 hours  guaiFENesin ER 1200 milliGRAM(s) Oral every 12 hours  methylPREDNISolone sodium succinate Injectable 40 milliGRAM(s) IV Push every 12 hours  polyethylene glycol 3350 17 Gram(s) Oral daily  senna 2 Tablet(s) Oral at bedtime  tiotropium 18 MICROgram(s) Capsule 1 Capsule(s) Inhalation daily    MEDICATIONS  (PRN):  acetaminophen     Tablet .. 650 milliGRAM(s) Oral every 6 hours PRN Temp greater or equal to 38C (100.4F), Mild Pain (1 - 3)  ALBUTerol    90 MICROgram(s) HFA Inhaler 2 Puff(s) Inhalation every 6 hours PRN Shortness of Breath and/or Wheezing  aluminum hydroxide/magnesium hydroxide/simethicone Suspension 30 milliLiter(s) Oral every 4 hours PRN Dyspepsia  benzonatate 100 milliGRAM(s) Oral every 8 hours PRN Cough  melatonin 3 milliGRAM(s) Oral at bedtime PRN Insomnia  ondansetron Injectable 4 milliGRAM(s) IV Push every 8 hours PRN Nausea and/or Vomiting  sodium chloride 0.65% Nasal 1 Spray(s) Both Nostrils two times a day PRN Nasal Congestion

## 2022-03-05 NOTE — PROGRESS NOTE ADULT - SUBJECTIVE AND OBJECTIVE BOX
Patient is a 71y old  Male who presents with a chief complaint of SOB (01 Mar 2022 13:52)      HPI:  72 yo pt with PMH of HLD,  kidney stone, valvular heart dz, prostate ca s/p Radiation presented  to ED c/o progressive SOB.    Diagnosed with COVID Pneumonia.   CTPE revealed pulmonary emboli in the right upper lobe pulmonary artery and bilateral segmental and subsegmental branches.   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     3/5  Transitioned to HFNC On 3/3, doing well  Ate dinner last night and breakfast this morning  on HFNC 50LPM, 100%FiO2      PAST MEDICAL & SURGICAL HISTORY:  Hypercholesterolemia    Cholelithiasis    Cholecystitis    Valvular heart disease    Psoriasis    History of kidney stones    Inguinal hernia, left    Prostate cancer  s/p XRT 1-6-2020 to 1/27/2021    History of renal calculi    S/P hernia repair  right inguinal; 5/2015    S/P colonoscopy with polypectomy  Baseline; viv polyps; 2012    History of cholecystectomy  2017    History of prostatectomy  5/2020    History of lithotripsy        PREVIOUS DIAGNOSTIC TESTING:      MEDICATIONS  (STANDING):  atorvastatin 10 milliGRAM(s) Oral at bedtime  budesonide 160 MICROgram(s)/formoterol 4.5 MICROgram(s) Inhaler 2 Puff(s) Inhalation two times a day  enoxaparin Injectable 80 milliGRAM(s) SubCutaneous every 12 hours  guaiFENesin ER 1200 milliGRAM(s) Oral every 12 hours  methylPREDNISolone sodium succinate Injectable 40 milliGRAM(s) IV Push daily  remdesivir  IVPB   IV Intermittent   remdesivir  IVPB 100 milliGRAM(s) IV Intermittent every 24 hours  tiotropium 18 MICROgram(s) Capsule 1 Capsule(s) Inhalation daily    MEDICATIONS  (PRN):  acetaminophen     Tablet .. 650 milliGRAM(s) Oral every 6 hours PRN Temp greater or equal to 38C (100.4F), Mild Pain (1 - 3)  ALBUTerol    90 MICROgram(s) HFA Inhaler 2 Puff(s) Inhalation every 6 hours PRN Shortness of Breath and/or Wheezing  aluminum hydroxide/magnesium hydroxide/simethicone Suspension 30 milliLiter(s) Oral every 4 hours PRN Dyspepsia  benzonatate 100 milliGRAM(s) Oral every 8 hours PRN Cough  melatonin 3 milliGRAM(s) Oral at bedtime PRN Insomnia  ondansetron Injectable 4 milliGRAM(s) IV Push every 8 hours PRN Nausea and/or Vomiting    Vital Signs Last 24 Hrs  T(C): 36.8 (05 Mar 2022 08:00), Max: 36.8 (05 Mar 2022 08:00)  T(F): 98.2 (05 Mar 2022 08:00), Max: 98.2 (05 Mar 2022 08:00)  HR: 86 (05 Mar 2022 08:00) (83 - 100)  BP: 127/76 (05 Mar 2022 08:00) (127/76 - 150/88)  BP(mean): --  RR: 20 (04 Mar 2022 21:00) (20 - 20)  SpO2: 96% (05 Mar 2022 08:00) (90% - 96%)      I&O's Summary    PHYSICAL EXAM  General Appearance: cooperative, no acute distress, on HFNC  HEENT: PERRL, conjunctiva clear, EOM's intact, non injected pharynx, no exudate, TM   normal  Neck: Supple, , no adenopathy, thyroid: not enlarged, no carotid bruit or JVD  Back: Symmetric, no  tenderness,no soft tissue tenderness  Lungs: coarse at the bases   Heart: Regular rate and rhythm, S1, S2 normal, no murmur, rub or gallop  Abdomen: Soft, non-tender, bowel sounds active , no hepatosplenomegaly  Extremities: no cyanosis or edema, no joint swelling  Skin: Skin color, texture normal, no rashes   Neurologic: Alert and oriented X3 , cranial nerves intact, sensory and motor normal,    ECG:    LABS:                          13.2   9.95  )-----------( 259      ( 01 Mar 2022 07:00 )             40.2     03-01    139  |  108  |  19  ----------------------------<  164<H>  4.5   |  26  |  0.88    Ca    9.3      01 Mar 2022 07:00    TPro  7.1  /  Alb  2.9<L>  /  TBili  0.8  /  DBili  0.2  /  AST  34  /  ALT  85<H>  /  AlkPhos  72  03-01          Pro BNP  292 03-01 @ 07:00  D Dimer  567 03-01 @ 07:00  Pro BNP  -- 02-28 @ 11:59  D Dimer  365 02-28 @ 11:59  Pro BNP  -- 02-28 @ 08:35  D Dimer  324 02-28 @ 08:35    PT/INR - ( 01 Mar 2022 07:00 )   PT: 14.2 sec;   INR: 1.22 ratio         PTT - ( 28 Feb 2022 08:35 )  PTT:25.3 sec          RADIOLOGY & ADDITIONAL STUDIES:

## 2022-03-05 NOTE — PROGRESS NOTE ADULT - SUBJECTIVE AND OBJECTIVE BOX
Date of service: 03-05-22 @ 13:20    Sitting in bed in NAD  Has SOB with light exercise  Has dry cough  On high flow O2    ROS: no fever or chills; denies dizziness, no HA, no abdominal pain, no diarrhea or constipation; no dysuria, no legs pain, no rashes    MEDICATIONS  (STANDING):  atorvastatin 10 milliGRAM(s) Oral at bedtime  budesonide 160 MICROgram(s)/formoterol 4.5 MICROgram(s) Inhaler 2 Puff(s) Inhalation two times a day  enoxaparin Injectable 80 milliGRAM(s) SubCutaneous every 12 hours  guaiFENesin ER 1200 milliGRAM(s) Oral every 12 hours  methylPREDNISolone sodium succinate Injectable 40 milliGRAM(s) IV Push every 12 hours  polyethylene glycol 3350 17 Gram(s) Oral daily  remdesivir  IVPB 100 milliGRAM(s) IV Intermittent every 24 hours  senna 2 Tablet(s) Oral at bedtime  tiotropium 18 MICROgram(s) Capsule 1 Capsule(s) Inhalation daily    Vital Signs Last 24 Hrs  T(C): 36.8 (05 Mar 2022 08:00), Max: 36.8 (05 Mar 2022 08:00)  T(F): 98.2 (05 Mar 2022 08:00), Max: 98.2 (05 Mar 2022 08:00)  HR: 86 (05 Mar 2022 08:00) (83 - 100)  BP: 127/76 (05 Mar 2022 08:00) (127/76 - 150/88)  BP(mean): --  RR: 20 (04 Mar 2022 21:00) (20 - 20)  SpO2: 96% (05 Mar 2022 08:00) (90% - 96%)     Physical exam:    Constitutional:  No acute distress  HEENT: NC/AT, EOMI, PERRLA, conjunctivae clear; ears and nose atraumatic  Neck: supple; thyroid not palpable  Back: no tenderness  Respiratory: respiratory effort normal; crackles at bases  Cardiovascular: S1S2 regular, no murmurs  Abdomen: soft, not tender, not distended, positive BS  Genitourinary: no suprapubic tenderness  Lymphatic: no LN palpable  Musculoskeletal: no muscle tenderness, no joint swelling or tenderness  Extremities: no pedal edema  Neurological/ Psychiatric: Alert; moving all extremities  Skin: no rashes; no palpable lesions    Labs: reviewed                        15.1   14.13 )-----------( 333      ( 04 Mar 2022 08:34 )             45.2     03-05    136  |  107  |  27<H>  ----------------------------<  121<H>  4.5   |  24  |  0.74    Ca    8.9      05 Mar 2022 07:45    TPro  6.6  /  Alb  2.9<L>  /  TBili  1.0  /  DBili  0.2  /  AST  31  /  ALT  81<H>  /  AlkPhos  79  03-05    Ferritin, Serum: 972 ng/mL (03-04-22 @ 08:34)  C-Reactive Protein, Serum: 40 mg/L (03-04-22 @ 08:34)  D-Dimer Assay, Quantitative: 733 ng/mL DDU (03-04-22 @ 08:34)  Ferritin, Serum: 909 ng/mL (03-03-22 @ 09:38)  C-Reactive Protein, Serum: 99 mg/L (03-03-22 @ 07:18)  Ferritin, Serum: 1055 ng/mL (03-01-22 @ 07:00)  C-Reactive Protein, Serum: 48 mg/L (03-01-22 @ 07:00)  D-Dimer Assay, Quantitative: 567 ng/mL DDU (03-01-22 @ 07:00)  D-Dimer Assay, Quantitative: 365 ng/mL DDU (02-28-22 @ 11:59)  C-Reactive Protein, Serum: 79 mg/L (02-28-22 @ 08:35)  D-Dimer Assay, Quantitative: 324 ng/mL DDU (02-28-22 @ 08:35)  Ferritin, Serum: 1224 ng/mL (02-28-22 @ 08:35)                        13.2   9.95  )-----------( 259      ( 01 Mar 2022 07:00 )             40.2     03-02    x   |  x   |  x   ----------------------------<  x   x    |  x   |  0.79    Ca    9.3      01 Mar 2022 07:00    TPro  6.7  /  Alb  2.7<L>  /  TBili  0.8  /  DBili  0.2  /  AST  24  /  ALT  68  /  AlkPhos  77  03-02    Ferritin, Serum: 1055 ng/mL (03-01-22 @ 07:00)  C-Reactive Protein, Serum: 48 mg/L (03-01-22 @ 07:00)  D-Dimer Assay, Quantitative: 567 ng/mL DDU (03-01-22 @ 07:00)  D-Dimer Assay, Quantitative: 365 ng/mL DDU (02-28-22 @ 11:59)  C-Reactive Protein, Serum: 79 mg/L (02-28-22 @ 08:35)  D-Dimer Assay, Quantitative: 324 ng/mL DDU (02-28-22 @ 08:35)  Ferritin, Serum: 1224 ng/mL (02-28-22 @ 08:35)                        13.2   9.95  )-----------( 259      ( 01 Mar 2022 07:00 )             40.2     03-01    139  |  108  |  19  ----------------------------<  164<H>  4.5   |  26  |  0.88    Ca    9.3      01 Mar 2022 07:00    TPro  7.1  /  Alb  2.9<L>  /  TBili  0.8  /  DBili  0.2  /  AST  34  /  ALT  85<H>  /  AlkPhos  72  03-01     LIVER FUNCTIONS - ( 01 Mar 2022 07:00 )  Alb: 2.9 g/dL / Pro: 7.1 gm/dL / ALK PHOS: 72 U/L / ALT: 85 U/L / AST: 34 U/L / GGT: x           COVID (02-28 @ 08:16)  Detected      Culture - Blood (collected 28 Feb 2022 08:35)  Source: .Blood None  Preliminary Report (01 Mar 2022 13:02):    No growth to date.    Radiology: all available radiological tests reviewed    < from: Xray Chest 1 View- PORTABLE-Urgent (02.28.22 @ 09:00) >  IMPRESSION: Mild bibasilar infiltrates left greater than right.  < end of copied text >    < from: CT Angio Chest PE Protocol w/ IV Cont (03.01.22 @ 14:14) >  Pulmonary emboli in the right upper lobe pulmonary artery and bilateral   segmental and subsegmental branches. This was discussed with Dr. Hazel   at 2:36 PM 3/1/2022.  Diffuse lung abnormality consistent with COVID.  < end of copied text >      Advanced directives addressed: full resuscitation

## 2022-03-05 NOTE — PROGRESS NOTE ADULT - ASSESSMENT
1) COVID Pneumonia  2) Hypoxemic Respiratory Failure  3) Pulmonary Embolism  4) Dyspnea   5) Combined Pulmonary Fibrosis/Emphysema   6) Abnormal CT Chest       70 yo pt with PMH of HLD,  kidney stone, valvular heart dz, prostate ca s/p Radiation presented  to ED c/o progressive SOB.    Diagnosed with COVID Pneumonia.   CTPE revealed pulmonary emboli in the right upper lobe pulmonary artery and bilateral segmental and subsegmental branches.   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   Not seen Pulmonary in the past.   Received Tocilizumab and now remdesivir/decadron , now on Solumedrol  Lovenox 80 q 12 for PE  Independently reviewed CT Chest   ABG 7.46/34/62 on NRB, 100%FiO2, transitioned to HFNC  Continue Spiriva/Symbicort   Will discuss further with hospitalist  Appreciate ID recommendations

## 2022-03-05 NOTE — PROGRESS NOTE ADULT - ASSESSMENT
72 y/o male with h/o HLD,  kidney stone, valvular heart disease, prostate Ca s/p radiation was admitted on2/28 for progressive  SOB.  Pt reports that on 2/19 he got sick with runny nose, fever and cough and tested positive for covid-19 on 2/21. His fever improved, but his SOB got worse, had difficulty breathing with minimal exertion. He checked his O2 and was below 80. In ER he received remdesivir.    1. Acute respiratory failure. COVID-19 breakthrough viral syndrome. Multifocal pneumonia. PE.  -respiratory frail; no COVID booster  -respiratory remains frail  -given tociluzumab infusion on 3/2  -on remdesivir protocol # 5  -tolerating abx well so far; no side effects noted  -inflammatory markers are elevated  -O2 therapy  -steroids  -AC  -droplet isolation  -respiratory care  -extend antiviral regimen  -continue antiviral therapy  -monitor temps  -f/u CBC  -supportive care    2. Other issues:   -care per medicine    d/w Dr. Kang

## 2022-03-05 NOTE — PROGRESS NOTE ADULT - SUBJECTIVE AND OBJECTIVE BOX
Chief Complaint: Dyspnea    Interval History: No new complaints. Continues to report dyspnea. Diminished appetite. Some generalized malaise. No chest pain or tightness. No significant cough. No chest pain. Mild constipation but no abdominal discomfort, nausea, etc. No genitourinary complaints. Remains profoundly hypoxic, though slightly weaned his FIO2 this AM, HFNC 50L, 90% FIO2. S/P tocilizumab 3/2. Solumedrol 0.5mg/kg q12h (day 1.5). Remdesivir day 5 today.     ROS: Multi system review is comprehensively negative x 10 systems except as above     Physical Exam:  T(F): 97.7 (05 Mar 2022 19:38), Max: 98.2 (05 Mar 2022 08:00)  HR: 81 (05 Mar 2022 19:38) (81 - 100)  BP: 121/75 (05 Mar 2022 19:38) (121/75 - 127/76)  RR: 19 (05 Mar 2022 19:38) (19 - 19)  SpO2: 95% (05 Mar 2022 19:38) (95% - 96%) on HFNC 50L/90% FiO2    Gen: Comfortable appearing at rest despite the O2 requirement  HEENT: NCAT PERRL EOMI  Neck: Supple, no JVD  Chest: Normal resp effort at rest, fairly clear lungs B/L  CVS: S1 S2 normal, RRR  Abd: +BS, soft NT ND   Ext: No edema or calf tenderness  Skin: Warm, dry, intact  Neuro: A+Ox3, no focal deficits    Labs:                   15.1   14.13 )--------( 333                   45.2     BMP 3/5    136  |  107  |  27  ---------------------<  121  4.5   |  24  |  0.74    Ca 8.9        TPro  6.6  /  Alb  2.9  /  TBili  1.0  /  DBili  0.2  /  AST  31  /  ALT  81  /  AlkPhos  79      Coags:  PT: 14.3 sec;   INR: 1.23 ratio      Trop (-)  proBNP 292    ABG 3/2:  pH 7.46  pH, pCO2: 34, pO2: 62, HCO3: 24, Base Excess: 0.8, SaO2: 94       Procalcitonin 0.11  Lactate 3.4 --> 2.1      CRP 79 --> 48 --> 99 --> 40   --> 583  Ferritin 1224 --> 972  D-dimer 324 --> 733    Micro:  Blood culture 2/28: Negative  COVID19 PCR 2/28: Positive    Imaging:  CTA chest W/ 3/1: Pulmonary emboli involving truncus anterior, as well as segmental and subsegmental branches in the upper lobes and right lower lobe. Normal caliber main pulmonary artery. Patent trachea and bronchi. Emphysema. New peripheral predominant ground-glass and consolidative opacities in all lobes, most severe in the right lower and middle lobes where it is more diffuse. Unremarkable pleura. Normal heart size. Unremarkable pericardium. Normal caliber aorta. Coronary artery calcified plaque.    CXR 2/28: Mild bibasilar infiltrates left greater than right which are new     Cardiac Testing:  EKG 2/28: Sinus tachycardia, rate 100    Medications:  MEDICATIONS  (STANDING):  atorvastatin 10 milliGRAM(s) Oral at bedtime  budesonide 160 MICROgram(s)/formoterol 4.5 MICROgram(s) Inhaler 2 Puff(s) Inhalation two times a day  enoxaparin Injectable 80 milliGRAM(s) SubCutaneous every 12 hours  guaiFENesin ER 1200 milliGRAM(s) Oral every 12 hours  methylPREDNISolone sodium succinate Injectable 40 milliGRAM(s) IV Push every 12 hours  polyethylene glycol 3350 17 Gram(s) Oral daily  remdesivir  IVPB 100 milliGRAM(s) IV Intermittent every 24 hours  senna 2 Tablet(s) Oral at bedtime  tiotropium 18 MICROgram(s) Capsule 1 Capsule(s) Inhalation daily    MEDICATIONS  (PRN):  acetaminophen     Tablet .. 650 milliGRAM(s) Oral every 6 hours PRN Temp greater or equal to 38C (100.4F), Mild Pain (1 - 3)  ALBUTerol    90 MICROgram(s) HFA Inhaler 2 Puff(s) Inhalation every 6 hours PRN Shortness of Breath and/or Wheezing  aluminum hydroxide/magnesium hydroxide/simethicone Suspension 30 milliLiter(s) Oral every 4 hours PRN Dyspepsia  benzonatate 100 milliGRAM(s) Oral every 8 hours PRN Cough  melatonin 3 milliGRAM(s) Oral at bedtime PRN Insomnia  ondansetron Injectable 4 milliGRAM(s) IV Push every 8 hours PRN Nausea and/or Vomiting  sodium chloride 0.65% Nasal 1 Spray(s) Both Nostrils two times a day PRN Nasal Congestion

## 2022-03-05 NOTE — PROGRESS NOTE ADULT - ASSESSMENT
71 year-old man with HLD, nephrolithiasis, extensive past tobacco use history, COVID-19 vaccinated x 2 but no booster, initially developed mild runny nose and cough 2/19, tested positive for COVID-19 2/21, and since then has developed progressively worsening dyspnea, presented to the ED 2/28 for further evaluation and management. In the ED patient was noted to have profound hypoxia, SPO2 70s with ambulation, placed on supplemental oxygen. CXR with mild bibasilar infiltrates. Patient was placed on IV dexamethasone and remdesivir and admitted to Medicine.    COVID-19 pneumonia, acute hypoxic respiratory failure  Remains medically acute. SPO2 92-94% on HFNC 50L/90% FiO2. S/p tocilizumab 3/2. Switched from dexamethasone 6mg IV daily to Solumedrol 0.5mg/kg q12 3/3 PM. Remains on Remdsivir, day 5 today.   - Continue Solumedrol 0.5mg/kg q 12 (patient with underlying emphysema, extensive past tobacco use, possible additional undiagnosed chronic lungs disease)  - Continue remdesivir, day 5  - Continue oxygen supplementation, wean as tolerated goal SPO2 88-92% or greater  - Encourage incentive spirometry  - Prone positioning    Multiple pulmonary emboli  In setting of COVID. On full-dose Lovenox 1mg/kg q12.  - Continue Lovenox 1mg/kg q12    Likely emphysema  Extensive tobacco use history. CT chest with bullae to suggest emphysema.   - Continue Symbicort and Spiriva  - Continue Solumedrol 0.5mg/kg q12    HLD  Chronic, stable  - Continue statin      Diet: Regular  DVT px: Full dose LMWH for PE  Code Status: Full  Dispo: To be determined pending further clinical assessment

## 2022-03-06 NOTE — PROGRESS NOTE ADULT - SUBJECTIVE AND OBJECTIVE BOX
Chief Complaint: Dyspnea    Interval History: No new complaints. Continues to report dyspnea. Diminished appetite. Some generalized malaise. No chest pain or tightness. No significant cough. No chest pain. Mild constipation but no abdominal discomfort, nausea, etc. No genitourinary complaints. Remains profoundly hypoxic, continuing to require HFNC 50L, 100% FIO2. S/P tocilizumab 3/2. Solumedrol 0.5mg/kg q12h (day 2.5). Remdesivir day 6 today. CXR repeated today. Residual interstitial opacities, no consolidation. TTE ordered as well, pending.     ROS: Multi system review is comprehensively negative x 10 systems except as above     Physical Exam:  T(F): 97.7 (06 Mar 2022 19:47), Max: 97.8 (06 Mar 2022 08:08)  HR: 96 (06 Mar 2022 19:47) (76 - 96)  BP: 121/73 (06 Mar 2022 19:47) (106/65 - 121/73)  RR: 18 (06 Mar 2022 19:47) (18 - 18)  SpO2: 92% (06 Mar 2022 19:47) (86% - 97%) on HFNC 50L/100% FiO2    Gen: Comfortable appearing at rest despite the O2 requirement  HEENT: NCAT PERRL EOMI  Neck: Supple, no JVD  Chest: Normal resp effort at rest, fairly clear lungs B/L  CVS: S1 S2 normal, RRR  Abd: +BS, soft NT ND   Ext: No edema or calf tenderness  Skin: Warm, dry, intact  Neuro: A+Ox3, no focal deficits    Labs:        134  |  102  |  23  ---------------------< 120  5.2   |  27  |  0.89    Ca 9.0    TPro  6.4  /  Alb  2.8  /  TBili  1.1  /  DBili  0.3  /  AST  35  /  ALT  103  /  AlkPhos  83     PT: 13.7 sec;   INR: 1.18 ratio      Trop (-)  proBNP 292    ABG 3/2:  pH 7.46  pH, pCO2: 34, pO2: 62, HCO3: 24, Base Excess: 0.8, SaO2: 94       Procalcitonin 0.11  Lactate 3.4 --> 2.1      CRP 79 --> 48 --> 99 --> 40   --> 583  Ferritin 1224 --> 972  D-dimer 324 --> 733    Micro:  Blood culture 2/28: Negative  COVID19 PCR 2/28: Positive    Imaging:  CXR 3/6: Residual interstitial opacities, no consolidation    CTA chest W/ 3/1: Pulmonary emboli involving truncus anterior, as well as segmental and subsegmental branches in the upper lobes and right lower lobe. Normal caliber main pulmonary artery. Patent trachea and bronchi. Emphysema. New peripheral predominant ground-glass and consolidative opacities in all lobes, most severe in the right lower and middle lobes where it is more diffuse. Unremarkable pleura. Normal heart size. Unremarkable pericardium. Normal caliber aorta. Coronary artery calcified plaque.    CXR 2/28: Mild bibasilar infiltrates left greater than right which are new     Cardiac Testing:  EKG 2/28: Sinus tachycardia, rate 100    Medications:  MEDICATIONS  (STANDING):  atorvastatin 10 milliGRAM(s) Oral at bedtime  budesonide 160 MICROgram(s)/formoterol 4.5 MICROgram(s) Inhaler 2 Puff(s) Inhalation two times a day  enoxaparin Injectable 80 milliGRAM(s) SubCutaneous every 12 hours  guaiFENesin ER 1200 milliGRAM(s) Oral every 12 hours  methylPREDNISolone sodium succinate Injectable 40 milliGRAM(s) IV Push every 12 hours  polyethylene glycol 3350 17 Gram(s) Oral daily  remdesivir  IVPB 100 milliGRAM(s) IV Intermittent every 24 hours  senna 2 Tablet(s) Oral at bedtime  tiotropium 18 MICROgram(s) Capsule 1 Capsule(s) Inhalation daily    MEDICATIONS  (PRN):  acetaminophen     Tablet .. 650 milliGRAM(s) Oral every 6 hours PRN Temp greater or equal to 38C (100.4F), Mild Pain (1 - 3)  ALBUTerol    90 MICROgram(s) HFA Inhaler 2 Puff(s) Inhalation every 6 hours PRN Shortness of Breath and/or Wheezing  aluminum hydroxide/magnesium hydroxide/simethicone Suspension 30 milliLiter(s) Oral every 4 hours PRN Dyspepsia  benzonatate 100 milliGRAM(s) Oral every 8 hours PRN Cough  melatonin 3 milliGRAM(s) Oral at bedtime PRN Insomnia  ondansetron Injectable 4 milliGRAM(s) IV Push every 8 hours PRN Nausea and/or Vomiting  sodium chloride 0.65% Nasal 1 Spray(s) Both Nostrils two times a day PRN Nasal Congestion

## 2022-03-06 NOTE — PROGRESS NOTE ADULT - SUBJECTIVE AND OBJECTIVE BOX
Patient is a 71y old  Male who presents with a chief complaint of SOB (01 Mar 2022 13:52)      HPI:  70 yo pt with PMH of HLD,  kidney stone, valvular heart dz, prostate ca s/p Radiation presented  to ED c/o progressive SOB.    Diagnosed with COVID Pneumonia.   CTPE revealed pulmonary emboli in the right upper lobe pulmonary artery and bilateral segmental and subsegmental branches.   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     3/6  Transitioned to HFNC On 3/3  No issues with appetite   on HFNC 50LPM, 100%FiO2, attempted to wean but increased overnight to 100%  Pending new CXR      PAST MEDICAL & SURGICAL HISTORY:  Hypercholesterolemia    Cholelithiasis    Cholecystitis    Valvular heart disease    Psoriasis    History of kidney stones    Inguinal hernia, left    Prostate cancer  s/p XRT 1-6-2020 to 1/27/2021    History of renal calculi    S/P hernia repair  right inguinal; 5/2015    S/P colonoscopy with polypectomy  Baseline; viv polyps; 2012    History of cholecystectomy  2017    History of prostatectomy  5/2020    History of lithotripsy        PREVIOUS DIAGNOSTIC TESTING:      MEDICATIONS  (STANDING):  atorvastatin 10 milliGRAM(s) Oral at bedtime  budesonide 160 MICROgram(s)/formoterol 4.5 MICROgram(s) Inhaler 2 Puff(s) Inhalation two times a day  enoxaparin Injectable 80 milliGRAM(s) SubCutaneous every 12 hours  guaiFENesin ER 1200 milliGRAM(s) Oral every 12 hours  methylPREDNISolone sodium succinate Injectable 40 milliGRAM(s) IV Push daily  remdesivir  IVPB   IV Intermittent   remdesivir  IVPB 100 milliGRAM(s) IV Intermittent every 24 hours  tiotropium 18 MICROgram(s) Capsule 1 Capsule(s) Inhalation daily    MEDICATIONS  (PRN):  acetaminophen     Tablet .. 650 milliGRAM(s) Oral every 6 hours PRN Temp greater or equal to 38C (100.4F), Mild Pain (1 - 3)  ALBUTerol    90 MICROgram(s) HFA Inhaler 2 Puff(s) Inhalation every 6 hours PRN Shortness of Breath and/or Wheezing  aluminum hydroxide/magnesium hydroxide/simethicone Suspension 30 milliLiter(s) Oral every 4 hours PRN Dyspepsia  benzonatate 100 milliGRAM(s) Oral every 8 hours PRN Cough  melatonin 3 milliGRAM(s) Oral at bedtime PRN Insomnia  ondansetron Injectable 4 milliGRAM(s) IV Push every 8 hours PRN Nausea and/or Vomiting    Vital Signs Last 24 Hrs  T(C): 36.5 (05 Mar 2022 19:38), Max: 36.5 (05 Mar 2022 19:38)  T(F): 97.7 (05 Mar 2022 19:38), Max: 97.7 (05 Mar 2022 19:38)  HR: 81 (05 Mar 2022 19:38) (81 - 81)  BP: 121/75 (05 Mar 2022 19:38) (121/75 - 121/75)  BP(mean): --  RR: 19 (05 Mar 2022 19:38) (19 - 19)  SpO2: 97% (06 Mar 2022 01:45) (86% - 97%)    I&O's Summary    PHYSICAL EXAM  General Appearance: cooperative, no acute distress, on HFNC  HEENT: PERRL, conjunctiva clear, EOM's intact, non injected pharynx, no exudate, TM   normal  Neck: Supple, , no adenopathy, thyroid: not enlarged, no carotid bruit or JVD  Back: Symmetric, no  tenderness,no soft tissue tenderness  Lungs: coarse at the bases   Heart: Regular rate and rhythm, S1, S2 normal, no murmur, rub or gallop  Abdomen: Soft, non-tender, bowel sounds active , no hepatosplenomegaly  Extremities: no cyanosis or edema, no joint swelling  Skin: Skin color, texture normal, no rashes   Neurologic: Alert and oriented X3 , cranial nerves intact, sensory and motor normal,    ECG:    LABS:                          13.2   9.95  )-----------( 259      ( 01 Mar 2022 07:00 )             40.2     03-01    139  |  108  |  19  ----------------------------<  164<H>  4.5   |  26  |  0.88    Ca    9.3      01 Mar 2022 07:00    TPro  7.1  /  Alb  2.9<L>  /  TBili  0.8  /  DBili  0.2  /  AST  34  /  ALT  85<H>  /  AlkPhos  72  03-01          Pro BNP  292 03-01 @ 07:00  D Dimer  567 03-01 @ 07:00  Pro BNP  -- 02-28 @ 11:59  D Dimer  365 02-28 @ 11:59  Pro BNP  -- 02-28 @ 08:35  D Dimer  324 02-28 @ 08:35    PT/INR - ( 01 Mar 2022 07:00 )   PT: 14.2 sec;   INR: 1.22 ratio         PTT - ( 28 Feb 2022 08:35 )  PTT:25.3 sec          RADIOLOGY & ADDITIONAL STUDIES:

## 2022-03-06 NOTE — PROGRESS NOTE ADULT - ASSESSMENT
72 y/o male with h/o HLD,  kidney stone, valvular heart disease, prostate Ca s/p radiation was admitted on2/28 for progressive  SOB.  Pt reports that on 2/19 he got sick with runny nose, fever and cough and tested positive for covid-19 on 2/21. His fever improved, but his SOB got worse, had difficulty breathing with minimal exertion. He checked his O2 and was below 80. In ER he received remdesivir.    1. Acute respiratory failure. COVID-19 breakthrough viral syndrome. Multifocal pneumonia. PE.  -respiratory frail; no COVID booster  -respiratory remains frail  -given tociluzumab infusion on 3/2  -on remdesivir protocol # 6  -tolerating abx well so far; no side effects noted  -inflammatory markers are elevated  -O2 therapy  -steroids  -AC  -droplet isolation  -respiratory care  -continue antiviral therapy  -monitor temps  -f/u CBC  -supportive care    2. Other issues:   -care per medicine    d/w Dr. Kang

## 2022-03-06 NOTE — PROGRESS NOTE ADULT - SUBJECTIVE AND OBJECTIVE BOX
Date of service: 03-06-22 @ 11:39    Sitting in bed in NAD  Episode of severe hypoxia overnight when he took the O2 high lfow off his face  Has dry cough  SOB with light exercise    ROS: no fever or chills; denies dizziness, no HA, no abdominal pain, no diarrhea or constipation; no dysuria, no legs pain, no rashes    MEDICATIONS  (STANDING):  atorvastatin 10 milliGRAM(s) Oral at bedtime  budesonide 160 MICROgram(s)/formoterol 4.5 MICROgram(s) Inhaler 2 Puff(s) Inhalation two times a day  enoxaparin Injectable 80 milliGRAM(s) SubCutaneous every 12 hours  guaiFENesin ER 1200 milliGRAM(s) Oral every 12 hours  methylPREDNISolone sodium succinate Injectable 40 milliGRAM(s) IV Push every 12 hours  polyethylene glycol 3350 17 Gram(s) Oral daily  remdesivir  IVPB 100 milliGRAM(s) IV Intermittent every 24 hours  senna 2 Tablet(s) Oral at bedtime  tiotropium 18 MICROgram(s) Capsule 1 Capsule(s) Inhalation daily    Vital Signs Last 24 Hrs  T(C): 36.6 (06 Mar 2022 08:08), Max: 36.6 (06 Mar 2022 08:08)  T(F): 97.8 (06 Mar 2022 08:08), Max: 97.8 (06 Mar 2022 08:08)  HR: 76 (06 Mar 2022 08:08) (76 - 81)  BP: 106/65 (06 Mar 2022 08:08) (106/65 - 121/75)  BP(mean): --  RR: 18 (06 Mar 2022 08:08) (18 - 19)  SpO2: 93% (06 Mar 2022 11:15) (86% - 97%)     Physical exam:    Constitutional:  No acute distress  HEENT: NC/AT, EOMI, PERRLA, conjunctivae clear; ears and nose atraumatic  Neck: supple; thyroid not palpable  Back: no tenderness  Respiratory: respiratory effort normal; crackles at bases  Cardiovascular: S1S2 regular, no murmurs  Abdomen: soft, not tender, not distended, positive BS  Genitourinary: no suprapubic tenderness  Lymphatic: no LN palpable  Musculoskeletal: no muscle tenderness, no joint swelling or tenderness  Extremities: no pedal edema  Neurological/ Psychiatric: Alert; moving all extremities  Skin: no rashes; no palpable lesions    Labs: reviewed    03-06    134<L>  |  102  |  23  ----------------------------<  120<H>  5.2   |  27  |  0.89    Ca    9.0      06 Mar 2022 07:32    TPro  6.4  /  Alb  2.8<L>  /  TBili  1.1  /  DBili  0.3  /  AST  35  /  ALT  103<H>  /  AlkPhos  83  03-06    D-Dimer Assay, Quantitative: 378 ng/mL DDU (03-06-22 @ 07:32)  Ferritin, Serum: 972 ng/mL (03-04-22 @ 08:34)  C-Reactive Protein, Serum: 40 mg/L (03-04-22 @ 08:34)  D-Dimer Assay, Quantitative: 733 ng/mL DDU (03-04-22 @ 08:34)  Ferritin, Serum: 909 ng/mL (03-03-22 @ 09:38)  C-Reactive Protein, Serum: 99 mg/L (03-03-22 @ 07:18)  Ferritin, Serum: 1055 ng/mL (03-01-22 @ 07:00)  C-Reactive Protein, Serum: 48 mg/L (03-01-22 @ 07:00)  D-Dimer Assay, Quantitative: 567 ng/mL DDU (03-01-22 @ 07:00)  D-Dimer Assay, Quantitative: 365 ng/mL DDU (02-28-22 @ 11:59)  C-Reactive Protein, Serum: 79 mg/L (02-28-22 @ 08:35)  D-Dimer Assay, Quantitative: 324 ng/mL DDU (02-28-22 @ 08:35)  Ferritin, Serum: 1224 ng/mL (02-28-22 @ 08:35)                        15.1   14.13 )-----------( 333      ( 04 Mar 2022 08:34 )             45.2     03-05    136  |  107  |  27<H>  ----------------------------<  121<H>  4.5   |  24  |  0.74    Ca    8.9      05 Mar 2022 07:45    TPro  6.6  /  Alb  2.9<L>  /  TBili  1.0  /  DBili  0.2  /  AST  31  /  ALT  81<H>  /  AlkPhos  79  03-05                        13.2   9.95  )-----------( 259      ( 01 Mar 2022 07:00 )             40.2     03-01    139  |  108  |  19  ----------------------------<  164<H>  4.5   |  26  |  0.88    Ca    9.3      01 Mar 2022 07:00    TPro  7.1  /  Alb  2.9<L>  /  TBili  0.8  /  DBili  0.2  /  AST  34  /  ALT  85<H>  /  AlkPhos  72  03-01     LIVER FUNCTIONS - ( 01 Mar 2022 07:00 )  Alb: 2.9 g/dL / Pro: 7.1 gm/dL / ALK PHOS: 72 U/L / ALT: 85 U/L / AST: 34 U/L / GGT: x           COVID (02-28 @ 08:16)  Detected      Culture - Blood (collected 28 Feb 2022 08:35)  Source: .Blood None  Preliminary Report (01 Mar 2022 13:02):    No growth to date.    Radiology: all available radiological tests reviewed    < from: Xray Chest 1 View- PORTABLE-Urgent (02.28.22 @ 09:00) >  IMPRESSION: Mild bibasilar infiltrates left greater than right.  < end of copied text >    < from: CT Angio Chest PE Protocol w/ IV Cont (03.01.22 @ 14:14) >  Pulmonary emboli in the right upper lobe pulmonary artery and bilateral   segmental and subsegmental branches. This was discussed with Dr. Hazel   at 2:36 PM 3/1/2022.  Diffuse lung abnormality consistent with COVID.  < end of copied text >      Advanced directives addressed: full resuscitation

## 2022-03-06 NOTE — PROGRESS NOTE ADULT - ASSESSMENT
1) COVID Pneumonia  2) Hypoxemic Respiratory Failure  3) Pulmonary Embolism  4) Dyspnea   5) Combined Pulmonary Fibrosis/Emphysema   6) Abnormal CT Chest       70 yo pt with PMH of HLD,  kidney stone, valvular heart dz, prostate ca s/p Radiation presented  to ED c/o progressive SOB.    Diagnosed with COVID Pneumonia.   CTPE revealed pulmonary emboli in the right upper lobe pulmonary artery and bilateral segmental and subsegmental branches.   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   Not seen Pulmonary in the past.   Received Tocilizumab and now remdesivir+solumedrol  Lovenox 80 q 12 for PE  Independently reviewed CT Chest   ABG 7.46/34/62 on NRB, 100%FiO2, transitioned to HFNC  Continue Spiriva/Symbicort   Ordered CXR and echocardiogram to further assess pulmonary vasculature/EF  Appreciate ID recommendations

## 2022-03-06 NOTE — PROGRESS NOTE ADULT - ASSESSMENT
71 year-old man with HLD, nephrolithiasis, extensive past tobacco use history, COVID-19 vaccinated x 2 but no booster, initially developed mild runny nose and cough 2/19, tested positive for COVID-19 2/21, and since then has developed progressively worsening dyspnea, presented to the ED 2/28 for further evaluation and management. In the ED patient was noted to have profound hypoxia, SPO2 70s with ambulation, placed on supplemental oxygen. CXR with mild bibasilar infiltrates. Patient was placed on IV dexamethasone and remdesivir and admitted to Medicine.    COVID-19 pneumonia, acute hypoxic respiratory failure  Remains medically acute. SPO2 92-94% on HFNC 50L/100% FiO2. S/p tocilizumab 3/2. Switched from dexamethasone 6mg IV daily to Solumedrol 0.5mg/kg q12 3/3 PM. Remains on Remdsivir, day 6 today.   - Continue Solumedrol 0.5mg/kg q 12 (patient with underlying emphysema, extensive past tobacco use, possible additional undiagnosed chronic lungs disease)  - Continue remdesivir, day 6, will need to monitor closely as ALT is uptrending  - Continue oxygen supplementation, wean as tolerated goal SPO2 88-92% or greater  - Encourage incentive spirometry  - Prone positioning    Multiple pulmonary emboli  In setting of COVID. On full-dose Lovenox 1mg/kg q12.  - Continue Lovenox 1mg/kg q12    Likely underlying emphysema  Extensive tobacco use history. CT chest with bullae to suggest emphysema.   - Continue Symbicort and Spiriva  - Continue Solumedrol 0.5mg/kg q12    Possible underlying pulmonary fibrosis w/ possible pulmonary HTN  Based on CT chest findings, impression from Pulmonary Medicine.   - Ordered for TTE    HLD  Chronic, stable  - Continue statin      Diet: Regular  DVT px: Full dose LMWH for PE  Code Status: Full  Dispo: To be determined pending further clinical assessment   71 year-old man with HLD, nephrolithiasis, extensive past tobacco use history, COVID-19 vaccinated x 2 but no booster, initially developed mild runny nose and cough 2/19, tested positive for COVID-19 2/21, and since then has developed progressively worsening dyspnea, presented to the ED 2/28 for further evaluation and management. In the ED patient was noted to have profound hypoxia, SPO2 70s with ambulation, placed on supplemental oxygen. CXR with mild bibasilar infiltrates. Patient was placed on IV dexamethasone and remdesivir and admitted to Medicine.    COVID-19 pneumonia, acute hypoxic respiratory failure  Remains medically acute. SPO2 92-94% on HFNC 50L/100% FiO2. S/p tocilizumab 3/2. Switched from dexamethasone 6mg IV daily to Solumedrol 0.5mg/kg q12 3/3 PM. Remains on Remdsivir, day 6 today.   - Continue Solumedrol 0.5mg/kg q 12 (patient with underlying emphysema, extensive past tobacco use, possible additional undiagnosed chronic lungs disease)  - Continue remdesivir, day 6, will need to monitor closely as ALT is uptrending  - Continue oxygen supplementation, wean as tolerated goal SPO2 88-92% or greater  - Encourage incentive spirometry  - Prone positioning    Multiple pulmonary emboli  In setting of COVID. On full-dose Lovenox 1mg/kg q12.  - Continue Lovenox 1mg/kg q12    Likely underlying emphysema  Extensive tobacco use history. CT chest with bullae to suggest emphysema.   - Continue Symbicort and Spiriva  - Continue Solumedrol 0.5mg/kg q12    Possible underlying pulmonary fibrosis w/ possible pulmonary HTN  Based on CT chest findings, impression from Pulmonary Medicine.   - Ordered for TTE    Mild hyponatremia  Due to underlying COVID19. Various contributing pathogenic mechanisms. Na now 134.   - If Na continues to downtrend significantly, can work up further with sOsm uOsm Lily etc.     Mild hyperkalemia  Due to underlying COVID19. K now 5.2  - If K continues to climb, can work up further and administer Lokelma, etc.     HLD  Chronic, stable  - Continue statin      Diet: Regular  DVT px: Full dose LMWH for PE  Code Status: Full  Dispo: To be determined pending further clinical assessment

## 2022-03-07 NOTE — PROGRESS NOTE ADULT - SUBJECTIVE AND OBJECTIVE BOX
Date of service: 03-07-22 @ 12:45    Sitting in bed in NAD  SOB with minimal exercise  On high flow O2  Has dry cough    ROS: no fever or chills; no HA, no abdominal pain, no diarrhea or constipation; no dysuria, no legs pain, no rashes    MEDICATIONS  (STANDING):  atorvastatin 10 milliGRAM(s) Oral at bedtime  budesonide 160 MICROgram(s)/formoterol 4.5 MICROgram(s) Inhaler 2 Puff(s) Inhalation two times a day  enoxaparin Injectable 80 milliGRAM(s) SubCutaneous every 12 hours  guaiFENesin ER 1200 milliGRAM(s) Oral every 12 hours  methylPREDNISolone sodium succinate Injectable 40 milliGRAM(s) IV Push every 12 hours  polyethylene glycol 3350 17 Gram(s) Oral daily  remdesivir  IVPB 100 milliGRAM(s) IV Intermittent every 24 hours  senna 2 Tablet(s) Oral at bedtime  tiotropium 18 MICROgram(s) Capsule 1 Capsule(s) Inhalation daily    Vital Signs Last 24 Hrs  T(C): 36.7 (07 Mar 2022 07:30), Max: 36.7 (07 Mar 2022 07:30)  T(F): 98.1 (07 Mar 2022 07:30), Max: 98.1 (07 Mar 2022 07:30)  HR: 82 (07 Mar 2022 07:30) (82 - 96)  BP: 121/64 (07 Mar 2022 07:30) (121/64 - 121/73)  BP(mean): --  RR: 22 (07 Mar 2022 09:50) (18 - 22)  SpO2: 96% (07 Mar 2022 09:50) (92% - 96%)     Physical exam:    Constitutional:  No acute distress  HEENT: NC/AT, EOMI, PERRLA, conjunctivae clear; ears and nose atraumatic  Neck: supple; thyroid not palpable  Back: no tenderness  Respiratory: respiratory effort normal; crackles at bases  Cardiovascular: S1S2 regular, no murmurs  Abdomen: soft, not tender, not distended, positive BS  Genitourinary: no suprapubic tenderness  Lymphatic: no LN palpable  Musculoskeletal: no muscle tenderness, no joint swelling or tenderness  Extremities: no pedal edema  Neurological/ Psychiatric: Alert; moving all extremities  Skin: no rashes; no palpable lesions    Labs: reviewed    03-07    134<L>  |  102  |  24<H>  ----------------------------<  113<H>  4.5   |  27  |  0.86    Ca    8.5      07 Mar 2022 08:06    TPro  6.2  /  Alb  2.6<L>  /  TBili  1.0  /  DBili  0.2  /  AST  28  /  ALT  102<H>  /  AlkPhos  89  03-07        Ferritin, Serum: 973 ng/mL (03-06-22 @ 07:32)  C-Reactive Protein, Serum: 8 mg/L (03-06-22 @ 07:32)  D-Dimer Assay, Quantitative: 378 ng/mL DDU (03-06-22 @ 07:32)  Ferritin, Serum: 972 ng/mL (03-04-22 @ 08:34)  C-Reactive Protein, Serum: 40 mg/L (03-04-22 @ 08:34)  D-Dimer Assay, Quantitative: 733 ng/mL DDU (03-04-22 @ 08:34)  Ferritin, Serum: 909 ng/mL (03-03-22 @ 09:38)  C-Reactive Protein, Serum: 99 mg/L (03-03-22 @ 07:18)  Ferritin, Serum: 1055 ng/mL (03-01-22 @ 07:00)  C-Reactive Protein, Serum: 48 mg/L (03-01-22 @ 07:00)  D-Dimer Assay, Quantitative: 567 ng/mL DDU (03-01-22 @ 07:00)  D-Dimer Assay, Quantitative: 365 ng/mL DDU (02-28-22 @ 11:59)  C-Reactive Protein, Serum: 79 mg/L (02-28-22 @ 08:35)  D-Dimer Assay, Quantitative: 324 ng/mL DDU (02-28-22 @ 08:35)  Ferritin, Serum: 1224 ng/mL (02-28-22 @ 08:35)                        15.1   14.13 )-----------( 333      ( 04 Mar 2022 08:34 )             45.2     03-05    136  |  107  |  27<H>  ----------------------------<  121<H>  4.5   |  24  |  0.74    Ca    8.9      05 Mar 2022 07:45    TPro  6.6  /  Alb  2.9<L>  /  TBili  1.0  /  DBili  0.2  /  AST  31  /  ALT  81<H>  /  AlkPhos  79  03-05                        13.2   9.95  )-----------( 259      ( 01 Mar 2022 07:00 )             40.2     03-01    139  |  108  |  19  ----------------------------<  164<H>  4.5   |  26  |  0.88    Ca    9.3      01 Mar 2022 07:00    TPro  7.1  /  Alb  2.9<L>  /  TBili  0.8  /  DBili  0.2  /  AST  34  /  ALT  85<H>  /  AlkPhos  72  03-01     LIVER FUNCTIONS - ( 01 Mar 2022 07:00 )  Alb: 2.9 g/dL / Pro: 7.1 gm/dL / ALK PHOS: 72 U/L / ALT: 85 U/L / AST: 34 U/L / GGT: x           COVID (02-28 @ 08:16)  Detected      Culture - Blood (collected 28 Feb 2022 08:35)  Source: .Blood None  Preliminary Report (01 Mar 2022 13:02):    No growth to date.    Radiology: all available radiological tests reviewed    < from: Xray Chest 1 View- PORTABLE-Urgent (02.28.22 @ 09:00) >  IMPRESSION: Mild bibasilar infiltrates left greater than right.  < end of copied text >    < from: CT Angio Chest PE Protocol w/ IV Cont (03.01.22 @ 14:14) >  Pulmonary emboli in the right upper lobe pulmonary artery and bilateral   segmental and subsegmental branches. This was discussed with Dr. Hazel   at 2:36 PM 3/1/2022.  Diffuse lung abnormality consistent with COVID.  < end of copied text >      Advanced directives addressed: full resuscitation

## 2022-03-07 NOTE — PROGRESS NOTE ADULT - ASSESSMENT
70 y/o male with h/o HLD,  kidney stone, valvular heart disease, prostate Ca s/p radiation was admitted on2/28 for progressive  SOB.  Pt reports that on 2/19 he got sick with runny nose, fever and cough and tested positive for covid-19 on 2/21. His fever improved, but his SOB got worse, had difficulty breathing with minimal exertion. He checked his O2 and was below 80. In ER he received remdesivir.    1. Acute respiratory failure. COVID-19 breakthrough viral syndrome. Multifocal pneumonia. PE.  -respiratory frail; no COVID booster  -respiratory remains frail  -given tociluzumab infusion on 3/2  -on remdesivir protocol # 7  -tolerating abx well so far; no side effects noted  -inflammatory markers are elevated  -O2 therapy  -steroids  -AC  -droplet isolation  -respiratory care  -continue antiviral therapy  -monitor temps  -f/u CBC  -supportive care    2. Other issues:   -care per medicine    d/w Dr. Kang

## 2022-03-07 NOTE — PROGRESS NOTE ADULT - ASSESSMENT
1) COVID Pneumonia  2) Hypoxemic Respiratory Failure  3) Pulmonary Embolism  4) Dyspnea   5) Combined Pulmonary Fibrosis/Emphysema   6) Abnormal CT Chest       70 yo pt with PMH of HLD,  kidney stone, valvular heart dz, prostate ca s/p Radiation presented  to ED c/o progressive SOB.    Diagnosed with COVID Pneumonia.   CTPE revealed pulmonary emboli in the right upper lobe pulmonary artery and bilateral segmental and subsegmental branches.   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   Not seen Pulmonary in the past.   Received Tocilizumab and now remdesivir+solumedrol  Lovenox 80 q 12 for PE  Independently reviewed CT Chest   ABG 7.46/34/62 on NRB, 100%FiO2, transitioned to HFNC on 50LPM, 95% FiO2 at the time of our encounter  GoalSaO2 >87%  Continue Spiriva/Symbicort   Ordered CXR and echocardiogram to further assess pulmonary vasculature/EF  Appreciate ID recommendations  1) COVID Pneumonia  2) Hypoxemic Respiratory Failure  3) Pulmonary Embolism  4) Dyspnea   5) Combined Pulmonary Fibrosis/Emphysema   6) Abnormal CT Chest       70 yo pt with PMH of HLD,  kidney stone, valvular heart dz, prostate ca s/p Radiation presented  to ED c/o progressive SOB.    Diagnosed with COVID Pneumonia.   CTPE revealed pulmonary emboli in the right upper lobe pulmonary artery and bilateral segmental and subsegmental branches.   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   Not seen Pulmonary in the past.   Received Tocilizumab and now remdesivir+solumedrol  Lovenox 80 q 12 for PE  Independently reviewed CT Chest   ABG 7.46/34/62 on NRB, 100%FiO2, transitioned to HFNC on 50LPM, 95% FiO2 at the time of our encounter  GoalSaO2 >87%  Continue Spiriva/Symbicort   Reviewed CXR/echocardiogram- no signs of new infiltrates on CXR and echo does not show PH  Appreciate ID recommendations

## 2022-03-07 NOTE — PROGRESS NOTE ADULT - SUBJECTIVE AND OBJECTIVE BOX
Chief Complaint: Dyspnea    Interval History: No new complaints. Continues to report dyspnea. Diminished appetite. Some generalized malaise. No chest pain or tightness. No significant cough. No chest pain. Mild constipation but no abdominal discomfort, nausea, etc. No genitourinary complaints. Remains profoundly hypoxic, continuing to require HFNC 50L, 100% FIO2. S/P tocilizumab 3/2. Solumedrol 0.5mg/kg q12h (day 3.5). Remdesivir day 7 today. CXR repeated yesterday. Residual interstitial opacities, no consolidation. TTE done today. LVEF 65-70%. Normal LA. Trace MR and TR. Patient very eager to be placed OOB to chair, OK to proceed but will best have RN, PT and RT at bedside for the transfer to chair.      ROS: Multi system review is comprehensively negative x 10 systems except as above     Physical Exam:  T(F): 97.9 (07 Mar 2022 18:43), Max: 98.1 (07 Mar 2022 07:30)  HR: 98 (07 Mar 2022 18:43) (82 - 98)  BP: 150/77 (07 Mar 2022 18:43) (121/64 - 150/77)  RR: 19 (07 Mar 2022 18:43) (19 - 22)  SpO2: 93% (07 Mar 2022 18:43) (93% - 96%) on HFNC 50L / 100% FiO2    Gen: Comfortable appearing at rest despite the O2 requirement  HEENT: NCAT PERRL EOMI  Neck: Supple, no JVD  Chest: Normal resp effort at rest, fairly clear lungs B/L  CVS: S1 S2 normal, RRR  Abd: +BS, soft NT ND   Ext: No edema or calf tenderness  Skin: Warm, dry, intact  Neuro: A+Ox3, no focal deficits    Labs:        134  |  102  |  24  --------------------<  113  4.5   |  27  |  0.86    Ca 8.5       TPro  6.2  /  Alb  2.6<L>  /  TBili  1.0  /  DBili  0.2  /  AST  28  /  ALT  106  /  AlkPhos  89      PT: 13.5 sec;   INR: 1.16 ratio      Trop (-)  proBNP 292    ABG 3/2:  pH 7.46  pH, pCO2: 34, pO2: 62, HCO3: 24, Base Excess: 0.8, SaO2: 94       Procalcitonin 0.11  Lactate 3.4 --> 2.1      CRP 79 --> 48 --> 99 --> 40   --> 583  Ferritin 1224 --> 972  D-dimer 324 --> 733    Micro:  Blood culture 2/28: Negative  COVID19 PCR 2/28: Positive    Imaging:  CXR 3/6: Residual interstitial opacities, no consolidation    CTA chest W/ 3/1: Pulmonary emboli involving truncus anterior, as well as segmental and subsegmental branches in the upper lobes and right lower lobe. Normal caliber main pulmonary artery. Patent trachea and bronchi. Emphysema. New peripheral predominant ground-glass and consolidative opacities in all lobes, most severe in the right lower and middle lobes where it is more diffuse. Unremarkable pleura. Normal heart size. Unremarkable pericardium. Normal caliber aorta. Coronary artery calcified plaque.    CXR 2/28: Mild bibasilar infiltrates left greater than right which are new     Cardiac Testing:  TTE 3/7: LVEF 65-70%. Normal LA. Trace MR and TR.     EKG 2/28: Sinus tachycardia, rate 100    Medications:  MEDICATIONS  (STANDING):  atorvastatin 10 milliGRAM(s) Oral at bedtime  budesonide 160 MICROgram(s)/formoterol 4.5 MICROgram(s) Inhaler 2 Puff(s) Inhalation two times a day  enoxaparin Injectable 80 milliGRAM(s) SubCutaneous every 12 hours  guaiFENesin ER 1200 milliGRAM(s) Oral every 12 hours  methylPREDNISolone sodium succinate Injectable 40 milliGRAM(s) IV Push every 12 hours  polyethylene glycol 3350 17 Gram(s) Oral daily  remdesivir  IVPB 100 milliGRAM(s) IV Intermittent every 24 hours  senna 2 Tablet(s) Oral at bedtime  tiotropium 18 MICROgram(s) Capsule 1 Capsule(s) Inhalation daily    MEDICATIONS  (PRN):  acetaminophen     Tablet .. 650 milliGRAM(s) Oral every 6 hours PRN Temp greater or equal to 38C (100.4F), Mild Pain (1 - 3)  ALBUTerol    90 MICROgram(s) HFA Inhaler 2 Puff(s) Inhalation every 6 hours PRN Shortness of Breath and/or Wheezing  aluminum hydroxide/magnesium hydroxide/simethicone Suspension 30 milliLiter(s) Oral every 4 hours PRN Dyspepsia  benzonatate 100 milliGRAM(s) Oral every 8 hours PRN Cough  melatonin 3 milliGRAM(s) Oral at bedtime PRN Insomnia  ondansetron Injectable 4 milliGRAM(s) IV Push every 8 hours PRN Nausea and/or Vomiting  sodium chloride 0.65% Nasal 1 Spray(s) Both Nostrils two times a day PRN Nasal Congestion

## 2022-03-07 NOTE — PROGRESS NOTE ADULT - ASSESSMENT
71 year-old man with HLD, nephrolithiasis, extensive past tobacco use history, COVID-19 vaccinated x 2 but no booster, initially developed mild runny nose and cough 2/19, tested positive for COVID-19 2/21, and since then has developed progressively worsening dyspnea, presented to the ED 2/28 for further evaluation and management. In the ED patient was noted to have profound hypoxia, SPO2 70s with ambulation, placed on supplemental oxygen. CXR with mild bibasilar infiltrates. Patient was placed on IV dexamethasone and remdesivir and admitted to Medicine.    COVID-19 pneumonia, acute hypoxic respiratory failure  Remains medically acute. SPO2 92-94% on HFNC 50L/100% FiO2. S/p tocilizumab 3/2. Switched from dexamethasone 6mg IV daily to Solumedrol 0.5mg/kg q12 3/3 PM. Remains on Remdsivir, day 7 today.   - Continue Solumedrol 0.5mg/kg q 12 (patient with underlying emphysema, extensive past tobacco use, possible additional undiagnosed chronic lungs disease)  - Continue remdesivir, day 7, will need to monitor LFTs and Cr closely  - Continue oxygen supplementation, wean as tolerated goal SPO2 88-92% or greater  - Encourage incentive spirometry  - Prone positioning    Multiple pulmonary emboli  In setting of COVID. On full-dose Lovenox 1mg/kg q12.  - Continue Lovenox 1mg/kg q12    Likely underlying emphysema  Extensive tobacco use history. CT chest with bullae to suggest emphysema.   - Continue Symbicort and Spiriva  - Continue Solumedrol 0.5mg/kg q12    Possible underlying pulmonary fibrosis w/ possible pulmonary HTN  Based on CT chest findings, impression from Pulmonary Medicine. TTE ordered. Limited echo windows. No comment on pHTN.  - Will discuss with Pulmonary    Mild hyponatremia  Due to underlying COVID19. Various contributing pathogenic mechanisms. Na now 134.   - If Na continues to downtrend significantly, can work up further with sOsm uOsm Lily etc.     Mild hyperkalemia  Due to underlying COVID19. K now 5.2  - If K continues to climb, can work up further and administer Lokelma, etc.     HLD  Chronic, stable  - Continue statin      Diet: Regular  DVT px: Full dose LMWH for PE  Code Status: Full  Dispo: To be determined pending further clinical assessment

## 2022-03-07 NOTE — PROGRESS NOTE ADULT - SUBJECTIVE AND OBJECTIVE BOX
Patient is a 71y old  Male who presents with a chief complaint of SOB (01 Mar 2022 13:52)      HPI:  72 yo pt with PMH of HLD,  kidney stone, valvular heart dz, prostate ca s/p Radiation presented  to ED c/o progressive SOB.    Diagnosed with COVID Pneumonia.   CTPE revealed pulmonary emboli in the right upper lobe pulmonary artery and bilateral segmental and subsegmental branches.   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     3/7  Transitioned to HFNC On 3/3  No issues with appetite   on HFNC 50LPM, 100%FiO2, attempted to wean but increased overnight to 100% now on 95%  Pending new CXR      PAST MEDICAL & SURGICAL HISTORY:  Hypercholesterolemia    Cholelithiasis    Cholecystitis    Valvular heart disease    Psoriasis    History of kidney stones    Inguinal hernia, left    Prostate cancer  s/p XRT 1-6-2020 to 1/27/2021    History of renal calculi    S/P hernia repair  right inguinal; 5/2015    S/P colonoscopy with polypectomy  Baseline; viv polyps; 2012    History of cholecystectomy  2017    History of prostatectomy  5/2020    History of lithotripsy        PREVIOUS DIAGNOSTIC TESTING:      MEDICATIONS  (STANDING):  atorvastatin 10 milliGRAM(s) Oral at bedtime  budesonide 160 MICROgram(s)/formoterol 4.5 MICROgram(s) Inhaler 2 Puff(s) Inhalation two times a day  enoxaparin Injectable 80 milliGRAM(s) SubCutaneous every 12 hours  guaiFENesin ER 1200 milliGRAM(s) Oral every 12 hours  methylPREDNISolone sodium succinate Injectable 40 milliGRAM(s) IV Push daily  remdesivir  IVPB   IV Intermittent   remdesivir  IVPB 100 milliGRAM(s) IV Intermittent every 24 hours  tiotropium 18 MICROgram(s) Capsule 1 Capsule(s) Inhalation daily    MEDICATIONS  (PRN):  acetaminophen     Tablet .. 650 milliGRAM(s) Oral every 6 hours PRN Temp greater or equal to 38C (100.4F), Mild Pain (1 - 3)  ALBUTerol    90 MICROgram(s) HFA Inhaler 2 Puff(s) Inhalation every 6 hours PRN Shortness of Breath and/or Wheezing  aluminum hydroxide/magnesium hydroxide/simethicone Suspension 30 milliLiter(s) Oral every 4 hours PRN Dyspepsia  benzonatate 100 milliGRAM(s) Oral every 8 hours PRN Cough  melatonin 3 milliGRAM(s) Oral at bedtime PRN Insomnia  ondansetron Injectable 4 milliGRAM(s) IV Push every 8 hours PRN Nausea and/or Vomiting    Vital Signs Last 24 Hrs  T(C): 36.5 (05 Mar 2022 19:38), Max: 36.5 (05 Mar 2022 19:38)  T(F): 97.7 (05 Mar 2022 19:38), Max: 97.7 (05 Mar 2022 19:38)  HR: 81 (05 Mar 2022 19:38) (81 - 81)  BP: 121/75 (05 Mar 2022 19:38) (121/75 - 121/75)  BP(mean): --  RR: 19 (05 Mar 2022 19:38) (19 - 19)  SpO2: 97% (06 Mar 2022 01:45) (86% - 97%)    I&O's Summary    PHYSICAL EXAM  General Appearance: cooperative, no acute distress, on HFNC  HEENT: PERRL, conjunctiva clear, EOM's intact, non injected pharynx, no exudate, TM   normal  Neck: Supple, , no adenopathy, thyroid: not enlarged, no carotid bruit or JVD  Back: Symmetric, no  tenderness,no soft tissue tenderness  Lungs: coarse at the bases   Heart: Regular rate and rhythm, S1, S2 normal, no murmur, rub or gallop  Abdomen: Soft, non-tender, bowel sounds active , no hepatosplenomegaly  Extremities: no cyanosis or edema, no joint swelling  Skin: Skin color, texture normal, no rashes   Neurologic: Alert and oriented X3 , cranial nerves intact, sensory and motor normal,    ECG:    LABS:                          13.2   9.95  )-----------( 259      ( 01 Mar 2022 07:00 )             40.2     03-01    139  |  108  |  19  ----------------------------<  164<H>  4.5   |  26  |  0.88    Ca    9.3      01 Mar 2022 07:00    TPro  7.1  /  Alb  2.9<L>  /  TBili  0.8  /  DBili  0.2  /  AST  34  /  ALT  85<H>  /  AlkPhos  72  03-01          Pro BNP  292 03-01 @ 07:00  D Dimer  567 03-01 @ 07:00  Pro BNP  -- 02-28 @ 11:59  D Dimer  365 02-28 @ 11:59  Pro BNP  -- 02-28 @ 08:35  D Dimer  324 02-28 @ 08:35    PT/INR - ( 01 Mar 2022 07:00 )   PT: 14.2 sec;   INR: 1.22 ratio         PTT - ( 28 Feb 2022 08:35 )  PTT:25.3 sec          RADIOLOGY & ADDITIONAL STUDIES:

## 2022-03-08 NOTE — PROGRESS NOTE ADULT - ASSESSMENT
71 year-old man with HLD, nephrolithiasis, extensive past tobacco use history, COVID-19 vaccinated x 2 but no booster, initially developed mild runny nose and cough 2/19, tested positive for COVID-19 2/21, and since then has developed progressively worsening dyspnea, presented to the ED 2/28 for further evaluation and management. In the ED patient was noted to have profound hypoxia, SPO2 70s with ambulation, placed on supplemental oxygen. CXR with mild bibasilar infiltrates. Patient was placed on IV dexamethasone and remdesivir and admitted to Medicine.    COVID-19 pneumonia, acute hypoxic respiratory failure  Remains medically acute. SPO2 92-94% on HFNC 50L/100% FiO2. S/p tocilizumab 3/2. Switched from dexamethasone 6mg IV daily to Solumedrol 0.5mg/kg q12 3/3 PM. Remains on Remdsivir, day 7 today.   - Continue Solumedrol 0.5mg/kg q 12 (patient with underlying emphysema, extensive past tobacco use, possible additional undiagnosed chronic lungs disease)  - Continue remdesivir, day 7, will need to monitor LFTs and Cr closely  - Continue oxygen supplementation, wean as tolerated goal SPO2 88-92% or greater  - Encourage incentive spirometry  - Prone positioning    Multiple pulmonary emboli  In setting of COVID. On full-dose Lovenox 1mg/kg q12.  - Continue Lovenox 1mg/kg q12    Likely underlying emphysema  Extensive tobacco use history. CT chest with bullae to suggest emphysema.   - Continue Symbicort and Spiriva  - Continue Solumedrol 0.5mg/kg q12    Possible underlying pulmonary fibrosis w/ possible pulmonary HTN  Based on CT chest findings, impression from Pulmonary Medicine. TTE ordered. Limited echo windows. No comment on pHTN.  - Will discuss with Pulmonary    Mild hyponatremia  Due to underlying COVID19. Various contributing pathogenic mechanisms. Na now 134.   - If Na continues to downtrend significantly, can work up further with sOsm uOsm Lily etc.     Mild hyperkalemia  Due to underlying COVID19. K now 5.2  - If K continues to climb, can work up further and administer Lokelma, etc.     HLD  Chronic, stable  - Continue statin      Diet: Regular  DVT px: Full dose LMWH for PE  Code Status: Full  Dispo: To be determined pending further clinical assessment   71 year-old man with HLD, nephrolithiasis, extensive past tobacco use history, COVID-19 vaccinated x 2 but no booster, initially developed mild runny nose and cough 2/19, tested positive for COVID-19 2/21, and since then has developed progressively worsening dyspnea, presented to the ED 2/28 for further evaluation and management. In the ED patient was noted to have profound hypoxia, SPO2 70s with ambulation, placed on supplemental oxygen. CXR with mild bibasilar infiltrates. Patient was placed on IV dexamethasone and remdesivir and admitted to Medicine.    COVID-19 pneumonia, acute hypoxic respiratory failure  Remains medically acute. SPO2 92-94% on HFNC 50L/100% FiO2. S/p tocilizumab 3/2. Switched from dexamethasone 6mg IV daily to Solumedrol 0.5mg/kg q12 3/3 PM. Remains on Remdsivir, day 7 today.   - Continue Solumedrol 0.5mg/kg q 12 (patient with underlying emphysema, extensive past tobacco use, possible additional undiagnosed chronic lungs disease)  - Continue remdesivir, day 8, will need to monitor LFTs and Cr closely  - Continue oxygen supplementation, wean as tolerated goal SPO2 88-92% or greater  - Encourage incentive spirometry  - Prone positioning    Multiple pulmonary emboli  In setting of COVID. On full-dose Lovenox 1mg/kg q12.  - Continue Lovenox 1mg/kg q12    Likely underlying emphysema  Extensive tobacco use history. CT chest with bullae to suggest emphysema.   - Continue Symbicort and Spiriva  - Continue Solumedrol 0.5mg/kg q12    Possible underlying pulmonary fibrosis w/ possible pulmonary HTN  Based on CT chest findings, impression from Pulmonary Medicine. TTE ordered. Limited echo windows. No comment on pHTN.  - Will discuss with Pulmonary    Mild hyponatremia  Due to underlying COVID19. Various contributing pathogenic mechanisms. Na now 134.   - If Na continues to downtrend significantly, can work up further with sOsm uOsm Lily etc.     Mild hyperkalemia  Due to underlying COVID19. K now 5.2  - If K continues to climb, can work up further and administer Lokelma, etc.     HLD  Chronic, stable  - Continue statin      Diet: Regular  DVT px: Full dose LMWH for PE  Code Status: Full  Dispo: To be determined pending further clinical assessment

## 2022-03-08 NOTE — PROVIDER CONTACT NOTE (CHANGE IN STATUS NOTIFICATION) - BACKGROUND
COVID + pt on high flow O2. pt was weaned down overnight to 40/99 and tolerated throughout the night. O2 sat this am dropped to 85% on high flow.

## 2022-03-08 NOTE — PROGRESS NOTE ADULT - SUBJECTIVE AND OBJECTIVE BOX
Chief Complaint: Dyspnea    Interval History: No new complaints. Continues to report dyspnea. Diminished appetite. Some generalized malaise. No chest pain or tightness. No significant cough. No chest pain. Mild constipation but no abdominal discomfort, nausea, etc. No genitourinary complaints. Remains profoundly hypoxic, continuing to require HFNC, though yesterday for some time he was 40L/70% FiO2, a marked improvement from 50L/100% FIO2 hes been for the past week. He was also placed OOB to chair and tolerated that well. S/P tocilizumab 3/2. Solumedrol 0.5mg/kg q12h (day 4.5). Remdesivir day 8 today. CXR repeated. Residual interstitial opacities, no consolidation. TTE done. LVEF 65-70%. Normal LA. Trace MR and TR.     ROS: Multi system review is comprehensively negative x 10 systems except as above     Physical Exam:  (F): 97.4 (08 Mar 2022 21:34), Max: 98.1 (07 Mar 2022 22:30)  HR: 92 (08 Mar 2022 21:34) (75 - 97)  BP: 151/89 (08 Mar 2022 21:34) (121/64 - 152/92)  RR: 16 (08 Mar 2022 21:56) (16 - 22)  SpO2: 92% (08 Mar 2022 21:56) (89% - 99%) 50L/90%    Gen: Comfortable appearing at rest despite the O2 requirement  HEENT: NCAT PERRL EOMI  Neck: Supple, no JVD  Chest: Normal resp effort at rest, fairly clear lungs B/L  CVS: S1 S2 normal, RRR  Abd: +BS, soft NT ND   Ext: No edema or calf tenderness  Skin: Warm, dry, intact  Neuro: A+Ox3, no focal deficits    Labs:        x   |  x   |  x   -------------------<  x   x   |  x   |  0.84      TPro  6.3  /  Alb  3.0  /  TBili  1.3  /  DBili  0.2  /  AST  25  /  ALT  88  /  AlkPhos  109      PT: 13.8 sec;   INR: 1.19 ratio      Trop (-)  proBNP 292    ABG 3/2:  pH 7.46  pH, pCO2: 34, pO2: 62, HCO3: 24, Base Excess: 0.8, SaO2: 94       Procalcitonin 0.11  Lactate 3.4 --> 2.1      CRP 79 --> 48 --> 99 --> 40   --> 583  Ferritin 1224 --> 972  D-dimer 324 --> 733    Micro:  Blood culture 2/28: Negative  COVID19 PCR 2/28: Positive    Imaging:  CXR 3/6: Residual interstitial opacities, no consolidation    CTA chest W/ 3/1: Pulmonary emboli involving truncus anterior, as well as segmental and subsegmental branches in the upper lobes and right lower lobe. Normal caliber main pulmonary artery. Patent trachea and bronchi. Emphysema. New peripheral predominant ground-glass and consolidative opacities in all lobes, most severe in the right lower and middle lobes where it is more diffuse. Unremarkable pleura. Normal heart size. Unremarkable pericardium. Normal caliber aorta. Coronary artery calcified plaque.    CXR 2/28: Mild bibasilar infiltrates left greater than right which are new     Cardiac Testing:  TTE 3/7: LVEF 65-70%. Normal LA. Trace MR and TR.     EKG 2/28: Sinus tachycardia, rate 100    Medications:  MEDICATIONS  (STANDING):  atorvastatin 10 milliGRAM(s) Oral at bedtime  budesonide 160 MICROgram(s)/formoterol 4.5 MICROgram(s) Inhaler 2 Puff(s) Inhalation two times a day  enoxaparin Injectable 80 milliGRAM(s) SubCutaneous every 12 hours  guaiFENesin ER 1200 milliGRAM(s) Oral every 12 hours  methylPREDNISolone sodium succinate Injectable 40 milliGRAM(s) IV Push every 12 hours  polyethylene glycol 3350 17 Gram(s) Oral daily  remdesivir  IVPB 100 milliGRAM(s) IV Intermittent every 24 hours  senna 2 Tablet(s) Oral at bedtime  tiotropium 18 MICROgram(s) Capsule 1 Capsule(s) Inhalation daily    MEDICATIONS  (PRN):  acetaminophen     Tablet .. 650 milliGRAM(s) Oral every 6 hours PRN Temp greater or equal to 38C (100.4F), Mild Pain (1 - 3)  ALBUTerol    90 MICROgram(s) HFA Inhaler 2 Puff(s) Inhalation every 6 hours PRN Shortness of Breath and/or Wheezing  aluminum hydroxide/magnesium hydroxide/simethicone Suspension 30 milliLiter(s) Oral every 4 hours PRN Dyspepsia  benzonatate 100 milliGRAM(s) Oral every 8 hours PRN Cough  bisacodyl Suppository 10 milliGRAM(s) Rectal daily PRN Constipation  melatonin 3 milliGRAM(s) Oral at bedtime PRN Insomnia  ondansetron Injectable 4 milliGRAM(s) IV Push every 8 hours PRN Nausea and/or Vomiting  sodium chloride 0.65% Nasal 1 Spray(s) Both Nostrils two times a day PRN Nasal Congestion

## 2022-03-08 NOTE — PROVIDER CONTACT NOTE (CHANGE IN STATUS NOTIFICATION) - ASSESSMENT
pt reports SOB when laying on right side this am and symptoms were correlated with decrease in O2 sat. repiratory called to bedside to increase pt back to 50/100. immediate response in O2 sat seen. pt reports SOB when laying on right side this am and symptoms were correlated with decrease in O2 sat. respiratory called to bedside to increase pt back to 50/100. immediate response in O2 sat seen.

## 2022-03-09 NOTE — PROGRESS NOTE ADULT - SUBJECTIVE AND OBJECTIVE BOX
Date of service: 03-09-22 @ 09:12    Lying in bed in NAD  Weak looking  On high flow O2 therapy  Alert  SOB with light exercise    ROS: no fever or chills; denies dizziness, no HA, no abdominal pain, no diarrhea or constipation; no dysuria, no legs pain, no rashes    MEDICATIONS  (STANDING):  atorvastatin 10 milliGRAM(s) Oral at bedtime  budesonide 160 MICROgram(s)/formoterol 4.5 MICROgram(s) Inhaler 2 Puff(s) Inhalation two times a day  enoxaparin Injectable 80 milliGRAM(s) SubCutaneous every 12 hours  guaiFENesin ER 1200 milliGRAM(s) Oral every 12 hours  methylPREDNISolone sodium succinate Injectable 40 milliGRAM(s) IV Push every 12 hours  polyethylene glycol 3350 17 Gram(s) Oral daily  remdesivir  IVPB 100 milliGRAM(s) IV Intermittent every 24 hours  senna 2 Tablet(s) Oral at bedtime  tiotropium 18 MICROgram(s) Capsule 1 Capsule(s) Inhalation daily    Vital Signs Last 24 Hrs  T(C): 36.3 (08 Mar 2022 21:34), Max: 36.3 (08 Mar 2022 21:34)  T(F): 97.4 (08 Mar 2022 21:34), Max: 97.4 (08 Mar 2022 21:34)  HR: 92 (08 Mar 2022 21:34) (91 - 97)  BP: 151/89 (08 Mar 2022 21:34) (140/96 - 152/92)  BP(mean): 106 (08 Mar 2022 18:11) (106 - 106)  RR: 16 (08 Mar 2022 21:56) (16 - 18)  SpO2: 92% (08 Mar 2022 21:56) (91% - 99%)     Physical exam:    Constitutional:  No acute distress  HEENT: NC/AT, EOMI, PERRLA, conjunctivae clear; ears and nose atraumatic  Neck: supple; thyroid not palpable  Back: no tenderness  Respiratory: respiratory effort normal; crackles at bases  Cardiovascular: S1S2 regular, no murmurs  Abdomen: soft, not tender, not distended, positive BS  Genitourinary: no suprapubic tenderness  Lymphatic: no LN palpable  Musculoskeletal: no muscle tenderness, no joint swelling or tenderness  Extremities: no pedal edema  Neurological/ Psychiatric: Alert; moving all extremities  Skin: no rashes; no palpable lesions    Labs: reviewed    03-08    x   |  x   |  x   ----------------------------<  x   x    |  x   |  0.84      TPro  6.3  /  Alb  3.0<L>  /  TBili  1.3<H>  /  DBili  0.2  /  AST  25  /  ALT  88<H>  /  AlkPhos  109  03-08        Ferritin, Serum: 973 ng/mL (03-06-22 @ 07:32)  C-Reactive Protein, Serum: 8 mg/L (03-06-22 @ 07:32)  D-Dimer Assay, Quantitative: 378 ng/mL DDU (03-06-22 @ 07:32)  Ferritin, Serum: 972 ng/mL (03-04-22 @ 08:34)  C-Reactive Protein, Serum: 40 mg/L (03-04-22 @ 08:34)  D-Dimer Assay, Quantitative: 733 ng/mL DDU (03-04-22 @ 08:34)  Ferritin, Serum: 909 ng/mL (03-03-22 @ 09:38)  C-Reactive Protein, Serum: 99 mg/L (03-03-22 @ 07:18)  Ferritin, Serum: 1055 ng/mL (03-01-22 @ 07:00)  C-Reactive Protein, Serum: 48 mg/L (03-01-22 @ 07:00)  D-Dimer Assay, Quantitative: 567 ng/mL DDU (03-01-22 @ 07:00)  D-Dimer Assay, Quantitative: 365 ng/mL DDU (02-28-22 @ 11:59)  C-Reactive Protein, Serum: 79 mg/L (02-28-22 @ 08:35)  D-Dimer Assay, Quantitative: 324 ng/mL DDU (02-28-22 @ 08:35)  Ferritin, Serum: 1224 ng/mL (02-28-22 @ 08:35)    03-07    134<L>  |  102  |  24<H>  ----------------------------<  113<H>  4.5   |  27  |  0.86    Ca    8.5      07 Mar 2022 08:06    TPro  6.2  /  Alb  2.6<L>  /  TBili  1.0  /  DBili  0.2  /  AST  28  /  ALT  102<H>  /  AlkPhos  89  03-07                        15.1   14.13 )-----------( 333      ( 04 Mar 2022 08:34 )             45.2     03-05    136  |  107  |  27<H>  ----------------------------<  121<H>  4.5   |  24  |  0.74    Ca    8.9      05 Mar 2022 07:45    TPro  6.6  /  Alb  2.9<L>  /  TBili  1.0  /  DBili  0.2  /  AST  31  /  ALT  81<H>  /  AlkPhos  79  03-05                        13.2   9.95  )-----------( 259      ( 01 Mar 2022 07:00 )             40.2     03-01    139  |  108  |  19  ----------------------------<  164<H>  4.5   |  26  |  0.88    Ca    9.3      01 Mar 2022 07:00    TPro  7.1  /  Alb  2.9<L>  /  TBili  0.8  /  DBili  0.2  /  AST  34  /  ALT  85<H>  /  AlkPhos  72  03-01     LIVER FUNCTIONS - ( 01 Mar 2022 07:00 )  Alb: 2.9 g/dL / Pro: 7.1 gm/dL / ALK PHOS: 72 U/L / ALT: 85 U/L / AST: 34 U/L / GGT: x           COVID (02-28 @ 08:16)  Detected      Culture - Blood (collected 28 Feb 2022 08:35)  Source: .Blood None  Preliminary Report (01 Mar 2022 13:02):    No growth to date.    Radiology: all available radiological tests reviewed    < from: Xray Chest 1 View- PORTABLE-Urgent (02.28.22 @ 09:00) >  IMPRESSION: Mild bibasilar infiltrates left greater than right.  < end of copied text >    < from: CT Angio Chest PE Protocol w/ IV Cont (03.01.22 @ 14:14) >  Pulmonary emboli in the right upper lobe pulmonary artery and bilateral   segmental and subsegmental branches. This was discussed with Dr. Hazel   at 2:36 PM 3/1/2022.  Diffuse lung abnormality consistent with COVID.  < end of copied text >      Advanced directives addressed: full resuscitation

## 2022-03-09 NOTE — PROGRESS NOTE ADULT - SUBJECTIVE AND OBJECTIVE BOX
Chief Complaint: Dyspnea    Interval History: No new complaints. Continues to report dyspnea, stable. Diminished appetite but stable. Some generalized malaise. No chest pain or tightness. Occasional dry cough. No chest pain. Continued constipation but did have a small bowel movement yesterday after receiving suppository. No abdominal discomfort, nor nausea. No genitourinary complaints. Remains hypoxic, continuing to require HFNC, currently 50L/85% FiO2 (was 50L/100% FiO2 essentially for the past week). He has been OOB to chair daily the past few days and tolerated that well, provided him a boost in spirit. CXR repeated. Residual interstitial opacities, no consolidation. TTE done. LVEF 65-70%. Normal LA. Trace MR and TR. On Solumedrol 0.5mg/kg IV q12h, day 5 1/2. On Remdesivir, day 9 today.     ROS: Multi system review is comprehensively negative x 10 systems except as above     Physical Exam:  T(F): 98.1 (09 Mar 2022 17:11), Max: 98.1 (09 Mar 2022 17:11)  HR: 92 (09 Mar 2022 17:11) (91 - 92)  BP: 136/84 (09 Mar 2022 17:11) (136/84 - 159/70)  RR: 18 (09 Mar 2022 17:11) (17 - 18)  SpO2: 95% (09 Mar 2022 17:11) (91% - 95%) on HFNC 50L/85% FiO2    Gen: Comfortable appearing at rest despite the O2 requirement  HEENT: NCAT PERRL EOMI  Neck: Supple, no JVD  Chest: Normal resp effort at rest, fairly clear lungs B/L  CVS: S1 S2 normal, RRR  Abd: +BS, soft NT ND   Ext: No edema or calf tenderness  Skin: Warm, dry, intact  Neuro: A+Ox3, no focal deficits    Labs:        131  |  98  |  21  --------------------< 108  4.2   |  25  |  0.67    Ca 8.8    TPro  6.6  /  Alb  2.9  /  TBili  1.6  /  DBili  0.3  /  AST  25  /  ALT  78  /  AlkPhos  112      PT: 13.4 sec;   INR: 1.15 ratio      Trop (-)  proBNP 292    ABG 3/2:  pH 7.46  pH, pCO2: 34, pO2: 62, HCO3: 24, Base Excess: 0.8, SaO2: 94       Procalcitonin 0.11  Lactate 3.4 --> 2.1      CRP 99 --> 40   --> 583  Ferritin 1224 --> 972  D-dimer 324 --> 733    Micro:  Blood culture 2/28: Negative  COVID19 PCR 2/28: Positive    Imaging:  CXR 3/6: Residual interstitial opacities, no consolidation    CTA chest W/ 3/1: Pulmonary emboli involving truncus anterior, as well as segmental and subsegmental branches in the upper lobes and right lower lobe. Normal caliber main pulmonary artery. Patent trachea and bronchi. Emphysema. New peripheral predominant ground-glass and consolidative opacities in all lobes, most severe in the right lower and middle lobes where it is more diffuse. Unremarkable pleura. Normal heart size. Unremarkable pericardium. Normal caliber aorta. Coronary artery calcified plaque.    CXR 2/28: Mild bibasilar infiltrates left greater than right which are new     Cardiac Testing:  TTE 3/7: LVEF 65-70%. Normal LA. Trace MR and TR.     EKG 2/28: Sinus tachycardia, rate 100    Medications:  MEDICATIONS  (STANDING):  atorvastatin 10 milliGRAM(s) Oral at bedtime  budesonide 160 MICROgram(s)/formoterol 4.5 MICROgram(s) Inhaler 2 Puff(s) Inhalation two times a day  enoxaparin Injectable 80 milliGRAM(s) SubCutaneous every 12 hours  guaiFENesin ER 1200 milliGRAM(s) Oral every 12 hours  methylPREDNISolone sodium succinate Injectable 40 milliGRAM(s) IV Push every 12 hours  polyethylene glycol 3350 17 Gram(s) Oral daily  senna 2 Tablet(s) Oral at bedtime  tiotropium 18 MICROgram(s) Capsule 1 Capsule(s) Inhalation daily    MEDICATIONS  (PRN):  acetaminophen     Tablet .. 650 milliGRAM(s) Oral every 6 hours PRN Temp greater or equal to 38C (100.4F), Mild Pain (1 - 3)  ALBUTerol    90 MICROgram(s) HFA Inhaler 2 Puff(s) Inhalation every 6 hours PRN Shortness of Breath and/or Wheezing  aluminum hydroxide/magnesium hydroxide/simethicone Suspension 30 milliLiter(s) Oral every 4 hours PRN Dyspepsia  benzonatate 100 milliGRAM(s) Oral every 8 hours PRN Cough  bisacodyl Suppository 10 milliGRAM(s) Rectal daily PRN Constipation  melatonin 3 milliGRAM(s) Oral at bedtime PRN Insomnia  ondansetron Injectable 4 milliGRAM(s) IV Push every 8 hours PRN Nausea and/or Vomiting  sodium chloride 0.65% Nasal 1 Spray(s) Both Nostrils two times a day PRN Nasal Congestion

## 2022-03-09 NOTE — PROGRESS NOTE ADULT - ASSESSMENT
1) COVID Pneumonia  2) Hypoxemic Respiratory Failure  3) Pulmonary Embolism  4) Dyspnea   5) Combined Pulmonary Fibrosis/Emphysema   6) Abnormal CT Chest       70 yo pt with PMH of HLD,  kidney stone, valvular heart dz, prostate ca s/p Radiation presented  to ED c/o progressive SOB.    Diagnosed with COVID Pneumonia.   CTPE revealed pulmonary emboli in the right upper lobe pulmonary artery and bilateral segmental and subsegmental branches.   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   Not seen Pulmonary in the past.   Received Tocilizumab and now remdesivir+solumedrol  Lovenox 80 q 12 for PE  Independently reviewed CT Chest   ABG 7.46/34/62 on NRB, 100%FiO2, transitioned to HFNC on 50LPM, 95% FiO2 at the time of our encounter  GoalSaO2 >87%  Continue Spiriva/Symbicort   Reviewed CXR/echocardiogram- no signs of new infiltrates on CXR and echo does not show PH  Due to the underlying COPD/emphysema, his recovery to normalcy will be longer than expected  Appreciate ID recommendations / hospitalist recommendations

## 2022-03-09 NOTE — ADVANCED PRACTICE NURSE CONSULT - ASSESSMENT
Patient Admitted on 2/28/2022 for COVID infection and is currently on 3 North. Asked by staff to assess patient nose RT High Flow. Noted erythema to right inner nare. Patient refused for me to place anything on the area. Resp Therapist at bedside adjusting High flow to be more comfortable. High Flow Appears to be fitting well and not too tight. Spoke with patient and asked him to have the staff call me if he continues to feels any further discomfort.

## 2022-03-09 NOTE — PROGRESS NOTE ADULT - SUBJECTIVE AND OBJECTIVE BOX
Patient is a 71y old  Male who presents with a chief complaint of SOB (01 Mar 2022 13:52)      HPI:  72 yo pt with PMH of HLD,  kidney stone, valvular heart dz, prostate ca s/p Radiation presented  to ED c/o progressive SOB.    Diagnosed with COVID Pneumonia.   CTPE revealed pulmonary emboli in the right upper lobe pulmonary artery and bilateral segmental and subsegmental branches.   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     3/9  Transitioned to HFNC On 3/3  No issues with appetite   on HFNC 50LPM, 85-90%FiO2  CXR reviewed from 3/6      PAST MEDICAL & SURGICAL HISTORY:  Hypercholesterolemia    Cholelithiasis    Cholecystitis    Valvular heart disease    Psoriasis    History of kidney stones    Inguinal hernia, left    Prostate cancer  s/p XRT 1-6-2020 to 1/27/2021    History of renal calculi    S/P hernia repair  right inguinal; 5/2015    S/P colonoscopy with polypectomy  Baseline; viv polyps; 2012    History of cholecystectomy  2017    History of prostatectomy  5/2020    History of lithotripsy        PREVIOUS DIAGNOSTIC TESTING:      MEDICATIONS  (STANDING):  atorvastatin 10 milliGRAM(s) Oral at bedtime  budesonide 160 MICROgram(s)/formoterol 4.5 MICROgram(s) Inhaler 2 Puff(s) Inhalation two times a day  enoxaparin Injectable 80 milliGRAM(s) SubCutaneous every 12 hours  guaiFENesin ER 1200 milliGRAM(s) Oral every 12 hours  methylPREDNISolone sodium succinate Injectable 40 milliGRAM(s) IV Push daily  remdesivir  IVPB   IV Intermittent   remdesivir  IVPB 100 milliGRAM(s) IV Intermittent every 24 hours  tiotropium 18 MICROgram(s) Capsule 1 Capsule(s) Inhalation daily    MEDICATIONS  (PRN):  acetaminophen     Tablet .. 650 milliGRAM(s) Oral every 6 hours PRN Temp greater or equal to 38C (100.4F), Mild Pain (1 - 3)  ALBUTerol    90 MICROgram(s) HFA Inhaler 2 Puff(s) Inhalation every 6 hours PRN Shortness of Breath and/or Wheezing  aluminum hydroxide/magnesium hydroxide/simethicone Suspension 30 milliLiter(s) Oral every 4 hours PRN Dyspepsia  benzonatate 100 milliGRAM(s) Oral every 8 hours PRN Cough  melatonin 3 milliGRAM(s) Oral at bedtime PRN Insomnia  ondansetron Injectable 4 milliGRAM(s) IV Push every 8 hours PRN Nausea and/or Vomiting    Vital Signs Last 24 Hrs  T(C): 36.5 (05 Mar 2022 19:38), Max: 36.5 (05 Mar 2022 19:38)  T(F): 97.7 (05 Mar 2022 19:38), Max: 97.7 (05 Mar 2022 19:38)  HR: 81 (05 Mar 2022 19:38) (81 - 81)  BP: 121/75 (05 Mar 2022 19:38) (121/75 - 121/75)  BP(mean): --  RR: 19 (05 Mar 2022 19:38) (19 - 19)  SpO2: 97% (06 Mar 2022 01:45) (86% - 97%)    I&O's Summary    PHYSICAL EXAM  General Appearance: cooperative, no acute distress, on HFNC  HEENT: PERRL, conjunctiva clear, EOM's intact, non injected pharynx, no exudate, TM   normal  Neck: Supple, , no adenopathy, thyroid: not enlarged, no carotid bruit or JVD  Back: Symmetric, no  tenderness,no soft tissue tenderness  Lungs: coarse at the bases   Heart: Regular rate and rhythm, S1, S2 normal, no murmur, rub or gallop  Abdomen: Soft, non-tender, bowel sounds active , no hepatosplenomegaly  Extremities: no cyanosis or edema, no joint swelling  Skin: Skin color, texture normal, no rashes   Neurologic: Alert and oriented X3 , cranial nerves intact, sensory and motor normal,    ECG:    LABS:                          13.2   9.95  )-----------( 259      ( 01 Mar 2022 07:00 )             40.2     03-01    139  |  108  |  19  ----------------------------<  164<H>  4.5   |  26  |  0.88    Ca    9.3      01 Mar 2022 07:00    TPro  7.1  /  Alb  2.9<L>  /  TBili  0.8  /  DBili  0.2  /  AST  34  /  ALT  85<H>  /  AlkPhos  72  03-01          Pro BNP  292 03-01 @ 07:00  D Dimer  567 03-01 @ 07:00  Pro BNP  -- 02-28 @ 11:59  D Dimer  365 02-28 @ 11:59  Pro BNP  -- 02-28 @ 08:35  D Dimer  324 02-28 @ 08:35    PT/INR - ( 01 Mar 2022 07:00 )   PT: 14.2 sec;   INR: 1.22 ratio         PTT - ( 28 Feb 2022 08:35 )  PTT:25.3 sec          RADIOLOGY & ADDITIONAL STUDIES:

## 2022-03-09 NOTE — PROGRESS NOTE ADULT - ASSESSMENT
71 year-old man with HLD, nephrolithiasis, extensive past tobacco use history, COVID-19 vaccinated x 2 but no booster, initially developed mild runny nose and cough 2/19, tested positive for COVID-19 2/21, and since then has developed progressively worsening dyspnea, presented to the ED 2/28 for further evaluation and management. In the ED patient was noted to have profound hypoxia, SPO2 70s with ambulation, placed on supplemental oxygen. CXR with mild bibasilar infiltrates. Patient was placed on IV dexamethasone and remdesivir and admitted to Medicine.    COVID-19 pneumonia, acute hypoxic respiratory failure  Remains medically acute. On HFNC 50L/85-90% FiO2. S/p tocilizumab 3/2. Switched from dexamethasone 6mg IV daily to Solumedrol 0.5mg/kg q12 3/3 PM. Remains on Remdsivir, day 9 today.   - Continue Solumedrol 0.5mg/kg q 12 (patient with underlying emphysema, extensive past tobacco use, possible additional undiagnosed lung disease such as PF), will need to consider starting taper at this point, will discuss with Pulmonary Medicine Dr. Rose  - Continue remdesivir, day 9, will need to monitor LFTs and Cr closely  - Continue oxygen supplementation, wean as tolerated goal SPO2 88-92% or greater  - Encourage incentive spirometry  - Prone positioning  - OOB to chair daily    Multiple pulmonary emboli  In setting of COVID. On full-dose Lovenox 1mg/kg q12.  - Continue Lovenox 1mg/kg q12    Likely underlying emphysema  Extensive tobacco use history. CT chest with bullae to suggest emphysema.   - Continue Symbicort and Spiriva  - Continue Solumedrol 0.5mg/kg q12, will likely start tapering any day now, will discuss with Pulmonary Medicine    Possible underlying pulmonary fibrosis  Based on CT chest findings, impression from Pulmonary Medicine. TTE ordered. Limited echo windows. No sign of PH as per Pulmonary Medicine  - Will discuss with Pulmonary    Hyponatremia  Due to underlying COVID19. Various contributing pathogenic mechanisms. Na now 131.   - Requested serum osmolality, urine osmolality, urine sodium, serum aldosterone    Mild hyperkalemia  Resolved.   - Continue to monitor    HLD  Chronic, stable  - Continue statin      Diet: Regular  DVT px: Full dose LMWH for PE  Code Status: Full  Dispo: To be determined pending further clinical assessment

## 2022-03-09 NOTE — PROGRESS NOTE ADULT - ASSESSMENT
72 y/o male with h/o HLD,  kidney stone, valvular heart disease, prostate Ca s/p radiation was admitted on2/28 for progressive  SOB.  Pt reports that on 2/19 he got sick with runny nose, fever and cough and tested positive for covid-19 on 2/21. His fever improved, but his SOB got worse, had difficulty breathing with minimal exertion. He checked his O2 and was below 80. In ER he received remdesivir.    1. Acute respiratory failure. COVID-19 breakthrough viral syndrome. Multifocal pneumonia. PE.  -respiratory frail; no COVID booster  -respiratory remains frail; has increased O2 requirements  -given tociluzumab infusion on 3/2  -on remdesivir protocol # 9  -tolerating abx well so far; no side effects noted  -inflammatory markers are elevated  -O2 therapy  -steroids  -AC  -droplet isolation  -respiratory care  -continue antiviral therapy  -monitor temps  -f/u CBC  -supportive care    2. Other issues:   -care per medicine    d/w Dr. Kang

## 2022-03-10 NOTE — PROGRESS NOTE ADULT - ASSESSMENT
1) COVID Pneumonia  2) Hypoxemic Respiratory Failure  3) Pulmonary Embolism  4) Dyspnea   5) Combined Pulmonary Fibrosis/Emphysema   6) Abnormal CT Chest       70 yo pt with PMH of HLD,  kidney stone, valvular heart dz, prostate ca s/p Radiation presented  to ED c/o progressive SOB.    Diagnosed with COVID Pneumonia.   CTPE revealed pulmonary emboli in the right upper lobe pulmonary artery and bilateral segmental and subsegmental branches.   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   Not seen Pulmonary in the past.   Received Tocilizumab and now remdesivir+solumedrol  Lovenox 80 q 12 for PE  Independently reviewed CT Chest   ABG 7.46/34/62 on NRB, 100%FiO2, transitioned to HFNC on 50LPM, 80% FiO2  GoalSaO2 >87%  Continue Spiriva/Symbicort   Reviewed CXR/echocardiogram- no signs of new infiltrates on CXR and echo does not show PH  Due to the underlying COPD/emphysema, his recovery to normalcy will be longer than expected  Appreciate ID recommendations / hospitalist recommendations

## 2022-03-10 NOTE — PROGRESS NOTE ADULT - SUBJECTIVE AND OBJECTIVE BOX
CC: SOB (10 Mar 2022 08:27)    HPI:  72 yo pt with PMH of HLD,  kidney stone, valvular heart dz, prostate ca s/p Radiation presented  to ED c/o progressive  SOB.  Pt reports he got sich with runny nose and cough 2/19 and tested positive for covid19 on 2/21.  Pt reports that he did have fevers but resolved  but SOB got worse, had difficulty breathing with minimal exertion. he checked his O2 and was below 80.  No CP, no palpitations, no leg pain.   Pt is s/p  2 shots of Pfizer  vaccine with second one in april 2021. No Booster.   Pts wife also sick at home with covid19.   In ED: Pt is hypoxic down to 70s on RA, was initially placed on NRB, later weaned of to 5L NC. Pt received IV dexamethasone and Remdesivir. S/p 1L IVF     INTERVAL HPI/OVERNIGHT EVENTS: chart reviewed, Pt was seen and examined, OOb to chair, reports slowly better, still SOB with min exertion. Poor appetite but able to eat some, + Constipation, had BM 2 days ago. No Abd pain.  Labs  and CXR, plan discussed     Vital Signs Last 24 Hrs  T(C): 36.5 (10 Mar 2022 16:12), Max: 36.8 (09 Mar 2022 23:21)  T(F): 97.7 (10 Mar 2022 16:12), Max: 98.3 (09 Mar 2022 23:21)  HR: 87 (10 Mar 2022 16:12) (80 - 90)  BP: 151/94 (10 Mar 2022 16:12) (131/70 - 151/94)  RR: 18 (10 Mar 2022 16:12) (18 - 18)  SpO2: 95% (10 Mar 2022 16:12) (91% - 95%)    REVIEW OF SYSTEMS:  All other review of systems is negative unless indicated above.      PHYSICAL EXAM:  General: Well developed; looks  ill,  in no acute distress on HFNC  Eyes: PERRLA, EOMI; conjunctiva and sclera clear  Head: Normocephalic; atraumatic  ENMT: No nasal discharge; airway clear  Neck: Supple; non tender; no masses  Respiratory: No wheezes, rales or rhonchi  Cardiovascular: Regular rate and rhythm. S1 and S2 Normal; No murmurs, gallops or rubs  Gastrointestinal: Soft non-tender non-distended; Normal bowel sounds  Genitourinary: No  suprapubic  tenderness  Extremities: Normal range of motion, No clubbing, cyanosis or edema  Vascular: Peripheral pulses palpable 2+ bilaterally  Neurological: Alert and oriented x4  Skin: Warm and dry. No acute rash  Lymph Nodes: No acute cervical adenopathy  Musculoskeletal: Normal muscle tone, without deformities  Psychiatric: Cooperative and appropriate                            16.5   32.85 )-----------( 294      ( 10 Mar 2022 08:37 )             48.4     10 Mar 2022 08:37    132    |  96     |  18     ----------------------------<  112    4.7     |  31     |  0.80     Ca    9.0        10 Mar 2022 08:37  Phos  3.3       10 Mar 2022 08:37  Mg     2.9       10 Mar 2022 08:37    TPro  6.9    /  Alb  3.3    /  TBili  1.6    /  DBili  0.4    /  AST  21     /  ALT  69     /  AlkPhos  128    10 Mar 2022 08:37    PT/INR - ( 09 Mar 2022 09:05 )   PT: 13.4 sec;   INR: 1.15 ratio           CAPILLARY BLOOD GLUCOSE        LIVER FUNCTIONS - ( 10 Mar 2022 08:37 )  Alb: 3.3 g/dL / Pro: 6.9 gm/dL / ALK PHOS: 128 U/L / ALT: 69 U/L / AST: 21 U/L / GGT: x               MEDICATIONS  (STANDING):  atorvastatin 10 milliGRAM(s) Oral at bedtime  budesonide 160 MICROgram(s)/formoterol 4.5 MICROgram(s) Inhaler 2 Puff(s) Inhalation two times a day  cefepime  Injectable.      enoxaparin Injectable 80 milliGRAM(s) SubCutaneous every 12 hours  guaiFENesin ER 1200 milliGRAM(s) Oral every 12 hours  methylPREDNISolone sodium succinate Injectable 40 milliGRAM(s) IV Push daily  polyethylene glycol 3350 17 Gram(s) Oral daily  senna 2 Tablet(s) Oral at bedtime  tiotropium 18 MICROgram(s) Capsule 1 Capsule(s) Inhalation daily    MEDICATIONS  (PRN):  acetaminophen     Tablet .. 650 milliGRAM(s) Oral every 6 hours PRN Temp greater or equal to 38C (100.4F), Mild Pain (1 - 3)  ALBUTerol    90 MICROgram(s) HFA Inhaler 2 Puff(s) Inhalation every 6 hours PRN Shortness of Breath and/or Wheezing  aluminum hydroxide/magnesium hydroxide/simethicone Suspension 30 milliLiter(s) Oral every 4 hours PRN Dyspepsia  benzonatate 100 milliGRAM(s) Oral every 8 hours PRN Cough  bisacodyl Suppository 10 milliGRAM(s) Rectal daily PRN Constipation  melatonin 3 milliGRAM(s) Oral at bedtime PRN Insomnia  ondansetron Injectable 4 milliGRAM(s) IV Push every 8 hours PRN Nausea and/or Vomiting  sodium chloride 0.65% Nasal 1 Spray(s) Both Nostrils two times a day PRN Nasal Congestion      RADIOLOGY & ADDITIONAL TESTS: CC: SOB (10 Mar 2022 08:27)    HPI:  70 yo pt with PMH of HLD,  kidney stone, valvular heart dz, prostate ca s/p Radiation presented  to ED c/o progressive  SOB.  Pt reports he got sich with runny nose and cough 2/19 and tested positive for covid19 on 2/21.  Pt reports that he did have fevers but resolved  but SOB got worse, had difficulty breathing with minimal exertion. he checked his O2 and was below 80.  No CP, no palpitations, no leg pain.   Pt is s/p  2 shots of Pfizer  vaccine with second one in april 2021. No Booster.   Pts wife also sick at home with covid19.   In ED: Pt is hypoxic down to 70s on RA, was initially placed on NRB, later weaned of to 5L NC. Pt received IV dexamethasone and Remdesivir. S/p 1L IVF     INTERVAL HPI/OVERNIGHT EVENTS: chart reviewed, Pt was seen and examined, OOb to chair, reports slowly better, still SOB with min exertion. Poor appetite but able to eat some, + Constipation, had BM 2 days ago. No Abd pain.  Labs  and CXR, plan discussed     Vital Signs Last 24 Hrs  T(C): 36.5 (10 Mar 2022 16:12), Max: 36.8 (09 Mar 2022 23:21)  T(F): 97.7 (10 Mar 2022 16:12), Max: 98.3 (09 Mar 2022 23:21)  HR: 87 (10 Mar 2022 16:12) (80 - 90)  BP: 151/94 (10 Mar 2022 16:12) (131/70 - 151/94)  RR: 18 (10 Mar 2022 16:12) (18 - 18)  SpO2: 95% (10 Mar 2022 16:12) (91% - 95%)    REVIEW OF SYSTEMS:  All other review of systems is negative unless indicated above.      PHYSICAL EXAM:  General: Well developed; looks  ill,  in no acute distress on HFNC  Eyes:  EOMI; conjunctiva and sclera clear  Head: Normocephalic; atraumatic  ENMT: No nasal discharge; airway clear  Neck: Supple; non tender; no masses  Respiratory: Decreased BS, No wheezes, rales or rhonchi  Cardiovascular: Regular rate and rhythm. S1 and S2 Normal;   Gastrointestinal: Soft non-tender non-distended; Normal bowel sounds  Genitourinary: No  suprapubic  tenderness  Extremities: No edema  Vascular: Peripheral pulses palpable 2+ bilaterally  Neurological: Alert and oriented x3, non focal   Skin: Warm and dry. No acute rash  Musculoskeletal: Normal muscle tone and strength   Psychiatric: Cooperative and appropriate        LABS:                         16.5   32.85 )-----------( 294      ( 10 Mar 2022 08:37 )             48.4     10 Mar 2022 08:37    132    |  96     |  18     ----------------------------<  112    4.7     |  31     |  0.80     Ca    9.0        10 Mar 2022 08:37  Phos  3.3       10 Mar 2022 08:37  Mg     2.9       10 Mar 2022 08:37    TPro  6.9    /  Alb  3.3    /  TBili  1.6    /  DBili  0.4    /  AST  21     /  ALT  69     /  AlkPhos  128    10 Mar 2022 08:37  PT/INR - ( 09 Mar 2022 09:05 )   PT: 13.4 sec;   INR: 1.15 ratio          LIVER FUNCTIONS - ( 10 Mar 2022 08:37 )  Alb: 3.3 g/dL / Pro: 6.9 gm/dL / ALK PHOS: 128 U/L / ALT: 69 U/L / AST: 21 U/L / GGT: x               MEDICATIONS  (STANDING):  atorvastatin 10 milliGRAM(s) Oral at bedtime  budesonide 160 MICROgram(s)/formoterol 4.5 MICROgram(s) Inhaler 2 Puff(s) Inhalation two times a day  cefepime  Injectable.      enoxaparin Injectable 80 milliGRAM(s) SubCutaneous every 12 hours  guaiFENesin ER 1200 milliGRAM(s) Oral every 12 hours  methylPREDNISolone sodium succinate Injectable 40 milliGRAM(s) IV Push daily  polyethylene glycol 3350 17 Gram(s) Oral daily  senna 2 Tablet(s) Oral at bedtime  tiotropium 18 MICROgram(s) Capsule 1 Capsule(s) Inhalation daily    MEDICATIONS  (PRN):  acetaminophen     Tablet .. 650 milliGRAM(s) Oral every 6 hours PRN Temp greater or equal to 38C (100.4F), Mild Pain (1 - 3)  ALBUTerol    90 MICROgram(s) HFA Inhaler 2 Puff(s) Inhalation every 6 hours PRN Shortness of Breath and/or Wheezing  aluminum hydroxide/magnesium hydroxide/simethicone Suspension 30 milliLiter(s) Oral every 4 hours PRN Dyspepsia  benzonatate 100 milliGRAM(s) Oral every 8 hours PRN Cough  bisacodyl Suppository 10 milliGRAM(s) Rectal daily PRN Constipation  melatonin 3 milliGRAM(s) Oral at bedtime PRN Insomnia  ondansetron Injectable 4 milliGRAM(s) IV Push every 8 hours PRN Nausea and/or Vomiting  sodium chloride 0.65% Nasal 1 Spray(s) Both Nostrils two times a day PRN Nasal Congestion      RADIOLOGY & ADDITIONAL TESTS:      ACC: [id number] EXAM:  XR CHEST PORTABLE IMMED 1V                        PROCEDURE DATE:  03/10/2022  s    COMPARISON STUDY: 3/1/2022    Frontal expiratory view of the chest shows the heart to be similar in   size. The lungs show slight clearing of the upper right lung with   progression of basilar infiltrates and there is no evidence of   pneumothorax nor pleural effusion.    IMPRESSION:  Changing infiltrates.

## 2022-03-10 NOTE — PROGRESS NOTE ADULT - ASSESSMENT
72 y/o male with h/o HLD,  kidney stone, valvular heart disease, prostate Ca s/p radiation was admitted on2/28 for progressive  SOB.  Pt reports that on 2/19 he got sick with runny nose, fever and cough and tested positive for covid-19 on 2/21. His fever improved, but his SOB got worse, had difficulty breathing with minimal exertion. He checked his O2 and was below 80. In ER he received remdesivir.    1. Acute respiratory failure. COVID-19 breakthrough viral syndrome. Multifocal pneumonia. PE.  -respiratory frail; no COVID booster  -respiratory remains frail; has increased O2 requirements  -given tociluzumab infusion on 3/2  -on remdesivir protocol # 10  -tolerating abx well so far; no side effects noted  -inflammatory markers are elevated  -O2 therapy  -steroids  -AC  -droplet isolation  -respiratory care  -complete antiviral antiviral therapy today  -monitor temps  -f/u CBC  -supportive care    2. Other issues:   -care per medicine    d/w Dr. COLEMAN Vivas

## 2022-03-10 NOTE — PROGRESS NOTE ADULT - SUBJECTIVE AND OBJECTIVE BOX
Date of service: 03-10-22 @ 08:28    Lying in bed in NAD  SOB with light exercise  On high flow O2  No fever    ROS: no fever or chills; denies dizziness, no HA, no abdominal pain, no diarrhea or constipation; no dysuria, no legs pain, no rashes    MEDICATIONS  (STANDING):  atorvastatin 10 milliGRAM(s) Oral at bedtime  budesonide 160 MICROgram(s)/formoterol 4.5 MICROgram(s) Inhaler 2 Puff(s) Inhalation two times a day  enoxaparin Injectable 80 milliGRAM(s) SubCutaneous every 12 hours  guaiFENesin ER 1200 milliGRAM(s) Oral every 12 hours  methylPREDNISolone sodium succinate Injectable 40 milliGRAM(s) IV Push every 12 hours  polyethylene glycol 3350 17 Gram(s) Oral daily  senna 2 Tablet(s) Oral at bedtime  tiotropium 18 MICROgram(s) Capsule 1 Capsule(s) Inhalation daily    Vital Signs Last 24 Hrs  T(C): 36.8 (09 Mar 2022 23:21), Max: 36.8 (09 Mar 2022 23:21)  T(F): 98.3 (09 Mar 2022 23:21), Max: 98.3 (09 Mar 2022 23:21)  HR: 80 (09 Mar 2022 23:21) (80 - 92)  BP: 131/70 (09 Mar 2022 23:21) (131/70 - 159/70)  BP(mean): 100 (09 Mar 2022 17:11) (100 - 100)  RR: 18 (09 Mar 2022 23:21) (17 - 18)  SpO2: 95% (09 Mar 2022 23:21) (91% - 95%)     Physical exam:    Constitutional:  No acute distress  HEENT: NC/AT, EOMI, PERRLA, conjunctivae clear; ears and nose atraumatic  Neck: supple; thyroid not palpable  Back: no tenderness  Respiratory: respiratory effort normal; crackles at bases  Cardiovascular: S1S2 regular, no murmurs  Abdomen: soft, not tender, not distended, positive BS  Genitourinary: no suprapubic tenderness  Lymphatic: no LN palpable  Musculoskeletal: no muscle tenderness, no joint swelling or tenderness  Extremities: no pedal edema  Neurological/ Psychiatric: Alert; moving all extremities  Skin: no rashes; no palpable lesions    Labs: reviewed    03-09    131<L>  |  98  |  21  ----------------------------<  108<H>  4.2   |  25  |  0.67    Ca    8.8      09 Mar 2022 09:05    TPro  6.6  /  Alb  2.9<L>  /  TBili  1.6<H>  /  DBili  0.3  /  AST  25  /  ALT  78  /  AlkPhos  112  03-09        Ferritin, Serum: 973 ng/mL (03-06-22 @ 07:32)  C-Reactive Protein, Serum: 8 mg/L (03-06-22 @ 07:32)  D-Dimer Assay, Quantitative: 378 ng/mL DDU (03-06-22 @ 07:32)  Ferritin, Serum: 972 ng/mL (03-04-22 @ 08:34)  C-Reactive Protein, Serum: 40 mg/L (03-04-22 @ 08:34)  D-Dimer Assay, Quantitative: 733 ng/mL DDU (03-04-22 @ 08:34)  Ferritin, Serum: 909 ng/mL (03-03-22 @ 09:38)  C-Reactive Protein, Serum: 99 mg/L (03-03-22 @ 07:18)  Ferritin, Serum: 1055 ng/mL (03-01-22 @ 07:00)  C-Reactive Protein, Serum: 48 mg/L (03-01-22 @ 07:00)  D-Dimer Assay, Quantitative: 567 ng/mL DDU (03-01-22 @ 07:00)  D-Dimer Assay, Quantitative: 365 ng/mL DDU (02-28-22 @ 11:59)  C-Reactive Protein, Serum: 79 mg/L (02-28-22 @ 08:35)  D-Dimer Assay, Quantitative: 324 ng/mL DDU (02-28-22 @ 08:35)  Ferritin, Serum: 1224 ng/mL (02-28-22 @ 08:35)                        15.1   14.13 )-----------( 333      ( 04 Mar 2022 08:34 )             45.2     03-05    136  |  107  |  27<H>  ----------------------------<  121<H>  4.5   |  24  |  0.74    Ca    8.9      05 Mar 2022 07:45    TPro  6.6  /  Alb  2.9<L>  /  TBili  1.0  /  DBili  0.2  /  AST  31  /  ALT  81<H>  /  AlkPhos  79  03-05                        13.2   9.95  )-----------( 259      ( 01 Mar 2022 07:00 )             40.2     03-01    139  |  108  |  19  ----------------------------<  164<H>  4.5   |  26  |  0.88    Ca    9.3      01 Mar 2022 07:00    TPro  7.1  /  Alb  2.9<L>  /  TBili  0.8  /  DBili  0.2  /  AST  34  /  ALT  85<H>  /  AlkPhos  72  03-01     LIVER FUNCTIONS - ( 01 Mar 2022 07:00 )  Alb: 2.9 g/dL / Pro: 7.1 gm/dL / ALK PHOS: 72 U/L / ALT: 85 U/L / AST: 34 U/L / GGT: x           COVID (02-28 @ 08:16)  Detected      Culture - Blood (collected 28 Feb 2022 08:35)  Source: .Blood None  Preliminary Report (01 Mar 2022 13:02):    No growth to date.    Radiology: all available radiological tests reviewed    < from: Xray Chest 1 View- PORTABLE-Urgent (02.28.22 @ 09:00) >  IMPRESSION: Mild bibasilar infiltrates left greater than right.  < end of copied text >    < from: CT Angio Chest PE Protocol w/ IV Cont (03.01.22 @ 14:14) >  Pulmonary emboli in the right upper lobe pulmonary artery and bilateral   segmental and subsegmental branches. This was discussed with Dr. Hazel   at 2:36 PM 3/1/2022.  Diffuse lung abnormality consistent with COVID.  < end of copied text >      Advanced directives addressed: full resuscitation

## 2022-03-10 NOTE — PROGRESS NOTE ADULT - ASSESSMENT
71 year-old man with HLD, nephrolithiasis, extensive past tobacco use history admitted for:       COVID-19 pneumonia, acute hypoxic respiratory failure  Clinically with some improvement  but still acute   C/w  HFNC, titrates down to  50L/75% FiO2.  S/p tocilizumab 3/2.   On Solumedrol 40 mg IVP BID, will start to titrate down.   On   Remdesivir day 10 today, will complete course   with significant leukocytosis with repeat CXR RLL worsening infiltrate , no fevers, can not rule out  secondary bacterial infection. Suspect GNR bacteria  Will start cefepime 1g BID as d/w DR Brody  Continue oxygen supplementation, wean as tolerated goal SPO2 88-92% or greater  Encourage incentive spirometry  Prone positioning  OOB to chair daily  Trend inflammatory markers     Multiple pulmonary emboli  In setting of COVID.   C/w  full-dose Lovenox  80mg IBP BID      Likely underlying emphysema  Extensive tobacco use history. CT chest with bullae to suggest emphysema.   Continue Symbicort and Spiriva  Continue Solumedrol , start taper down     Possible underlying pulmonary fibrosis  Based on CT chest findings, impression from Pulmonary Medicine.   TTE :  Limited echo windows. No sign of PH as per Pulmonary Medicine      Hyponatremia  Due to underlying COVID19 and decreased PO intake.   Na improved, monitor       Mild hyperkalemia  Resolved.   - Continue to monitor    HLD  Chronic, stable  - Continue statin      DVT px: Full dose LMWH for PE  Code Status: Full

## 2022-03-10 NOTE — PROGRESS NOTE ADULT - SUBJECTIVE AND OBJECTIVE BOX
Patient is a 71y old  Male who presents with a chief complaint of SOB (01 Mar 2022 13:52)      HPI:  72 yo pt with PMH of HLD,  kidney stone, valvular heart dz, prostate ca s/p Radiation presented  to ED c/o progressive SOB.    Diagnosed with COVID Pneumonia.   CTPE revealed pulmonary emboli in the right upper lobe pulmonary artery and bilateral segmental and subsegmental branches.   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     3/10  Transitioned to HFNC On 3/3  No issues with appetite   on HFNC 50LPM, 80%FiO2  CXR reviewed from 3/6  No acute pulmonary events occurred overnight    PAST MEDICAL & SURGICAL HISTORY:  Hypercholesterolemia    Cholelithiasis    Cholecystitis    Valvular heart disease    Psoriasis    History of kidney stones    Inguinal hernia, left    Prostate cancer  s/p XRT 1-6-2020 to 1/27/2021    History of renal calculi    S/P hernia repair  right inguinal; 5/2015    S/P colonoscopy with polypectomy  Baseline; viv polyps; 2012    History of cholecystectomy  2017    History of prostatectomy  5/2020    History of lithotripsy        MEDICATIONS  (STANDING):  atorvastatin 10 milliGRAM(s) Oral at bedtime  budesonide 160 MICROgram(s)/formoterol 4.5 MICROgram(s) Inhaler 2 Puff(s) Inhalation two times a day  enoxaparin Injectable 80 milliGRAM(s) SubCutaneous every 12 hours  guaiFENesin ER 1200 milliGRAM(s) Oral every 12 hours  methylPREDNISolone sodium succinate Injectable 40 milliGRAM(s) IV Push every 12 hours  polyethylene glycol 3350 17 Gram(s) Oral daily  senna 2 Tablet(s) Oral at bedtime  tiotropium 18 MICROgram(s) Capsule 1 Capsule(s) Inhalation daily    MEDICATIONS  (PRN):  acetaminophen     Tablet .. 650 milliGRAM(s) Oral every 6 hours PRN Temp greater or equal to 38C (100.4F), Mild Pain (1 - 3)  ALBUTerol    90 MICROgram(s) HFA Inhaler 2 Puff(s) Inhalation every 6 hours PRN Shortness of Breath and/or Wheezing  aluminum hydroxide/magnesium hydroxide/simethicone Suspension 30 milliLiter(s) Oral every 4 hours PRN Dyspepsia  benzonatate 100 milliGRAM(s) Oral every 8 hours PRN Cough  bisacodyl Suppository 10 milliGRAM(s) Rectal daily PRN Constipation  melatonin 3 milliGRAM(s) Oral at bedtime PRN Insomnia  ondansetron Injectable 4 milliGRAM(s) IV Push every 8 hours PRN Nausea and/or Vomiting  sodium chloride 0.65% Nasal 1 Spray(s) Both Nostrils two times a day PRN Nasal Congestion      Vital Signs Last 24 Hrs  T(C): 36.8 (09 Mar 2022 23:21), Max: 36.8 (09 Mar 2022 23:21)  T(F): 98.3 (09 Mar 2022 23:21), Max: 98.3 (09 Mar 2022 23:21)  HR: 80 (09 Mar 2022 23:21) (80 - 92)  BP: 131/70 (09 Mar 2022 23:21) (131/70 - 159/70)  BP(mean): 100 (09 Mar 2022 17:11) (100 - 100)  RR: 18 (09 Mar 2022 23:21) (17 - 18)  SpO2: 95% (09 Mar 2022 23:21) (91% - 95%)      I&O's Summary    PHYSICAL EXAM  General Appearance: cooperative, no acute distress, on HFNC  HEENT: PERRL, conjunctiva clear, EOM's intact, non injected pharynx, no exudate, TM   normal  Neck: Supple, , no adenopathy, thyroid: not enlarged, no carotid bruit or JVD  Back: Symmetric, no  tenderness,no soft tissue tenderness  Lungs: coarse at the bases   Heart: Regular rate and rhythm, S1, S2 normal, no murmur, rub or gallop  Abdomen: Soft, non-tender, bowel sounds active , no hepatosplenomegaly  Extremities: no cyanosis or edema, no joint swelling  Skin: Skin color, texture normal, no rashes   Neurologic: Alert and oriented X3 , cranial nerves intact, sensory and motor normal,    ECG:    LABS:                          13.2   9.95  )-----------( 259      ( 01 Mar 2022 07:00 )             40.2     03-01    139  |  108  |  19  ----------------------------<  164<H>  4.5   |  26  |  0.88    Ca    9.3      01 Mar 2022 07:00    TPro  7.1  /  Alb  2.9<L>  /  TBili  0.8  /  DBili  0.2  /  AST  34  /  ALT  85<H>  /  AlkPhos  72  03-01          Pro BNP  292 03-01 @ 07:00  D Dimer  567 03-01 @ 07:00  Pro BNP  -- 02-28 @ 11:59  D Dimer  365 02-28 @ 11:59  Pro BNP  -- 02-28 @ 08:35  D Dimer  324 02-28 @ 08:35    PT/INR - ( 01 Mar 2022 07:00 )   PT: 14.2 sec;   INR: 1.22 ratio         PTT - ( 28 Feb 2022 08:35 )  PTT:25.3 sec          RADIOLOGY & ADDITIONAL STUDIES:

## 2022-03-11 NOTE — PROGRESS NOTE ADULT - ASSESSMENT
71 year-old man with HLD, nephrolithiasis, extensive past tobacco use history admitted for:       COVID-19 pneumonia, acute hypoxic respiratory failure  Clinically with some improvement  but still acute   C/w  HFNC, titrates down to  50L/75% FiO2.  S/p tocilizumab 3/2.   On Solumedrol 40 mg IVP BID, start to titrate down.   Completed   Remdesivir  10 days    with significant leukocytosis with repeat CXR RLL worsening infiltrate , no fevers, can not rule out  secondary bacterial infection. Suspect GNR bacteria. WBCs improved   C/w Cefepime 2 g BID as d/w DR Brody  Continue oxygen supplementation, wean as tolerated goal SPO2 88-92% or greater  Encourage incentive spirometry  Prone positioning  OOB to chair daily  Trend inflammatory markers   D/w DR Brody and Dr Carreon     Multiple pulmonary emboli  In setting of COVID.   C/w  full-dose Lovenox  80mg IBP BID      Likely underlying emphysema  Extensive tobacco use history. CT chest with bullae to suggest emphysema.   Continue Symbicort and Spiriva  Continue Solumedrol , start taper down     Possible underlying pulmonary fibrosis  Based on CT chest findings, impression from Pulmonary Medicine.   TTE :  Limited echo windows. No sign of PH as per Pulmonary Medicine      Hyponatremia  Due to underlying COVID19 and decreased PO intake.   monitor NA      Mild hyperkalemia  Resolved.   - Continue to monitor    HLD  Chronic, stable  - Continue statin      DVT px: Full dose LMWH for PE  Code Status: Full    Called Pts spouse, updated on Pts [progress

## 2022-03-11 NOTE — PROGRESS NOTE ADULT - ASSESSMENT
1) COVID Pneumonia  2) Hypoxemic Respiratory Failure  3) Pulmonary Embolism  4) Dyspnea   5) Combined Pulmonary Fibrosis/Emphysema   6) Abnormal CT Chest         70 yo pt with PMH of HLD,  kidney stone, valvular heart dz, prostate ca s/p Radiation presented  to ED c/o progressive SOB.    Diagnosed with COVID Pneumonia.   CTPE revealed pulmonary emboli in the right upper lobe pulmonary artery and bilateral segmental and subsegmental branches.   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   Not seen Pulmonary in the past.   Received Tocilizumab and remdesivir now receiving solumedrol 40 q daily  Lovenox 80 q 12 for PE  Independently reviewed CT Chest and new CXR 3/10  ABG 7.46/34/62 on NRB, 100%FiO2, transitioned to HFNC on 50LPM, 90% FiO2  GoalSaO2 >87%  Continue Spiriva/Symbicort  Cefepime for suspected superimposed pneumonia    Reviewed CXR/echocardiogram/ echoo does not show PH  Due to the underlying COPD/emphysema, his recovery to normalcy will be longer than expected  Ordered ABG  Appreciate ID recommendations / hospitalist recommendations

## 2022-03-11 NOTE — PROGRESS NOTE ADULT - SUBJECTIVE AND OBJECTIVE BOX
Date of service: 03-11-22 @ 09:35    OOB to chair  Noted with worsening leukocytosis  No diarrhea reported  Has dry cough  On high flow O2    ROS: no fever or chills; denies dizziness, no HA, no abdominal pain, no diarrhea or constipation; no dysuria, no legs pain, no rashes    MEDICATIONS  (STANDING):  atorvastatin 10 milliGRAM(s) Oral at bedtime  budesonide 160 MICROgram(s)/formoterol 4.5 MICROgram(s) Inhaler 2 Puff(s) Inhalation two times a day  cefepime   IVPB 2000 milliGRAM(s) IV Intermittent every 12 hours  enoxaparin Injectable 80 milliGRAM(s) SubCutaneous every 12 hours  guaiFENesin ER 1200 milliGRAM(s) Oral every 12 hours  methylPREDNISolone sodium succinate Injectable 40 milliGRAM(s) IV Push daily  polyethylene glycol 3350 17 Gram(s) Oral daily  senna 2 Tablet(s) Oral at bedtime  tiotropium 18 MICROgram(s) Capsule 1 Capsule(s) Inhalation daily    Vital Signs Last 24 Hrs  T(C): 36.2 (11 Mar 2022 09:21), Max: 36.6 (11 Mar 2022 06:00)  T(F): 97.2 (11 Mar 2022 09:21), Max: 97.9 (11 Mar 2022 06:00)  HR: 103 (11 Mar 2022 09:21) (86 - 103)  BP: 149/83 (11 Mar 2022 09:21) (140/74 - 151/94)  BP(mean): 92 (11 Mar 2022 06:00) (92 - 92)  RR: 18 (11 Mar 2022 09:21) (18 - 18)  SpO2: 94% (11 Mar 2022 09:21) (91% - 95%)     Physical exam:    Constitutional:  No acute distress  HEENT: NC/AT, EOMI, PERRLA, conjunctivae clear; ears and nose atraumatic  Neck: supple; thyroid not palpable  Back: no tenderness  Respiratory: respiratory effort normal; crackles at bases  Cardiovascular: S1S2 regular, no murmurs  Abdomen: soft, not tender, not distended, positive BS  Genitourinary: no suprapubic tenderness  Lymphatic: no LN palpable  Musculoskeletal: no muscle tenderness, no joint swelling or tenderness  Extremities: no pedal edema  Neurological/ Psychiatric: Alert; moving all extremities  Skin: no rashes; no palpable lesions    Labs: reviewed                        16.4   28.18 )-----------( 220      ( 11 Mar 2022 08:34 )             47.0     03-10    132<L>  |  96  |  18  ----------------------------<  112<H>  4.7   |  31  |  0.80    Ca    9.0      10 Mar 2022 08:37  Phos  3.3     03-10  Mg     2.9     03-10    TPro  6.9  /  Alb  3.3  /  TBili  1.6<H>  /  DBili  0.4<H>  /  AST  21  /  ALT  69  /  AlkPhos  128<H>  03-10    Ferritin, Serum: 1068 ng/mL (03-10-22 @ 08:37)  C-Reactive Protein, Serum: <3 mg/L (03-10-22 @ 08:37)  Ferritin, Serum: 973 ng/mL (03-06-22 @ 07:32)  C-Reactive Protein, Serum: 8 mg/L (03-06-22 @ 07:32)  D-Dimer Assay, Quantitative: 378 ng/mL DDU (03-06-22 @ 07:32)  Ferritin, Serum: 972 ng/mL (03-04-22 @ 08:34)  C-Reactive Protein, Serum: 40 mg/L (03-04-22 @ 08:34)  D-Dimer Assay, Quantitative: 733 ng/mL DDU (03-04-22 @ 08:34)  Ferritin, Serum: 909 ng/mL (03-03-22 @ 09:38)  C-Reactive Protein, Serum: 99 mg/L (03-03-22 @ 07:18)  Ferritin, Serum: 1055 ng/mL (03-01-22 @ 07:00)  C-Reactive Protein, Serum: 48 mg/L (03-01-22 @ 07:00)  D-Dimer Assay, Quantitative: 567 ng/mL DDU (03-01-22 @ 07:00)  D-Dimer Assay, Quantitative: 365 ng/mL DDU (02-28-22 @ 11:59)  C-Reactive Protein, Serum: 79 mg/L (02-28-22 @ 08:35)  D-Dimer Assay, Quantitative: 324 ng/mL DDU (02-28-22 @ 08:35)  Ferritin, Serum: 1224 ng/mL (02-28-22 @ 08:35)    03-09    131<L>  |  98  |  21  ----------------------------<  108<H>  4.2   |  25  |  0.67    Ca    8.8      09 Mar 2022 09:05    TPro  6.6  /  Alb  2.9<L>  /  TBili  1.6<H>  /  DBili  0.3  /  AST  25  /  ALT  78  /  AlkPhos  112  03-09                        15.1   14.13 )-----------( 333      ( 04 Mar 2022 08:34 )             45.2     03-05    136  |  107  |  27<H>  ----------------------------<  121<H>  4.5   |  24  |  0.74    Ca    8.9      05 Mar 2022 07:45    TPro  6.6  /  Alb  2.9<L>  /  TBili  1.0  /  DBili  0.2  /  AST  31  /  ALT  81<H>  /  AlkPhos  79  03-05                        13.2   9.95  )-----------( 259      ( 01 Mar 2022 07:00 )             40.2     03-01    139  |  108  |  19  ----------------------------<  164<H>  4.5   |  26  |  0.88    Ca    9.3      01 Mar 2022 07:00    TPro  7.1  /  Alb  2.9<L>  /  TBili  0.8  /  DBili  0.2  /  AST  34  /  ALT  85<H>  /  AlkPhos  72  03-01     LIVER FUNCTIONS - ( 01 Mar 2022 07:00 )  Alb: 2.9 g/dL / Pro: 7.1 gm/dL / ALK PHOS: 72 U/L / ALT: 85 U/L / AST: 34 U/L / GGT: x           COVID (02-28 @ 08:16)  Detected      Culture - Blood (collected 28 Feb 2022 08:35)  Source: .Blood None  Preliminary Report (01 Mar 2022 13:02):    No growth to date.    Radiology: all available radiological tests reviewed    < from: Xray Chest 1 View- PORTABLE-Urgent (02.28.22 @ 09:00) >  IMPRESSION: Mild bibasilar infiltrates left greater than right.  < end of copied text >    < from: CT Angio Chest PE Protocol w/ IV Cont (03.01.22 @ 14:14) >  Pulmonary emboli in the right upper lobe pulmonary artery and bilateral   segmental and subsegmental branches. This was discussed with Dr. Hazel   at 2:36 PM 3/1/2022.  Diffuse lung abnormality consistent with COVID.  < end of copied text >      Advanced directives addressed: full resuscitation

## 2022-03-11 NOTE — PROGRESS NOTE ADULT - SUBJECTIVE AND OBJECTIVE BOX
CC: SOB (10 Mar 2022 08:27)    HPI:  70 yo pt with PMH of HLD,  kidney stone, valvular heart dz, prostate ca s/p Radiation presented  to ED c/o progressive  SOB.  Pt reports he got sich with runny nose and cough 2/19 and tested positive for covid19 on 2/21.  Pt reports that he did have fevers but resolved  but SOB got worse, had difficulty breathing with minimal exertion. he checked his O2 and was below 80.  No CP, no palpitations, no leg pain.   Pt is s/p  2 shots of Pfizer  vaccine with second one in april 2021. No Booster.   Pts wife also sick at home with covid19.   In ED: Pt is hypoxic down to 70s on RA, was initially placed on NRB, later weaned of to 5L NC. Pt received IV dexamethasone and Remdesivir. S/p 1L IVF     INTERVAL HPI/ OVERNIGHT EVENTS: Pt was seen and examined, OOb to chair,  reports feels better during the  day, but  SOB worse at night when laying down. No fevers or chills.  Ok oral intake. POC discussed     Vital Signs Last 24 Hrs  T(C): 36.8 (11 Mar 2022 15:55), Max: 36.8 (11 Mar 2022 15:55)  T(F): 98.3 (11 Mar 2022 15:55), Max: 98.3 (11 Mar 2022 15:55)  HR: 78 (11 Mar 2022 15:55) (78 - 103)  BP: 134/74 (11 Mar 2022 15:55) (134/74 - 151/69)  BP(mean): 92 (11 Mar 2022 06:00) (92 - 92)  RR: 17 (11 Mar 2022 16:32) (16 - 18)  SpO2: 94% (11 Mar 2022 16:32) (91% - 96%)      REVIEW OF SYSTEMS:  All other review of systems is negative unless indicated above.      PHYSICAL EXAM:  General: Well developed; looks  ill,  in no acute distress on HFNC  Eyes:  EOMI; conjunctiva and sclera clear  Head: Normocephalic; atraumatic  ENMT: No nasal discharge; airway clear  Neck: Supple; non tender; no masses  Respiratory: Decreased BS, No wheezes, rales or rhonchi  Cardiovascular: Regular rate and rhythm. S1 and S2 Normal;   Gastrointestinal: Soft non-tender non-distended; Normal bowel sounds  Genitourinary: No  suprapubic  tenderness  Extremities: No edema  Vascular: Peripheral pulses palpable 2+ bilaterally  Neurological: Alert and oriented x3, non focal   Skin: Warm and dry. No acute rash  Musculoskeletal: Normal muscle tone and strength   Psychiatric: Cooperative and appropriate        LABS:                         16.4   28.18 )-----------( 220      ( 11 Mar 2022 08:34 )             47.0     03-11    130<L>  |  97  |  18  ----------------------------<  108<H>  3.9   |  25  |  0.61    Ca    8.3<L>      11 Mar 2022 08:34  Phos  3.3     03-10  Mg     2.9     03-10    TPro  6.2  /  Alb  3.1<L>  /  TBili  1.9<H>  /  DBili  0.4<H>  /  AST  32  /  ALT  60  /  AlkPhos  128<H>  03-11        LIVER FUNCTIONS - ( 11 Mar 2022 08:34 )  Alb: 3.1 g/dL / Pro: 6.2 gm/dL / ALK PHOS: 128 U/L / ALT: 60 U/L / AST: 32 U/L / GGT: x           ABG - ( 11 Mar 2022 09:28 )  pH, Arterial: 7.47  pH, Blood: x     /  pCO2: 35    /  pO2: 74    / HCO3: 26    / Base Excess: 2.1   /  SaO2: 98                                  16.5   32.85 )-----------( 294      ( 10 Mar 2022 08:37 )             48.4     10 Mar 2022 08:37    132    |  96     |  18     ----------------------------<  112    4.7     |  31     |  0.80     Ca    9.0        10 Mar 2022 08:37  Phos  3.3       10 Mar 2022 08:37  Mg     2.9       10 Mar 2022 08:37    TPro  6.9    /  Alb  3.3    /  TBili  1.6    /  DBili  0.4    /  AST  21     /  ALT  69     /  AlkPhos  128    10 Mar 2022 08:37  PT/INR - ( 09 Mar 2022 09:05 )   PT: 13.4 sec;   INR: 1.15 ratio          LIVER FUNCTIONS - ( 10 Mar 2022 08:37 )  Alb: 3.3 g/dL / Pro: 6.9 gm/dL / ALK PHOS: 128 U/L / ALT: 69 U/L / AST: 21 U/L / GGT: x             MEDICATIONS  (STANDING):  atorvastatin 10 milliGRAM(s) Oral at bedtime  budesonide 160 MICROgram(s)/formoterol 4.5 MICROgram(s) Inhaler 2 Puff(s) Inhalation two times a day  cefepime   IVPB 2000 milliGRAM(s) IV Intermittent every 12 hours  enoxaparin Injectable 80 milliGRAM(s) SubCutaneous every 12 hours  guaiFENesin ER 1200 milliGRAM(s) Oral every 12 hours  methylPREDNISolone sodium succinate Injectable 40 milliGRAM(s) IV Push daily  polyethylene glycol 3350 17 Gram(s) Oral daily  senna 2 Tablet(s) Oral at bedtime  tiotropium 18 MICROgram(s) Capsule 1 Capsule(s) Inhalation daily    MEDICATIONS  (PRN):  acetaminophen     Tablet .. 650 milliGRAM(s) Oral every 6 hours PRN Temp greater or equal to 38C (100.4F), Mild Pain (1 - 3)  ALBUTerol    90 MICROgram(s) HFA Inhaler 2 Puff(s) Inhalation every 6 hours PRN Shortness of Breath and/or Wheezing  aluminum hydroxide/magnesium hydroxide/simethicone Suspension 30 milliLiter(s) Oral every 4 hours PRN Dyspepsia  benzonatate 100 milliGRAM(s) Oral every 8 hours PRN Cough  bisacodyl Suppository 10 milliGRAM(s) Rectal daily PRN Constipation  melatonin 3 milliGRAM(s) Oral at bedtime PRN Insomnia  ondansetron Injectable 4 milliGRAM(s) IV Push every 8 hours PRN Nausea and/or Vomiting  sodium chloride 0.65% Nasal 1 Spray(s) Both Nostrils two times a day PRN Nasal Congestion        RADIOLOGY & ADDITIONAL TESTS:      ACC: [id number] EXAM:  XR CHEST PORTABLE IMMED 1V                        PROCEDURE DATE:  03/10/2022  s    COMPARISON STUDY: 3/1/2022    Frontal expiratory view of the chest shows the heart to be similar in   size. The lungs show slight clearing of the upper right lung with   progression of basilar infiltrates and there is no evidence of   pneumothorax nor pleural effusion.    IMPRESSION:  Changing infiltrates.

## 2022-03-11 NOTE — PROGRESS NOTE ADULT - ASSESSMENT
70 y/o male with h/o HLD,  kidney stone, valvular heart disease, prostate Ca s/p radiation was admitted on2/28 for progressive  SOB.  Pt reports that on 2/19 he got sick with runny nose, fever and cough and tested positive for covid-19 on 2/21. His fever improved, but his SOB got worse, had difficulty breathing with minimal exertion. He checked his O2 and was below 80. In ER he received remdesivir.    1. Acute respiratory failure. COVID-19 breakthrough viral syndrome. Multifocal pneumonia. Possible superimposed bacterial pneumonia. PE.  -new leukocytosis ?cause ?bacterial infection  -respiratory frail; no COVID booster  -respiratory remains frail; has increased O2 requirements  -given tociluzumab infusion on 3/2  -s/p remdesivir protocol # 10  -tolerating abx well so far; no side effects noted  -started on cefepime 2 gm IV q12h   -reason for abx use and side effects reviewed with patient; monitor BMP   -inflammatory markers are elevated  -O2 therapy  -taper steroids  -AC  -droplet isolation  -respiratory care  -continue abx coverage   -monitor temps  -f/u CBC  -supportive care    2. Other issues:   -care per medicine    d/w Dr. COLEMAN Vivas

## 2022-03-11 NOTE — PROGRESS NOTE ADULT - SUBJECTIVE AND OBJECTIVE BOX
Patient is a 71y old  Male who presents with a chief complaint of SOB (01 Mar 2022 13:52)      HPI:  70 yo pt with PMH of HLD,  kidney stone, valvular heart dz, prostate ca s/p Radiation presented  to ED c/o progressive SOB.    Diagnosed with COVID Pneumonia.   CTPE revealed pulmonary emboli in the right upper lobe pulmonary artery and bilateral segmental and subsegmental branches.   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     3/11  Patient has been on HFNC 50LPM, between 70% to 80%, increased to 90% overnight. SaO2 was 91% this morning  CXR reviewed from 3/6 as well as 3/10.  Increased WBC noted and patient is now on Cefepime     PAST MEDICAL & SURGICAL HISTORY:  Hypercholesterolemia    Cholelithiasis    Cholecystitis    Valvular heart disease    Psoriasis    History of kidney stones    Inguinal hernia, left    Prostate cancer  s/p XRT 1-6-2020 to 1/27/2021    History of renal calculi    S/P hernia repair  right inguinal; 5/2015    S/P colonoscopy with polypectomy  Baseline; viv polyps; 2012    History of cholecystectomy  2017    History of prostatectomy  5/2020    History of lithotripsy      MEDICATIONS  (STANDING):  atorvastatin 10 milliGRAM(s) Oral at bedtime  budesonide 160 MICROgram(s)/formoterol 4.5 MICROgram(s) Inhaler 2 Puff(s) Inhalation two times a day  cefepime  Injectable.      cefepime  Injectable. 1000 milliGRAM(s) IV Push every 12 hours  enoxaparin Injectable 80 milliGRAM(s) SubCutaneous every 12 hours  guaiFENesin ER 1200 milliGRAM(s) Oral every 12 hours  methylPREDNISolone sodium succinate Injectable 40 milliGRAM(s) IV Push daily  polyethylene glycol 3350 17 Gram(s) Oral daily  senna 2 Tablet(s) Oral at bedtime  tiotropium 18 MICROgram(s) Capsule 1 Capsule(s) Inhalation daily    MEDICATIONS  (PRN):  acetaminophen     Tablet .. 650 milliGRAM(s) Oral every 6 hours PRN Temp greater or equal to 38C (100.4F), Mild Pain (1 - 3)  ALBUTerol    90 MICROgram(s) HFA Inhaler 2 Puff(s) Inhalation every 6 hours PRN Shortness of Breath and/or Wheezing  aluminum hydroxide/magnesium hydroxide/simethicone Suspension 30 milliLiter(s) Oral every 4 hours PRN Dyspepsia  benzonatate 100 milliGRAM(s) Oral every 8 hours PRN Cough  bisacodyl Suppository 10 milliGRAM(s) Rectal daily PRN Constipation  melatonin 3 milliGRAM(s) Oral at bedtime PRN Insomnia  ondansetron Injectable 4 milliGRAM(s) IV Push every 8 hours PRN Nausea and/or Vomiting  sodium chloride 0.65% Nasal 1 Spray(s) Both Nostrils two times a day PRN Nasal Congestion      Vital Signs Last 24 Hrs  T(C): 36.6 (11 Mar 2022 06:00), Max: 36.7 (10 Mar 2022 09:06)  T(F): 97.9 (11 Mar 2022 06:00), Max: 98.1 (10 Mar 2022 09:06)  HR: 91 (11 Mar 2022 06:00) (86 - 91)  BP: 140/74 (11 Mar 2022 06:00) (140/74 - 151/94)  BP(mean): 92 (11 Mar 2022 06:00) (92 - 92)  RR: 18 (11 Mar 2022 06:00) (18 - 18)  SpO2: 92% (11 Mar 2022 06:00) (91% - 95%)    I&O's Summary    PHYSICAL EXAM  General Appearance: cooperative, no acute distress, on HFNC  HEENT: PERRL, conjunctiva clear, EOM's intact, non injected pharynx, no exudate, TM   normal  Neck: Supple, , no adenopathy, thyroid: not enlarged, no carotid bruit or JVD  Back: Symmetric, no  tenderness,no soft tissue tenderness  Lungs: coarse at the bases   Heart: Regular rate and rhythm, S1, S2 normal, no murmur, rub or gallop  Abdomen: Soft, non-tender, bowel sounds active , no hepatosplenomegaly  Extremities: no cyanosis or edema, no joint swelling  Skin: Skin color, texture normal, no rashes   Neurologic: Alert and oriented X3    ECG:    LABS:                        16.5   32.85 )-----------( 294      ( 10 Mar 2022 08:37 )             48.4   03-10    132<L>  |  96  |  18  ----------------------------<  112<H>  4.7   |  31  |  0.80    Ca    9.0      10 Mar 2022 08:37  Phos  3.3     03-10  Mg     2.9     03-10    TPro  6.9  /  Alb  3.3  /  TBili  1.6<H>  /  DBili  0.4<H>  /  AST  21  /  ALT  69  /  AlkPhos  128<H>  03-10    \        RADIOLOGY & ADDITIONAL STUDIES:    ACC: [id number] EXAM:  XR CHEST PORTABLE IMMED 1V                          PROCEDURE DATE:  03/10/2022          INTERPRETATION:  Chest one view    HISTORY: Leukocytosis    COMPARISON STUDY: 3/1/2022    Frontal expiratory view of the chest shows the heart to be similar in   size. The lungs show slight clearing of the upper right lung with   progression of basilar infiltrates and there is no evidence of   pneumothorax nor pleural effusion.    IMPRESSION:  Changing infiltrates.  CXR independently reviewed

## 2022-03-12 NOTE — PROGRESS NOTE ADULT - ASSESSMENT
71 year-old man with HLD, nephrolithiasis, extensive past tobacco use history admitted for:       COVID-19 pneumonia, acute hypoxic respiratory failure  Clinically with some improvement  but still acute   C/w  HFNC, titrates down to  50L/75% FiO2.  S/p tocilizumab 3/2.   On Solumedrol 40 mg IVP BID, start to titrate down.   Completed   Remdesivir  10 days    with significant leukocytosis with repeat CXR RLL worsening infiltrate , no fevers, can not rule out  secondary bacterial infection. Suspect GNR bacteria. WBCs improved   C/w Cefepime 2 g BID as d/w DR Brody  Continue oxygen supplementation, wean as tolerated goal SPO2 88-92% or greater  Encourage incentive spirometry  Prone positioning  OOB to chair daily  Trend inflammatory markers   D/w DR Brody and Dr Carreon     Multiple pulmonary emboli  In setting of COVID.   C/w  full-dose Lovenox  80mg IBP BID      Likely underlying emphysema  Extensive tobacco use history. CT chest with bullae to suggest emphysema.   Continue Symbicort and Spiriva  Continue Solumedrol , start taper down     Possible underlying pulmonary fibrosis  Based on CT chest findings, impression from Pulmonary Medicine.   TTE :  Limited echo windows. No sign of PH as per Pulmonary Medicine      Hyponatremia  Due to underlying COVID19 and decreased PO intake.   monitor NA      Mild hyperkalemia  Resolved.   - Continue to monitor    HLD  Chronic, stable  - Continue statin      DVT px: Full dose LMWH for PE  Code Status: Full    Called Pts spouse, updated on Pts [progress  71 year-old man with HLD, nephrolithiasis, extensive past tobacco use history admitted for:       COVID-19 pneumonia, acute hypoxic respiratory failure  Clinically with some improvement  but still acute   C/w  HFNC, titrates down to  50L/75% FiO2.  S/p tocilizumab 3/2.   On Solumedrol 40 mg IVP BID, titrated  down.   Completed   Remdesivir  10 days    with significant leukocytosis with repeat CXR RLL worsening infiltrate , no fevers, can not rule out  secondary bacterial infection. Suspect GNR bacteria. WBCs improving   C/w Cefepime 2 g BID as d/w DR Brody  Continue oxygen supplementation, wean as tolerated goal SPO2 88-92% or greater  Encourage incentive spirometry  Prone positioning  OOB to chair daily  Trend inflammatory markers   D/w  Dr Carreon     Multiple pulmonary emboli  In setting of COVID.   C/w  full-dose Lovenox  80mg IBP BID      Likely underlying emphysema  Extensive tobacco use history. CT chest with bullae to suggest emphysema.   Continue Symbicort and Spiriva  Continue Solumedrol , start taper down     Possible underlying pulmonary fibrosis  Based on CT chest findings, impression from Pulmonary Medicine.   TTE :  Limited echo windows. No sign of PH as per Pulmonary Medicine      Hyponatremia  Due to underlying COVID19 and decreased PO intake.   monitor NA, better today      Mild hyperkalemia  Resolved.   - Continue to monitor    HLD  Chronic, stable  - Continue statin      DVT px: Full dose LMWH for PE  Code Status: Full

## 2022-03-12 NOTE — PROGRESS NOTE ADULT - ASSESSMENT
1) COVID Pneumonia  2) Hypoxemic Respiratory Failure  3) Pulmonary Embolism  4) Dyspnea   5) Combined Pulmonary Fibrosis/Emphysema   6) Abnormal CT Chest         72 yo pt with PMH of HLD,  kidney stone, valvular heart dz, prostate ca s/p Radiation presented  to ED c/o progressive SOB.    Diagnosed with COVID Pneumonia.   CTPE revealed pulmonary emboli in the right upper lobe pulmonary artery and bilateral segmental and subsegmental branches.   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   Not seen Pulmonary in the past.   Received Tocilizumab and remdesivir now receiving solumedrol 40 q daily  Lovenox 80 q 12 for PE  HFNC 50LPM, 85% FiO2, 7.47/35/74  Independently reviewed CT Chest and new CXR 3/10  GoalSaO2 >87% Continue Spiriva/Symbicort  Cefepime for suspected superimposed pneumonia    Reviewed CXR/echocardiogram/ echo does not show PH  Due to the underlying COPD/emphysema, his recovery to normalcy will be longer than expected  Appreciate ID recommendations / hospitalist recommendations

## 2022-03-12 NOTE — PHYSICAL THERAPY INITIAL EVALUATION ADULT - PERTINENT HX OF CURRENT PROBLEM, REHAB EVAL
Pt presented  to ED on 2/28 c/o progressive  SOB.  Pt reports he got sick with runny nose and cough 2/19 and tested positive for covid19 on 2/21.  Pt reports that he did have fevers but resolved  but SOB got worse, had difficulty breathing with minimal exertion. he checked his O2 and was below 80. Pt is s/p  2 shots of Pfizer  vaccine with second one in april 2021. No Booster.

## 2022-03-12 NOTE — PROGRESS NOTE ADULT - SUBJECTIVE AND OBJECTIVE BOX
Patient is a 71y old  Male who presents with a chief complaint of SOB (01 Mar 2022 13:52)      HPI:  70 yo pt with PMH of HLD,  kidney stone, valvular heart dz, prostate ca s/p Radiation presented  to ED c/o progressive SOB.    Diagnosed with COVID Pneumonia.   CTPE revealed pulmonary emboli in the right upper lobe pulmonary artery and bilateral segmental and subsegmental branches.   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     3/12  Patient has been on HFNC 50LPM, between 70% to 80%, increased to 90% overnight. SaO2 was 91% this morning  CXR reviewed from 3/6 as well as 3/10.  Increased WBC noted and patient is now on Cefepime   on HFNC 50LPM, 85% FiO2, 7.47/35/74            PAST MEDICAL & SURGICAL HISTORY:  Hypercholesterolemia    Cholelithiasis    Cholecystitis    Valvular heart disease    Psoriasis    History of kidney stones    Inguinal hernia, left    Prostate cancer  s/p XRT 1-6-2020 to 1/27/2021    History of renal calculi    S/P hernia repair  right inguinal; 5/2015    S/P colonoscopy with polypectomy  Baseline; bridgern polyps; 2012    History of cholecystectomy  2017    History of prostatectomy  5/2020    History of lithotripsy      MEDICATIONS  (STANDING):  atorvastatin 10 milliGRAM(s) Oral at bedtime  budesonide 160 MICROgram(s)/formoterol 4.5 MICROgram(s) Inhaler 2 Puff(s) Inhalation two times a day  cefepime  Injectable.      cefepime  Injectable. 1000 milliGRAM(s) IV Push every 12 hours  enoxaparin Injectable 80 milliGRAM(s) SubCutaneous every 12 hours  guaiFENesin ER 1200 milliGRAM(s) Oral every 12 hours  methylPREDNISolone sodium succinate Injectable 40 milliGRAM(s) IV Push daily  polyethylene glycol 3350 17 Gram(s) Oral daily  senna 2 Tablet(s) Oral at bedtime  tiotropium 18 MICROgram(s) Capsule 1 Capsule(s) Inhalation daily    MEDICATIONS  (PRN):  acetaminophen     Tablet .. 650 milliGRAM(s) Oral every 6 hours PRN Temp greater or equal to 38C (100.4F), Mild Pain (1 - 3)  ALBUTerol    90 MICROgram(s) HFA Inhaler 2 Puff(s) Inhalation every 6 hours PRN Shortness of Breath and/or Wheezing  aluminum hydroxide/magnesium hydroxide/simethicone Suspension 30 milliLiter(s) Oral every 4 hours PRN Dyspepsia  benzonatate 100 milliGRAM(s) Oral every 8 hours PRN Cough  bisacodyl Suppository 10 milliGRAM(s) Rectal daily PRN Constipation  melatonin 3 milliGRAM(s) Oral at bedtime PRN Insomnia  ondansetron Injectable 4 milliGRAM(s) IV Push every 8 hours PRN Nausea and/or Vomiting  sodium chloride 0.65% Nasal 1 Spray(s) Both Nostrils two times a day PRN Nasal Congestion    Vital Signs Last 24 Hrs  T(C): 36.5 (12 Mar 2022 08:48), Max: 36.8 (11 Mar 2022 15:55)  T(F): 97.7 (12 Mar 2022 08:48), Max: 98.3 (11 Mar 2022 15:55)  HR: 95 (12 Mar 2022 08:48) (78 - 103)  BP: 145/89 (12 Mar 2022 08:48) (134/74 - 149/83)  BP(mean): --  RR: 18 (12 Mar 2022 08:48) (16 - 18)  SpO2: 93% (12 Mar 2022 08:48) (92% - 96%)  I&O's Summary    PHYSICAL EXAM  General Appearance: cooperative, no acute distress, on HFNC  HEENT: PERRL, conjunctiva clear, EOM's intact, non injected pharynx, no exudate, TM   normal  Neck: Supple, , no adenopathy, thyroid: not enlarged, no carotid bruit or JVD  Back: Symmetric, no  tenderness,no soft tissue tenderness  Lungs: coarse at the bases   Heart: Regular rate and rhythm, S1, S2 normal, no murmur, rub or gallop  Abdomen: Soft, non-tender, bowel sounds active , no hepatosplenomegaly  Extremities: no cyanosis or edema, no joint swelling  Skin: Skin color, texture normal, no rashes   Neurologic: Alert and oriented X3    ECG:    LABS:                        16.5   32.85 )-----------( 294      ( 10 Mar 2022 08:37 )             48.4   03-10    132<L>  |  96  |  18  ----------------------------<  112<H>  4.7   |  31  |  0.80    Ca    9.0      10 Mar 2022 08:37  Phos  3.3     03-10  Mg     2.9     03-10    TPro  6.9  /  Alb  3.3  /  TBili  1.6<H>  /  DBili  0.4<H>  /  AST  21  /  ALT  69  /  AlkPhos  128<H>  03-10    \        RADIOLOGY & ADDITIONAL STUDIES:    ACC: [id number] EXAM:  XR CHEST PORTABLE IMMED 1V                          PROCEDURE DATE:  03/10/2022          INTERPRETATION:  Chest one view    HISTORY: Leukocytosis    COMPARISON STUDY: 3/1/2022    Frontal expiratory view of the chest shows the heart to be similar in   size. The lungs show slight clearing of the upper right lung with   progression of basilar infiltrates and there is no evidence of   pneumothorax nor pleural effusion.    IMPRESSION:  Changing infiltrates.  CXR independently reviewed

## 2022-03-12 NOTE — PROGRESS NOTE ADULT - SUBJECTIVE AND OBJECTIVE BOX
CC: SOB (10 Mar 2022 08:27)    HPI:  72 yo pt with PMH of HLD,  kidney stone, valvular heart dz, prostate ca s/p Radiation presented  to ED c/o progressive  SOB.  Pt reports he got sich with runny nose and cough 2/19 and tested positive for covid19 on 2/21.  Pt reports that he did have fevers but resolved  but SOB got worse, had difficulty breathing with minimal exertion. he checked his O2 and was below 80.  No CP, no palpitations, no leg pain.   Pt is s/p  2 shots of Pfizer  vaccine with second one in april 2021. No Booster.   Pts wife also sick at home with covid19.   In ED: Pt is hypoxic down to 70s on RA, was initially placed on NRB, later weaned of to 5L NC. Pt received IV dexamethasone and Remdesivir. S/p 1L IVF     INTERVAL HPI/ OVERNIGHT EVENTS: Pt was seen and examined, OOb to chair,  reports feels better during the  day, but  SOB worse at night when laying down. No fevers or chills.  Ok oral intake. POC discussed           REVIEW OF SYSTEMS:  All other review of systems is negative unless indicated above.      PHYSICAL EXAM:  General: Well developed; looks  ill,  in no acute distress on HFNC  Eyes:  EOMI; conjunctiva and sclera clear  Head: Normocephalic; atraumatic  ENMT: No nasal discharge; airway clear  Neck: Supple; non tender; no masses  Respiratory: Decreased BS, No wheezes, rales or rhonchi  Cardiovascular: Regular rate and rhythm. S1 and S2 Normal;   Gastrointestinal: Soft non-tender non-distended; Normal bowel sounds  Genitourinary: No  suprapubic  tenderness  Extremities: No edema  Vascular: Peripheral pulses palpable 2+ bilaterally  Neurological: Alert and oriented x3, non focal   Skin: Warm and dry. No acute rash  Musculoskeletal: Normal muscle tone and strength   Psychiatric: Cooperative and appropriate        LABS:                         16.4   28.18 )-----------( 220      ( 11 Mar 2022 08:34 )             47.0     03-11    130<L>  |  97  |  18  ----------------------------<  108<H>  3.9   |  25  |  0.61    Ca    8.3<L>      11 Mar 2022 08:34  Phos  3.3     03-10  Mg     2.9     03-10    TPro  6.2  /  Alb  3.1<L>  /  TBili  1.9<H>  /  DBili  0.4<H>  /  AST  32  /  ALT  60  /  AlkPhos  128<H>  03-11        LIVER FUNCTIONS - ( 11 Mar 2022 08:34 )  Alb: 3.1 g/dL / Pro: 6.2 gm/dL / ALK PHOS: 128 U/L / ALT: 60 U/L / AST: 32 U/L / GGT: x           ABG - ( 11 Mar 2022 09:28 )  pH, Arterial: 7.47  pH, Blood: x     /  pCO2: 35    /  pO2: 74    / HCO3: 26    / Base Excess: 2.1   /  SaO2: 98                                  16.5   32.85 )-----------( 294      ( 10 Mar 2022 08:37 )             48.4     10 Mar 2022 08:37    132    |  96     |  18     ----------------------------<  112    4.7     |  31     |  0.80     Ca    9.0        10 Mar 2022 08:37  Phos  3.3       10 Mar 2022 08:37  Mg     2.9       10 Mar 2022 08:37    TPro  6.9    /  Alb  3.3    /  TBili  1.6    /  DBili  0.4    /  AST  21     /  ALT  69     /  AlkPhos  128    10 Mar 2022 08:37  PT/INR - ( 09 Mar 2022 09:05 )   PT: 13.4 sec;   INR: 1.15 ratio          LIVER FUNCTIONS - ( 10 Mar 2022 08:37 )  Alb: 3.3 g/dL / Pro: 6.9 gm/dL / ALK PHOS: 128 U/L / ALT: 69 U/L / AST: 21 U/L / GGT: x             MEDICATIONS  (STANDING):  atorvastatin 10 milliGRAM(s) Oral at bedtime  budesonide 160 MICROgram(s)/formoterol 4.5 MICROgram(s) Inhaler 2 Puff(s) Inhalation two times a day  cefepime   IVPB 2000 milliGRAM(s) IV Intermittent every 12 hours  enoxaparin Injectable 80 milliGRAM(s) SubCutaneous every 12 hours  guaiFENesin ER 1200 milliGRAM(s) Oral every 12 hours  methylPREDNISolone sodium succinate Injectable 40 milliGRAM(s) IV Push daily  polyethylene glycol 3350 17 Gram(s) Oral daily  senna 2 Tablet(s) Oral at bedtime  tiotropium 18 MICROgram(s) Capsule 1 Capsule(s) Inhalation daily    MEDICATIONS  (PRN):  acetaminophen     Tablet .. 650 milliGRAM(s) Oral every 6 hours PRN Temp greater or equal to 38C (100.4F), Mild Pain (1 - 3)  ALBUTerol    90 MICROgram(s) HFA Inhaler 2 Puff(s) Inhalation every 6 hours PRN Shortness of Breath and/or Wheezing  aluminum hydroxide/magnesium hydroxide/simethicone Suspension 30 milliLiter(s) Oral every 4 hours PRN Dyspepsia  benzonatate 100 milliGRAM(s) Oral every 8 hours PRN Cough  bisacodyl Suppository 10 milliGRAM(s) Rectal daily PRN Constipation  melatonin 3 milliGRAM(s) Oral at bedtime PRN Insomnia  ondansetron Injectable 4 milliGRAM(s) IV Push every 8 hours PRN Nausea and/or Vomiting  sodium chloride 0.65% Nasal 1 Spray(s) Both Nostrils two times a day PRN Nasal Congestion        RADIOLOGY & ADDITIONAL TESTS:      ACC: [id number] EXAM:  XR CHEST PORTABLE IMMED 1V                        PROCEDURE DATE:  03/10/2022  s    COMPARISON STUDY: 3/1/2022    Frontal expiratory view of the chest shows the heart to be similar in   size. The lungs show slight clearing of the upper right lung with   progression of basilar infiltrates and there is no evidence of   pneumothorax nor pleural effusion.    IMPRESSION:  Changing infiltrates.       CC: SOB (10 Mar 2022 08:27)    HPI:  72 yo pt with PMH of HLD,  kidney stone, valvular heart dz, prostate ca s/p Radiation presented  to ED c/o progressive  SOB.  Pt reports he got sich with runny nose and cough 2/19 and tested positive for covid19 on 2/21.  Pt reports that he did have fevers but resolved  but SOB got worse, had difficulty breathing with minimal exertion. he checked his O2 and was below 80.  No CP, no palpitations, no leg pain.   Pt is s/p  2 shots of Pfizer  vaccine with second one in april 2021. No Booster.   Pts wife also sick at home with covid19.   In ED: Pt is hypoxic down to 70s on RA, was initially placed on NRB, later weaned of to 5L NC. Pt received IV dexamethasone and Remdesivir. S/p 1L IVF     INTERVAL HPI/ OVERNIGHT EVENTS: Pt was seen and examined, feels  the same, feels emotional as O2 sats  89-91 today. Was encouraged to keep incentive spirometry.      REVIEW OF SYSTEMS:  All other review of systems is negative unless indicated above.      PHYSICAL EXAM:  General: Well developed; looks  ill,  in no acute distress on HFNC  Eyes:  EOMI; conjunctiva and sclera clear  Head: Normocephalic; atraumatic  ENMT: No nasal discharge; airway clear  Neck: Supple; non tender; no masses  Respiratory: Decreased BS, No wheezes, rales or rhonchi  Cardiovascular: Regular rate and rhythm. S1 and S2 Normal;   Gastrointestinal: Soft non-tender non-distended; Normal bowel sounds  Genitourinary: No  suprapubic  tenderness  Extremities: No edema  Vascular: Peripheral pulses palpable 2+ bilaterally  Neurological: Alert and oriented x3, non focal   Skin: Warm and dry. No acute rash  Musculoskeletal: Normal muscle tone and strength   Psychiatric: Cooperative and appropriate        LABS:                           15.5   21.14 )-----------( 187      ( 12 Mar 2022 08:56 )             46.0     03-12    132<L>  |  97  |  18  ----------------------------<  116<H>  4.4   |  31  |  0.85    Ca    8.5      12 Mar 2022 08:56    TPro  5.9<L>  /  Alb  2.9<L>  /  TBili  1.9<H>  /  DBili  0.4<H>  /  AST  27  /  ALT  65  /  AlkPhos  108  03-12    LIVER FUNCTIONS - ( 12 Mar 2022 08:56 )  Alb: 2.9 g/dL / Pro: 5.9 gm/dL / ALK PHOS: 108 U/L / ALT: 65 U/L / AST: 27 U/L / GGT: x                                   16.4   28.18 )-----------( 220      ( 11 Mar 2022 08:34 )             47.0     03-11    130<L>  |  97  |  18  ----------------------------<  108<H>  3.9   |  25  |  0.61    Ca    8.3<L>      11 Mar 2022 08:34  Phos  3.3     03-10  Mg     2.9     03-10    TPro  6.2  /  Alb  3.1<L>  /  TBili  1.9<H>  /  DBili  0.4<H>  /  AST  32  /  ALT  60  /  AlkPhos  128<H>  03-11        LIVER FUNCTIONS - ( 11 Mar 2022 08:34 )  Alb: 3.1 g/dL / Pro: 6.2 gm/dL / ALK PHOS: 128 U/L / ALT: 60 U/L / AST: 32 U/L / GGT: x           ABG - ( 11 Mar 2022 09:28 )  pH, Arterial: 7.47  pH, Blood: x     /  pCO2: 35    /  pO2: 74    / HCO3: 26    / Base Excess: 2.1   /  SaO2: 98                                  16.5   32.85 )-----------( 294      ( 10 Mar 2022 08:37 )             48.4     10 Mar 2022 08:37    132    |  96     |  18     ----------------------------<  112    4.7     |  31     |  0.80     Ca    9.0        10 Mar 2022 08:37  Phos  3.3       10 Mar 2022 08:37  Mg     2.9       10 Mar 2022 08:37    TPro  6.9    /  Alb  3.3    /  TBili  1.6    /  DBili  0.4    /  AST  21     /  ALT  69     /  AlkPhos  128    10 Mar 2022 08:37  PT/INR - ( 09 Mar 2022 09:05 )   PT: 13.4 sec;   INR: 1.15 ratio          LIVER FUNCTIONS - ( 10 Mar 2022 08:37 )  Alb: 3.3 g/dL / Pro: 6.9 gm/dL / ALK PHOS: 128 U/L / ALT: 69 U/L / AST: 21 U/L / GGT: x             MEDICATIONS  (STANDING):  atorvastatin 10 milliGRAM(s) Oral at bedtime  budesonide 160 MICROgram(s)/formoterol 4.5 MICROgram(s) Inhaler 2 Puff(s) Inhalation two times a day  cefepime   IVPB 2000 milliGRAM(s) IV Intermittent every 12 hours  enoxaparin Injectable 80 milliGRAM(s) SubCutaneous every 12 hours  guaiFENesin ER 1200 milliGRAM(s) Oral every 12 hours  methylPREDNISolone sodium succinate Injectable 40 milliGRAM(s) IV Push daily  polyethylene glycol 3350 17 Gram(s) Oral daily  senna 2 Tablet(s) Oral at bedtime  tiotropium 18 MICROgram(s) Capsule 1 Capsule(s) Inhalation daily    MEDICATIONS  (PRN):  acetaminophen     Tablet .. 650 milliGRAM(s) Oral every 6 hours PRN Temp greater or equal to 38C (100.4F), Mild Pain (1 - 3)  ALBUTerol    90 MICROgram(s) HFA Inhaler 2 Puff(s) Inhalation every 6 hours PRN Shortness of Breath and/or Wheezing  aluminum hydroxide/magnesium hydroxide/simethicone Suspension 30 milliLiter(s) Oral every 4 hours PRN Dyspepsia  benzonatate 100 milliGRAM(s) Oral every 8 hours PRN Cough  bisacodyl Suppository 10 milliGRAM(s) Rectal daily PRN Constipation  melatonin 3 milliGRAM(s) Oral at bedtime PRN Insomnia  ondansetron Injectable 4 milliGRAM(s) IV Push every 8 hours PRN Nausea and/or Vomiting  sodium chloride 0.65% Nasal 1 Spray(s) Both Nostrils two times a day PRN Nasal Congestion        RADIOLOGY & ADDITIONAL TESTS:      ACC: [id number] EXAM:  XR CHEST PORTABLE IMMED 1V                        PROCEDURE DATE:  03/10/2022  s    COMPARISON STUDY: 3/1/2022    Frontal expiratory view of the chest shows the heart to be similar in   size. The lungs show slight clearing of the upper right lung with   progression of basilar infiltrates and there is no evidence of   pneumothorax nor pleural effusion.    IMPRESSION:  Changing infiltrates.

## 2022-03-12 NOTE — PHYSICAL THERAPY INITIAL EVALUATION ADULT - GENERAL OBSERVATIONS, REHAB EVAL
Pt was found sitting in chair with HFNC on, pulse oxy, tele, o2 sats of 93% at rest, Pt is pleasant and willing to participate in PT, reports feels better during the  day, but  SOB worse at night when laying down.

## 2022-03-13 NOTE — PROGRESS NOTE ADULT - ASSESSMENT
71 year-old man with HLD, nephrolithiasis, extensive past tobacco use history admitted for:       COVID-19 pneumonia, acute hypoxic respiratory failure  Clinically with some improvement  but still acute   C/w  HFNC, titrates down to  50L/75% FiO2.  S/p tocilizumab 3/2.   On Solumedrol 40 mg IVP BID, titrated  down.   Completed   Remdesivir  10 days    with significant leukocytosis with repeat CXR RLL worsening infiltrate , no fevers, can not rule out  secondary bacterial infection. Suspect GNR bacteria. WBCs improving   C/w Cefepime 2 g BID as d/w DR Brody  Continue oxygen supplementation, wean as tolerated goal SPO2 88-92% or greater  Encourage incentive spirometry  Prone positioning  OOB to chair daily  Trend inflammatory markers   D/w  Dr Carreon     Multiple pulmonary emboli  In setting of COVID.   C/w  full-dose Lovenox  80mg IBP BID      Likely underlying emphysema  Extensive tobacco use history. CT chest with bullae to suggest emphysema.   Continue Symbicort and Spiriva  Continue Solumedrol , start taper down     Possible underlying pulmonary fibrosis  Based on CT chest findings, impression from Pulmonary Medicine.   TTE :  Limited echo windows. No sign of PH as per Pulmonary Medicine      Hyponatremia  Due to underlying COVID19 and decreased PO intake.   monitor NA, better today      Mild hyperkalemia  Resolved.   - Continue to monitor    HLD  Chronic, stable  - Continue statin      DVT px: Full dose LMWH for PE  Code Status: Full     71 year-old man with HLD, nephrolithiasis, extensive past tobacco use history admitted for:       COVID-19 pneumonia, acute hypoxic respiratory failure  Clinically with some improvement  but still acute   C/w  HFNC, titrated  down to  50L/75% FiO2. O2  Sats stable   S/p tocilizumab 3/2.   On Solumedrol 40 mg IVP QD,  titrated  down.   Completed   Remdesivir  10 days    with significant leukocytosis with repeat CXR RLL worsening infiltrate , no fevers, can not rule out  secondary bacterial infection. Suspect GNR bacteria. WBCs improving   C/w Cefepime 2 g BID as d/w DR Brody  Continue oxygen supplementation, wean as tolerated goal SPO2 88-92% or greater  Encourage incentive spirometry  Prone positioning  OOB to chair daily  Trend inflammatory markers   D/w  Dr Carreon, repeat CT chest    Multiple pulmonary emboli  In setting of COVID.   C/w  full-dose Lovenox  80mg IBP BID      Likely underlying emphysema  Extensive tobacco use history. CT chest with bullae to suggest emphysema.   Continue Symbicort and Spiriva  Continue Solumedrol     Possible underlying pulmonary fibrosis  Based on CT chest findings, impression from Pulmonary Medicine.   TTE :  Limited echo windows. No sign of PH as per Pulmonary Medicine      Hyponatremia  Due to underlying COVID19 and decreased PO intake.   monitor NA, better today      Mild hyperkalemia  Resolved.   - Continue to monitor    HLD  Chronic, stable  - Continue statin      DVT px: Full dose LMWH for PE  Code Status: Full

## 2022-03-13 NOTE — PROGRESS NOTE ADULT - SUBJECTIVE AND OBJECTIVE BOX
Patient is a 71y old  Male who presents with a chief complaint of SOB (01 Mar 2022 13:52)      HPI:  72 yo pt with PMH of HLD,  kidney stone, valvular heart dz, prostate ca s/p Radiation presented  to ED c/o progressive SOB.    Diagnosed with COVID Pneumonia.   CTPE revealed pulmonary emboli in the right upper lobe pulmonary artery and bilateral segmental and subsegmental branches.   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     3/13  Patient has been on HFNC 50LPM, between 70% to 80%, increased to 80% overnight. SaO2 was 91% this morning  CXR reviewed from 3/6 as well as 3/10.  Increased WBC noted and patient is now on Cefepime   on HFNC 50LPM, 80% FiO2, 7.47/35/74            PAST MEDICAL & SURGICAL HISTORY:  Hypercholesterolemia    Cholelithiasis    Cholecystitis    Valvular heart disease    Psoriasis    History of kidney stones    Inguinal hernia, left    Prostate cancer  s/p XRT 1-6-2020 to 1/27/2021    History of renal calculi    S/P hernia repair  right inguinal; 5/2015    S/P colonoscopy with polypectomy  Baseline; bridgern polyps; 2012    History of cholecystectomy  2017    History of prostatectomy  5/2020    History of lithotripsy      MEDICATIONS  (STANDING):  atorvastatin 10 milliGRAM(s) Oral at bedtime  budesonide 160 MICROgram(s)/formoterol 4.5 MICROgram(s) Inhaler 2 Puff(s) Inhalation two times a day  cefepime  Injectable.      cefepime  Injectable. 1000 milliGRAM(s) IV Push every 12 hours  enoxaparin Injectable 80 milliGRAM(s) SubCutaneous every 12 hours  guaiFENesin ER 1200 milliGRAM(s) Oral every 12 hours  methylPREDNISolone sodium succinate Injectable 40 milliGRAM(s) IV Push daily  polyethylene glycol 3350 17 Gram(s) Oral daily  senna 2 Tablet(s) Oral at bedtime  tiotropium 18 MICROgram(s) Capsule 1 Capsule(s) Inhalation daily    MEDICATIONS  (PRN):  acetaminophen     Tablet .. 650 milliGRAM(s) Oral every 6 hours PRN Temp greater or equal to 38C (100.4F), Mild Pain (1 - 3)  ALBUTerol    90 MICROgram(s) HFA Inhaler 2 Puff(s) Inhalation every 6 hours PRN Shortness of Breath and/or Wheezing  aluminum hydroxide/magnesium hydroxide/simethicone Suspension 30 milliLiter(s) Oral every 4 hours PRN Dyspepsia  benzonatate 100 milliGRAM(s) Oral every 8 hours PRN Cough  bisacodyl Suppository 10 milliGRAM(s) Rectal daily PRN Constipation  melatonin 3 milliGRAM(s) Oral at bedtime PRN Insomnia  ondansetron Injectable 4 milliGRAM(s) IV Push every 8 hours PRN Nausea and/or Vomiting  sodium chloride 0.65% Nasal 1 Spray(s) Both Nostrils two times a day PRN Nasal Congestion    Vital Signs Last 24 Hrs  T(C): 36.6 (13 Mar 2022 08:50), Max: 36.7 (12 Mar 2022 23:27)  T(F): 97.9 (13 Mar 2022 08:50), Max: 98.1 (12 Mar 2022 23:27)  HR: 88 (13 Mar 2022 09:07) (88 - 102)  BP: 128/80 (13 Mar 2022 08:50) (128/80 - 144/86)  BP(mean): --  RR: 18 (13 Mar 2022 08:50) (18 - 18)  SpO2: 94% (13 Mar 2022 08:50) (85% - 96%)    PHYSICAL EXAM  General Appearance: cooperative, no acute distress, on HFNC  HEENT: PERRL, conjunctiva clear, EOM's intact, non injected pharynx, no exudate, TM   normal  Neck: Supple, , no adenopathy, thyroid: not enlarged, no carotid bruit or JVD  Back: Symmetric, no  tenderness,no soft tissue tenderness  Lungs: coarse at the bases   Heart: Regular rate and rhythm, S1, S2 normal, no murmur, rub or gallop  Abdomen: Soft, non-tender, bowel sounds active , no hepatosplenomegaly  Extremities: no cyanosis or edema, no joint swelling  Skin: Skin color, texture normal, no rashes   Neurologic: Alert and oriented X3    ECG:    LABS:                        16.5   32.85 )-----------( 294      ( 10 Mar 2022 08:37 )             48.4   03-10    132<L>  |  96  |  18  ----------------------------<  112<H>  4.7   |  31  |  0.80    Ca    9.0      10 Mar 2022 08:37  Phos  3.3     03-10  Mg     2.9     03-10    TPro  6.9  /  Alb  3.3  /  TBili  1.6<H>  /  DBili  0.4<H>  /  AST  21  /  ALT  69  /  AlkPhos  128<H>  03-10    \        RADIOLOGY & ADDITIONAL STUDIES:    ACC: [id number] EXAM:  XR CHEST PORTABLE IMMED 1V                          PROCEDURE DATE:  03/10/2022          INTERPRETATION:  Chest one view    HISTORY: Leukocytosis    COMPARISON STUDY: 3/1/2022    Frontal expiratory view of the chest shows the heart to be similar in   size. The lungs show slight clearing of the upper right lung with   progression of basilar infiltrates and there is no evidence of   pneumothorax nor pleural effusion.    IMPRESSION:  Changing infiltrates.  CXR independently reviewed

## 2022-03-13 NOTE — PROGRESS NOTE ADULT - SUBJECTIVE AND OBJECTIVE BOX
CC: SOB (10 Mar 2022 08:27)    HPI:  70 yo pt with PMH of HLD,  kidney stone, valvular heart dz, prostate ca s/p Radiation presented  to ED c/o progressive  SOB.  Pt reports he got sich with runny nose and cough 2/19 and tested positive for covid19 on 2/21.  Pt reports that he did have fevers but resolved  but SOB got worse, had difficulty breathing with minimal exertion. he checked his O2 and was below 80.  No CP, no palpitations, no leg pain.   Pt is s/p  2 shots of Pfizer  vaccine with second one in april 2021. No Booster.   Pts wife also sick at home with covid19.   In ED: Pt is hypoxic down to 70s on RA, was initially placed on NRB, later weaned of to 5L NC. Pt received IV dexamethasone and Remdesivir. S/p 1L IVF     INTERVAL HPI/ OVERNIGHT EVENTS:     REVIEW OF SYSTEMS:  All other review of systems is negative unless indicated above.      PHYSICAL EXAM:  General: Well developed; looks  ill,  in no acute distress on HFNC  Eyes:  EOMI; conjunctiva and sclera clear  Head: Normocephalic; atraumatic  ENMT: No nasal discharge; airway clear  Neck: Supple; non tender; no masses  Respiratory: Decreased BS, No wheezes, rales or rhonchi  Cardiovascular: Regular rate and rhythm. S1 and S2 Normal;   Gastrointestinal: Soft non-tender non-distended; Normal bowel sounds  Genitourinary: No  suprapubic  tenderness  Extremities: No edema  Vascular: Peripheral pulses palpable 2+ bilaterally  Neurological: Alert and oriented x3, non focal   Skin: Warm and dry. No acute rash  Musculoskeletal: Normal muscle tone and strength   Psychiatric: Cooperative and appropriate        LABS:                           15.5   21.14 )-----------( 187      ( 12 Mar 2022 08:56 )             46.0     03-12    132<L>  |  97  |  18  ----------------------------<  116<H>  4.4   |  31  |  0.85    Ca    8.5      12 Mar 2022 08:56    TPro  5.9<L>  /  Alb  2.9<L>  /  TBili  1.9<H>  /  DBili  0.4<H>  /  AST  27  /  ALT  65  /  AlkPhos  108  03-12    LIVER FUNCTIONS - ( 12 Mar 2022 08:56 )  Alb: 2.9 g/dL / Pro: 5.9 gm/dL / ALK PHOS: 108 U/L / ALT: 65 U/L / AST: 27 U/L / GGT: x                                   16.4   28.18 )-----------( 220      ( 11 Mar 2022 08:34 )             47.0     03-11    130<L>  |  97  |  18  ----------------------------<  108<H>  3.9   |  25  |  0.61    Ca    8.3<L>      11 Mar 2022 08:34  Phos  3.3     03-10  Mg     2.9     03-10    TPro  6.2  /  Alb  3.1<L>  /  TBili  1.9<H>  /  DBili  0.4<H>  /  AST  32  /  ALT  60  /  AlkPhos  128<H>  03-11        LIVER FUNCTIONS - ( 11 Mar 2022 08:34 )  Alb: 3.1 g/dL / Pro: 6.2 gm/dL / ALK PHOS: 128 U/L / ALT: 60 U/L / AST: 32 U/L / GGT: x           ABG - ( 11 Mar 2022 09:28 )  pH, Arterial: 7.47  pH, Blood: x     /  pCO2: 35    /  pO2: 74    / HCO3: 26    / Base Excess: 2.1   /  SaO2: 98                                  16.5   32.85 )-----------( 294      ( 10 Mar 2022 08:37 )             48.4     10 Mar 2022 08:37    132    |  96     |  18     ----------------------------<  112    4.7     |  31     |  0.80     Ca    9.0        10 Mar 2022 08:37  Phos  3.3       10 Mar 2022 08:37  Mg     2.9       10 Mar 2022 08:37    TPro  6.9    /  Alb  3.3    /  TBili  1.6    /  DBili  0.4    /  AST  21     /  ALT  69     /  AlkPhos  128    10 Mar 2022 08:37  PT/INR - ( 09 Mar 2022 09:05 )   PT: 13.4 sec;   INR: 1.15 ratio          LIVER FUNCTIONS - ( 10 Mar 2022 08:37 )  Alb: 3.3 g/dL / Pro: 6.9 gm/dL / ALK PHOS: 128 U/L / ALT: 69 U/L / AST: 21 U/L / GGT: x             MEDICATIONS  (STANDING):  atorvastatin 10 milliGRAM(s) Oral at bedtime  budesonide 160 MICROgram(s)/formoterol 4.5 MICROgram(s) Inhaler 2 Puff(s) Inhalation two times a day  cefepime   IVPB 2000 milliGRAM(s) IV Intermittent every 12 hours  enoxaparin Injectable 80 milliGRAM(s) SubCutaneous every 12 hours  guaiFENesin ER 1200 milliGRAM(s) Oral every 12 hours  methylPREDNISolone sodium succinate Injectable 40 milliGRAM(s) IV Push daily  polyethylene glycol 3350 17 Gram(s) Oral daily  senna 2 Tablet(s) Oral at bedtime  tiotropium 18 MICROgram(s) Capsule 1 Capsule(s) Inhalation daily    MEDICATIONS  (PRN):  acetaminophen     Tablet .. 650 milliGRAM(s) Oral every 6 hours PRN Temp greater or equal to 38C (100.4F), Mild Pain (1 - 3)  ALBUTerol    90 MICROgram(s) HFA Inhaler 2 Puff(s) Inhalation every 6 hours PRN Shortness of Breath and/or Wheezing  aluminum hydroxide/magnesium hydroxide/simethicone Suspension 30 milliLiter(s) Oral every 4 hours PRN Dyspepsia  benzonatate 100 milliGRAM(s) Oral every 8 hours PRN Cough  bisacodyl Suppository 10 milliGRAM(s) Rectal daily PRN Constipation  melatonin 3 milliGRAM(s) Oral at bedtime PRN Insomnia  ondansetron Injectable 4 milliGRAM(s) IV Push every 8 hours PRN Nausea and/or Vomiting  sodium chloride 0.65% Nasal 1 Spray(s) Both Nostrils two times a day PRN Nasal Congestion        RADIOLOGY & ADDITIONAL TESTS:      ACC: [id number] EXAM:  XR CHEST PORTABLE IMMED 1V                        PROCEDURE DATE:  03/10/2022  s    COMPARISON STUDY: 3/1/2022    Frontal expiratory view of the chest shows the heart to be similar in   size. The lungs show slight clearing of the upper right lung with   progression of basilar infiltrates and there is no evidence of   pneumothorax nor pleural effusion.    IMPRESSION:  Changing infiltrates.       CC: SOB (10 Mar 2022 08:27)    HPI:  72 yo pt with PMH of HLD,  kidney stone, valvular heart dz, prostate ca s/p Radiation presented  to ED c/o progressive  SOB.  Pt reports he got sich with runny nose and cough 2/19 and tested positive for covid19 on 2/21.  Pt reports that he did have fevers but resolved  but SOB got worse, had difficulty breathing with minimal exertion. he checked his O2 and was below 80.  No CP, no palpitations, no leg pain.   Pt is s/p  2 shots of Pfizer  vaccine with second one in april 2021. No Booster.   Pts wife also sick at home with covid19.   In ED: Pt is hypoxic down to 70s on RA, was initially placed on NRB, later weaned of to 5L NC. Pt received IV dexamethasone and Remdesivir. S/p 1L IVF     INTERVAL HPI/ OVERNIGHT EVENTS: Pt was seen and examined, reports slept better last night, breathing is the same. Better appetite. OOB to chair       Vital Signs Last 24 Hrs  T(C): 36.4 (13 Mar 2022 16:43), Max: 36.7 (12 Mar 2022 23:27)  T(F): 97.6 (13 Mar 2022 16:43), Max: 98.1 (12 Mar 2022 23:27)  HR: 100 (13 Mar 2022 16:43) (88 - 100)  BP: 137/75 (13 Mar 2022 16:43) (128/80 - 144/86)  RR: 18 (13 Mar 2022 16:43) (18 - 18)  SpO2: 93% (13 Mar 2022 16:43) (93% - 96%)  REVIEW OF SYSTEMS:  All other review of systems is negative unless indicated above.      PHYSICAL EXAM:  General: Well developed; looks  ill,  in no acute distress on HFNC  Eyes:  EOMI; conjunctiva and sclera clear  Head: Normocephalic; atraumatic  ENMT: No nasal discharge; airway clear  Neck: Supple; non tender; no masses  Respiratory: Decreased BS, No wheezes, rales or rhonchi  Cardiovascular: Regular rate and rhythm. S1 and S2 Normal;   Gastrointestinal: Soft non-tender non-distended; Normal bowel sounds  Genitourinary: No  suprapubic  tenderness  Extremities: No edema  Vascular: Peripheral pulses palpable 2+ bilaterally  Neurological: Alert and oriented x3, non focal   Skin: Warm and dry. No acute rash  Musculoskeletal: Normal muscle tone and strength   Psychiatric: Cooperative and appropriate        LABS:                         15.5   21.14 )-----------( 187      ( 12 Mar 2022 08:56 )             46.0     03-13    x   |  x   |  x   ----------------------------<  x   x    |  x   |  0.61    Ca    8.5      12 Mar 2022 08:56    TPro  5.8<L>  /  Alb  2.6<L>  /  TBili  1.5<H>  /  DBili  0.4<H>  /  AST  26  /  ALT  74  /  AlkPhos  95  03-13        LIVER FUNCTIONS - ( 13 Mar 2022 08:43 )  Alb: 2.6 g/dL / Pro: 5.8 gm/dL / ALK PHOS: 95 U/L / ALT: 74 U/L / AST: 26 U/L / GGT: x                                   15.5   21.14 )-----------( 187      ( 12 Mar 2022 08:56 )             46.0     03-12    132<L>  |  97  |  18  ----------------------------<  116<H>  4.4   |  31  |  0.85    Ca    8.5      12 Mar 2022 08:56    TPro  5.9<L>  /  Alb  2.9<L>  /  TBili  1.9<H>  /  DBili  0.4<H>  /  AST  27  /  ALT  65  /  AlkPhos  108  03-12    LIVER FUNCTIONS - ( 12 Mar 2022 08:56 )  Alb: 2.9 g/dL / Pro: 5.9 gm/dL / ALK PHOS: 108 U/L / ALT: 65 U/L / AST: 27 U/L / GGT: x                                   16.4   28.18 )-----------( 220      ( 11 Mar 2022 08:34 )             47.0     03-11    130<L>  |  97  |  18  ----------------------------<  108<H>  3.9   |  25  |  0.61    Ca    8.3<L>      11 Mar 2022 08:34  Phos  3.3     03-10  Mg     2.9     03-10    TPro  6.2  /  Alb  3.1<L>  /  TBili  1.9<H>  /  DBili  0.4<H>  /  AST  32  /  ALT  60  /  AlkPhos  128<H>  03-11        LIVER FUNCTIONS - ( 11 Mar 2022 08:34 )  Alb: 3.1 g/dL / Pro: 6.2 gm/dL / ALK PHOS: 128 U/L / ALT: 60 U/L / AST: 32 U/L / GGT: x           ABG - ( 11 Mar 2022 09:28 )  pH, Arterial: 7.47  pH, Blood: x     /  pCO2: 35    /  pO2: 74    / HCO3: 26    / Base Excess: 2.1   /  SaO2: 98                                  16.5   32.85 )-----------( 294      ( 10 Mar 2022 08:37 )             48.4     10 Mar 2022 08:37    132    |  96     |  18     ----------------------------<  112    4.7     |  31     |  0.80     Ca    9.0        10 Mar 2022 08:37  Phos  3.3       10 Mar 2022 08:37  Mg     2.9       10 Mar 2022 08:37    TPro  6.9    /  Alb  3.3    /  TBili  1.6    /  DBili  0.4    /  AST  21     /  ALT  69     /  AlkPhos  128    10 Mar 2022 08:37  PT/INR - ( 09 Mar 2022 09:05 )   PT: 13.4 sec;   INR: 1.15 ratio          LIVER FUNCTIONS - ( 10 Mar 2022 08:37 )  Alb: 3.3 g/dL / Pro: 6.9 gm/dL / ALK PHOS: 128 U/L / ALT: 69 U/L / AST: 21 U/L / GGT: x             MEDICATIONS  (STANDING):  atorvastatin 10 milliGRAM(s) Oral at bedtime  budesonide 160 MICROgram(s)/formoterol 4.5 MICROgram(s) Inhaler 2 Puff(s) Inhalation two times a day  cefepime   IVPB 2000 milliGRAM(s) IV Intermittent every 12 hours  enoxaparin Injectable 80 milliGRAM(s) SubCutaneous every 12 hours  guaiFENesin ER 1200 milliGRAM(s) Oral every 12 hours  methylPREDNISolone sodium succinate Injectable 40 milliGRAM(s) IV Push daily  polyethylene glycol 3350 17 Gram(s) Oral daily  senna 2 Tablet(s) Oral at bedtime  tiotropium 18 MICROgram(s) Capsule 1 Capsule(s) Inhalation daily    MEDICATIONS  (PRN):  acetaminophen     Tablet .. 650 milliGRAM(s) Oral every 6 hours PRN Temp greater or equal to 38C (100.4F), Mild Pain (1 - 3)  ALBUTerol    90 MICROgram(s) HFA Inhaler 2 Puff(s) Inhalation every 6 hours PRN Shortness of Breath and/or Wheezing  aluminum hydroxide/magnesium hydroxide/simethicone Suspension 30 milliLiter(s) Oral every 4 hours PRN Dyspepsia  benzonatate 100 milliGRAM(s) Oral every 8 hours PRN Cough  bisacodyl Suppository 10 milliGRAM(s) Rectal daily PRN Constipation  melatonin 3 milliGRAM(s) Oral at bedtime PRN Insomnia  ondansetron Injectable 4 milliGRAM(s) IV Push every 8 hours PRN Nausea and/or Vomiting  sodium chloride 0.65% Nasal 1 Spray(s) Both Nostrils two times a day PRN Nasal Congestion        RADIOLOGY & ADDITIONAL TESTS:      ACC: [id number] EXAM:  XR CHEST PORTABLE IMMED 1V                        PROCEDURE DATE:  03/10/2022  s    COMPARISON STUDY: 3/1/2022    Frontal expiratory view of the chest shows the heart to be similar in   size. The lungs show slight clearing of the upper right lung with   progression of basilar infiltrates and there is no evidence of   pneumothorax nor pleural effusion.    IMPRESSION:  Changing infiltrates.

## 2022-03-14 NOTE — PROGRESS NOTE ADULT - ASSESSMENT
1) COVID Pneumonia  2) Hypoxemic Respiratory Failure  3) Pulmonary Embolism  4) Dyspnea   5) Combined Pulmonary Fibrosis/Emphysema   6) Abnormal CT Chest         72 yo pt with PMH of HLD,  kidney stone, valvular heart dz, prostate ca s/p Radiation presented  to ED c/o progressive SOB.    Diagnosed with COVID Pneumonia.   CTPE revealed pulmonary emboli in the right upper lobe pulmonary artery and bilateral segmental and subsegmental branches.   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   Not seen Pulmonary in the past.   Received Tocilizumab and remdesivir now receiving solumedrol 30mg q daily  Lovenox 80 q 12 for PE  Patient has been on HFNC- now 50LPM, 70% FiO2  Pending new CT Chest - 3/14  Increased WBC noted and being treated for superimposed Pneumonia with Cefepime   7.47/35/74  Independently reviewed previous CT Chest and new CXR 3/10  GoalSaO2 >87% Continue Spiriva/Symbicort  Echo does not show PH  Due to the underlying COPD/emphysema, his recovery to normalcy will be longer than expected  Appreciate ID recommendations / hospitalist recommendations

## 2022-03-14 NOTE — PROGRESS NOTE ADULT - SUBJECTIVE AND OBJECTIVE BOX
Patient is a 71y old  Male who presents with a chief complaint of SOB (01 Mar 2022 13:52)      HPI:  70 yo pt with PMH of HLD,  kidney stone, valvular heart dz, prostate ca s/p Radiation presented  to ED c/o progressive SOB.    Diagnosed with COVID Pneumonia.   CTPE revealed pulmonary emboli in the right upper lobe pulmonary artery and bilateral segmental and subsegmental branches.   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     3/14  Patient has been on HFNC- 50LPM, 70% FiO2  Pending new CT Chest   Increased WBC noted and being treated for superimposed Pneumonia with Cefepime   7.47/35/74            PAST MEDICAL & SURGICAL HISTORY:  Hypercholesterolemia    Cholelithiasis    Cholecystitis    Valvular heart disease    Psoriasis    History of kidney stones    Inguinal hernia, left    Prostate cancer  s/p XRT 1-6-2020 to 1/27/2021    History of renal calculi    S/P hernia repair  right inguinal; 5/2015    S/P colonoscopy with polypectomy  Baseline; viv polyps; 2012    History of cholecystectomy  2017    History of prostatectomy  5/2020    History of lithotripsy    MEDICATIONS  (STANDING):  atorvastatin 10 milliGRAM(s) Oral at bedtime  budesonide 160 MICROgram(s)/formoterol 4.5 MICROgram(s) Inhaler 2 Puff(s) Inhalation two times a day  cefepime   IVPB 2000 milliGRAM(s) IV Intermittent every 12 hours  enoxaparin Injectable 80 milliGRAM(s) SubCutaneous every 12 hours  guaiFENesin ER 1200 milliGRAM(s) Oral every 12 hours  methylPREDNISolone sodium succinate Injectable 30 milliGRAM(s) IV Push daily  polyethylene glycol 3350 17 Gram(s) Oral daily  senna 2 Tablet(s) Oral at bedtime  tiotropium 18 MICROgram(s) Capsule 1 Capsule(s) Inhalation daily    MEDICATIONS  (PRN):  acetaminophen     Tablet .. 650 milliGRAM(s) Oral every 6 hours PRN Temp greater or equal to 38C (100.4F), Mild Pain (1 - 3)  ALBUTerol    90 MICROgram(s) HFA Inhaler 2 Puff(s) Inhalation every 6 hours PRN Shortness of Breath and/or Wheezing  aluminum hydroxide/magnesium hydroxide/simethicone Suspension 30 milliLiter(s) Oral every 4 hours PRN Dyspepsia  benzonatate 100 milliGRAM(s) Oral every 8 hours PRN Cough  bisacodyl Suppository 10 milliGRAM(s) Rectal daily PRN Constipation  melatonin 3 milliGRAM(s) Oral at bedtime PRN Insomnia  ondansetron Injectable 4 milliGRAM(s) IV Push every 8 hours PRN Nausea and/or Vomiting  sodium chloride 0.65% Nasal 1 Spray(s) Both Nostrils two times a day PRN Nasal Congestion    Vital Signs Last 24 Hrs  T(C): 36.8 (13 Mar 2022 23:36), Max: 36.8 (13 Mar 2022 23:36)  T(F): 98.3 (13 Mar 2022 23:36), Max: 98.3 (13 Mar 2022 23:36)  HR: 95 (13 Mar 2022 23:36) (88 - 100)  BP: 143/83 (13 Mar 2022 23:36) (128/80 - 143/83)  BP(mean): --  RR: 18 (13 Mar 2022 23:36) (18 - 18)  SpO2: 94% (13 Mar 2022 23:36) (93% - 94%)  PHYSICAL EXAM  General Appearance: cooperative, no acute distress, on HFNC  HEENT: PERRL, conjunctiva clear, EOM's intact, non injected pharynx, no exudate, TM   normal  Neck: Supple, , no adenopathy, thyroid: not enlarged, no carotid bruit or JVD  Back: Symmetric, no  tenderness,no soft tissue tenderness  Lungs: coarse at the bases   Heart: Regular rate and rhythm, S1, S2 normal, no murmur, rub or gallop  Abdomen: Soft, non-tender, bowel sounds active , no hepatosplenomegaly  Extremities: no cyanosis or edema, no joint swelling  Skin: Skin color, texture normal, no rashes   Neurologic: Alert and oriented X3    ECG:    LABS:                        16.5   32.85 )-----------( 294      ( 10 Mar 2022 08:37 )             48.4   03-10    132<L>  |  96  |  18  ----------------------------<  112<H>  4.7   |  31  |  0.80    Ca    9.0      10 Mar 2022 08:37  Phos  3.3     03-10  Mg     2.9     03-10    TPro  6.9  /  Alb  3.3  /  TBili  1.6<H>  /  DBili  0.4<H>  /  AST  21  /  ALT  69  /  AlkPhos  128<H>  03-10    \        RADIOLOGY & ADDITIONAL STUDIES:    ACC: [id number] EXAM:  XR CHEST PORTABLE IMMED 1V                          PROCEDURE DATE:  03/10/2022          INTERPRETATION:  Chest one view    HISTORY: Leukocytosis    COMPARISON STUDY: 3/1/2022    Frontal expiratory view of the chest shows the heart to be similar in   size. The lungs show slight clearing of the upper right lung with   progression of basilar infiltrates and there is no evidence of   pneumothorax nor pleural effusion.    IMPRESSION:  Changing infiltrates.  CXR independently reviewed

## 2022-03-14 NOTE — PROGRESS NOTE ADULT - ASSESSMENT
71 year-old man with HLD, nephrolithiasis, extensive past tobacco use history admitted for:       COVID-19 pneumonia, acute hypoxic respiratory failure  Clinically with some improvement  but still acute   C/w  HFNC,  now back to   50L/78% FiO2. O2  Sats stable at low 90s   S/p tocilizumab 3/2.   Completed   Remdesivir  10 days   On Solumedrol IV solumedrol, cont to  titrate  down.   Due to significant leukocytosis with repeat CXR RLL worsening infiltrate can not rule out  secondary bacterial infection. Suspect GNR bacteria. WBCs improving now   C/w Cefepime 2 g BID as d/w DR Brody  Continue oxygen supplementation, wean as tolerated goal SPO2 88-92% or greater  Encourage incentive spirometry  Prone positioning  OOB to chair daily  Trend inflammatory markers   D/w  Dr Carreon,     Multiple pulmonary emboli  In setting of COVID.   C/w  full-dose Lovenox  80mg IBP BID      Likely underlying emphysema  Extensive tobacco use history. CT chest with bullae to suggest emphysema.   Continue Symbicort and Spiriva  Continue Solumedrol     Possible underlying pulmonary fibrosis  Based on CT chest findings, impression from Pulmonary Medicine.   TTE :  Limited echo windows. No sign of PH as per Pulmonary Medicine      Hyponatremia  Due to underlying COVID19 and decreased PO intake.   monitor NA, better today      Mild hyperkalemia  Resolved.   - Continue to monitor    HLD  Chronic, stable  - Continue statin      DVT px: Full dose LMWH for PE  Code Status: Full      Pts wife updated on the phone, was asking permission for brief visit, for support. Will d/w team

## 2022-03-14 NOTE — PROGRESS NOTE ADULT - ASSESSMENT
72 y/o male with h/o HLD,  kidney stone, valvular heart disease, prostate Ca s/p radiation was admitted on 2/28 for progressive  SOB.  Pt reports that on 2/19 he got sick with runny nose, fever and cough and tested positive for covid-19 on 2/21. His fever improved, but his SOB got worse, had difficulty breathing with minimal exertion. He checked his O2 and was below 80. In ER he received remdesivir.    1. Acute respiratory failure. COVID-19 breakthrough viral syndrome. Multifocal pneumonia. Possible superimposed bacterial pneumonia. PE.  -leukocytosis ?cause ?bacterial infection  -respiratory frail; no COVID booster  -respiratory remains frail; has increased O2 requirements  -given tociluzumab infusion on 3/2  -s/p remdesivir protocol # 10  -tolerating abx well so far; no side effects noted  -on cefepime 2 gm IV q12h # 4  -tolerating abx well so far; no side effects noted   -inflammatory markers are elevated  -O2 therapy  -taper steroids  -AC  -droplet isolation  -respiratory care  -continue abx coverage   -monitor temps  -f/u CBC  -supportive care    2. Other issues:   -care per medicine    d/w Dr. COLEMAN Vivas

## 2022-03-14 NOTE — PROGRESS NOTE ADULT - SUBJECTIVE AND OBJECTIVE BOX
Date of service: 03-14-22 @ 12:33    Sitting in bed in NAD  Has dry cough  SOB with light exercise    ROS: no fever or chills; denies dizziness, no HA, no abdominal pain, no diarrhea or constipation; no dysuria, no legs pain, no rashes    MEDICATIONS  (STANDING):  atorvastatin 10 milliGRAM(s) Oral at bedtime  budesonide 160 MICROgram(s)/formoterol 4.5 MICROgram(s) Inhaler 2 Puff(s) Inhalation two times a day  cefepime   IVPB 2000 milliGRAM(s) IV Intermittent every 12 hours  enoxaparin Injectable 80 milliGRAM(s) SubCutaneous every 12 hours  guaiFENesin ER 1200 milliGRAM(s) Oral every 12 hours  methylPREDNISolone sodium succinate Injectable 30 milliGRAM(s) IV Push daily  polyethylene glycol 3350 17 Gram(s) Oral daily  senna 2 Tablet(s) Oral at bedtime  tiotropium 18 MICROgram(s) Capsule 1 Capsule(s) Inhalation daily    Vital Signs Last 24 Hrs  T(C): 36.6 (14 Mar 2022 08:27), Max: 36.8 (13 Mar 2022 23:36)  T(F): 97.8 (14 Mar 2022 08:27), Max: 98.3 (13 Mar 2022 23:36)  HR: 84 (14 Mar 2022 09:40) (66 - 100)  BP: 113/64 (14 Mar 2022 08:27) (113/64 - 143/83)  BP(mean): --  RR: 21 (14 Mar 2022 09:40) (18 - 21)  SpO2: 88% (14 Mar 2022 09:40) (88% - 98%)     Physical exam:    Constitutional:  No acute distress  HEENT: NC/AT, EOMI, PERRLA, conjunctivae clear; ears and nose atraumatic  Neck: supple; thyroid not palpable  Back: no tenderness  Respiratory: respiratory effort normal; crackles at bases  Cardiovascular: S1S2 regular, no murmurs  Abdomen: soft, not tender, not distended, positive BS  Genitourinary: no suprapubic tenderness  Lymphatic: no LN palpable  Musculoskeletal: no muscle tenderness, no joint swelling or tenderness  Extremities: no pedal edema  Neurological/ Psychiatric: Alert; moving all extremities  Skin: no rashes; no palpable lesions    Labs: reviewed                        15.0   20.14 )-----------( 152      ( 14 Mar 2022 11:39 )             44.2     03-14    133<L>  |  100  |  16  ----------------------------<  176<H>  3.6   |  24  |  0.79    Ca    8.8      14 Mar 2022 11:39    TPro  6.1  /  Alb  2.9<L>  /  TBili  1.5<H>  /  DBili  0.3  /  AST  54<H>  /  ALT  110<H>  /  AlkPhos  117  03-14      D-Dimer Assay, Quantitative: 538 ng/mL DDU (03-14-22 @ 11:39)  D-Dimer Assay, Quantitative: 622 ng/mL DDU (03-11-22 @ 08:34)  C-Reactive Protein, Serum: <3 mg/L (03-11-22 @ 08:34)  Ferritin, Serum: 1176 ng/mL (03-11-22 @ 08:34)  Ferritin, Serum: 1068 ng/mL (03-10-22 @ 08:37)  C-Reactive Protein, Serum: <3 mg/L (03-10-22 @ 08:37)  Ferritin, Serum: 973 ng/mL (03-06-22 @ 07:32)  C-Reactive Protein, Serum: 8 mg/L (03-06-22 @ 07:32)  D-Dimer Assay, Quantitative: 378 ng/mL DDU (03-06-22 @ 07:32)  Ferritin, Serum: 972 ng/mL (03-04-22 @ 08:34)  C-Reactive Protein, Serum: 40 mg/L (03-04-22 @ 08:34)  D-Dimer Assay, Quantitative: 733 ng/mL DDU (03-04-22 @ 08:34)  Ferritin, Serum: 909 ng/mL (03-03-22 @ 09:38)  C-Reactive Protein, Serum: 99 mg/L (03-03-22 @ 07:18)                        16.4   28.18 )-----------( 220      ( 11 Mar 2022 08:34 )             47.0     03-10    132<L>  |  96  |  18  ----------------------------<  112<H>  4.7   |  31  |  0.80    Ca    9.0      10 Mar 2022 08:37  Phos  3.3     03-10  Mg     2.9     03-10    TPro  6.9  /  Alb  3.3  /  TBili  1.6<H>  /  DBili  0.4<H>  /  AST  21  /  ALT  69  /  AlkPhos  128<H>  03-10    03-09    131<L>  |  98  |  21  ----------------------------<  108<H>  4.2   |  25  |  0.67    Ca    8.8      09 Mar 2022 09:05    TPro  6.6  /  Alb  2.9<L>  /  TBili  1.6<H>  /  DBili  0.3  /  AST  25  /  ALT  78  /  AlkPhos  112  03-09                        15.1   14.13 )-----------( 333      ( 04 Mar 2022 08:34 )             45.2     03-05    136  |  107  |  27<H>  ----------------------------<  121<H>  4.5   |  24  |  0.74    Ca    8.9      05 Mar 2022 07:45    TPro  6.6  /  Alb  2.9<L>  /  TBili  1.0  /  DBili  0.2  /  AST  31  /  ALT  81<H>  /  AlkPhos  79  03-05                        13.2   9.95  )-----------( 259      ( 01 Mar 2022 07:00 )             40.2     03-01    139  |  108  |  19  ----------------------------<  164<H>  4.5   |  26  |  0.88    Ca    9.3      01 Mar 2022 07:00    TPro  7.1  /  Alb  2.9<L>  /  TBili  0.8  /  DBili  0.2  /  AST  34  /  ALT  85<H>  /  AlkPhos  72  03-01     LIVER FUNCTIONS - ( 01 Mar 2022 07:00 )  Alb: 2.9 g/dL / Pro: 7.1 gm/dL / ALK PHOS: 72 U/L / ALT: 85 U/L / AST: 34 U/L / GGT: x           COVID (02-28 @ 08:16)  Detected      Culture - Blood (collected 28 Feb 2022 08:35)  Source: .Blood None  Preliminary Report (01 Mar 2022 13:02):    No growth to date.    Radiology: all available radiological tests reviewed    < from: Xray Chest 1 View- PORTABLE-Urgent (02.28.22 @ 09:00) >  IMPRESSION: Mild bibasilar infiltrates left greater than right.  < end of copied text >    < from: CT Angio Chest PE Protocol w/ IV Cont (03.01.22 @ 14:14) >  Pulmonary emboli in the right upper lobe pulmonary artery and bilateral   segmental and subsegmental branches. This was discussed with Dr. Hazel   at 2:36 PM 3/1/2022.  Diffuse lung abnormality consistent with COVID.  < end of copied text >      Advanced directives addressed: full resuscitation

## 2022-03-15 NOTE — PROGRESS NOTE ADULT - SUBJECTIVE AND OBJECTIVE BOX
CC: SOB (10 Mar 2022 08:27)    HPI:  70 yo pt with PMH of HLD,  kidney stone, valvular heart dz, prostate ca s/p Radiation presented  to ED c/o progressive  SOB.  Pt reports he got sich with runny nose and cough 2/19 and tested positive for covid19 on 2/21.  Pt reports that he did have fevers but resolved  but SOB got worse, had difficulty breathing with minimal exertion. he checked his O2 and was below 80.  No CP, no palpitations, no leg pain.   Pt is s/p  2 shots of Pfizer  vaccine with second one in april 2021. No Booster.   Pts wife also sick at home with covid19.   In ED: Pt is hypoxic down to 70s on RA, was initially placed on NRB, later weaned of to 5L NC. Pt received IV dexamethasone and Remdesivir. S/p 1L IVF     INTERVAL HPI/ OVERNIGHT EVENTS: Pt was seen and examined,  no changes, was very emotional and discouraged of episode of low O2 sats this am, now improved.  HFNC settings were adjusted. Emotional support provided,  explained nature of  Dz and risk for prolong recovery due to baseline Lung DZ    Vital Signs Last 24 Hrs  T(C): 36.4 (13 Mar 2022 16:43), Max: 36.7 (12 Mar 2022 23:27)  T(F): 97.6 (13 Mar 2022 16:43), Max: 98.1 (12 Mar 2022 23:27)  HR: 100 (13 Mar 2022 16:43) (88 - 100)  BP: 137/75 (13 Mar 2022 16:43) (128/80 - 144/86)  RR: 18 (13 Mar 2022 16:43) (18 - 18)  SpO2: 93% (13 Mar 2022 16:43) (93% - 96%)      REVIEW OF SYSTEMS:  All other review of systems is negative unless indicated above.      PHYSICAL EXAM:  General: Well developed; looks  ill,  in no acute distress on HFNC  Eyes:  EOMI; conjunctiva and sclera clear  Head: Normocephalic; atraumatic  ENMT: No nasal discharge; airway clear  Neck: Supple; non tender; no masses  Respiratory: Decreased BS at bases, overall air entry improved, No wheezes   Cardiovascular: Regular rate and rhythm. S1 and S2 Normal;   Gastrointestinal: Soft non-tender non-distended; Normal bowel sounds  Genitourinary: No  suprapubic  tenderness  Extremities: No edema  Vascular: Peripheral pulses palpable 2+ bilaterally  Neurological: Alert and oriented x3, non focal   Skin: Warm and dry. No acute rash  Musculoskeletal: Normal muscle tone and strength   Psychiatric: Cooperative and appropriate        LABS:                           15.0   20.14 )-----------( 152      ( 14 Mar 2022 11:39 )             44.2     03-14    133<L>  |  100  |  16  ----------------------------<  176<H>  3.6   |  24  |  0.79    Ca    8.8      14 Mar 2022 11:39    TPro  6.1  /  Alb  2.9<L>  /  TBili  1.5<H>  /  DBili  0.3  /  AST  54<H>  /  ALT  110<H>  /  AlkPhos  117  03-14        LIVER FUNCTIONS - ( 14 Mar 2022 11:39 )  Alb: 2.9 g/dL / Pro: 6.1 gm/dL / ALK PHOS: 117 U/L / ALT: 110 U/L / AST: 54 U/L / GGT: x                                 15.5   21.14 )-----------( 187      ( 12 Mar 2022 08:56 )             46.0     03-13    x   |  x   |  x   ----------------------------<  x   x    |  x   |  0.61    Ca    8.5      12 Mar 2022 08:56    TPro  5.8<L>  /  Alb  2.6<L>  /  TBili  1.5<H>  /  DBili  0.4<H>  /  AST  26  /  ALT  74  /  AlkPhos  95  03-13        LIVER FUNCTIONS - ( 13 Mar 2022 08:43 )  Alb: 2.6 g/dL / Pro: 5.8 gm/dL / ALK PHOS: 95 U/L / ALT: 74 U/L / AST: 26 U/L / GGT: x                                   15.5   21.14 )-----------( 187      ( 12 Mar 2022 08:56 )             46.0     03-12    132<L>  |  97  |  18  ----------------------------<  116<H>  4.4   |  31  |  0.85    Ca    8.5      12 Mar 2022 08:56    TPro  5.9<L>  /  Alb  2.9<L>  /  TBili  1.9<H>  /  DBili  0.4<H>  /  AST  27  /  ALT  65  /  AlkPhos  108  03-12    LIVER FUNCTIONS - ( 12 Mar 2022 08:56 )  Alb: 2.9 g/dL / Pro: 5.9 gm/dL / ALK PHOS: 108 U/L / ALT: 65 U/L / AST: 27 U/L / GGT: x                                   16.4   28.18 )-----------( 220      ( 11 Mar 2022 08:34 )             47.0     03-11    130<L>  |  97  |  18  ----------------------------<  108<H>  3.9   |  25  |  0.61    Ca    8.3<L>      11 Mar 2022 08:34  Phos  3.3     03-10  Mg     2.9     03-10    TPro  6.2  /  Alb  3.1<L>  /  TBili  1.9<H>  /  DBili  0.4<H>  /  AST  32  /  ALT  60  /  AlkPhos  128<H>  03-11        LIVER FUNCTIONS - ( 11 Mar 2022 08:34 )  Alb: 3.1 g/dL / Pro: 6.2 gm/dL / ALK PHOS: 128 U/L / ALT: 60 U/L / AST: 32 U/L / GGT: x           ABG - ( 11 Mar 2022 09:28 )  pH, Arterial: 7.47  pH, Blood: x     /  pCO2: 35    /  pO2: 74    / HCO3: 26    / Base Excess: 2.1   /  SaO2: 98                                  16.5   32.85 )-----------( 294      ( 10 Mar 2022 08:37 )             48.4     10 Mar 2022 08:37    132    |  96     |  18     ----------------------------<  112    4.7     |  31     |  0.80     Ca    9.0        10 Mar 2022 08:37  Phos  3.3       10 Mar 2022 08:37  Mg     2.9       10 Mar 2022 08:37    TPro  6.9    /  Alb  3.3    /  TBili  1.6    /  DBili  0.4    /  AST  21     /  ALT  69     /  AlkPhos  128    10 Mar 2022 08:37  PT/INR - ( 09 Mar 2022 09:05 )   PT: 13.4 sec;   INR: 1.15 ratio          LIVER FUNCTIONS - ( 10 Mar 2022 08:37 )  Alb: 3.3 g/dL / Pro: 6.9 gm/dL / ALK PHOS: 128 U/L / ALT: 69 U/L / AST: 21 U/L / GGT: x             MEDICATIONS  (STANDING):  atorvastatin 10 milliGRAM(s) Oral at bedtime  budesonide 160 MICROgram(s)/formoterol 4.5 MICROgram(s) Inhaler 2 Puff(s) Inhalation two times a day  cefepime   IVPB 2000 milliGRAM(s) IV Intermittent every 12 hours  enoxaparin Injectable 80 milliGRAM(s) SubCutaneous every 12 hours  guaiFENesin ER 1200 milliGRAM(s) Oral every 12 hours  methylPREDNISolone sodium succinate Injectable 30 milliGRAM(s) IV Push daily  polyethylene glycol 3350 17 Gram(s) Oral daily  senna 2 Tablet(s) Oral at bedtime  tiotropium 18 MICROgram(s) Capsule 1 Capsule(s) Inhalation daily    MEDICATIONS  (PRN):  acetaminophen     Tablet .. 650 milliGRAM(s) Oral every 6 hours PRN Temp greater or equal to 38C (100.4F), Mild Pain (1 - 3)  ALBUTerol    90 MICROgram(s) HFA Inhaler 2 Puff(s) Inhalation every 6 hours PRN Shortness of Breath and/or Wheezing  aluminum hydroxide/magnesium hydroxide/simethicone Suspension 30 milliLiter(s) Oral every 4 hours PRN Dyspepsia  benzonatate 100 milliGRAM(s) Oral every 8 hours PRN Cough  bisacodyl Suppository 10 milliGRAM(s) Rectal daily PRN Constipation  melatonin 3 milliGRAM(s) Oral at bedtime PRN Insomnia  ondansetron Injectable 4 milliGRAM(s) IV Push every 8 hours PRN Nausea and/or Vomiting  sodium chloride 0.65% Nasal 1 Spray(s) Both Nostrils two times a day PRN Nasal Congestion        RADIOLOGY & ADDITIONAL TESTS:      ACC: [id number] EXAM:  XR CHEST PORTABLE IMMED 1V                        PROCEDURE DATE:  03/10/2022  s    COMPARISON STUDY: 3/1/2022  Frontal expiratory view of the chest shows the heart to be similar in   size. The lungs show slight clearing of the upper right lung with   progression of basilar infiltrates and there is no evidence of   pneumothorax nor pleural effusion.    IMPRESSION:  Changing infiltrates.       CC: SOB (10 Mar 2022 08:27)    HPI:  72 yo pt with PMH of HLD,  kidney stone, valvular heart dz, prostate ca s/p Radiation presented  to ED c/o progressive  SOB.  Pt reports he got sich with runny nose and cough 2/19 and tested positive for covid19 on 2/21.  Pt reports that he did have fevers but resolved  but SOB got worse, had difficulty breathing with minimal exertion. he checked his O2 and was below 80.  No CP, no palpitations, no leg pain.   Pt is s/p  2 shots of Pfizer  vaccine with second one in april 2021. No Booster.   Pts wife also sick at home with covid19.   In ED: Pt is hypoxic down to 70s on RA, was initially placed on NRB, later weaned of to 5L NC. Pt received IV dexamethasone and Remdesivir. S/p 1L IVF     INTERVAL HPI/ OVERNIGHT EVENTS: Pt was seen and examined,  no changes, was very emotional and discouraged of episode of low O2 sats this am, now improved.  HFNC settings were adjusted. Emotional support provided,  explained nature of  Dz and risk for prolong recovery due to baseline Lung DZ    REVIEW OF SYSTEMS:  All other review of systems is negative unless indicated above.    PHYSICAL EXAM:  ICU Vital Signs Last 24 Hrs  T(C): 36.6 (15 Mar 2022 10:34), Max: 36.6 (15 Mar 2022 10:34)  T(F): 97.8 (15 Mar 2022 10:34), Max: 97.8 (15 Mar 2022 10:34)  HR: 103 (15 Mar 2022 10:34) (66 - 104)  BP: 138/69 (15 Mar 2022 10:34) (138/69 - 138/87)  RR: 18 (15 Mar 2022 10:34) (18 - 19)  SpO2: 94% (15 Mar 2022 16:01) (86% - 97%)  General: Well developed; looks  ill,  in no acute distress on HFNC  Eyes:  EOMI; conjunctiva and sclera clear  Head: Normocephalic; atraumatic  ENMT: No nasal discharge; airway clear  Neck: Supple; non tender; no masses  Respiratory: Decreased BS at bases, overall air entry improved, No wheezes   Cardiovascular: Regular rate and rhythm. S1 and S2 Normal;   Gastrointestinal: Soft non-tender non-distended; Normal bowel sounds  Genitourinary: No  suprapubic  tenderness  Extremities: No edema  Vascular: Peripheral pulses palpable 2+ bilaterally  Neurological: Alert and oriented x3, non focal   Skin: Warm and dry. No acute rash  Musculoskeletal: Normal muscle tone and strength   Psychiatric: Cooperative and appropriate        LABS:     CBC Full  -  ( 15 Mar 2022 07:49 )  WBC Count : 18.29 K/uL  RBC Count : 4.78 M/uL  Hemoglobin : 13.8 g/dL  Hematocrit : 40.6 %  Platelet Count - Automated : 128 K/uL    133<L>  |  100  |  17  ----------------------------<  100<H>  3.8   |  27  |  0.59    Ca    8.6      15 Mar 2022 07:49    TPro  5.8<L>  /  Alb  2.8<L>  /  TBili  1.2  /  DBili  0.3  /  AST  40<H>  /  ALT  118<H>  /  AlkPhos  95  03-15        RADIOLOGY & ADDITIONAL TESTS:      ACC: [id number] EXAM:  XR CHEST PORTABLE IMMED 1V                        PROCEDURE DATE:  03/10/2022  s    COMPARISON STUDY: 3/1/2022  Frontal expiratory view of the chest shows the heart to be similar in   size. The lungs show slight clearing of the upper right lung with   progression of basilar infiltrates and there is no evidence of   pneumothorax nor pleural effusion.    IMPRESSION:  Changing infiltrates.

## 2022-03-15 NOTE — PROGRESS NOTE ADULT - ASSESSMENT
71 year-old man with HLD, nephrolithiasis, extensive past tobacco use history admitted for:       COVID-19 pneumonia, acute hypoxic respiratory failure  Clinically with some improvement  but still acute   C/w  HFNC,  now back to   50L/78% FiO2. O2  Sats stable at low 90s   S/p tocilizumab 3/2.   Completed   Remdesivir  10 days   On Solumedrol IV solumedrol, cont to  titrate  down.   Due to significant leukocytosis with repeat CXR RLL worsening infiltrate can not rule out  secondary bacterial infection. Suspect GNR bacteria. WBCs improving now   C/w Cefepime 2 g BID as d/w DR Brody  Continue oxygen supplementation, wean as tolerated goal SPO2 88-92% or greater  Encourage incentive spirometry  Prone positioning  OOB to chair daily  Trend inflammatory markers   D/w  Dr Carreon,     Multiple pulmonary emboli  In setting of COVID.   C/w  full-dose Lovenox  80mg IBP BID      Likely underlying emphysema  Extensive tobacco use history. CT chest with bullae to suggest emphysema.   Continue Symbicort and Spiriva  Continue Solumedrol     Possible underlying pulmonary fibrosis  Based on CT chest findings, impression from Pulmonary Medicine.   TTE :  Limited echo windows. No sign of PH as per Pulmonary Medicine      Hyponatremia  Due to underlying COVID19 and decreased PO intake.   monitor NA, better today      Mild hyperkalemia  Resolved.   - Continue to monitor    HLD  Chronic, stable  - Continue statin      DVT px: Full dose LMWH for PE  Code Status: Full      Pts wife updated on the phone, was asking permission for brief visit, for support. Will d/w team      71 year-old man with HLD, nephrolithiasis, extensive past tobacco use history admitted for:       # COVID-19 pneumonia, acute hypoxic respiratory failure  - Clinically with some improvement  but still acute   - on HF 40LPM, 80% FiO2  - S/p tocilizumab 3/2.   - Completed   Remdesivir  10 days   - On prednisone  - Due to significant leukocytosis with repeat CXR RLL worsening infiltrate can not rule out  secondary bacterial infection. Suspect GNR bacteria. WBCs improving now   - C/w Cefepime 2 g BID as d/w DR Brody  - Continue oxygen supplementation, wean as tolerated goal SPO2 88-92% or greater  - Encourage incentive spirometry  - Prone positioning  - OOB to chair daily  - Trend inflammatory markers     Multiple pulmonary emboli  - In setting of COVID.   - C/w  full-dose Lovenox  80mg IV BID    Likely underlying emphysema  - Extensive tobacco use history. CT chest with bullae to suggest emphysema.   - Continue Symbicort and Spiriva  - Continue Solumedrol     Possible underlying pulmonary fibrosis  Based on CT chest findings, impression from Pulmonary Medicine.   TTE :  Limited echo windows. No sign of PH as per Pulmonary Medicine      Hyponatremia  Due to underlying COVID19 and decreased PO intake.   monitor NA, better today      Mild hyperkalemia  Resolved.   - Continue to monitor    HLD  Chronic, stable  - Continue statin      DVT px: Full dose LMWH for PE  Code Status: Full      Pts wife updated on the phone, was asking permission for brief visit, for support. Will d/w team

## 2022-03-15 NOTE — PROGRESS NOTE ADULT - ASSESSMENT
72 y/o male with h/o HLD,  kidney stone, valvular heart disease, prostate Ca s/p radiation was admitted on 2/28 for progressive  SOB.  Pt reports that on 2/19 he got sick with runny nose, fever and cough and tested positive for covid-19 on 2/21. His fever improved, but his SOB got worse, had difficulty breathing with minimal exertion. He checked his O2 and was below 80. In ER he received remdesivir.    1. Acute respiratory failure. COVID-19 breakthrough viral syndrome. Multifocal pneumonia. Possible superimposed bacterial pneumonia. PE.  -leukocytosis ?cause ?bacterial infection  -respiratory frail; no COVID booster  -respiratory remains frail; has increased O2 requirements  -given tociluzumab infusion on 3/2  -s/p remdesivir protocol # 10  -tolerating abx well so far; no side effects noted  -on cefepime 2 gm IV q12h # 5  -tolerating abx well so far; no side effects noted   -inflammatory markers are elevated  -O2 therapy  -taper steroids  -AC  -droplet isolation  -respiratory care  -continue abx coverage for now  -monitor temps  -f/u CBC  -supportive care    2. Other issues:   -care per medicine    d/w Dr. COLEMAN Vivas no confusion/no loss of consciousness/no numbness/no tingling/no vomiting/no weakness

## 2022-03-15 NOTE — PROGRESS NOTE ADULT - ASSESSMENT
1) COVID Pneumonia  2) Hypoxemic Respiratory Failure  3) Pulmonary Embolism  4) Dyspnea   5) Combined Pulmonary Fibrosis/Emphysema   6) Abnormal CT Chest         70 yo pt with PMH of HLD,  kidney stone, valvular heart dz, prostate ca s/p Radiation presented  to ED c/o progressive SOB.    Diagnosed with COVID Pneumonia.   CTPE revealed pulmonary emboli in the right upper lobe pulmonary artery and bilateral segmental and subsegmental branches.   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   Not seen Pulmonary in the past.   Received Tocilizumab and remdesivir now receiving solumedrol 30mg q daily  Lovenox 80 q 12 for PE  Patient has been on HFNC- now 40LPM, 80% FiO2  Pending new CT Chest and abd us - 3/14  Increased WBC noted and being treated for superimposed Pneumonia with Cefepime   7.47/35/74  Independently reviewed previous CT Chest and new CXR 3/10  GoalSaO2 >87% Continue Spiriva/Symbicort  Echo does not show PH  Due to the underlying COPD/emphysema, his recovery to normalcy will be longer than expected  Appreciate ID recommendations / hospitalist recommendations

## 2022-03-15 NOTE — PROGRESS NOTE ADULT - SUBJECTIVE AND OBJECTIVE BOX
Patient is a 71y old  Male who presents with a chief complaint of SOB (01 Mar 2022 13:52)      HPI:  72 yo pt with PMH of HLD,  kidney stone, valvular heart dz, prostate ca s/p Radiation presented  to ED c/o progressive SOB.    Diagnosed with COVID Pneumonia.   CTPE revealed pulmonary emboli in the right upper lobe pulmonary artery and bilateral segmental and subsegmental branches.   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     3/15  Patient has been on HFNC- 40LPM, 80% FiO2  CT chest, US Abd pending  Increased WBC noted and being treated for superimposed Pneumonia with Cefepime   7.47/35/74            PAST MEDICAL & SURGICAL HISTORY:  Hypercholesterolemia    Cholelithiasis    Cholecystitis    Valvular heart disease    Psoriasis    History of kidney stones    Inguinal hernia, left    Prostate cancer  s/p XRT 1-6-2020 to 1/27/2021    History of renal calculi    S/P hernia repair  right inguinal; 5/2015    S/P colonoscopy with polypectomy  Baseline; viv polyps; 2012    History of cholecystectomy  2017    History of prostatectomy  5/2020    History of lithotripsy    MEDICATIONS  (STANDING):  atorvastatin 10 milliGRAM(s) Oral at bedtime  budesonide 160 MICROgram(s)/formoterol 4.5 MICROgram(s) Inhaler 2 Puff(s) Inhalation two times a day  cefepime   IVPB 2000 milliGRAM(s) IV Intermittent every 12 hours  enoxaparin Injectable 80 milliGRAM(s) SubCutaneous every 12 hours  guaiFENesin ER 1200 milliGRAM(s) Oral every 12 hours  methylPREDNISolone sodium succinate Injectable 30 milliGRAM(s) IV Push daily  polyethylene glycol 3350 17 Gram(s) Oral daily  senna 2 Tablet(s) Oral at bedtime  tiotropium 18 MICROgram(s) Capsule 1 Capsule(s) Inhalation daily    MEDICATIONS  (PRN):  acetaminophen     Tablet .. 650 milliGRAM(s) Oral every 6 hours PRN Temp greater or equal to 38C (100.4F), Mild Pain (1 - 3)  ALBUTerol    90 MICROgram(s) HFA Inhaler 2 Puff(s) Inhalation every 6 hours PRN Shortness of Breath and/or Wheezing  aluminum hydroxide/magnesium hydroxide/simethicone Suspension 30 milliLiter(s) Oral every 4 hours PRN Dyspepsia  benzonatate 100 milliGRAM(s) Oral every 8 hours PRN Cough  bisacodyl Suppository 10 milliGRAM(s) Rectal daily PRN Constipation  melatonin 3 milliGRAM(s) Oral at bedtime PRN Insomnia  ondansetron Injectable 4 milliGRAM(s) IV Push every 8 hours PRN Nausea and/or Vomiting  sodium chloride 0.65% Nasal 1 Spray(s) Both Nostrils two times a day PRN Nasal Congestion        Vital Signs Last 24 Hrs  T(C): 36.4 (14 Mar 2022 23:29), Max: 36.6 (14 Mar 2022 16:21)  T(F): 97.6 (14 Mar 2022 23:29), Max: 97.9 (14 Mar 2022 16:21)  HR: 104 (14 Mar 2022 23:29) (66 - 107)  BP: 138/87 (14 Mar 2022 23:29) (120/79 - 138/87)  BP(mean): --  RR: 18 (15 Mar 2022 05:37) (18 - 21)  SpO2: 86% (15 Mar 2022 08:08) (86% - 98%)    PHYSICAL EXAM  General Appearance: cooperative, no acute distress, on HFNC  HEENT: PERRL, conjunctiva clear, EOM's intact, non injected pharynx, no exudate, TM   normal  Neck: Supple, , no adenopathy, thyroid: not enlarged, no carotid bruit or JVD  Back: Symmetric, no  tenderness,no soft tissue tenderness  Lungs: coarse at the bases   Heart: Regular rate and rhythm, S1, S2 normal, no murmur, rub or gallop  Abdomen: Soft, non-tender, bowel sounds active , no hepatosplenomegaly  Extremities: no cyanosis or edema, no joint swelling  Skin: Skin color, texture normal, no rashes   Neurologic: Alert and oriented X3    ECG:    LABS:                        16.5   32.85 )-----------( 294      ( 10 Mar 2022 08:37 )             48.4   03-10    132<L>  |  96  |  18  ----------------------------<  112<H>  4.7   |  31  |  0.80    Ca    9.0      10 Mar 2022 08:37  Phos  3.3     03-10  Mg     2.9     03-10    TPro  6.9  /  Alb  3.3  /  TBili  1.6<H>  /  DBili  0.4<H>  /  AST  21  /  ALT  69  /  AlkPhos  128<H>  03-10    \        RADIOLOGY & ADDITIONAL STUDIES:    ACC: [id number] EXAM:  XR CHEST PORTABLE IMMED 1V                          PROCEDURE DATE:  03/10/2022          INTERPRETATION:  Chest one view    HISTORY: Leukocytosis    COMPARISON STUDY: 3/1/2022    Frontal expiratory view of the chest shows the heart to be similar in   size. The lungs show slight clearing of the upper right lung with   progression of basilar infiltrates and there is no evidence of   pneumothorax nor pleural effusion.    IMPRESSION:  Changing infiltrates.  CXR independently reviewed

## 2022-03-15 NOTE — PROGRESS NOTE ADULT - SUBJECTIVE AND OBJECTIVE BOX
Date of service: 03-15-22 @ 08:55    Lying in bed in NAD  Mild hypoxia on high flow O2  Has dry cough  No fever    ROS: no fever or chills; denies dizziness, no HA, has SOB or cough, no abdominal pain, no diarrhea or constipation; no dysuria, no legs pain, no rashes    MEDICATIONS  (STANDING):  atorvastatin 10 milliGRAM(s) Oral at bedtime  budesonide 160 MICROgram(s)/formoterol 4.5 MICROgram(s) Inhaler 2 Puff(s) Inhalation two times a day  cefepime   IVPB 2000 milliGRAM(s) IV Intermittent every 12 hours  enoxaparin Injectable 80 milliGRAM(s) SubCutaneous every 12 hours  guaiFENesin ER 1200 milliGRAM(s) Oral every 12 hours  polyethylene glycol 3350 17 Gram(s) Oral daily  predniSONE   Tablet 20 milliGRAM(s) Oral daily  senna 2 Tablet(s) Oral at bedtime  tiotropium 18 MICROgram(s) Capsule 1 Capsule(s) Inhalation daily    Vital Signs Last 24 Hrs  T(C): 36.4 (14 Mar 2022 23:29), Max: 36.6 (14 Mar 2022 16:21)  T(F): 97.6 (14 Mar 2022 23:29), Max: 97.9 (14 Mar 2022 16:21)  HR: 104 (14 Mar 2022 23:29) (66 - 107)  BP: 138/87 (14 Mar 2022 23:29) (120/79 - 138/87)  BP(mean): --  RR: 18 (15 Mar 2022 05:37) (18 - 21)  SpO2: 86% (15 Mar 2022 08:08) (86% - 98%)     Physical exam:    Constitutional:  No acute distress  HEENT: NC/AT, EOMI, PERRLA, conjunctivae clear; ears and nose atraumatic  Neck: supple; thyroid not palpable  Back: no tenderness  Respiratory: respiratory effort normal; crackles at bases  Cardiovascular: S1S2 regular, no murmurs  Abdomen: soft, not tender, not distended, positive BS  Genitourinary: no suprapubic tenderness  Lymphatic: no LN palpable  Musculoskeletal: no muscle tenderness, no joint swelling or tenderness  Extremities: no pedal edema  Neurological/ Psychiatric: Alert; moving all extremities  Skin: no rashes; no palpable lesions    Labs: reviewed                        13.8   18.29 )-----------( 128      ( 15 Mar 2022 07:49 )             40.6     03-15    133<L>  |  100  |  17  ----------------------------<  100<H>  3.8   |  27  |  0.59    Ca    8.6      15 Mar 2022 07:49    TPro  5.8<L>  /  Alb  2.8<L>  /  TBili  1.2  /  DBili  0.3  /  AST  40<H>  /  ALT  118<H>  /  AlkPhos  95  03-15        C-Reactive Protein, Serum: <3 mg/L (03-14-22 @ 11:39)  D-Dimer Assay, Quantitative: 538 ng/mL DDU (03-14-22 @ 11:39)  D-Dimer Assay, Quantitative: 622 ng/mL DDU (03-11-22 @ 08:34)  C-Reactive Protein, Serum: <3 mg/L (03-11-22 @ 08:34)  Ferritin, Serum: 1176 ng/mL (03-11-22 @ 08:34)  Ferritin, Serum: 1068 ng/mL (03-10-22 @ 08:37)  C-Reactive Protein, Serum: <3 mg/L (03-10-22 @ 08:37)  Ferritin, Serum: 973 ng/mL (03-06-22 @ 07:32)  C-Reactive Protein, Serum: 8 mg/L (03-06-22 @ 07:32)  D-Dimer Assay, Quantitative: 378 ng/mL DDU (03-06-22 @ 07:32)  Ferritin, Serum: 972 ng/mL (03-04-22 @ 08:34)  C-Reactive Protein, Serum: 40 mg/L (03-04-22 @ 08:34)  D-Dimer Assay, Quantitative: 733 ng/mL DDU (03-04-22 @ 08:34)  Ferritin, Serum: 909 ng/mL (03-03-22 @ 09:38)                        15.0   20.14 )-----------( 152      ( 14 Mar 2022 11:39 )             44.2     03-14    133<L>  |  100  |  16  ----------------------------<  176<H>  3.6   |  24  |  0.79    Ca    8.8      14 Mar 2022 11:39    TPro  6.1  /  Alb  2.9<L>  /  TBili  1.5<H>  /  DBili  0.3  /  AST  54<H>  /  ALT  110<H>  /  AlkPhos  117  03-14                            16.4   28.18 )-----------( 220      ( 11 Mar 2022 08:34 )             47.0     03-10    132<L>  |  96  |  18  ----------------------------<  112<H>  4.7   |  31  |  0.80    Ca    9.0      10 Mar 2022 08:37  Phos  3.3     03-10  Mg     2.9     03-10    TPro  6.9  /  Alb  3.3  /  TBili  1.6<H>  /  DBili  0.4<H>  /  AST  21  /  ALT  69  /  AlkPhos  128<H>  03-10                        13.2   9.95  )-----------( 259      ( 01 Mar 2022 07:00 )             40.2     03-01    139  |  108  |  19  ----------------------------<  164<H>  4.5   |  26  |  0.88    Ca    9.3      01 Mar 2022 07:00    TPro  7.1  /  Alb  2.9<L>  /  TBili  0.8  /  DBili  0.2  /  AST  34  /  ALT  85<H>  /  AlkPhos  72  03-01     LIVER FUNCTIONS - ( 01 Mar 2022 07:00 )  Alb: 2.9 g/dL / Pro: 7.1 gm/dL / ALK PHOS: 72 U/L / ALT: 85 U/L / AST: 34 U/L / GGT: x           COVID (02-28 @ 08:16)  Detected      Culture - Blood (collected 28 Feb 2022 08:35)  Source: .Blood None  Preliminary Report (01 Mar 2022 13:02):    No growth to date.    Radiology: all available radiological tests reviewed    < from: Xray Chest 1 View- PORTABLE-Urgent (02.28.22 @ 09:00) >  IMPRESSION: Mild bibasilar infiltrates left greater than right.  < end of copied text >    < from: CT Angio Chest PE Protocol w/ IV Cont (03.01.22 @ 14:14) >  Pulmonary emboli in the right upper lobe pulmonary artery and bilateral   segmental and subsegmental branches. This was discussed with Dr. Hazel   at 2:36 PM 3/1/2022.  Diffuse lung abnormality consistent with COVID.  < end of copied text >      Advanced directives addressed: full resuscitation

## 2022-03-16 NOTE — PROGRESS NOTE ADULT - SUBJECTIVE AND OBJECTIVE BOX
CC: SOB (10 Mar 2022 08:27)    HPI:  72 yo pt with PMH of HLD,  kidney stone, valvular heart dz, prostate ca s/p Radiation presented  to ED c/o progressive  SOB.  Pt reports he got sich with runny nose and cough 2/19 and tested positive for covid19 on 2/21.  Pt reports that he did have fevers but resolved  but SOB got worse, had difficulty breathing with minimal exertion. he checked his O2 and was below 80.  No CP, no palpitations, no leg pain.   Pt is s/p  2 shots of Pfizer  vaccine with second one in april 2021. No Booster.   Pts wife also sick at home with covid19.   In ED: Pt is hypoxic down to 70s on RA, was initially placed on NRB, later weaned of to 5L NC. Pt received IV dexamethasone and Remdesivir. S/p 1L IVF     3/16: patient has been very deconditioned, continues to be very anxious. On HF. We had a long discussion in regards of patient's clinical state and oxygen support. I have explained patient that, it will take long time for the patient to improve as lungs have experienced damage.     REVIEW OF SYSTEMS:  All other review of systems is negative unless indicated above.    PHYSICAL EXAM:  T(C): 36.6 (15 Mar 2022 10:34), Max: 36.6 (15 Mar 2022 10:34)  T(F): 97.8 (15 Mar 2022 10:34), Max: 97.8 (15 Mar 2022 10:34)  HR: 103 (15 Mar 2022 10:34) (66 - 104)  BP: 138/69 (15 Mar 2022 10:34) (138/69 - 138/87)  RR: 18 (15 Mar 2022 10:34) (18 - 19)  SpO2: 94% (15 Mar 2022 16:01) (86% - 97%)  General: Well developed; looks  ill,  in no acute distress on HFNC  Eyes:  EOMI; conjunctiva and sclera clear  Head: Normocephalic; atraumatic  ENMT: No nasal discharge; airway clear  Neck: Supple; non tender; no masses  Respiratory: Decreased BS at bases, overall air entry improved, No wheezes   Cardiovascular: Regular rate and rhythm. S1 and S2 Normal;   Gastrointestinal: Soft non-tender non-distended; Normal bowel sounds  Genitourinary: No  suprapubic  tenderness  Extremities: No edema  Vascular: Peripheral pulses palpable 2+ bilaterally  Neurological: Alert and oriented x3, non focal   Skin: Warm and dry. No acute rash  Musculoskeletal: Normal muscle tone and strength   Psychiatric: Cooperative and appropriate        LABS:     CBC Full  -  ( 15 Mar 2022 07:49 )  WBC Count : 18.29 K/uL  RBC Count : 4.78 M/uL  Hemoglobin : 13.8 g/dL  Hematocrit : 40.6 %  Platelet Count - Automated : 128 K/uL    133<L>  |  100  |  17  ----------------------------<  100<H>  3.8   |  27  |  0.59    Ca    8.6      15 Mar 2022 07:49    TPro  5.8<L>  /  Alb  2.8<L>  /  TBili  1.2  /  DBili  0.3  /  AST  40<H>  /  ALT  118<H>  /  AlkPhos  95  03-15        RADIOLOGY & ADDITIONAL TESTS:      ACC: [id number] EXAM:  XR CHEST PORTABLE IMMED 1V                        PROCEDURE DATE:  03/10/2022  s    COMPARISON STUDY: 3/1/2022  Frontal expiratory view of the chest shows the heart to be similar in   size. The lungs show slight clearing of the upper right lung with   progression of basilar infiltrates and there is no evidence of   pneumothorax nor pleural effusion.    IMPRESSION:  Changing infiltrates.

## 2022-03-16 NOTE — PROGRESS NOTE ADULT - SUBJECTIVE AND OBJECTIVE BOX
Date of service: 03-16-22     Lying in bed in NAD  Mild hypoxia on high flow O2  Has dry cough  Hypoxic with light exercise  No fever  Feels anxious    ROS: no fever or chills; no HA, no abdominal pain, no diarrhea or constipation; no dysuria, no legs pain, no rashes    MEDICATIONS  (STANDING):  atorvastatin 10 milliGRAM(s) Oral at bedtime  budesonide 160 MICROgram(s)/formoterol 4.5 MICROgram(s) Inhaler 2 Puff(s) Inhalation two times a day  cefepime   IVPB 2000 milliGRAM(s) IV Intermittent every 12 hours  enoxaparin Injectable 80 milliGRAM(s) SubCutaneous every 12 hours  guaiFENesin ER 1200 milliGRAM(s) Oral every 12 hours  polyethylene glycol 3350 17 Gram(s) Oral daily  predniSONE   Tablet 20 milliGRAM(s) Oral daily  senna 2 Tablet(s) Oral at bedtime  tiotropium 18 MICROgram(s) Capsule 1 Capsule(s) Inhalation daily    Vital Signs Last 24 Hrs  T(F): 97.6 (15 Mar 2022 23:29), Max: 97.9 (15 Mar 2022)  HR: 104 (15 Mar 2022 23:29) (66 - 107)  BP: 138/87 (15 Mar 2022 23:29) (120/79 - 138/87)  BP(mean): --  RR: 18 (16 Mar 2022 05:37) (18 - 21)  SpO2: 86% (16 Mar 2022 08:08) (86% - 98%)     Physical exam:    Constitutional:  No acute distress  HEENT: NC/AT, EOMI, PERRLA, conjunctivae clear; ears and nose atraumatic  Neck: supple; thyroid not palpable  Back: no tenderness  Respiratory: respiratory effort normal; crackles at bases  Cardiovascular: S1S2 regular, no murmurs  Abdomen: soft, not tender, not distended, positive BS  Genitourinary: no suprapubic tenderness  Lymphatic: no LN palpable  Musculoskeletal: no muscle tenderness, no joint swelling or tenderness  Extremities: no pedal edema  Neurological/ Psychiatric: Alert; moving all extremities  Skin: no rashes; no palpable lesions    Labs: reviewed                        13.8   18.29 )-----------( 128      ( 15 Mar 2022 07:49 )             40.6     03-15    133<L>  |  100  |  17  ----------------------------<  100<H>  3.8   |  27  |  0.59    Ca    8.6      15 Mar 2022 07:49    TPro  5.8<L>  /  Alb  2.8<L>  /  TBili  1.2  /  DBili  0.3  /  AST  40<H>  /  ALT  118<H>  /  AlkPhos  95  03-15        C-Reactive Protein, Serum: <3 mg/L (03-14-22 @ 11:39)  D-Dimer Assay, Quantitative: 538 ng/mL DDU (03-14-22 @ 11:39)  D-Dimer Assay, Quantitative: 622 ng/mL DDU (03-11-22 @ 08:34)  C-Reactive Protein, Serum: <3 mg/L (03-11-22 @ 08:34)  Ferritin, Serum: 1176 ng/mL (03-11-22 @ 08:34)  Ferritin, Serum: 1068 ng/mL (03-10-22 @ 08:37)  C-Reactive Protein, Serum: <3 mg/L (03-10-22 @ 08:37)  Ferritin, Serum: 973 ng/mL (03-06-22 @ 07:32)  C-Reactive Protein, Serum: 8 mg/L (03-06-22 @ 07:32)  D-Dimer Assay, Quantitative: 378 ng/mL DDU (03-06-22 @ 07:32)  Ferritin, Serum: 972 ng/mL (03-04-22 @ 08:34)  C-Reactive Protein, Serum: 40 mg/L (03-04-22 @ 08:34)  D-Dimer Assay, Quantitative: 733 ng/mL DDU (03-04-22 @ 08:34)  Ferritin, Serum: 909 ng/mL (03-03-22 @ 09:38)                        15.0   20.14 )-----------( 152      ( 14 Mar 2022 11:39 )             44.2     03-14    133<L>  |  100  |  16  ----------------------------<  176<H>  3.6   |  24  |  0.79    Ca    8.8      14 Mar 2022 11:39    TPro  6.1  /  Alb  2.9<L>  /  TBili  1.5<H>  /  DBili  0.3  /  AST  54<H>  /  ALT  110<H>  /  AlkPhos  117  03-14                            16.4   28.18 )-----------( 220      ( 11 Mar 2022 08:34 )             47.0     03-10    132<L>  |  96  |  18  ----------------------------<  112<H>  4.7   |  31  |  0.80    Ca    9.0      10 Mar 2022 08:37  Phos  3.3     03-10  Mg     2.9     03-10    TPro  6.9  /  Alb  3.3  /  TBili  1.6<H>  /  DBili  0.4<H>  /  AST  21  /  ALT  69  /  AlkPhos  128<H>  03-10                        13.2   9.95  )-----------( 259      ( 01 Mar 2022 07:00 )             40.2     03-01    139  |  108  |  19  ----------------------------<  164<H>  4.5   |  26  |  0.88    Ca    9.3      01 Mar 2022 07:00    TPro  7.1  /  Alb  2.9<L>  /  TBili  0.8  /  DBili  0.2  /  AST  34  /  ALT  85<H>  /  AlkPhos  72  03-01     LIVER FUNCTIONS - ( 01 Mar 2022 07:00 )  Alb: 2.9 g/dL / Pro: 7.1 gm/dL / ALK PHOS: 72 U/L / ALT: 85 U/L / AST: 34 U/L / GGT: x           COVID (02-28 @ 08:16)  Detected      Culture - Blood (collected 28 Feb 2022 08:35)  Source: .Blood None  Preliminary Report (01 Mar 2022 13:02):    No growth to date.    Radiology: all available radiological tests reviewed    < from: Xray Chest 1 View- PORTABLE-Urgent (02.28.22 @ 09:00) >  IMPRESSION: Mild bibasilar infiltrates left greater than right.  < end of copied text >    < from: CT Angio Chest PE Protocol w/ IV Cont (03.01.22 @ 14:14) >  Pulmonary emboli in the right upper lobe pulmonary artery and bilateral   segmental and subsegmental branches. This was discussed with Dr. Hazel   at 2:36 PM 3/1/2022.  Diffuse lung abnormality consistent with COVID.  < end of copied text >      Advanced directives addressed: full resuscitation

## 2022-03-16 NOTE — PROGRESS NOTE ADULT - SUBJECTIVE AND OBJECTIVE BOX
Patient is a 71y old  Male who presents with a chief complaint of SOB (01 Mar 2022 13:52)      HPI:  72 yo pt with PMH of HLD,  kidney stone, valvular heart dz, prostate ca s/p Radiation presented  to ED c/o progressive SOB.    Diagnosed with COVID Pneumonia.   CTPE revealed pulmonary emboli in the right upper lobe pulmonary artery and bilateral segmental and subsegmental branches.   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     3/16  Patient has been on HFNC- 40LPM, 70% FiO2  CT chest pending  Abd us revealed hepatic steatosis  Increased WBC noted and being treated for superimposed Pneumonia with Cefepime   7.47/35/74            PAST MEDICAL & SURGICAL HISTORY:  Hypercholesterolemia    Cholelithiasis    Cholecystitis    Valvular heart disease    Psoriasis    History of kidney stones    Inguinal hernia, left    Prostate cancer  s/p XRT 1-6-2020 to 1/27/2021    History of renal calculi    S/P hernia repair  right inguinal; 5/2015    S/P colonoscopy with polypectomy  Baseline; viv polyps; 2012    History of cholecystectomy  2017    History of prostatectomy  5/2020    History of lithotripsy    MEDICATIONS  (STANDING):  atorvastatin 10 milliGRAM(s) Oral at bedtime  budesonide 160 MICROgram(s)/formoterol 4.5 MICROgram(s) Inhaler 2 Puff(s) Inhalation two times a day  cefepime   IVPB 2000 milliGRAM(s) IV Intermittent every 12 hours  enoxaparin Injectable 80 milliGRAM(s) SubCutaneous every 12 hours  guaiFENesin ER 1200 milliGRAM(s) Oral every 12 hours  polyethylene glycol 3350 17 Gram(s) Oral daily  predniSONE   Tablet 20 milliGRAM(s) Oral daily  senna 2 Tablet(s) Oral at bedtime  tiotropium 18 MICROgram(s) Capsule 1 Capsule(s) Inhalation daily    MEDICATIONS  (PRN):  acetaminophen     Tablet .. 650 milliGRAM(s) Oral every 6 hours PRN Temp greater or equal to 38C (100.4F), Mild Pain (1 - 3)  ALBUTerol    90 MICROgram(s) HFA Inhaler 2 Puff(s) Inhalation every 6 hours PRN Shortness of Breath and/or Wheezing  aluminum hydroxide/magnesium hydroxide/simethicone Suspension 30 milliLiter(s) Oral every 4 hours PRN Dyspepsia  benzonatate 100 milliGRAM(s) Oral every 8 hours PRN Cough  bisacodyl Suppository 10 milliGRAM(s) Rectal daily PRN Constipation  melatonin 3 milliGRAM(s) Oral at bedtime PRN Insomnia  ondansetron Injectable 4 milliGRAM(s) IV Push every 8 hours PRN Nausea and/or Vomiting  oxymetazoline 0.05% Nasal Spray 2 Spray(s) Both Nostrils two times a day PRN Nasal Congestion  sodium chloride 0.65% Nasal 1 Spray(s) Both Nostrils two times a day PRN Nasal Congestion        Vital Signs Last 24 Hrs  T(C): 36.4 (14 Mar 2022 23:29), Max: 36.6 (14 Mar 2022 16:21)  T(F): 97.6 (14 Mar 2022 23:29), Max: 97.9 (14 Mar 2022 16:21)  HR: 104 (14 Mar 2022 23:29) (66 - 107)  BP: 138/87 (14 Mar 2022 23:29) (120/79 - 138/87)  BP(mean): --  RR: 18 (15 Mar 2022 05:37) (18 - 21)  SpO2: 86% (15 Mar 2022 08:08) (86% - 98%)    PHYSICAL EXAM  General Appearance: cooperative, no acute distress, on HFNC  HEENT: PERRL, conjunctiva clear, EOM's intact, non injected pharynx, no exudate, TM   normal  Neck: Supple, , no adenopathy, thyroid: not enlarged, no carotid bruit or JVD  Back: Symmetric, no  tenderness,no soft tissue tenderness  Lungs: coarse at the bases   Heart: Regular rate and rhythm, S1, S2 normal, no murmur, rub or gallop  Abdomen: Soft, non-tender, bowel sounds active , no hepatosplenomegaly  Extremities: no cyanosis or edema, no joint swelling  Skin: Skin color, texture normal, no rashes   Neurologic: Alert and oriented X3    ECG:    LABS:                        16.5   32.85 )-----------( 294      ( 10 Mar 2022 08:37 )             48.4   03-10    132<L>  |  96  |  18  ----------------------------<  112<H>  4.7   |  31  |  0.80    Ca    9.0      10 Mar 2022 08:37  Phos  3.3     03-10  Mg     2.9     03-10    TPro  6.9  /  Alb  3.3  /  TBili  1.6<H>  /  DBili  0.4<H>  /  AST  21  /  ALT  69  /  AlkPhos  128<H>  03-10    \        RADIOLOGY & ADDITIONAL STUDIES:    ACC: [id number] EXAM:  XR CHEST PORTABLE IMMED 1V                          PROCEDURE DATE:  03/10/2022          INTERPRETATION:  Chest one view    HISTORY: Leukocytosis    COMPARISON STUDY: 3/1/2022    Frontal expiratory view of the chest shows the heart to be similar in   size. The lungs show slight clearing of the upper right lung with   progression of basilar infiltrates and there is no evidence of   pneumothorax nor pleural effusion.    IMPRESSION:  Changing infiltrates.  CXR independently reviewed

## 2022-03-16 NOTE — PROGRESS NOTE ADULT - ASSESSMENT
71 year-old man with HLD, nephrolithiasis, extensive past tobacco use history admitted for:     # Acute on chronic hypoxic respiratory failure in the setting of emphasema /  new PEs  # COVID-19 pneumonia, acute hypoxic respiratory failure  - on HF 40LPM, 70% FiO2  - S/p tocilizumab 3/2.   - Completed   Remdesivir  10 days   - On prednisone  - Due to significant leukocytosis with repeat CXR RLL worsening infiltrate can not rule out  secondary bacterial infection. Suspect GNR bacteria. WBCs improving now   - C/w Cefepime 2 g BID as d/w DR Brody  - Continue oxygen supplementation, wean as tolerated goal SPO2 88-92% or greater  - Encourage incentive spirometry  - Prone positioning  - OOB to chair daily  - Trend inflammatory markers     Multiple pulmonary emboli  - In setting of COVID.   - C/w  full-dose Lovenox  80mg IV BID    Likely underlying emphysema  - Extensive tobacco use history. CT chest with bullae to suggest emphysema.   - Continue Symbicort and Spiriva  - Continue Solumedrol     Possible underlying pulmonary fibrosis  Based on CT chest findings, impression from Pulmonary Medicine.   TTE :  Limited echo windows. No sign of PH as per Pulmonary Medicine      Hyponatremia  Due to underlying COVID19 and decreased PO intake.   monitor NA, better today      Mild hyperkalemia  Resolved.   - Continue to monitor    HLD  Chronic, stable  - Continue statin      DVT px: Full dose LMWH for PE  Code Status: Full      Pts wife updated on the phone, was asking permission for brief visit, for support. Will d/w team

## 2022-03-16 NOTE — PROGRESS NOTE ADULT - ASSESSMENT
72 y/o male with h/o HLD,  kidney stone, valvular heart disease, prostate Ca s/p radiation was admitted on 2/28 for progressive  SOB.  Pt reports that on 2/19 he got sick with runny nose, fever and cough and tested positive for covid-19 on 2/21. His fever improved, but his SOB got worse, had difficulty breathing with minimal exertion. He checked his O2 and was below 80. In ER he received remdesivir.    1. Acute respiratory failure. COVID-19 breakthrough viral syndrome. Multifocal pneumonia. Possible superimposed bacterial pneumonia. PE.  -leukocytosis ?cause ?bacterial infection  -respiratory frail; no COVID booster  -respiratory remains frail; has increased O2 requirements  -given tociluzumab infusion on 3/2  -s/p remdesivir protocol # 10  -tolerating abx well so far; no side effects noted  -on cefepime 2 gm IV q12h # 6  -tolerating abx well so far; no side effects noted   -inflammatory markers are elevated  -O2 therapy  -taper steroids  -AC  -droplet isolation  -respiratory care  -continue abx coverage for one more day  -monitor temps  -f/u CBC  -supportive care    2. Other issues:   -care per medicine    d/w Dr. COLEMAN Vivas

## 2022-03-16 NOTE — PROGRESS NOTE ADULT - ASSESSMENT
1) COVID Pneumonia  2) Hypoxemic Respiratory Failure  3) Pulmonary Embolism  4) Dyspnea   5) Combined Pulmonary Fibrosis/Emphysema   6) Abnormal CT Chest         72 yo pt with PMH of HLD,  kidney stone, valvular heart dz, prostate ca s/p Radiation presented  to ED c/o progressive SOB.    Diagnosed with COVID Pneumonia.   CTPE revealed pulmonary emboli in the right upper lobe pulmonary artery and bilateral segmental and subsegmental branches.   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   Not seen Pulmonary in the past.   Received Tocilizumab and remdesivir now receiving solumedrol 30mg q daily  Lovenox 80 q 12 for PE  Patient has been on HFNC- now 40LPM, 70% FiO2  Pending new CT Chest  Reviewed Abd us  Increased WBC noted and being treated for superimposed Pneumonia with Cefepime   7.47/35/74  Independently reviewed previous CT Chest and new CXR 3/10  GoalSaO2 >87% Continue Spiriva/Symbicort  Echo does not show PH  Due to the underlying COPD/emphysema, his recovery to normalcy will be longer than expected  Appreciate ID recommendations / hospitalist recommendations

## 2022-03-17 NOTE — PROGRESS NOTE ADULT - SUBJECTIVE AND OBJECTIVE BOX
Impression: Mechanical ectropion of left lower eyelid: H02.125. Plan: Continue to use erythromycin ointment HS OS. 
Tears and gel Patient is a 71y old  Male who presents with a chief complaint of SOB (17 Mar 2022 13:12)      BRIEF HOSPITAL COURSE:   71M with PMHx HLD, sp CCY, prostate CA sp prostatectomy, Vaccinated not boosted admitted with COVID-19 Viral PNA. Course complicated by hypoxemic respiratory failure, acute PE, ARDS. Sp Remdesivir/Decadron/Toci.    Events last 24 hours: afebrile, hemodynamics stable, tolerating PO, remains on high flow and CPAP 100% FiO2, some mild tachypnea. Denies CP, abd pain, N/V/D.     PAST MEDICAL & SURGICAL HISTORY:  Hypercholesterolemia    Cholelithiasis    Cholecystitis    Valvular heart disease    Psoriasis    History of kidney stones    Inguinal hernia, left    Prostate cancer  s/p XRT 1-6-2020 to 1/27/2021    History of renal calculi    S/P hernia repair  right inguinal; 5/2015    S/P colonoscopy with polypectomy  Baseline; viv polyps; 2012    History of cholecystectomy  2017    History of prostatectomy  5/2020    History of lithotripsy          Hosp day #17d      Vital signs / Reviewed and Physical Exam Performed where pertinent and urgently required    Lab / Radiology  studies / ABG / Meds -  reviewed and interpreted into the assessment and treatment plan.    I&O's Summary    17 Mar 2022 07:01  -  17 Mar 2022 20:24  --------------------------------------------------------  IN: 0 mL / OUT: 300 mL / NET: -300 mL      Impression:  1. acute hypoxemic respiratory failure  2. ARDS  3. COVID-19 Viral PNA  4. hyponatremia  5. acute pulmonary embolism    Plan:  Neuro - xanax PRN for anxiety, melatonin for sleep hygiene, stable neuro exam    CV -  hemodynamics stable, NSR          Echo with normal LV and RV function          no CP    Pulm -  cont HFNC with high level flow for PEEP effect to promote alveolar recruitment              CPAP at HS and PRN              actively titrating FiO2 to keep sats>88%              bronchodialators              PO steroids              remains high risk for decompensation and escalation to vent support     GI -  PO diet as tolerated with protein shake supplements as per RD, LFTs lateral, RUQ u/s with hepatic steatosis    Renal - Cr stable, Even to negative fluid balance as tolerated by hemodynamics and renal fx, strict I/Os, BMP in am    Heme -  Full dose AC with lovenox, no signs of active bleeding     ID - empiric IV abx for potential superimposed bacterial PNA, WBC downtrending, afebrile, Cxs NG, rest RVP negative, abx adjustments/            discontinuation based on discussion with ID in conjunction with clinical features and culture data.    Endo -  Aggressive glycemic control to limit FS glucose to < 180mg/dl, BS stable, vit D level normal      COVID 19 specific considerations and therapeutic  options based on the available and rapidly changing literature    35 mins critical care time spent in the management of this critically ill COVID-19 patient with continuous assessments and interventions based on the interpretation of multiple databases.

## 2022-03-17 NOTE — PROGRESS NOTE ADULT - ASSESSMENT
1) COVID Pneumonia  2) Hypoxemic Respiratory Failure  3) Pulmonary Embolism  4) Dyspnea   5) Combined Pulmonary Fibrosis/Emphysema   6) Abnormal CT Chest         70 yo pt with PMH of HLD,  kidney stone, valvular heart dz, prostate ca s/p Radiation presented  to ED c/o progressive SOB.    Diagnosed with COVID Pneumonia.   CTPE revealed pulmonary emboli in the right upper lobe pulmonary artery and bilateral segmental and subsegmental branches.   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   Not seen Pulmonary in the past.   Received Tocilizumab and remdesivir now receiving prednisone 20mg/d  Lovenox 80 q 12 for PE  Patient has been on HFNC- now 40LPM, 70% FiO2  Pending new CT Chest  Reviewed Abd us  Increased WBC noted and being treated for superimposed Pneumonia with Cefepime   7.47/35/74  Independently reviewed previous CT Chest and new CXR 3/10  GoalSaO2 >87% Continue Spiriva/Symbicort  Echo does not show PH  Due to the underlying COPD/emphysema, his recovery to normalcy will be longer than expected  Epistaxis noted overnight  Possibly will need MICU evaluation  Appreciate ID recommendations / hospitalist recommendations

## 2022-03-17 NOTE — PROGRESS NOTE ADULT - SUBJECTIVE AND OBJECTIVE BOX
Patient is a 71y old  Male who presents with a chief complaint of SOB (01 Mar 2022 13:52)      HPI:  72 yo pt with PMH of HLD,  kidney stone, valvular heart dz, prostate ca s/p Radiation presented  to ED c/o progressive SOB.    Diagnosed with COVID Pneumonia.   CTPE revealed pulmonary emboli in the right upper lobe pulmonary artery and bilateral segmental and subsegmental branches.   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     3/17  Patient has been on HFNC- 40LPM, 70% FiO2 then back to 50/100 due to epistaxis  CT chest pending  Abd us revealed hepatic steatosis  Increased WBC noted and being treated for superimposed Pneumonia with Cefepime   7.47/35/74            PAST MEDICAL & SURGICAL HISTORY:  Hypercholesterolemia    Cholelithiasis    Cholecystitis    Valvular heart disease    Psoriasis    History of kidney stones    Inguinal hernia, left    Prostate cancer  s/p XRT 1-6-2020 to 1/27/2021    History of renal calculi    S/P hernia repair  right inguinal; 5/2015    S/P colonoscopy with polypectomy  Baseline; viv polyps; 2012    History of cholecystectomy  2017    History of prostatectomy  5/2020    History of lithotripsy    MEDICATIONS  (STANDING):  atorvastatin 10 milliGRAM(s) Oral at bedtime  budesonide 160 MICROgram(s)/formoterol 4.5 MICROgram(s) Inhaler 2 Puff(s) Inhalation two times a day  cefepime   IVPB 2000 milliGRAM(s) IV Intermittent every 12 hours  enoxaparin Injectable 80 milliGRAM(s) SubCutaneous every 12 hours  guaiFENesin ER 1200 milliGRAM(s) Oral every 12 hours  polyethylene glycol 3350 17 Gram(s) Oral daily  predniSONE   Tablet 20 milliGRAM(s) Oral daily  senna 2 Tablet(s) Oral at bedtime  tiotropium 18 MICROgram(s) Capsule 1 Capsule(s) Inhalation daily    MEDICATIONS  (PRN):  acetaminophen     Tablet .. 650 milliGRAM(s) Oral every 6 hours PRN Temp greater or equal to 38C (100.4F), Mild Pain (1 - 3)  ALBUTerol    90 MICROgram(s) HFA Inhaler 2 Puff(s) Inhalation every 6 hours PRN Shortness of Breath and/or Wheezing  aluminum hydroxide/magnesium hydroxide/simethicone Suspension 30 milliLiter(s) Oral every 4 hours PRN Dyspepsia  benzonatate 100 milliGRAM(s) Oral every 8 hours PRN Cough  bisacodyl Suppository 10 milliGRAM(s) Rectal daily PRN Constipation  melatonin 3 milliGRAM(s) Oral at bedtime PRN Insomnia  ondansetron Injectable 4 milliGRAM(s) IV Push every 8 hours PRN Nausea and/or Vomiting  oxymetazoline 0.05% Nasal Spray 2 Spray(s) Both Nostrils two times a day PRN Nasal Congestion  sodium chloride 0.65% Nasal 1 Spray(s) Both Nostrils two times a day PRN Nasal Congestion            PHYSICAL EXAM  General Appearance: cooperative, no acute distress, on HFNC  HEENT: PERRL, conjunctiva clear, EOM's intact, non injected pharynx, no exudate, TM   normal  Neck: Supple, , no adenopathy, thyroid: not enlarged, no carotid bruit or JVD  Back: Symmetric, no  tenderness,no soft tissue tenderness  Lungs: coarse at the bases   Heart: Regular rate and rhythm, S1, S2 normal, no murmur, rub or gallop  Abdomen: Soft, non-tender, bowel sounds active , no hepatosplenomegaly  Extremities: no cyanosis or edema, no joint swelling  Skin: Skin color, texture normal, no rashes   Neurologic: Alert and oriented X3    ECG:    LABS:                        16.5   32.85 )-----------( 294      ( 10 Mar 2022 08:37 )             48.4   03-10    132<L>  |  96  |  18  ----------------------------<  112<H>  4.7   |  31  |  0.80    Ca    9.0      10 Mar 2022 08:37  Phos  3.3     03-10  Mg     2.9     03-10    TPro  6.9  /  Alb  3.3  /  TBili  1.6<H>  /  DBili  0.4<H>  /  AST  21  /  ALT  69  /  AlkPhos  128<H>  03-10    \        RADIOLOGY & ADDITIONAL STUDIES:    ACC: [id number] EXAM:  XR CHEST PORTABLE IMMED 1V                          PROCEDURE DATE:  03/10/2022          INTERPRETATION:  Chest one view    HISTORY: Leukocytosis    COMPARISON STUDY: 3/1/2022    Frontal expiratory view of the chest shows the heart to be similar in   size. The lungs show slight clearing of the upper right lung with   progression of basilar infiltrates and there is no evidence of   pneumothorax nor pleural effusion.    IMPRESSION:  Changing infiltrates.  CXR independently reviewed

## 2022-03-17 NOTE — PROGRESS NOTE ADULT - SUBJECTIVE AND OBJECTIVE BOX
Patient is a 71y old  Male who presents with a chief complaint of SOB (17 Mar 2022 08:40)    24 hour events: asked by nurse manager to evaluate for worsening resp status     Patient with 2+ week hospitalization for COVID, hypoxic resp failure, PE    Now worsening status requiring HFNC 50L, 100% + NRM     Anxious, dyspneic     Afebrile, denies productive cough or chest pain currently     PAST MEDICAL & SURGICAL HISTORY:  Hypercholesterolemia    Cholelithiasis    Cholecystitis    Valvular heart disease    Psoriasis    History of kidney stones    Inguinal hernia, left    Prostate cancer  s/p XRT 1-6-2020 to 1/27/2021    History of renal calculi    S/P hernia repair  right inguinal; 5/2015    S/P colonoscopy with polypectomy  Baseline; viv polyps; 2012    History of cholecystectomy  2017    History of prostatectomy  5/2020    History of lithotripsy        Allergies    No Known Allergies    Intolerances      REVIEW OF SYSTEMS: SEE BELOW       ICU Vital Signs Last 24 Hrs  T(C): 36.5 (17 Mar 2022 08:21), Max: 36.6 (16 Mar 2022 16:59)  T(F): 97.7 (17 Mar 2022 08:21), Max: 97.9 (16 Mar 2022 16:59)  HR: 103 (17 Mar 2022 08:21) (87 - 110)  BP: 122/74 (17 Mar 2022 08:21) (122/74 - 137/78)  BP(mean): --  ABP: --  ABP(mean): --  RR: 19 (17 Mar 2022 08:21) (19 - 22)  SpO2: 94% (17 Mar 2022 08:21) (87% - 95%)      CAPILLARY BLOOD GLUCOSE          I&O's Summary          MEDICATIONS  (STANDING):  atorvastatin 10 milliGRAM(s) Oral at bedtime  budesonide 160 MICROgram(s)/formoterol 4.5 MICROgram(s) Inhaler 2 Puff(s) Inhalation two times a day  cefepime   IVPB 2000 milliGRAM(s) IV Intermittent every 12 hours  enoxaparin Injectable 80 milliGRAM(s) SubCutaneous every 12 hours  guaiFENesin ER 1200 milliGRAM(s) Oral every 12 hours  polyethylene glycol 3350 17 Gram(s) Oral daily  predniSONE   Tablet 20 milliGRAM(s) Oral daily  senna 2 Tablet(s) Oral at bedtime  tiotropium 18 MICROgram(s) Capsule 1 Capsule(s) Inhalation daily      MEDICATIONS  (PRN):  acetaminophen     Tablet .. 650 milliGRAM(s) Oral every 6 hours PRN Temp greater or equal to 38C (100.4F), Mild Pain (1 - 3)  ALBUTerol    90 MICROgram(s) HFA Inhaler 2 Puff(s) Inhalation every 6 hours PRN Shortness of Breath and/or Wheezing  ALPRAZolam 0.25 milliGRAM(s) Oral every 8 hours PRN anxiety  aluminum hydroxide/magnesium hydroxide/simethicone Suspension 30 milliLiter(s) Oral every 4 hours PRN Dyspepsia  benzonatate 100 milliGRAM(s) Oral every 8 hours PRN Cough  bisacodyl Suppository 10 milliGRAM(s) Rectal daily PRN Constipation  melatonin 3 milliGRAM(s) Oral at bedtime PRN Insomnia  ondansetron Injectable 4 milliGRAM(s) IV Push every 8 hours PRN Nausea and/or Vomiting  oxymetazoline 0.05% Nasal Spray 2 Spray(s) Both Nostrils two times a day PRN Nasal Congestion  sodium chloride 0.65% Nasal 1 Spray(s) Both Nostrils two times a day PRN Nasal Congestion      PHYSICAL EXAM: SEE BELOW                          14.0   16.29 )-----------( 125      ( 16 Mar 2022 08:10 )             41.5       03-16    134<L>  |  102  |  16  ----------------------------<  107<H>  3.7   |  26  |  0.64    Ca    8.8      16 Mar 2022 08:10

## 2022-03-17 NOTE — PROGRESS NOTE ADULT - SUBJECTIVE AND OBJECTIVE BOX
CC: SOB (10 Mar 2022 08:27)    HPI:  72 yo pt with PMH of HLD,  kidney stone, valvular heart dz, prostate ca s/p Radiation presented  to ED c/o progressive  SOB.  Pt reports he got sich with runny nose and cough 2/19 and tested positive for covid19 on 2/21.  Pt reports that he did have fevers but resolved  but SOB got worse, had difficulty breathing with minimal exertion. he checked his O2 and was below 80.  No CP, no palpitations, no leg pain.   Pt is s/p  2 shots of Pfizer  vaccine with second one in april 2021. No Booster.   Pts wife also sick at home with covid19.   In ED: Pt is hypoxic down to 70s on RA, was initially placed on NRB, later weaned of to 5L NC. Pt received IV dexamethasone and Remdesivir. S/p 1L IVF     Medical progress: NO clinical improvement. Clinically continues to be deconditioned. on HF /  nonrebreather continued to have a low O2. Overnight events reviewed. Patient continues to have a high risk for  pulmonary decompensation. ICU evaluation requested by nursing. Patient to be transferred to ICU.      REVIEW OF SYSTEMS:  All other review of systems is negative unless indicated above.    PHYSICAL EXAM:  T(C): 36.6 (15 Mar 2022 10:34), Max: 36.6 (15 Mar 2022 10:34)  T(F): 97.8 (15 Mar 2022 10:34), Max: 97.8 (15 Mar 2022 10:34)  HR: 103 (15 Mar 2022 10:34) (66 - 104)  BP: 138/69 (15 Mar 2022 10:34) (138/69 - 138/87)  RR: 18 (15 Mar 2022 10:34) (18 - 19)  SpO2: 94% (15 Mar 2022 16:01) (86% - 97%)  General: HF / non-rebreather  Eyes:  EOMI; conjunctiva and sclera clear  Head: Normocephalic; atraumatic  ENMT: No nasal discharge; airway clear  Neck: Supple; non tender; no masses  Respiratory: Decreased BS at bases, overall air entry improved, No wheezes   Cardiovascular: Regular rate and rhythm. S1 and S2 Normal;   Gastrointestinal: Soft non-tender non-distended; Normal bowel sounds  Genitourinary: No  suprapubic  tenderness  Extremities: No edema  Vascular: Peripheral pulses palpable 2+ bilaterally  Neurological: Alert and oriented x3, non focal   Skin: Warm and dry. No acute rash  Musculoskeletal: Normal muscle tone and strength   Psychiatric: Cooperative and appropriate      LABS:       CBC Full  -  ( 16 Mar 2022 08:10 )  WBC Count : 16.29 K/uL  RBC Count : 4.79 M/uL  Hemoglobin : 14.0 g/dL  Hematocrit : 41.5 %  Platelet Count - Automated : 125 K/uL  Mean Cell Volume : 86.6 fl  Mean Cell Hemoglobin : 29.2 pg  Mean Cell Hemoglobin Concentration : 33.7 gm/dL  Auto Neutrophil # : x  Auto Lymphocyte # : x  Auto Monocyte # : x  Auto Eosinophil # : x  Auto Basophil # : x  Auto Neutrophil % : x  Auto Lymphocyte % : x  Auto Monocyte % : x  Auto Eosinophil % : x  Auto Basophil % : x        03-16    134<L>  |  102  |  16  ----------------------------<  107<H>  3.7   |  26  |  0.64    Ca    8.8      16 Mar 2022 08:10      71 year-old man with HLD, nephrolithiasis, extensive past tobacco use history admitted for:     # Acute on chronic hypoxic respiratory failure in the setting of emphasema/ ? pulmonary fibrosis /  new PEs  # COVID-19 pneumonia, acute hypoxic respiratory failure  - on HF /  NRB -  patient continued to have low O2  - overnight nose bleed  - tocilizumab 3/2.   - On prednisone which has been titrated down  - s/p remdesevir  - doubt bacterial pneumonia in the setting of covid infection, as bacterial pneumonias in the setting of covid are less than 1%. cw Cefepime 2 g BID as d/w DR Brody  - ICU transfer     # Pulmonary emboli - In setting of COVID as it is a hypercoagulable state  - Lovenox * therapeutic anticoagulation.   - overnight nose bleed (combination of dry nasal mucousa /  full dose of AC)    # Hyponatremia  - Na -  134 this am  - Due to underlying COVID19 and decreased PO intake.   - monitor NA, better today    # Mild hyperkalemia  Resolved.   - Continue to monitor    # HLD  Chronic, stable  - Continue statin    DVT px: Full dose LMWH for PE    Code Status: Full

## 2022-03-17 NOTE — PROGRESS NOTE ADULT - ASSESSMENT
Imp:       Plan:  Transfer to ICU   CPAP qhs + prn   HFNC + NRM other times   Xanax for anxiety, may need precedex   On prednisone 20 mg daily, may need to increase   Continue cefepime for bacterial pneumonia   S/p remdesivir 2/28 - 3/9   S/p tocilizumab 3/2       DVT ppx: lovenox   Nutrition: po  Central line: no  Arterial line: no  Payton: no  Restraints: no  Activity: oob    Code status: full - discussed intubation and mechanical ventilation, he is not sure about it at this time, wants to think     Disposition: ICU     Updated:  71M PMH HLD, prostate ca, diagnosed with COVID on 2/21/22, hospitalized at  since 2/28/22, being treated for acute hypoxic resp failure, b/l PEs, and bacterial pneumonia    Now worsening resp status and hypoxia       Plan:  Transfer to ICU   CPAP qhs + prn   HFNC + NRM other times   Xanax for anxiety, may need precedex   On prednisone 20 mg daily, may need to increase   Continue cefepime for bacterial pneumonia   S/p remdesivir 2/28 - 3/9   S/p tocilizumab 3/2   Send BCx x 2, fungitell     Further w/u and mx based on clinical course     Patient critically ill with high risk for decompensation including need for intubation and mechanical ventilation       DVT ppx: lovenox   Nutrition: po  Central line: no  Arterial line: no  Payton: no  Restraints: no  Activity: oob    Code status: full - discussed intubation and mechanical ventilation, he is not sure about it at this time, wants to think     Disposition: ICU     Updated: wife Alyse by phone [phone]

## 2022-03-18 NOTE — PROGRESS NOTE ADULT - SUBJECTIVE AND OBJECTIVE BOX
Date of service: 03-18-22 @ 09:04    Events noted  More SOB  Placed on BiPAP  Lethargic  Has low grade fever  Worsening leukocytosis    ROS: unobtainable    MEDICATIONS  (STANDING):  atorvastatin 10 milliGRAM(s) Oral at bedtime  budesonide 160 MICROgram(s)/formoterol 4.5 MICROgram(s) Inhaler 2 Puff(s) Inhalation two times a day  dexMEDEtomidine Infusion 1 MICROgram(s)/kG/Hr (20.7 mL/Hr) IV Continuous <Continuous>  enoxaparin Injectable 80 milliGRAM(s) SubCutaneous every 12 hours  guaiFENesin ER 1200 milliGRAM(s) Oral every 12 hours  LORazepam   Injectable 1 milliGRAM(s) IV Push once  methylPREDNISolone sodium succinate Injectable 40 milliGRAM(s) IV Push every 12 hours  polyethylene glycol 3350 17 Gram(s) Oral daily  senna 2 Tablet(s) Oral at bedtime  tiotropium 18 MICROgram(s) Capsule 1 Capsule(s) Inhalation daily    Vital Signs Last 24 Hrs  T(C): 38.1 (18 Mar 2022 06:00), Max: 38.1 (18 Mar 2022 06:00)  T(F): 100.6 (18 Mar 2022 06:00), Max: 100.6 (18 Mar 2022 06:00)  HR: 79 (18 Mar 2022 07:00) (79 - 153)  BP: 88/67 (18 Mar 2022 07:00) (88/67 - 168/92)  BP(mean): 75 (18 Mar 2022 07:00) (75 - 118)  RR: 22 (18 Mar 2022 07:00) (22 - 45)  SpO2: 91% (18 Mar 2022 07:00) (70% - 98%)     Physical exam:    Constitutional:  No acute distress  HEENT: NC/AT, EOMI, PERRLA, conjunctivae clear; ears and nose atraumatic  Neck: supple; thyroid not palpable  Back: no tenderness  Respiratory: respiratory effort normal; crackles at bases  Cardiovascular: S1S2 regular, no murmurs  Abdomen: soft, not tender, not distended, positive BS  Genitourinary: no suprapubic tenderness  Lymphatic: no LN palpable  Musculoskeletal: no muscle tenderness, no joint swelling or tenderness  Extremities: no pedal edema  Neurological/ Psychiatric: Alert; moving all extremities  Skin: no rashes; no palpable lesions    Labs: reviewed                        14.0   20.59 )-----------( 99       ( 18 Mar 2022 07:05 )             42.3     03-18    133<L>  |  101  |  12  ----------------------------<  156<H>  5.0   |  26  |  0.65    Ca    9.0      18 Mar 2022 07:05  Phos  3.4     03-18  Mg     2.6     03-18    TPro  6.6  /  Alb  2.6<L>  /  TBili  1.9<H>  /  DBili  x   /  AST  37  /  ALT  132<H>  /  AlkPhos  162<H>  03-18    Ferritin, Serum: 1102 ng/mL (03-15-22 @ 07:49)  C-Reactive Protein, Serum: <3 mg/L (03-14-22 @ 11:39)  D-Dimer Assay, Quantitative: 538 ng/mL DDU (03-14-22 @ 11:39)  D-Dimer Assay, Quantitative: 622 ng/mL DDU (03-11-22 @ 08:34)  C-Reactive Protein, Serum: <3 mg/L (03-11-22 @ 08:34)  Ferritin, Serum: 1176 ng/mL (03-11-22 @ 08:34)  Ferritin, Serum: 1068 ng/mL (03-10-22 @ 08:37)  C-Reactive Protein, Serum: <3 mg/L (03-10-22 @ 08:37)                        13.8   18.29 )-----------( 128      ( 15 Mar 2022 07:49 )             40.6     03-15    133<L>  |  100  |  17  ----------------------------<  100<H>  3.8   |  27  |  0.59    Ca    8.6      15 Mar 2022 07:49    TPro  5.8<L>  /  Alb  2.8<L>  /  TBili  1.2  /  DBili  0.3  /  AST  40<H>  /  ALT  118<H>  /  AlkPhos  95  03-15        C-Reactive Protein, Serum: <3 mg/L (03-14-22 @ 11:39)  D-Dimer Assay, Quantitative: 538 ng/mL DDU (03-14-22 @ 11:39)  D-Dimer Assay, Quantitative: 622 ng/mL DDU (03-11-22 @ 08:34)  C-Reactive Protein, Serum: <3 mg/L (03-11-22 @ 08:34)  Ferritin, Serum: 1176 ng/mL (03-11-22 @ 08:34)  Ferritin, Serum: 1068 ng/mL (03-10-22 @ 08:37)  C-Reactive Protein, Serum: <3 mg/L (03-10-22 @ 08:37)  Ferritin, Serum: 973 ng/mL (03-06-22 @ 07:32)  C-Reactive Protein, Serum: 8 mg/L (03-06-22 @ 07:32)  D-Dimer Assay, Quantitative: 378 ng/mL DDU (03-06-22 @ 07:32)  Ferritin, Serum: 972 ng/mL (03-04-22 @ 08:34)  C-Reactive Protein, Serum: 40 mg/L (03-04-22 @ 08:34)  D-Dimer Assay, Quantitative: 733 ng/mL DDU (03-04-22 @ 08:34)  Ferritin, Serum: 909 ng/mL (03-03-22 @ 09:38)                        15.0   20.14 )-----------( 152      ( 14 Mar 2022 11:39 )             44.2     03-14    133<L>  |  100  |  16  ----------------------------<  176<H>  3.6   |  24  |  0.79    Ca    8.8      14 Mar 2022 11:39    TPro  6.1  /  Alb  2.9<L>  /  TBili  1.5<H>  /  DBili  0.3  /  AST  54<H>  /  ALT  110<H>  /  AlkPhos  117  03-14                            16.4   28.18 )-----------( 220      ( 11 Mar 2022 08:34 )             47.0     03-10    132<L>  |  96  |  18  ----------------------------<  112<H>  4.7   |  31  |  0.80    Ca    9.0      10 Mar 2022 08:37  Phos  3.3     03-10  Mg     2.9     03-10    TPro  6.9  /  Alb  3.3  /  TBili  1.6<H>  /  DBili  0.4<H>  /  AST  21  /  ALT  69  /  AlkPhos  128<H>  03-10                        13.2   9.95  )-----------( 259      ( 01 Mar 2022 07:00 )             40.2     03-01    139  |  108  |  19  ----------------------------<  164<H>  4.5   |  26  |  0.88    Ca    9.3      01 Mar 2022 07:00    TPro  7.1  /  Alb  2.9<L>  /  TBili  0.8  /  DBili  0.2  /  AST  34  /  ALT  85<H>  /  AlkPhos  72  03-01     LIVER FUNCTIONS - ( 01 Mar 2022 07:00 )  Alb: 2.9 g/dL / Pro: 7.1 gm/dL / ALK PHOS: 72 U/L / ALT: 85 U/L / AST: 34 U/L / GGT: x           COVID (02-28 @ 08:16)  Detected      Culture - Blood (collected 28 Feb 2022 08:35)  Source: .Blood None  Preliminary Report (01 Mar 2022 13:02):    No growth to date.    Radiology: all available radiological tests reviewed    < from: Xray Chest 1 View- PORTABLE-Urgent (02.28.22 @ 09:00) >  IMPRESSION: Mild bibasilar infiltrates left greater than right.  < end of copied text >    < from: CT Angio Chest PE Protocol w/ IV Cont (03.01.22 @ 14:14) >  Pulmonary emboli in the right upper lobe pulmonary artery and bilateral   segmental and subsegmental branches. This was discussed with Dr. Hazel   at 2:36 PM 3/1/2022.  Diffuse lung abnormality consistent with COVID.  < end of copied text >      Advanced directives addressed: full resuscitation

## 2022-03-18 NOTE — PROGRESS NOTE ADULT - SUBJECTIVE AND OBJECTIVE BOX
Patient is a 71y old  Male who presents with a chief complaint of SOB (01 Mar 2022 13:52)      HPI:  70 yo pt with PMH of HLD,  kidney stone, valvular heart dz, prostate ca s/p Radiation presented  to ED c/o progressive SOB.    Diagnosed with COVID Pneumonia.   CTPE revealed pulmonary emboli in the right upper lobe pulmonary artery and bilateral segmental and subsegmental branches.   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     3/18  Transferred to MICU in the last 24 hours  On BiPAP 18/12  Increased WBC noted and was being treated for superimposed Pneumonia with Cefepime   7.47/35/74  Portable CXR reviewed from 3/18, worsening infiltrates noted          PAST MEDICAL & SURGICAL HISTORY:  Hypercholesterolemia    Cholelithiasis    Cholecystitis    Valvular heart disease    Psoriasis    History of kidney stones    Inguinal hernia, left    Prostate cancer  s/p XRT 1-6-2020 to 1/27/2021    History of renal calculi    S/P hernia repair  right inguinal; 5/2015    S/P colonoscopy with polypectomy  Baseline; viv polyps; 2012    History of cholecystectomy  2017    History of prostatectomy  5/2020    History of lithotripsy    MEDICATIONS  (STANDING):  atorvastatin 10 milliGRAM(s) Oral at bedtime  budesonide 160 MICROgram(s)/formoterol 4.5 MICROgram(s) Inhaler 2 Puff(s) Inhalation two times a day  dexMEDEtomidine Infusion 1 MICROgram(s)/kG/Hr (20.7 mL/Hr) IV Continuous <Continuous>  enoxaparin Injectable 80 milliGRAM(s) SubCutaneous every 12 hours  guaiFENesin ER 1200 milliGRAM(s) Oral every 12 hours  LORazepam   Injectable 1 milliGRAM(s) IV Push once  methylPREDNISolone sodium succinate Injectable 40 milliGRAM(s) IV Push every 12 hours  polyethylene glycol 3350 17 Gram(s) Oral daily  senna 2 Tablet(s) Oral at bedtime  tiotropium 18 MICROgram(s) Capsule 1 Capsule(s) Inhalation daily    MEDICATIONS  (PRN):  acetaminophen     Tablet .. 650 milliGRAM(s) Oral every 6 hours PRN Temp greater or equal to 38C (100.4F), Mild Pain (1 - 3)  ALBUTerol    90 MICROgram(s) HFA Inhaler 2 Puff(s) Inhalation every 6 hours PRN Shortness of Breath and/or Wheezing  ALPRAZolam 0.25 milliGRAM(s) Oral every 8 hours PRN anxiety  aluminum hydroxide/magnesium hydroxide/simethicone Suspension 30 milliLiter(s) Oral every 4 hours PRN Dyspepsia  benzonatate 100 milliGRAM(s) Oral every 8 hours PRN Cough  bisacodyl Suppository 10 milliGRAM(s) Rectal daily PRN Constipation  melatonin 3 milliGRAM(s) Oral at bedtime PRN Insomnia  ondansetron Injectable 4 milliGRAM(s) IV Push every 8 hours PRN Nausea and/or Vomiting  oxymetazoline 0.05% Nasal Spray 2 Spray(s) Both Nostrils two times a day PRN Nasal Congestion  sodium chloride 0.65% Nasal 1 Spray(s) Both Nostrils two times a day PRN Nasal Congestion        Vital Signs Last 24 Hrs  T(C): 38.1 (18 Mar 2022 06:00), Max: 38.1 (18 Mar 2022 06:00)  T(F): 100.6 (18 Mar 2022 06:00), Max: 100.6 (18 Mar 2022 06:00)  HR: 79 (18 Mar 2022 07:00) (79 - 153)  BP: 88/67 (18 Mar 2022 07:00) (88/67 - 168/92)  BP(mean): 75 (18 Mar 2022 07:00) (75 - 118)  RR: 22 (18 Mar 2022 07:00) (22 - 45)  SpO2: 91% (18 Mar 2022 07:00) (70% - 98%)      PHYSICAL EXAM  General Appearance: On BIPAP  HEENT: PERRL, conjunctiva clear, EOM's intact, non injected pharynx, no exudate, TM   normal  Neck: Supple, , no adenopathy, thyroid: not enlarged, no carotid bruit or JVD  Back: Symmetric, no  tenderness,no soft tissue tenderness  Lungs: coarse at the bases   Heart: Regular rate and rhythm, S1, S2 normal, no murmur, rub or gallop  Abdomen: Soft, non-tender, bowel sounds active , no hepatosplenomegaly  Extremities: no cyanosis or edema, no joint swelling  Skin: Skin color, texture normal, no rashes   Neurologic: non focal    ECG:    LABS:                        16.5   32.85 )-----------( 294      ( 10 Mar 2022 08:37 )             48.4   03-10    132<L>  |  96  |  18  ----------------------------<  112<H>  4.7   |  31  |  0.80    Ca    9.0      10 Mar 2022 08:37  Phos  3.3     03-10  Mg     2.9     03-10    TPro  6.9  /  Alb  3.3  /  TBili  1.6<H>  /  DBili  0.4<H>  /  AST  21  /  ALT  69  /  AlkPhos  128<H>  03-10    \        RADIOLOGY & ADDITIONAL STUDIES:    ACC: [id number] EXAM:  XR CHEST PORTABLE IMMED 1V                          PROCEDURE DATE:  03/10/2022          INTERPRETATION:  Chest one view    HISTORY: Leukocytosis    COMPARISON STUDY: 3/1/2022    Frontal expiratory view of the chest shows the heart to be similar in   size. The lungs show slight clearing of the upper right lung with   progression of basilar infiltrates and there is no evidence of   pneumothorax nor pleural effusion.    IMPRESSION:  Changing infiltrates.  CXR independently reviewed

## 2022-03-18 NOTE — PROGRESS NOTE ADULT - SUBJECTIVE AND OBJECTIVE BOX
Patient is a 71y old  Male who presents with a chief complaint of SOB (17 Mar 2022 20:24)      BRIEF HOSPITAL COURSE:   71M with PMHx HLD, sp CCY, prostate CA sp prostatectomy, Vaccinated not boosted admitted with COVID-19 Viral PNA. Course complicated by hypoxemic respiratory failure, acute PE, ARDS. Sp Remdesivir/Decadron/Toci.    Events last 24 hours: called to evaluate by nursing for desats to 60s with agitation on CPAP, precedex now, treated with morphine for tachypnea,                                 CPAP overnight.     PAST MEDICAL & SURGICAL HISTORY:  Hypercholesterolemia    Cholelithiasis    Cholecystitis    Valvular heart disease    Psoriasis    History of kidney stones    Inguinal hernia, left    Prostate cancer  s/p XRT 1-6-2020 to 1/27/2021    History of renal calculi    S/P hernia repair  right inguinal; 5/2015    S/P colonoscopy with polypectomy  Baseline; begnin polyps; 2012    History of cholecystectomy  2017    History of prostatectomy  5/2020    History of lithotripsy          Hosp day #18d        Vital signs / Reviewed and Physical Exam Performed where pertinent and urgently required    Lab / Radiology  studies / ABG / Meds -  reviewed and interpreted into the assessment and treatment plan.    I&O's Summary    17 Mar 2022 07:01  -  18 Mar 2022 06:15  --------------------------------------------------------  IN: 50 mL / OUT: 550 mL / NET: -500 mL      Impression:  1. acute hypoxemic respiratory failure  2. ARDS  3. COVID-19 Viral PNA  4. acute pulmonary embolism  5. anxiety    Plan:  Neuro - Precedex and benzos for anxiolysis, Morphine for tachypnea,    CV -  hemodynamics stable, NSR          Echo with normal LV and RV function    Pulm -  switched to BIPAP 18/12              CPAP at HS and PRN              actively titrating FiO2 to keep sats>88%              bronchodialators              increase steroids to solumedrol 40mg IV [id number]              remains high risk for decompensation, discussed potential needs for intubation and the pt is currently expressing wishes to not be placed               on mechanical vent support. Will have day team follow up with family/GOC given his need for precedex.    GI -  NPO when on NIPPV, PO diet with protein shake supplements as per RD when off, LFTs lateral, RUQ u/s with hepatic steatosis    Renal - Cr stable, Negative fluid balance as tolerated by hemodynamics and renal fx, strict I/Os, BMP in am    Heme -  Full dose AC with lovenox, no signs of active bleeding     ID - empiric IV abx for potential superimposed bacterial PNA, WBC downtrending, afebrile, Cxs NG, rest RVP negative, abx adjustments/            discontinuation based on discussion with ID in conjunction with clinical features and culture data.    Endo -  Aggressive glycemic control to limit FS glucose to < 180mg/dl, BS stable, vit D level normal      COVID 19 specific considerations and therapeutic  options based on the available and rapidly changing literature    35 mins critical care time spent in the management of this critically ill COVID-19 patient with continuous assessments and interventions based on the interpretation of multiple databases.

## 2022-03-18 NOTE — PROGRESS NOTE ADULT - ASSESSMENT
72 y/o male with h/o HLD,  kidney stone, valvular heart disease, prostate Ca s/p radiation was admitted on 2/28 for progressive  SOB.  Pt reports that on 2/19 he got sick with runny nose, fever and cough and tested positive for covid-19 on 2/21. His fever improved, but his SOB got worse, had difficulty breathing with minimal exertion. He checked his O2 and was below 80. In ER he received remdesivir.    1. Acute respiratory failure. COVID-19 breakthrough viral syndrome. Multifocal pneumonia. Possible superimposed bacterial pneumonia. PE.  -leukocytosis is worse  -respiratory frail and worsening  -concern of bacterial superinfection and possible MDRO's was raised  -given tociluzumab infusion on 3/2  -s/p remdesivir protocol # 10  -tolerating abx well so far; no side effects noted  -s/p cefepime 2 gm IV q12h # 7 (completed on 3/17)  -inflammatory markers are worse  -obtain BC x 2, sputum c/s if possible  -start meropenem 1 gm IV q8h and vancomycin 1 gm IV q12h  -reason for abx use and side effects reviewed; monitor BMP and vancomycin trough levels   -O2 therapy  -AC  -droplet isolation  -respiratory care  -continue abx coverage   -monitor temps  -f/u CBC  -supportive care  2. Other issues:   -care per medicine    d/w Dr. Conner

## 2022-03-18 NOTE — GOALS OF CARE CONVERSATION - ADVANCED CARE PLANNING - CONVERSATION DETAILS
Extensive conversation with wife Alsye at bedside regarding her husbands current status and poor prognosis. We discussed that Guilherme had expressed a desire to be a DNI prior, however he is now on precedex for comfort and NIPPV compliance. We further discussed a DNR. I explained that the most likely cause of cardiac arrest for her  is a respiratory event with hypoxia and that without the ability to intubate him that we will likely be unable obtain ROSC and only prolong his sufffering. I have suggested that if she and her  desire for a DNI that DNR be in place as well. She is waiting for her daughter to come into town from [place] tomorrow afternoon and wishes to discuss this with her further before making any decisions. Ongoing [facility] discussions tomorrow pending arrival of other family. Overnight I have expressed that I will need to call her if he decompensates and at that time she will need to decide what to do. She expressed an understanding of this and is states she is hopeful he will be alright tonight. All questions were asked and answered.

## 2022-03-18 NOTE — PROGRESS NOTE ADULT - SUBJECTIVE AND OBJECTIVE BOX
HPI:  70 yo pt with PMH of HLD,  kidney stone, valvular heart dz, prostate ca s/p Radiation presented  to ED c/o progressive  SOB.  Pt reports he got sich with runny nose and cough 2/19 and tested positive for covid19 on 2/21.  Pt reports that he did have fevers but resolved  but SOB got worse, had difficulty breathing with minimal exertion. he checked his O2 and was below 80.  Pt is s/p  2 shots of Pfizer  vaccine with second one in april 2021. No Booster.  Patient transferred to the ICU for worsening hypoxia.  Now on BIPAP Fi O2 100%.      3/18: Patient doing poorly.  On BIPAP and Precedex to ensure compliance.  CXR With progressive infiltrates.  Elevated WBC and Low grade temps         PAST MEDICAL & SURGICAL HISTORY:  Hypercholesterolemia    Cholelithiasis    Cholecystitis    Valvular heart disease    Psoriasis    History of kidney stones    Inguinal hernia, left    Prostate cancer  s/p XRT 1-6-2020 to 1/27/2021    History of renal calculi    S/P hernia repair  right inguinal; 5/2015    S/P colonoscopy with polypectomy  Baseline; viv polyps; 2012    History of cholecystectomy  2017    History of prostatectomy  5/2020    History of lithotripsy        FAMILY HISTORY:  FHx: stroke (Father)        Social Hx:    Allergies    No Known Allergies    Intolerances            ICU Vital Signs Last 24 Hrs  T(C): 38.1 (18 Mar 2022 06:00), Max: 38.1 (18 Mar 2022 06:00)  T(F): 100.6 (18 Mar 2022 06:00), Max: 100.6 (18 Mar 2022 06:00)  HR: 79 (18 Mar 2022 07:00) (79 - 153)  BP: 88/67 (18 Mar 2022 07:00) (88/67 - 168/92)  BP(mean): 75 (18 Mar 2022 07:00) (75 - 118)  ABP: --  ABP(mean): --  RR: 22 (18 Mar 2022 07:00) (22 - 45)  SpO2: 91% (18 Mar 2022 07:00) (70% - 98%)          I&O's Summary    17 Mar 2022 07:01  -  18 Mar 2022 07:00  --------------------------------------------------------  IN: 111 mL / OUT: 900 mL / NET: -789 mL                              14.0   20.59 )-----------( 99       ( 18 Mar 2022 07:05 )             42.3       03-18    133<L>  |  101  |  12  ----------------------------<  156<H>  5.0   |  26  |  0.65    Ca    9.0      18 Mar 2022 07:05  Phos  3.4     03-18  Mg     2.6     03-18    TPro  6.6  /  Alb  2.6<L>  /  TBili  1.9<H>  /  DBili  x   /  AST  37  /  ALT  132<H>  /  AlkPhos  162<H>  03-18                    MEDICATIONS  (STANDING):  atorvastatin 10 milliGRAM(s) Oral at bedtime  budesonide 160 MICROgram(s)/formoterol 4.5 MICROgram(s) Inhaler 2 Puff(s) Inhalation two times a day  dexMEDEtomidine Infusion 1 MICROgram(s)/kG/Hr (20.7 mL/Hr) IV Continuous <Continuous>  enoxaparin Injectable 80 milliGRAM(s) SubCutaneous every 12 hours  guaiFENesin ER 1200 milliGRAM(s) Oral every 12 hours  LORazepam   Injectable 1 milliGRAM(s) IV Push once  meropenem  IVPB 1000 milliGRAM(s) IV Intermittent every 8 hours  methylPREDNISolone sodium succinate Injectable 40 milliGRAM(s) IV Push every 12 hours  polyethylene glycol 3350 17 Gram(s) Oral daily  senna 2 Tablet(s) Oral at bedtime  tiotropium 18 MICROgram(s) Capsule 1 Capsule(s) Inhalation daily  vancomycin  IVPB 1000 milliGRAM(s) IV Intermittent once    MEDICATIONS  (PRN):  acetaminophen     Tablet .. 650 milliGRAM(s) Oral every 6 hours PRN Temp greater or equal to 38C (100.4F), Mild Pain (1 - 3)  ALBUTerol    90 MICROgram(s) HFA Inhaler 2 Puff(s) Inhalation every 6 hours PRN Shortness of Breath and/or Wheezing  ALPRAZolam 0.25 milliGRAM(s) Oral every 8 hours PRN anxiety  aluminum hydroxide/magnesium hydroxide/simethicone Suspension 30 milliLiter(s) Oral every 4 hours PRN Dyspepsia  benzonatate 100 milliGRAM(s) Oral every 8 hours PRN Cough  bisacodyl Suppository 10 milliGRAM(s) Rectal daily PRN Constipation  melatonin 3 milliGRAM(s) Oral at bedtime PRN Insomnia  ondansetron Injectable 4 milliGRAM(s) IV Push every 8 hours PRN Nausea and/or Vomiting  oxymetazoline 0.05% Nasal Spray 2 Spray(s) Both Nostrils two times a day PRN Nasal Congestion  sodium chloride 0.65% Nasal 1 Spray(s) Both Nostrils two times a day PRN Nasal Congestion      DVT Prophylaxis: Lovenox    Advanced Directives:  Discussed with:    Visit Information: 45 min    ** Time is exclusive of billed procedures and/or teaching and/or routine family updates.

## 2022-03-18 NOTE — PROGRESS NOTE ADULT - ASSESSMENT
A/P: 71 male with Acute Hypoxic Respiratory Failure from COVID 19 Pneumonia and ARDS  PE    Plan:  ICU    PULM: Continue NIV, will try to switch back to HF NC O2.  Continue Solumedrol.  Continue Lovenox for PE    Cardio: Hemodynamics reasonable    Renal: Stable    GI: PO Diet, PPI    ID: Completed Toci.REM.  Completed Cefepime.  Stasrting on Dylon/Vano for possible secondary bacterial PNA

## 2022-03-18 NOTE — PROGRESS NOTE ADULT - ASSESSMENT
1) COVID Pneumonia  2) Hypoxemic Respiratory Failure  3) Pulmonary Embolism  4) Dyspnea   5) Combined Pulmonary Fibrosis/Emphysema   6) Abnormal CT Chest         72 yo pt with PMH of HLD,  kidney stone, valvular heart dz, prostate ca s/p Radiation presented  to ED c/o progressive SOB.    Diagnosed with COVID Pneumonia.   CTPE revealed pulmonary emboli in the right upper lobe pulmonary artery and bilateral segmental and subsegmental branches.   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   Not seen Pulmonary in the past.   Received Tocilizumab and remdesivir now receiving prednisone 20mg/d  Lovenox 80 q 12 for PE  Transitioned from HFNC to BiPAP 18/12  Increased WBC noted and being treated for superimposed Pneumonia with Cefepime   7.47/35/74  Reviewed CXR 3/18  GoalSaO2 >87% Continue Spiriva/Symbicort  Echo does not show PH  Due to the underlying COPD/emphysema, his recovery to normalcy will be longer than expected and prognosis is guarded  Epistaxis noted 3/16 and patient transferred to MICU  Appreciate ID recommendations / hospitalist / MICU recommendations

## 2022-03-19 NOTE — PROGRESS NOTE ADULT - SUBJECTIVE AND OBJECTIVE BOX
HPI:  70 yo pt with PMH of HLD,  kidney stone, valvular heart dz, prostate ca s/p Radiation presented  to ED c/o progressive  SOB.  Pt reports he got sich with runny nose and cough 2/19 and tested positive for covid19 on 2/21.  Pt reports that he did have fevers but resolved  but SOB got worse, had difficulty breathing with minimal exertion. he checked his O2 and was below 80.  Pt is s/p  2 shots of Pfizer  vaccine with second one in april 2021. No Booster.  Patient transferred to the ICU for worsening hypoxia.  Now on BIPAP Fi O2 100%.      3/18: Patient doing poorly.  On BIPAP and Precedex to ensure compliance.  CXR With progressive infiltrates.  Elevated WBC and Low grade temps  3/19: He continues to do poorly, did not tolerate HF NC o2 and NRB this morning, back on NIV, WBC better today            PAST MEDICAL & SURGICAL HISTORY:  Hypercholesterolemia    Cholelithiasis    Cholecystitis    Valvular heart disease    Psoriasis    History of kidney stones    Inguinal hernia, left    Prostate cancer  s/p XRT 1-6-2020 to 1/27/2021    History of renal calculi    S/P hernia repair  right inguinal; 5/2015    S/P colonoscopy with polypectomy  Baseline; sandynin polyps; 2012    History of cholecystectomy  2017    History of prostatectomy  5/2020    History of lithotripsy        FAMILY HISTORY:  FHx: stroke (Father)        Social Hx:    Allergies    No Known Allergies    Intolerances            ICU Vital Signs Last 24 Hrs  T(C): 36 (19 Mar 2022 05:00), Max: 36.7 (18 Mar 2022 12:00)  T(F): 96.8 (19 Mar 2022 05:00), Max: 98 (18 Mar 2022 12:00)  HR: 60 (19 Mar 2022 09:00) (57 - 75)  BP: 120/73 (19 Mar 2022 09:00) (91/68 - 127/73)  BP(mean): 87 (19 Mar 2022 09:00) (77 - 91)  ABP: --  ABP(mean): --  RR: 18 (19 Mar 2022 09:00) (14 - 30)  SpO2: 92% (19 Mar 2022 09:00) (86% - 95%)          I&O's Summary    18 Mar 2022 07:01  -  19 Mar 2022 07:00  --------------------------------------------------------  IN: 1341 mL / OUT: 600 mL / NET: 741 mL                              12.6   15.57 )-----------( 92       ( 19 Mar 2022 06:08 )             36.9       03-19    137  |  105  |  20  ----------------------------<  154<H>  4.2   |  28  |  0.53    Ca    9.0      19 Mar 2022 06:08  Phos  2.7     03-19  Mg     2.7     03-19    TPro  6.6  /  Alb  2.6<L>  /  TBili  1.9<H>  /  DBili  x   /  AST  37  /  ALT  132<H>  /  AlkPhos  162<H>  03-18                    MEDICATIONS  (STANDING):  atorvastatin 10 milliGRAM(s) Oral at bedtime  budesonide 160 MICROgram(s)/formoterol 4.5 MICROgram(s) Inhaler 2 Puff(s) Inhalation two times a day  dexMEDEtomidine Infusion 1 MICROgram(s)/kG/Hr (20.7 mL/Hr) IV Continuous <Continuous>  dextrose 5% + sodium chloride 0.9%. 1000 milliLiter(s) (50 mL/Hr) IV Continuous <Continuous>  enoxaparin Injectable 80 milliGRAM(s) SubCutaneous every 12 hours  guaiFENesin ER 1200 milliGRAM(s) Oral every 12 hours  LORazepam   Injectable 1 milliGRAM(s) IV Push once  meropenem  IVPB 1000 milliGRAM(s) IV Intermittent every 8 hours  methylPREDNISolone sodium succinate Injectable 40 milliGRAM(s) IV Push every 12 hours  polyethylene glycol 3350 17 Gram(s) Oral daily  senna 2 Tablet(s) Oral at bedtime  tiotropium 18 MICROgram(s) Capsule 1 Capsule(s) Inhalation daily  vancomycin  IVPB 1000 milliGRAM(s) IV Intermittent every 12 hours    MEDICATIONS  (PRN):  acetaminophen     Tablet .. 650 milliGRAM(s) Oral every 6 hours PRN Temp greater or equal to 38C (100.4F), Mild Pain (1 - 3)  ALBUTerol    90 MICROgram(s) HFA Inhaler 2 Puff(s) Inhalation every 6 hours PRN Shortness of Breath and/or Wheezing  ALPRAZolam 0.25 milliGRAM(s) Oral every 8 hours PRN anxiety  aluminum hydroxide/magnesium hydroxide/simethicone Suspension 30 milliLiter(s) Oral every 4 hours PRN Dyspepsia  benzonatate 100 milliGRAM(s) Oral every 8 hours PRN Cough  bisacodyl Suppository 10 milliGRAM(s) Rectal daily PRN Constipation  melatonin 3 milliGRAM(s) Oral at bedtime PRN Insomnia  ondansetron Injectable 4 milliGRAM(s) IV Push every 8 hours PRN Nausea and/or Vomiting  oxymetazoline 0.05% Nasal Spray 2 Spray(s) Both Nostrils two times a day PRN Nasal Congestion  sodium chloride 0.65% Nasal 1 Spray(s) Both Nostrils two times a day PRN Nasal Congestion      DVT Prophylaxis:    Advanced Directives:  Discussed with:    Visit Information: 30 min    ** Time is exclusive of billed procedures and/or teaching and/or routine family updates.

## 2022-03-19 NOTE — PROGRESS NOTE ADULT - ASSESSMENT
A/P: 71 male with Acute Hypoxic Respiratory Failure from COVID 19 Pneumonia and ARDS  PE    Plan:  ICU    PULM: Continue NIV.  Continue Solumedrol.  Continue Lovenox for PE    Cardio: Hemodynamics reasonable    Renal: Stable    GI: PO Diet, PPI    ID: Completed Toci/REM.  Completed Cefepime.  Continue Dylon/Vano for possible secondary bacterial PNA    GOC D/W the patient wife yesterday and last night.  She hopes to aviod intubation.  Daughter is flying up from [place] today.  Still a full code

## 2022-03-19 NOTE — PROGRESS NOTE ADULT - ASSESSMENT
1) COVID Pneumonia  2) Hypoxemic Respiratory Failure  3) Pulmonary Embolism  4) Dyspnea   5) Combined Pulmonary Fibrosis/Emphysema   6) Abnormal CT Chest         70 yo pt with PMH of HLD,  kidney stone, valvular heart dz, prostate ca s/p Radiation presented  to ED c/o progressive SOB.    Diagnosed with COVID Pneumonia.   CTPE revealed pulmonary emboli in the right upper lobe pulmonary artery and bilateral segmental and subsegmental branches.   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   Not seen Pulmonary in the past.   Received Tocilizumab and remdesivir now receiving prednisone 20mg/d  Lovenox 80 q 12 for PE  Transitioned from HFNC to BiPAP 18/12  Increased WBC noted and being treated for superimposed Pneumonia with Cefepime   7.47/35/74  Reviewed CXR 3/18  GoalSaO2 >87% Continue Spiriva/Symbicort  Echo does not show PH  3/19  Covering for DR serrato  resp status remains tenuous and critical  HFNC and NRBM in place  data reviewed  < from: Xray Chest 1 View- PORTABLE-Urgent (Xray Chest 1 View- PORTABLE-Urgent .) (03.18.22 @ 09:08) >   Increasing bilateral infiltrates. cxr reviewed as well    Overall prognosis remains guraded    Due to the underlying COPD/emphysema, his recovery to normalcy will be longer than expected and prognosis is guarded  Epistaxis noted 3/16 and patient transferred to MICU  Appreciate ID recommendations / hospitalist / MICU recommendations

## 2022-03-19 NOTE — PROGRESS NOTE ADULT - SUBJECTIVE AND OBJECTIVE BOX
Patient is a 71y old  Male who presents with a chief complaint of SOB (19 Mar 2022 00:31)      BRIEF HOSPITAL COURSE:  71M with PMHx HLD, sp CCY, prostate CA sp prostatectomy, Vaccinated not boosted admitted with COVID-19 Viral PNA. Course complicated by hypoxemic respiratory failure, acute PE, ARDS. Sp Remdesivir/Decadron/Toci.    Events last 24 hours: afebrile, hemodynamics stable, remains on BIPAP 100% FiO2, precedex infusion.      PAST MEDICAL & SURGICAL HISTORY:  Hypercholesterolemia    Cholelithiasis    Cholecystitis    Valvular heart disease    Psoriasis    History of kidney stones    Inguinal hernia, left    Prostate cancer  s/p XRT 1-6-2020 to 1/27/2021    History of renal calculi    S/P hernia repair  right inguinal; 5/2015    S/P colonoscopy with polypectomy  Baseline; [place] polyps; 2012    History of cholecystectomy  2017    History of prostatectomy  5/2020    History of lithotripsy    Hosp day #19d      Vital signs / Reviewed and Physical Exam Performed where pertinent and urgently required    Lab / Radiology  studies / ABG / Meds -  reviewed and interpreted into the assessment and treatment plan.    I&O's Summary    17 Mar 2022 07:01  -  18 Mar 2022 07:00  --------------------------------------------------------  IN: 111 mL / OUT: 900 mL / NET: -789 mL    18 Mar 2022 07:01  -  19 Mar 2022 00:33  --------------------------------------------------------  IN: 545 mL / OUT: 200 mL / NET: 345 mL    Impression:  1. acute hypoxemic respiratory failure  2. ARDS  3. COVID-19 Viral PNA  4. acute pulmonary embolism  5. anxiety  6. thrombocytopenia    Plan:  Neuro - Precedex infusion and benzos for anxiolysis and NIPPV compliance    CV -  hemodynamics stable, NSR          Echo with normal LV and RV function    Pulm -  NIPPV support with elevated EPAP for alveolar recruitment              actively titrating FiO2 to keep sats>88%              bronchodialators              solumedrol 40mg IV [id number]              remains high risk for decompensation and potential needs for intubation. Ongoing GOC with family and pt regarding decisions for               MOLST, awaiting daughters arrival tomorrow.     GI -  NPO when on NIPPV, PO diet with protein shake supplements as per RD when off, LFTs lateral, RUQ u/s with hepatic steatosis    Renal - Cr stable, even fluid balance as tolerated by hemodynamics and renal fx, strict I/Os, BMP in am    Heme -  Full dose AC with lovenox, no signs of active bleeding, transfuse plts<10k or <50k with active bleeding.    ID - empiric IV abx for potential superimposed bacterial PNA transitioned to Meropenem with Vanco x1, add Vanco standing [id number] as per ID         recs. afebrile, Cxs NG, rest RVP negative, abx adjustments/discontinuation based on discussion with ID in conjunction with clinical features and         culture data.    Endo -  Aggressive glycemic control to limit FS glucose to < 180mg/dl, BS stable, vit D level normal      COVID 19 specific considerations and therapeutic  options based on the available and rapidly changing literature    35 mins critical care time spent in the management of this critically ill COVID-19 patient with continuous assessments and interventions based on the interpretation of multiple databases.

## 2022-03-19 NOTE — PROGRESS NOTE ADULT - SUBJECTIVE AND OBJECTIVE BOX
Patient is a 71y old  Male who presents with a chief complaint of SOB (01 Mar 2022 13:52)      HPI:  72 yo pt with PMH of HLD,  kidney stone, valvular heart dz, prostate ca s/p Radiation presented  to ED c/o progressive SOB.    Diagnosed with COVID Pneumonia.   CTPE revealed pulmonary emboli in the right upper lobe pulmonary artery and bilateral segmental and subsegmental branches.   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     3/18  Transferred to MICU in the last 24 hours  On BiPAP 18/12  Increased WBC noted and was being treated for superimposed Pneumonia with Cefepime   7.47/35/74  Portable CXR reviewed from 3/18, worsening infiltrates noted    3/19  Covering for DR serrato  resp status remains tenuous and critical  HFNC and NRBM in place  data reviewed  < from: Xray Chest 1 View- PORTABLE-Urgent (Xray Chest 1 View- PORTABLE-Urgent .) (03.18.22 @ 09:08) >   Increasing bilateral infiltrates. cxr reviewed as well              PAST MEDICAL & SURGICAL HISTORY:  Hypercholesterolemia    Cholelithiasis    Cholecystitis    Valvular heart disease    Psoriasis    History of kidney stones    Inguinal hernia, left    Prostate cancer  s/p XRT 1-6-2020 to 1/27/2021    History of renal calculi    S/P hernia repair  right inguinal; 5/2015    S/P colonoscopy with polypectomy  Baseline; begnin polyps; 2012    History of cholecystectomy  2017    History of prostatectomy  5/2020    History of lithotripsy    MEDICATIONS  (STANDING):  atorvastatin 10 milliGRAM(s) Oral at bedtime  budesonide 160 MICROgram(s)/formoterol 4.5 MICROgram(s) Inhaler 2 Puff(s) Inhalation two times a day  dexMEDEtomidine Infusion 1 MICROgram(s)/kG/Hr (20.7 mL/Hr) IV Continuous <Continuous>  dextrose 5% + sodium chloride 0.9%. 1000 milliLiter(s) (50 mL/Hr) IV Continuous <Continuous>  enoxaparin Injectable 80 milliGRAM(s) SubCutaneous every 12 hours  guaiFENesin ER 1200 milliGRAM(s) Oral every 12 hours  LORazepam   Injectable 1 milliGRAM(s) IV Push once  meropenem  IVPB 1000 milliGRAM(s) IV Intermittent every 8 hours  methylPREDNISolone sodium succinate Injectable 40 milliGRAM(s) IV Push every 12 hours  polyethylene glycol 3350 17 Gram(s) Oral daily  senna 2 Tablet(s) Oral at bedtime  tiotropium 18 MICROgram(s) Capsule 1 Capsule(s) Inhalation daily  vancomycin  IVPB 1000 milliGRAM(s) IV Intermittent every 12 hours    MEDICATIONS  (PRN):  acetaminophen     Tablet .. 650 milliGRAM(s) Oral every 6 hours PRN Temp greater or equal to 38C (100.4F), Mild Pain (1 - 3)  ALBUTerol    90 MICROgram(s) HFA Inhaler 2 Puff(s) Inhalation every 6 hours PRN Shortness of Breath and/or Wheezing  ALPRAZolam 0.25 milliGRAM(s) Oral every 8 hours PRN anxiety  aluminum hydroxide/magnesium hydroxide/simethicone Suspension 30 milliLiter(s) Oral every 4 hours PRN Dyspepsia  benzonatate 100 milliGRAM(s) Oral every 8 hours PRN Cough  bisacodyl Suppository 10 milliGRAM(s) Rectal daily PRN Constipation  melatonin 3 milliGRAM(s) Oral at bedtime PRN Insomnia  ondansetron Injectable 4 milliGRAM(s) IV Push every 8 hours PRN Nausea and/or Vomiting  oxymetazoline 0.05% Nasal Spray 2 Spray(s) Both Nostrils two times a day PRN Nasal Congestion  sodium chloride 0.65% Nasal 1 Spray(s) Both Nostrils two times a day PRN Nasal Congestion      Vital Signs Last 24 Hrs  T(C): 36 (19 Mar 2022 05:00), Max: 36.7 (18 Mar 2022 12:00)  T(F): 96.8 (19 Mar 2022 05:00), Max: 98 (18 Mar 2022 12:00)  HR: 65 (19 Mar 2022 10:47) (57 - 77)  BP: 122/69 (19 Mar 2022 10:00) (91/68 - 127/73)  BP(mean): 86 (19 Mar 2022 10:00) (77 - 91)  RR: 21 (19 Mar 2022 10:00) (12 - 30)  SpO2: 91% (19 Mar 2022 10:47) (80% - 95%)    I&O's Detail    18 Mar 2022 07:01  -  19 Mar 2022 07:00  --------------------------------------------------------  IN:    Dexmedetomidine: 559 mL    dextrose 5% + sodium chloride 0.9%: 432 mL    IV PiggyBack: 350 mL  Total IN: 1341 mL    OUT:    Incontinent per Collection Bag (mL): 600 mL  Total OUT: 600 mL    Total NET: 741 mL          PHYSICAL EXAM  General Appearance: On HFNC and NRBM  Neck: Supple, , no adenopathy,   Lungs: coarse at the bases   Heart: Regular rate and rhythm, S1, S2 normal,  Abdomen: Soft, non-tender, bowel sounds active   Extremities: no cyanosis or edema, no joint swelling  Neurologic: non focal    ECG:    LABS:                                 12.6   15.57 )-----------( 92       ( 19 Mar 2022 06:08 )             36.9   03-19    137  |  105  |  20  ----------------------------<  154<H>  4.2   |  28  |  0.53    Ca    9.0      19 Mar 2022 06:08  Phos  2.7     03-19  Mg     2.7     03-19    TPro  6.6  /  Alb  2.6<L>  /  TBili  1.9<H>  /  DBili  x   /  AST  37  /  ALT  132<H>  /  AlkPhos  162<H>  03-18               16.5   32.85 )-----------( 294      ( 10 Mar 2022 08:37 )             48.4   03-10    132<L>  |  96  |  18  ----------------------------<  112<H>  4.7   |  31  |  0.80    Ca    9.0      10 Mar 2022 08:37  Phos  3.3     03-10  Mg     2.9     03-10    TPro  6.9  /  Alb  3.3  /  TBili  1.6<H>  /  DBili  0.4<H>  /  AST  21  /  ALT  69  /  AlkPhos  128<H>  03-10    \        RADIOLOGY & ADDITIONAL STUDIES:    ACC: [id number] EXAM:  XR CHEST PORTABLE IMMED 1V                          PROCEDURE DATE:  03/10/2022          INTERPRETATION:  Chest one view    HISTORY: Leukocytosis    COMPARISON STUDY: 3/1/2022    Frontal expiratory view of the chest shows the heart to be similar in   size. The lungs show slight clearing of the upper right lung with   progression of basilar infiltrates and there is no evidence of   pneumothorax nor pleural effusion.    IMPRESSION:  Changing infiltrates.  CXR independently reviewed

## 2022-03-20 NOTE — PROGRESS NOTE ADULT - ASSESSMENT
A/P: 71 male with Acute Hypoxic Respiratory Failure from COVID 19 Pneumonia and ARDS  PE    Plan:  ICU    PULM: Continue NIV and HF NC O2 as he tolerates.  Continue Solumedrol.  Continue Lovenox for PE    Cardio: Hemodynamics reasonable    Renal: Stable    GI: PO Diet, PPI    ID: Completed Toci/REM.  Completed Cefepime.  Continue Dylon/Vano for possible secondary bacterial PNA    GOC D/W the patient wife yesterday and last night.  She hopes to avoid intubation.  Still a full code

## 2022-03-20 NOTE — PROGRESS NOTE ADULT - ASSESSMENT
1) COVID Pneumonia  2) Hypoxemic Respiratory Failure  3) Pulmonary Embolism  4) Dyspnea   5) Combined Pulmonary Fibrosis/Emphysema   6) Abnormal CT Chest         70 yo pt with PMH of HLD,  kidney stone, valvular heart dz, prostate ca s/p Radiation presented  to ED c/o progressive SOB.    Diagnosed with COVID Pneumonia.   CTPE revealed pulmonary emboli in the right upper lobe pulmonary artery and bilateral segmental and subsegmental branches.   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   Not seen Pulmonary in the past.   Received Tocilizumab and remdesivir now receiving prednisone 20mg/d  Lovenox 80 q 12 for PE  Transitioned from HFNC to BiPAP 18/12  Increased WBC noted and being treated for superimposed Pneumonia with Cefepime   7.47/35/74  Reviewed CXR 3/18  GoalSaO2 >87% Continue Spiriva/Symbicort  Echo does not show PH  3/19-3/20  Covering for DR serrato  resp status remains tenuous and critical  HFNC and NRBM in place  data reviewed  < from: Xray Chest 1 View- PORTABLE-Urgent (Xray Chest 1 View- PORTABLE-Urgent .) (03.18.22 @ 09:08) >   Increasing bilateral infiltrates. cxr reviewed as well    Overall prognosis remains guraded  DR Serrato will resume 3/21    Due to the underlying COPD/emphysema, his recovery to normalcy will be longer than expected and prognosis is guarded  patient transferred to MICU / ICU level care appropriate / high risk for intubation / mechanical ventilation requirement  Appreciate ID recommendations / hospitalist / MICU recommendations

## 2022-03-20 NOTE — PROGRESS NOTE ADULT - SUBJECTIVE AND OBJECTIVE BOX
Patient is a 71y old  Male who presents with a chief complaint of SOB (01 Mar 2022 13:52)      HPI:  70 yo pt with PMH of HLD,  kidney stone, valvular heart dz, prostate ca s/p Radiation presented  to ED c/o progressive SOB.    Diagnosed with COVID Pneumonia.   CTPE revealed pulmonary emboli in the right upper lobe pulmonary artery and bilateral segmental and subsegmental branches.   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     3/18  Transferred to MICU in the last 24 hours  On BiPAP 18/12  Increased WBC noted and was being treated for superimposed Pneumonia with Cefepime   7.47/35/74  Portable CXR reviewed from 3/18, worsening infiltrates noted    3/19  Covering for DR serrato  resp status remains tenuous and critical  HFNC and NRBM in place  data reviewed  < from: Xray Chest 1 View- PORTABLE-Urgent (Xray Chest 1 View- PORTABLE-Urgent .) (03.18.22 @ 09:08) >   Increasing bilateral infiltrates. cxr reviewed as well      3/20  remains critically ill / Bipap and high flow O2 required  DR Serrato will resume in am        PAST MEDICAL & SURGICAL HISTORY:  Hypercholesterolemia    Cholelithiasis    Cholecystitis    Valvular heart disease    Psoriasis    History of kidney stones    Inguinal hernia, left    Prostate cancer  s/p XRT 1-6-2020 to 1/27/2021    History of renal calculi    S/P hernia repair  right inguinal; 5/2015    S/P colonoscopy with polypectomy  Baseline; bridgern polyps; 2012    History of cholecystectomy  2017    History of prostatectomy  5/2020    History of lithotripsy    MEDICATIONS  (STANDING):  atorvastatin 10 milliGRAM(s) Oral at bedtime  budesonide 160 MICROgram(s)/formoterol 4.5 MICROgram(s) Inhaler 2 Puff(s) Inhalation two times a day  dexMEDEtomidine Infusion 1 MICROgram(s)/kG/Hr (20.7 mL/Hr) IV Continuous <Continuous>  dextrose 5% + sodium chloride 0.9%. 1000 milliLiter(s) (50 mL/Hr) IV Continuous <Continuous>  enoxaparin Injectable 80 milliGRAM(s) SubCutaneous every 12 hours  guaiFENesin ER 1200 milliGRAM(s) Oral every 12 hours  LORazepam   Injectable 1 milliGRAM(s) IV Push once  meropenem  IVPB 1000 milliGRAM(s) IV Intermittent every 8 hours  methylPREDNISolone sodium succinate Injectable 40 milliGRAM(s) IV Push every 12 hours  polyethylene glycol 3350 17 Gram(s) Oral daily  senna 2 Tablet(s) Oral at bedtime  tiotropium 18 MICROgram(s) Capsule 1 Capsule(s) Inhalation daily  vancomycin  IVPB 1000 milliGRAM(s) IV Intermittent every 12 hours    MEDICATIONS  (PRN):  acetaminophen     Tablet .. 650 milliGRAM(s) Oral every 6 hours PRN Temp greater or equal to 38C (100.4F), Mild Pain (1 - 3)  ALBUTerol    90 MICROgram(s) HFA Inhaler 2 Puff(s) Inhalation every 6 hours PRN Shortness of Breath and/or Wheezing  ALPRAZolam 0.25 milliGRAM(s) Oral every 8 hours PRN anxiety  aluminum hydroxide/magnesium hydroxide/simethicone Suspension 30 milliLiter(s) Oral every 4 hours PRN Dyspepsia  benzonatate 100 milliGRAM(s) Oral every 8 hours PRN Cough  bisacodyl Suppository 10 milliGRAM(s) Rectal daily PRN Constipation  melatonin 3 milliGRAM(s) Oral at bedtime PRN Insomnia  ondansetron Injectable 4 milliGRAM(s) IV Push every 8 hours PRN Nausea and/or Vomiting  oxymetazoline 0.05% Nasal Spray 2 Spray(s) Both Nostrils two times a day PRN Nasal Congestion  sodium chloride 0.65% Nasal 1 Spray(s) Both Nostrils two times a day PRN Nasal Congestion      ICU Vital Signs Last 24 Hrs  T(C): 36.4 (20 Mar 2022 04:00), Max: 36.5 (19 Mar 2022 20:00)  T(F): 97.6 (20 Mar 2022 04:00), Max: 97.7 (19 Mar 2022 20:00)  HR: 73 (20 Mar 2022 07:00) (59 - 82)  BP: 127/73 (20 Mar 2022 07:00) (119/67 - 155/90)  BP(mean): 90 (20 Mar 2022 07:00) (81 - 108)  ABP: --  ABP(mean): --  RR: 21 (20 Mar 2022 07:00) (15 - 25)  SpO2: 90% (20 Mar 2022 07:00) (87% - 95%)    I&O's Detail    19 Mar 2022 07:01  -  20 Mar 2022 07:00  --------------------------------------------------------  IN:    Dexmedetomidine: 646.8 mL    dextrose 5% + sodium chloride 0.9%: 1179.7 mL    IV PiggyBack: 650 mL    Oral Fluid: 240 mL  Total IN: 2716.6 mL    OUT:    Incontinent per Collection Bag (mL): 1250 mL  Total OUT: 1250 mL    Total NET: 1466.6 mL              PHYSICAL EXAM  General Appearance: On HFNC and NRBM  Neck: Supple, , no adenopathy,   Lungs: coarse at the bases   Heart: Regular rate and rhythm, S1, S2 normal,  Abdomen: Soft, non-tender, bowel sounds active   Extremities: no cyanosis or edema, no joint swelling  Neurologic: non focal    ECG:    LABS:                                     14.0   16.99 )-----------( 101      ( 20 Mar 2022 06:45 )             42.2   03-20    136  |  105  |  21  ----------------------------<  144<H>  4.4   |  27  |  0.60    Ca    9.1      20 Mar 2022 06:45  Phos  3.0     03-20  Mg     2.5     03-20                      12.6   15.57 )-----------( 92       ( 19 Mar 2022 06:08 )             36.9   03-19    137  |  105  |  20  ----------------------------<  154<H>  4.2   |  28  |  0.53    Ca    9.0      19 Mar 2022 06:08  Phos  2.7     03-19  Mg     2.7     03-19    TPro  6.6  /  Alb  2.6<L>  /  TBili  1.9<H>  /  DBili  x   /  AST  37  /  ALT  132<H>  /  AlkPhos  162<H>  03-18               16.5   32.85 )-----------( 294      ( 10 Mar 2022 08:37 )             48.4   03-10    132<L>  |  96  |  18  ----------------------------<  112<H>  4.7   |  31  |  0.80    Ca    9.0      10 Mar 2022 08:37  Phos  3.3     03-10  Mg     2.9     03-10    TPro  6.9  /  Alb  3.3  /  TBili  1.6<H>  /  DBili  0.4<H>  /  AST  21  /  ALT  69  /  AlkPhos  128<H>  03-10    \        RADIOLOGY & ADDITIONAL STUDIES:    ACC: [id number] EXAM:  XR CHEST PORTABLE IMMED 1V                          PROCEDURE DATE:  03/10/2022          INTERPRETATION:  Chest one view    HISTORY: Leukocytosis    COMPARISON STUDY: 3/1/2022    Frontal expiratory view of the chest shows the heart to be similar in   size. The lungs show slight clearing of the upper right lung with   progression of basilar infiltrates and there is no evidence of   pneumothorax nor pleural effusion.    IMPRESSION:  Changing infiltrates.  CXR independently reviewed    < from: Xray Chest 1 View- PORTABLE-Urgent (Xray Chest 1 View- PORTABLE-Urgent .) (03.18.22 @ 09:08) >  PROCEDURE DATE:  03/18/2022          INTERPRETATION:  AP erect chest on March 18, 2022 at 8:28 AM. Patient has   Covid pneumonia.    Heart magnified by technique.    Scattered interstitial available infiltrates are again noted more   concentrated at lung bases. They have increased from March 10.    IMPRESSION: Increasing bilateral infiltrates.    < end of copied text >

## 2022-03-20 NOTE — PROGRESS NOTE ADULT - SUBJECTIVE AND OBJECTIVE BOX
HPI:    71M with PMHx HLD, sp CCY, prostate CA sp prostatectomy, Vaccinated not boosted admitted with COVID-19 Viral PNA. Course complicated by hypoxemic respiratory failure, acute PE, ARDS. Sp Remdesivir/Decadron/Toci.      Hosp day # 2/28      Recent clinical changes:  Worsening respiratory status now BIPAP dependent 100% Fi02  CXR worse.       Vital signs/reviewed and physical exam performed where pertinent and urgently required.    Lab/radiology studies/ABG/meds reviewed and interpreted into the assessment and treatment plan.    Assessment/Plan/Therapeutic interventions.        Neuro: Precedex for mask compliance.  Prn morphine for dyspnea     Cor:  HD stable SR     Pulm: Bipap dependent 100% fi02   Medrol 40 q12     GI:  Gi prophylaxis   Enteric feeds as tolerated.   ?? PPN as now mask dependent     Renal: Even to negative fluid balance as tolerated by hemodynamics and renal fx.      Heme:  full AC VTE PE  watch platelets  slow drift.     ID: vanco  Meropenum  r/o SI bacterial process.      Endo Aggressive glycemic control to limit FS glucose to < 180mg/dl.        COVID 19 specific considerations and therapeutic options based on the available and rapidly changing medical literature.    Goals of care considerations:  Ongoing assessment for patient specific treatment options based on clinical progression or decline.  I have involved the family with updates and requests in guidance for medical decision making.      38 minutes of critical care time spent, (independent of any procedures),  in the management of this critically ill COVID-19 patient  with continuous assessments and interventions based on the interpretation of multiple databases.

## 2022-03-20 NOTE — PROGRESS NOTE ADULT - SUBJECTIVE AND OBJECTIVE BOX
HPI:  70 yo pt with PMH of HLD,  kidney stone, valvular heart dz, prostate ca s/p Radiation presented  to ED c/o progressive  SOB.  Pt reports he got sich with runny nose and cough 2/19 and tested positive for covid19 on 2/21.  Pt reports that he did have fevers but resolved  but SOB got worse, had difficulty breathing with minimal exertion. he checked his O2 and was below 80.  Pt is s/p  2 shots of Pfizer  vaccine with second one in april 2021. No Booster.  Patient transferred to the ICU for worsening hypoxia.  Now on BIPAP Fi O2 100%.      3/18: Patient doing poorly.  On BIPAP and Precedex to ensure compliance.  CXR With progressive infiltrates.  Elevated WBC and Low grade temps  3/19: He continues to do poorly, did not tolerate HF NC o2 and NRB this morning, back on NIV, WBC better today  3/20: Was briefly on HFNC and 100% this morning, O2 sat remained in the low 80s and he had to back to NIV.          PAST MEDICAL & SURGICAL HISTORY:  Hypercholesterolemia    Cholelithiasis    Cholecystitis    Valvular heart disease    Psoriasis    History of kidney stones    Inguinal hernia, left    Prostate cancer  s/p XRT 1-6-2020 to 1/27/2021    History of renal calculi    S/P hernia repair  right inguinal; 5/2015    S/P colonoscopy with polypectomy  Baseline; viv polyps; 2012    History of cholecystectomy  2017    History of prostatectomy  5/2020    History of lithotripsy        FAMILY HISTORY:  FHx: stroke (Father)        Social Hx:    Allergies    No Known Allergies    Intolerances            ICU Vital Signs Last 24 Hrs  T(C): 36.4 (20 Mar 2022 04:00), Max: 36.5 (19 Mar 2022 20:00)  T(F): 97.6 (20 Mar 2022 04:00), Max: 97.7 (19 Mar 2022 20:00)  HR: 73 (20 Mar 2022 07:00) (59 - 82)  BP: 127/73 (20 Mar 2022 07:00) (119/67 - 155/90)  BP(mean): 90 (20 Mar 2022 07:00) (81 - 108)  ABP: --  ABP(mean): --  RR: 21 (20 Mar 2022 07:00) (15 - 25)  SpO2: 90% (20 Mar 2022 07:00) (87% - 95%)          I&O's Summary    19 Mar 2022 07:01  -  20 Mar 2022 07:00  --------------------------------------------------------  IN: 2716.6 mL / OUT: 1250 mL / NET: 1466.6 mL                              14.0   16.99 )-----------( 101      ( 20 Mar 2022 06:45 )             42.2       03-20    136  |  105  |  21  ----------------------------<  144<H>  4.4   |  27  |  0.60    Ca    9.1      20 Mar 2022 06:45  Phos  3.0     03-20  Mg     2.5     03-20                      MEDICATIONS  (STANDING):  atorvastatin 10 milliGRAM(s) Oral at bedtime  budesonide 160 MICROgram(s)/formoterol 4.5 MICROgram(s) Inhaler 2 Puff(s) Inhalation two times a day  dexMEDEtomidine Infusion 1 MICROgram(s)/kG/Hr (20.7 mL/Hr) IV Continuous <Continuous>  dextrose 5% + sodium chloride 0.9%. 1000 milliLiter(s) (50 mL/Hr) IV Continuous <Continuous>  enoxaparin Injectable 80 milliGRAM(s) SubCutaneous every 12 hours  guaiFENesin ER 1200 milliGRAM(s) Oral every 12 hours  LORazepam   Injectable 1 milliGRAM(s) IV Push once  meropenem  IVPB 1000 milliGRAM(s) IV Intermittent every 8 hours  methylPREDNISolone sodium succinate Injectable 40 milliGRAM(s) IV Push every 12 hours  polyethylene glycol 3350 17 Gram(s) Oral daily  senna 2 Tablet(s) Oral at bedtime  tiotropium 18 MICROgram(s) Capsule 1 Capsule(s) Inhalation daily  vancomycin  IVPB 1000 milliGRAM(s) IV Intermittent every 12 hours    MEDICATIONS  (PRN):  acetaminophen     Tablet .. 650 milliGRAM(s) Oral every 6 hours PRN Temp greater or equal to 38C (100.4F), Mild Pain (1 - 3)  ALBUTerol    90 MICROgram(s) HFA Inhaler 2 Puff(s) Inhalation every 6 hours PRN Shortness of Breath and/or Wheezing  ALPRAZolam 0.25 milliGRAM(s) Oral every 8 hours PRN anxiety  aluminum hydroxide/magnesium hydroxide/simethicone Suspension 30 milliLiter(s) Oral every 4 hours PRN Dyspepsia  benzonatate 100 milliGRAM(s) Oral every 8 hours PRN Cough  bisacodyl Suppository 10 milliGRAM(s) Rectal daily PRN Constipation  melatonin 3 milliGRAM(s) Oral at bedtime PRN Insomnia  morphine  - Injectable 2 milliGRAM(s) IV Push every 4 hours PRN anxiety  ondansetron Injectable 4 milliGRAM(s) IV Push every 8 hours PRN Nausea and/or Vomiting  oxymetazoline 0.05% Nasal Spray 2 Spray(s) Both Nostrils two times a day PRN Nasal Congestion  sodium chloride 0.65% Nasal 1 Spray(s) Both Nostrils two times a day PRN Nasal Congestion      DVT Prophylaxis:    Advanced Directives:  Discussed with:    Visit Information: 30 min    ** Time is exclusive of billed procedures and/or teaching and/or routine family updates.

## 2022-03-21 NOTE — DIETITIAN NUTRITION RISK NOTIFICATION - ADDITIONAL COMMENTS/DIETITIAN RECOMMENDATIONS
d1) start TF with Nepro @ 10mL/hr and titrate to goal rate of 50mL/hr with 4pkts Prosource TF  2) monitor TF tolerance, keep back of bed >35 degrees  3) monitor hydration status; adjust free water flushes prn; consider free water flushes of 30mL q1hr (=600mL additional free water daily)  4) daily wt checks  5) monitor lytes/mins; replete/correct prn

## 2022-03-21 NOTE — PROCEDURE NOTE - NSPROCDETAILS_GEN_ALL_CORE
guidewire recovered/lumen(s) aspirated and flushed/sterile technique, catheter placed/ultrasound guidance with use of sterile gel and probe cove
location identified, draped/prepped, sterile technique used, needle inserted/introduced/positive blood return obtained via catheter/connected to a pressurized flush line/sutured in place/Seldinger technique/all materials/supplies accounted for at end of procedure
patient pre-oxygenated, tube inserted, placement confirmed

## 2022-03-21 NOTE — PROGRESS NOTE ADULT - ASSESSMENT
1) COVID Pneumonia  2) Hypoxemic Respiratory Failure  3) Pulmonary Embolism  4) Dyspnea   5) Combined Pulmonary Fibrosis/Emphysema   6) Abnormal CT Chest         70 yo pt with PMH of HLD,  kidney stone, valvular heart dz, prostate ca s/p Radiation presented  to ED c/o progressive SOB.    Diagnosed with COVID Pneumonia.   CTPE revealed pulmonary emboli in the right upper lobe pulmonary artery and bilateral segmental and subsegmental branches.   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   Not seen Pulmonary in the past.   Received Tocilizumab and remdesivir, placed back on Solumedrol  Lovenox 80 q 12 for PE  Transitioned from HFNC to BiPAP 18/12 and intubated 3/21/2022  Increased WBC noted and treated for superimposed Pneumonia with Cefepime initially now vancomycin  Reviewed CXR worsening infiltrates   Continue Spiriva/Symbicort  Due to the underlying COPD/emphysema, his recovery to normalcy will be longer than expected and prognosis is guarded  /30/100/10  30-74 minutes of critical care time provided seeing the patient, reviewing the chart, discussing with ancillary team and family.

## 2022-03-21 NOTE — PROVIDER CONTACT NOTE (EICU) - ACTION/TREATMENT ORDERED:
Per request, post intubation ABG/chest xray ordered, to be followed up by bedside team. Rocuronium/Versed/Fentanyl drips ordered for paralytic and analgosedation with IVP and vent setting of 30/450/100/10.

## 2022-03-21 NOTE — PROGRESS NOTE ADULT - SUBJECTIVE AND OBJECTIVE BOX
Hospital D # 21  ICU # 5  Vent # 0  [facility] CVL # 0  L Rad A-line # 0    CC:  Respiratory Failure     HPI:    70 y/o male with COPD, HLD,  kidney stone, valvular heart dz, prostate ca s/p Radiation presented  to ED c/o progressive SOB found to have COVID PNA.  He is tx to ICU for worsening hypoxia and progressive resp failure.      3/21:  Pt seen and examined in ICU during IDR.  Vented few hrs ago--100/+ 10.  CVL and A-line placed.  PF ratio 55.  Will prone.  Tm 97.6   On prop/Fent and Cis gtts.  Very ill    PMH:  As above.     PSH:  As above.     FH: Non Contributory other than those listed in HPI    Social History:  Unobtainable due to clinical condition     MEDICATIONS  (STANDING):  atorvastatin 10 milliGRAM(s) Oral at bedtime  chlorhexidine 0.12% Liquid 15 milliLiter(s) Oral Mucosa every 12 hours  chlorhexidine 4% Liquid 1 Application(s) Topical <User Schedule>  cisatracurium Infusion 3 MICROgram(s)/kG/Min (14.9 mL/Hr) IV Continuous <Continuous>  dextrose 5% + sodium chloride 0.9%. 1000 milliLiter(s) (50 mL/Hr) IV Continuous <Continuous>  enoxaparin Injectable 80 milliGRAM(s) SubCutaneous every 12 hours  fentaNYL   Infusion. 0.75 MICROgram(s)/kG/Hr (6.21 mL/Hr) IV Continuous <Continuous>  meropenem  IVPB 1000 milliGRAM(s) IV Intermittent every 8 hours  methylPREDNISolone sodium succinate Injectable 40 milliGRAM(s) IV Push every 12 hours  pantoprazole  Injectable 40 milliGRAM(s) IV Push daily  petrolatum Ophthalmic Ointment 1 Application(s) Both EYES four times a day  polyethylene glycol 3350 17 Gram(s) Oral daily  propofol Infusion 25 MICROgram(s)/kG/Min (12.4 mL/Hr) IV Continuous <Continuous>  senna 2 Tablet(s) Oral at bedtime  vancomycin  IVPB 1000 milliGRAM(s) IV Intermittent every 12 hours    MEDICATIONS  (PRN):  acetaminophen     Tablet .. 650 milliGRAM(s) Oral every 6 hours PRN Temp greater or equal to 38C (100.4F), Mild Pain (1 - 3)  ALBUTerol    90 MICROgram(s) HFA Inhaler 2 Puff(s) Inhalation every 6 hours PRN Shortness of Breath and/or Wheezing  ALPRAZolam 0.25 milliGRAM(s) Oral every 8 hours PRN anxiety  aluminum hydroxide/magnesium hydroxide/simethicone Suspension 30 milliLiter(s) Oral every 4 hours PRN Dyspepsia  benzonatate 100 milliGRAM(s) Oral every 8 hours PRN Cough  bisacodyl Suppository 10 milliGRAM(s) Rectal daily PRN Constipation  melatonin 3 milliGRAM(s) Oral at bedtime PRN Insomnia  ondansetron Injectable 4 milliGRAM(s) IV Push every 8 hours PRN Nausea and/or Vomiting  sodium chloride 0.65% Nasal 1 Spray(s) Both Nostrils two times a day PRN Nasal Congestion  sodium chloride 0.9% lock flush 10 milliLiter(s) IV Push every 1 hour PRN Pre/post blood products, medications, blood draw, and to maintain line patency      Allergies: NKDA    ROS:  SEE BELOW        ICU Vital Signs Last 24 Hrs  T(C): 36.2 (21 Mar 2022 04:00), Max: 36.4 (20 Mar 2022 12:00)  T(F): 97.2 (21 Mar 2022 04:00), Max: 97.6 (20 Mar 2022 12:00)  HR: 120 (21 Mar 2022 09:00) (68 - 125)  BP: 104/74 (21 Mar 2022 09:00) (104/72 - 183/116)  BP(mean): 84 (21 Mar 2022 09:00) (82 - 134)  ABP: --  ABP(mean): --  RR: 30 (21 Mar 2022 09:00) (17 - 36)  SpO2: 78% (21 Mar 2022 09:00) (70% - 90%)      Mode: AC/ CMV (Assist Control/ Continuous Mandatory Ventilation)  RR (machine): 30  TV (machine): 450  FiO2: 100  PEEP: 10  MAP: 17  PIP: 28      I&O's Summary    20 Mar 2022 07:01  -  21 Mar 2022 07:00  --------------------------------------------------------  IN: 2859 mL / OUT: 1675 mL / NET: 1184 mL    21 Mar 2022 07:01  -  21 Mar 2022 10:04  --------------------------------------------------------  IN: 6 mL / OUT: 60 mL / NET: -54 mL        Physical Exam:  SEE BELOW                          14.0   16.99 )-----------( 101      ( 20 Mar 2022 06:45 )             42.2       03-20    136  |  105  |  21  ----------------------------<  144<H>  4.4   |  27  |  0.60    Ca    9.1      20 Mar 2022 06:45  Phos  3.0     03-20  Mg     2.5     03-20              ABG - ( 21 Mar 2022 08:55 )  pH, Arterial: 7.23  pH, Blood: x     /  pCO2: 64    /  pO2: 55    / HCO3: 27    / Base Excess: -2.1  /  SaO2: 84                      DVT Prophylaxis:                                                            Contraindication:     Advanced Directives:    Discussed with:    Visit Information:  Time spent excluding procedure:      ** Time is exclusive of billed procedures and/or teaching and/or routine family updates.

## 2022-03-21 NOTE — PROGRESS NOTE ADULT - SUBJECTIVE AND OBJECTIVE BOX
HPI:    71M with PMHx HLD, sp CCY, prostate CA sp prostatectomy, Vaccinated not boosted admitted with COVID-19 Viral PNA. Course complicated by hypoxemic respiratory failure, acute PE, ARDS. Sp Remdesivir/Decadron/Toci.      Hosp day # 2/28  ICU 3/17      Recent clinical changes:  Worsening respiratory status now BIPAP dependent 100% Fi02  CXR worse.  Continues with gradual decrease in o2 sat.    Did not tolerate brief periods off HFNC    Single organ failure       Vital signs/reviewed and physical exam performed where pertinent and urgently required.    Lab/radiology studies/ABG/meds reviewed and interpreted into the assessment and treatment plan.    Assessment/Plan/Therapeutic interventions.        Neuro: Precedex for mask compliance.  Prn morphine for dyspnea     Cor:  HD stable SR     Pulm: BiPAP dependent 100% fi02   Medrol 40 q12     GI:  Gi prophylaxis   Enteric feeds as tolerated.   ?? PPN as now mask dependent     Renal: Even to negative fluid balance as tolerated by hemodynamics and renal fx.      Heme:  full AC VTE PE  watch platelets  slow drift.     ID: vanco  Meropenum  r/o SI bacterial process.      Endo Aggressive glycemic control to limit FS glucose to < 180mg/dl.        COVID 19 specific considerations and therapeutic options based on the available and rapidly changing medical literature.    Goals of care considerations:  Ongoing assessment for patient specific treatment options based on clinical progression or decline.  I have involved the family with updates and requests in guidance for medical decision making.      32 minutes of critical care time spent, (independent of any procedures),  in the management of this critically ill COVID-19 patient  with continuous assessments and interventions based on the interpretation of multiple databases.

## 2022-03-21 NOTE — PROCEDURE NOTE - NSINDICATIONS_GEN_A_CORE
feeding tube replacement
critical patient/respiratory failure
arterial puncture to obtain ABG's/monitoring purposes
critical illness/emergency venous access/venous access

## 2022-03-21 NOTE — PROCEDURE NOTE - ADDITIONAL PROCEDURE DETAILS
Procedure done under US guidance throughout, dynamic  Image obtained
COVID -19 type 1 resp failure      CXR P
DX COVID 19 ARDS

## 2022-03-21 NOTE — PROGRESS NOTE ADULT - SUBJECTIVE AND OBJECTIVE BOX
Patient is a 71y old  Male who presents with a chief complaint of SOB (01 Mar 2022 13:52)      HPI:  70 yo pt with PMH of HLD,  kidney stone, valvular heart dz, prostate ca s/p Radiation presented  to ED c/o progressive SOB.    Diagnosed with COVID Pneumonia.   CTPE revealed pulmonary emboli in the right upper lobe pulmonary artery and bilateral segmental and subsegmental branches.   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     3/21  Patient was intubated this morning  Discussed with wife and daughter      PAST MEDICAL & SURGICAL HISTORY:  Hypercholesterolemia    Cholelithiasis    Cholecystitis    Valvular heart disease    Psoriasis    History of kidney stones    Inguinal hernia, left    Prostate cancer  s/p XRT 1-6-2020 to 1/27/2021    History of renal calculi    S/P hernia repair  right inguinal; 5/2015    S/P colonoscopy with polypectomy  Baseline; viv polyps; 2012    History of cholecystectomy  2017    History of prostatectomy  5/2020    History of lithotripsy    MEDICATIONS  (STANDING):  atorvastatin 10 milliGRAM(s) Oral at bedtime  budesonide 160 MICROgram(s)/formoterol 4.5 MICROgram(s) Inhaler 2 Puff(s) Inhalation two times a day  dexMEDEtomidine Infusion 1 MICROgram(s)/kG/Hr (20.7 mL/Hr) IV Continuous <Continuous>  dextrose 5% + sodium chloride 0.9%. 1000 milliLiter(s) (50 mL/Hr) IV Continuous <Continuous>  enoxaparin Injectable 80 milliGRAM(s) SubCutaneous every 12 hours  guaiFENesin ER 1200 milliGRAM(s) Oral every 12 hours  LORazepam   Injectable 1 milliGRAM(s) IV Push once  meropenem  IVPB 1000 milliGRAM(s) IV Intermittent every 8 hours  methylPREDNISolone sodium succinate Injectable 40 milliGRAM(s) IV Push every 12 hours  polyethylene glycol 3350 17 Gram(s) Oral daily  senna 2 Tablet(s) Oral at bedtime  tiotropium 18 MICROgram(s) Capsule 1 Capsule(s) Inhalation daily  vancomycin  IVPB 1000 milliGRAM(s) IV Intermittent every 12 hours    MEDICATIONS  (PRN):  acetaminophen     Tablet .. 650 milliGRAM(s) Oral every 6 hours PRN Temp greater or equal to 38C (100.4F), Mild Pain (1 - 3)  ALBUTerol    90 MICROgram(s) HFA Inhaler 2 Puff(s) Inhalation every 6 hours PRN Shortness of Breath and/or Wheezing  ALPRAZolam 0.25 milliGRAM(s) Oral every 8 hours PRN anxiety  aluminum hydroxide/magnesium hydroxide/simethicone Suspension 30 milliLiter(s) Oral every 4 hours PRN Dyspepsia  benzonatate 100 milliGRAM(s) Oral every 8 hours PRN Cough  bisacodyl Suppository 10 milliGRAM(s) Rectal daily PRN Constipation  melatonin 3 milliGRAM(s) Oral at bedtime PRN Insomnia  ondansetron Injectable 4 milliGRAM(s) IV Push every 8 hours PRN Nausea and/or Vomiting  oxymetazoline 0.05% Nasal Spray 2 Spray(s) Both Nostrils two times a day PRN Nasal Congestion  sodium chloride 0.65% Nasal 1 Spray(s) Both Nostrils two times a day PRN Nasal Congestion    \ICU Vital Signs Last 24 Hrs  T(C): 37.1 (21 Mar 2022 13:40), Max: 37.1 (21 Mar 2022 13:40)  T(F): 98.8 (21 Mar 2022 13:40), Max: 98.8 (21 Mar 2022 13:40)  HR: 118 (21 Mar 2022 15:31) (68 - 125)  BP: 121/79 (21 Mar 2022 15:15) (96/65 - 183/116)  BP(mean): 92 (21 Mar 2022 15:15) (75 - 134)  ABP: 133/73 (21 Mar 2022 15:31) (85/56 - 155/82)  ABP(mean): 93 (21 Mar 2022 15:31) (66 - 107)  RR: 30 (21 Mar 2022 15:31) (18 - 36)  SpO2: 92% (21 Mar 2022 15:31) (70% - 92%)      I&O's Detail    19 Mar 2022 07:01  -  20 Mar 2022 07:00  --------------------------------------------------------  IN:    Dexmedetomidine: 646.8 mL    dextrose 5% + sodium chloride 0.9%: 1179.7 mL    IV PiggyBack: 650 mL    Oral Fluid: 240 mL  Total IN: 2716.6 mL    OUT:    Incontinent per Collection Bag (mL): 1250 mL  Total OUT: 1250 mL    Total NET: 1466.6 mL              PHYSICAL EXAM  General Appearance: Intubated  Neck: Supple, , no adenopathy,   Lungs: coarse at the bases   Heart: Regular rate and rhythm, S1, S2 normal,  Abdomen: Soft, non-tender, bowel sounds active   Extremities: no cyanosis or edema, no joint swelling  Neurologic: Sedated     ECG:    LABS:    Vital Signs Last 24 Hrs                                       14.0   16.99 )-----------( 101      ( 20 Mar 2022 06:45 )             42.2   03-20    136  |  105  |  21  ----------------------------<  144<H>  4.4   |  27  |  0.60    Ca    9.1      20 Mar 2022 06:45  Phos  3.0     03-20  Mg     2.5     03-20                      12.6   15.57 )-----------( 92       ( 19 Mar 2022 06:08 )             36.9   03-19    137  |  105  |  20  ----------------------------<  154<H>  4.2   |  28  |  0.53    Ca    9.0      19 Mar 2022 06:08  Phos  2.7     03-19  Mg     2.7     03-19    TPro  6.6  /  Alb  2.6<L>  /  TBili  1.9<H>  /  DBili  x   /  AST  37  /  ALT  132<H>  /  AlkPhos  162<H>  03-18               16.5   32.85 )-----------( 294      ( 10 Mar 2022 08:37 )             48.4   03-10    132<L>  |  96  |  18  ----------------------------<  112<H>  4.7   |  31  |  0.80    Ca    9.0      10 Mar 2022 08:37  Phos  3.3     03-10  Mg     2.9     03-10    TPro  6.9  /  Alb  3.3  /  TBili  1.6<H>  /  DBili  0.4<H>  /  AST  21  /  ALT  69  /  AlkPhos  128<H>  03-10    \        RADIOLOGY & ADDITIONAL STUDIES:    ACC: [id number] EXAM:  XR CHEST PORTABLE IMMED 1V                          PROCEDURE DATE:  03/10/2022          INTERPRETATION:  Chest one view    HISTORY: Leukocytosis    COMPARISON STUDY: 3/1/2022    Frontal expiratory view of the chest shows the heart to be similar in   size. The lungs show slight clearing of the upper right lung with   progression of basilar infiltrates and there is no evidence of   pneumothorax nor pleural effusion.    IMPRESSION:  Changing infiltrates.  CXR independently reviewed    < from: Xray Chest 1 View- PORTABLE-Urgent (Xray Chest 1 View- PORTABLE-Urgent .) (03.18.22 @ 09:08) >  PROCEDURE DATE:  03/18/2022          INTERPRETATION:  AP erect chest on March 18, 2022 at 8:28 AM. Patient has   Covid pneumonia.    Heart magnified by technique.    Scattered interstitial available infiltrates are again noted more   concentrated at lung bases. They have increased from March 10.    IMPRESSION: Increasing bilateral infiltrates.    < end of copied text >

## 2022-03-21 NOTE — PROGRESS NOTE ADULT - ASSESSMENT
IMP:    72 y/o male with COPD tx to ICU for worsening resp failure secondary to COVID PNA and sepsis  AT1RF on full vent support  VTE    Critically ill.  High risk for acute decompensation and deterioration including death   Patient requires critical care for support of one or more vital organ systems with a high probability of imminent or life threatening deterioration in his/her condition     Plan:    Full vent support--LPV strategy to maintain plateau pressures < 30--High PEEP/low TV--Permissive hypercapnea and acidemia.  Will Prone   Cont with Prop/Fent and cis gtts  S/P Rem/Dexa and Toci.  Will cont with solu  Cont with empiric Abx IV Vanco (3) and Dylon (4).  Will check putum Cx  Will start TF   CVL and A-line placement  DVT prophy--Full dose LMWH    ICU care-- d/w ICU staff on multi disciplinary rounds and wife at bedside-- All concerns addressed including but not limited to diagnosis, treatment plan and overall prognosis

## 2022-03-21 NOTE — PROCEDURE NOTE - NSPOSTPRCRAD_GEN_A_CORE
central line located in the/central line located in the superior vena cava/no pneumothorax
To be check by Dr Pinto/chest radiograph

## 2022-03-21 NOTE — PROGRESS NOTE ADULT - SUBJECTIVE AND OBJECTIVE BOX
Resp status has worsened.    Now with 02 sats in the 70's more tachypneic.    He "wants to live", but at this point declines mechanical ventilation.    I have called his wife and daughter to come in urgently as this may be there last chance to be able to speak with him.    He will die without a respirator and will likely die despite it.    On standby to proceed in the way the patient and family desire.

## 2022-03-22 NOTE — PROGRESS NOTE ADULT - ASSESSMENT
IMP:    72 y/o male with COPD tx to ICU for worsening resp failure secondary to COVID PNA and sepsis  Viral PNA sepsis secondary to COVID--Acute type 1 resp failure--septic shock   VTE    Critically ill.  High risk for acute decompensation and deterioration including death   Patient requires critical care for support of one or more vital organ systems with a high probability of imminent or life threatening deterioration in his/her condition     Plan:    Full vent support--LPV strategy to maintain plateau pressures < 30--High PEEP/low TV--Permissive hypercapnea and acidemia.  Will Prone again today  Cont with Prop/Fent and cis gtts  Ordered Norepi gtt and Midodrine.  Actively titrate pressors to keep MAP > 60  S/P Rem/Dexa and Toci.  Will cont with solu  Cont with empiric Abx IV Vanco (4) and Dylon (5).  Will check putum Cx  Cont TF   DC Free water  CVL and A-line in place  DVT prophy--Full dose LMWH    ICU care-- d/w ICU staff on multi disciplinary rounds and wife at bedside-- All concerns addressed including but not limited to diagnosis, treatment plan and overall prognosis

## 2022-03-22 NOTE — PROGRESS NOTE ADULT - ASSESSMENT
72 y/o male with h/o HLD,  kidney stone, valvular heart disease, prostate Ca s/p radiation was admitted on 2/28 for progressive  SOB.  Pt reports that on 2/19 he got sick with runny nose, fever and cough and tested positive for covid-19 on 2/21. His fever improved, but his SOB got worse, had difficulty breathing with minimal exertion. He checked his O2 and was below 80. In ER he received remdesivir.    1. Acute respiratory failure. COVID-19 breakthrough viral syndrome. Multifocal pneumonia. Possible superimposed bacterial pneumonia. PE.  - vent and pressors per icu  -given tociluzumab infusion on 3/2  -s/p remdesivir protocol # 10  -tolerating abx well so far; no side effects noted  -s/p cefepime 2 gm IV q12h # 7 (completed on 3/17)  -inflammatory markers are worse  -obtain BC x 2, sputum c/s if possible  -day #5 meropenem 1 gm IV q8h and vancomycin 1 gm IV q12h  - tolerating antibiotics without rashes or side effects   - increased vancomycin given subtherapeutic trough  -AC  -droplet isolation  -respiratory care  -continue abx coverage   -monitor temps  -f/u CBC  -supportive care  2. Other issues:   -care per medicine

## 2022-03-22 NOTE — PROGRESS NOTE ADULT - SUBJECTIVE AND OBJECTIVE BOX
Date of service: 03-22-22 @ 12:11    Patient is intubated, sedated and on pressor support  Family is at bedside        ROS unable to obtain secondary to patient medical condition     MEDICATIONS  (STANDING):  atorvastatin 10 milliGRAM(s) Oral at bedtime  chlorhexidine 0.12% Liquid 15 milliLiter(s) Oral Mucosa every 12 hours  chlorhexidine 4% Liquid 1 Application(s) Topical <User Schedule>  cisatracurium Infusion 3 MICROgram(s)/kG/Min (14.9 mL/Hr) IV Continuous <Continuous>  enoxaparin Injectable 80 milliGRAM(s) SubCutaneous every 12 hours  fentaNYL   Infusion. 0.75 MICROgram(s)/kG/Hr (6.21 mL/Hr) IV Continuous <Continuous>  meropenem  IVPB 1000 milliGRAM(s) IV Intermittent every 8 hours  methylPREDNISolone sodium succinate Injectable 40 milliGRAM(s) IV Push every 12 hours  midodrine 10 milliGRAM(s) Oral every 8 hours  norepinephrine Infusion 0.05 MICROgram(s)/kG/Min (7.76 mL/Hr) IV Continuous <Continuous>  pantoprazole  Injectable 40 milliGRAM(s) IV Push daily  petrolatum Ophthalmic Ointment 1 Application(s) Both EYES four times a day  polyethylene glycol 3350 17 Gram(s) Oral daily  propofol Infusion 25 MICROgram(s)/kG/Min (12.4 mL/Hr) IV Continuous <Continuous>  senna 2 Tablet(s) Oral at bedtime  vancomycin  IVPB 1250 milliGRAM(s) IV Intermittent every 12 hours    MEDICATIONS  (PRN):  acetaminophen     Tablet .. 650 milliGRAM(s) Oral every 6 hours PRN Temp greater or equal to 38C (100.4F), Mild Pain (1 - 3)  ALBUTerol    90 MICROgram(s) HFA Inhaler 2 Puff(s) Inhalation every 6 hours PRN Shortness of Breath and/or Wheezing  ALPRAZolam 0.25 milliGRAM(s) Oral every 8 hours PRN anxiety  aluminum hydroxide/magnesium hydroxide/simethicone Suspension 30 milliLiter(s) Oral every 4 hours PRN Dyspepsia  benzonatate 100 milliGRAM(s) Oral every 8 hours PRN Cough  bisacodyl Suppository 10 milliGRAM(s) Rectal daily PRN Constipation  melatonin 3 milliGRAM(s) Oral at bedtime PRN Insomnia  ondansetron Injectable 4 milliGRAM(s) IV Push every 8 hours PRN Nausea and/or Vomiting  sodium chloride 0.65% Nasal 1 Spray(s) Both Nostrils two times a day PRN Nasal Congestion  sodium chloride 0.9% lock flush 10 milliLiter(s) IV Push every 1 hour PRN Pre/post blood products, medications, blood draw, and to maintain line patency      Vital Signs Last 24 Hrs  T(C): 37.3 (22 Mar 2022 04:00), Max: 37.3 (22 Mar 2022 04:00)  T(F): 99.2 (22 Mar 2022 04:00), Max: 99.2 (22 Mar 2022 04:00)  HR: 95 (22 Mar 2022 11:00) (93 - 123)  BP: 119/75 (22 Mar 2022 11:00) (85/61 - 127/80)  BP(mean): 87 (22 Mar 2022 11:00) (69 - 94)  RR: 30 (22 Mar 2022 11:00) (30 - 30)  SpO2: 95% (22 Mar 2022 11:00) (86% - 96%)    Mode: AC/ CMV (Assist Control/ Continuous Mandatory Ventilation), RR (machine): 30, TV (machine): 450, FiO2: 100, PEEP: 10, PIP: 32    Physical Exam:        Constitutional:  No acute distress  HEENT: orally intubated  Neck: supple; thyroid not palpable  Back: no tenderness  Respiratory: respiratory effort normal; scattered coarse breath sounds  Cardiovascular: S1S2 regular, no murmurs  Abdomen: soft, not tender, not distended, positive BS  Genitourinary: no suprapubic tenderness  Musculoskeletal: no muscle tenderness, no joint swelling or tenderness  Extremities: no pedal edema  Neurological/ Psychiatric: intubated  Skin: no rashes; no palpable lesions    Labs: reviewed                       Labs:                        11.7   17.66 )-----------( 110      ( 22 Mar 2022 07:05 )             37.4     03-22    133<L>  |  102  |  21  ----------------------------<  145<H>  5.1   |  29  |  0.44<L>    Ca    8.6      22 Mar 2022 07:05  Phos  2.8     03-22  Mg     2.9     03-22         Vancomycin Level, Trough: 6.0 ug/mL (03-20 @ 21:23)      Cultures:       Culture - Blood (collected 03-17-22 @ 14:06)  Source: .Blood None  Preliminary Report (03-18-22 @ 19:02):    No growth to date.    Culture - Blood (collected 03-17-22 @ 14:03)  Source: .Blood None  Preliminary Report (03-18-22 @ 19:02):    No growth to date.      Ferritin, Serum: 1102 ng/mL (03-15-22 @ 07:49)  C-Reactive Protein, Serum: <3 mg/L (03-14-22 @ 11:39)  D-Dimer Assay, Quantitative: 538 ng/mL DDU (03-14-22 @ 11:39)  D-Dimer Assay, Quantitative: 622 ng/mL DDU (03-11-22 @ 08:34)  C-Reactive Protein, Serum: <3 mg/L (03-11-22 @ 08:34)  Ferritin, Serum: 1176 ng/mL (03-11-22 @ 08:34)          COVID (02-28 @ 08:16)  Detected      Culture - Blood (collected 28 Feb 2022 08:35)  Source: .Blood None  Preliminary Report (01 Mar 2022 13:02):    No growth to date.    Radiology: all available radiological tests reviewed    < from: Xray Chest 1 View- PORTABLE-Urgent (02.28.22 @ 09:00) >  IMPRESSION: Mild bibasilar infiltrates left greater than right.  < end of copied text >    < from: CT Angio Chest PE Protocol w/ IV Cont (03.01.22 @ 14:14) >  Pulmonary emboli in the right upper lobe pulmonary artery and bilateral   segmental and subsegmental branches. This was discussed with Dr. Hazel   at 2:36 PM 3/1/2022.  Diffuse lung abnormality consistent with COVID.  < end of copied text >      Advanced directives addressed: full resuscitation

## 2022-03-22 NOTE — PROGRESS NOTE ADULT - SUBJECTIVE AND OBJECTIVE BOX
Patient is a 71y old  Male who presents with a chief complaint of SOB (01 Mar 2022 13:52)      HPI:  70 yo pt with PMH of HLD,  kidney stone, valvular heart dz, prostate ca s/p Radiation presented  to ED c/o progressive SOB.    Diagnosed with COVID Pneumonia.   CTPE revealed pulmonary emboli in the right upper lobe pulmonary artery and bilateral segmental and subsegmental branches.   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     3/22  Patient was intubated on 3/21  Discussed with wife and daughter on 3/21 and 3/22      PAST MEDICAL & SURGICAL HISTORY:  Hypercholesterolemia    Cholelithiasis    Cholecystitis    Valvular heart disease    Psoriasis    History of kidney stones    Inguinal hernia, left    Prostate cancer  s/p XRT 1-6-2020 to 1/27/2021    History of renal calculi    S/P hernia repair  right inguinal; 5/2015    S/P colonoscopy with polypectomy  Baseline; viv polyps; 2012    History of cholecystectomy  2017    History of prostatectomy  5/2020    History of lithotripsy    MEDICATIONS  (STANDING):  atorvastatin 10 milliGRAM(s) Oral at bedtime  budesonide 160 MICROgram(s)/formoterol 4.5 MICROgram(s) Inhaler 2 Puff(s) Inhalation two times a day  dexMEDEtomidine Infusion 1 MICROgram(s)/kG/Hr (20.7 mL/Hr) IV Continuous <Continuous>  dextrose 5% + sodium chloride 0.9%. 1000 milliLiter(s) (50 mL/Hr) IV Continuous <Continuous>  enoxaparin Injectable 80 milliGRAM(s) SubCutaneous every 12 hours  guaiFENesin ER 1200 milliGRAM(s) Oral every 12 hours  LORazepam   Injectable 1 milliGRAM(s) IV Push once  meropenem  IVPB 1000 milliGRAM(s) IV Intermittent every 8 hours  methylPREDNISolone sodium succinate Injectable 40 milliGRAM(s) IV Push every 12 hours  polyethylene glycol 3350 17 Gram(s) Oral daily  senna 2 Tablet(s) Oral at bedtime  tiotropium 18 MICROgram(s) Capsule 1 Capsule(s) Inhalation daily  vancomycin  IVPB 1000 milliGRAM(s) IV Intermittent every 12 hours    MEDICATIONS  (PRN):  acetaminophen     Tablet .. 650 milliGRAM(s) Oral every 6 hours PRN Temp greater or equal to 38C (100.4F), Mild Pain (1 - 3)  ALBUTerol    90 MICROgram(s) HFA Inhaler 2 Puff(s) Inhalation every 6 hours PRN Shortness of Breath and/or Wheezing  ALPRAZolam 0.25 milliGRAM(s) Oral every 8 hours PRN anxiety  aluminum hydroxide/magnesium hydroxide/simethicone Suspension 30 milliLiter(s) Oral every 4 hours PRN Dyspepsia  benzonatate 100 milliGRAM(s) Oral every 8 hours PRN Cough  bisacodyl Suppository 10 milliGRAM(s) Rectal daily PRN Constipation  melatonin 3 milliGRAM(s) Oral at bedtime PRN Insomnia  ondansetron Injectable 4 milliGRAM(s) IV Push every 8 hours PRN Nausea and/or Vomiting  oxymetazoline 0.05% Nasal Spray 2 Spray(s) Both Nostrils two times a day PRN Nasal Congestion  sodium chloride 0.65% Nasal 1 Spray(s) Both Nostrils two times a day PRN Nasal Congestion    \ICU Vital Signs Last 24 Hrs  T(C): 37.1 (21 Mar 2022 13:40), Max: 37.1 (21 Mar 2022 13:40)  T(F): 98.8 (21 Mar 2022 13:40), Max: 98.8 (21 Mar 2022 13:40)  HR: 118 (21 Mar 2022 15:31) (68 - 125)  BP: 121/79 (21 Mar 2022 15:15) (96/65 - 183/116)  BP(mean): 92 (21 Mar 2022 15:15) (75 - 134)  ABP: 133/73 (21 Mar 2022 15:31) (85/56 - 155/82)  ABP(mean): 93 (21 Mar 2022 15:31) (66 - 107)  RR: 30 (21 Mar 2022 15:31) (18 - 36)  SpO2: 92% (21 Mar 2022 15:31) (70% - 92%)      I&O's Detail    19 Mar 2022 07:01  -  20 Mar 2022 07:00  --------------------------------------------------------  IN:    Dexmedetomidine: 646.8 mL    dextrose 5% + sodium chloride 0.9%: 1179.7 mL    IV PiggyBack: 650 mL    Oral Fluid: 240 mL  Total IN: 2716.6 mL    OUT:    Incontinent per Collection Bag (mL): 1250 mL  Total OUT: 1250 mL    Total NET: 1466.6 mL              PHYSICAL EXAM  General Appearance: Intubated  Neck: Supple, , no adenopathy,   Lungs: coarse at the bases   Heart: Regular rate and rhythm, S1, S2 normal,  Abdomen: Soft, non-tender, bowel sounds active   Extremities: no cyanosis or edema, no joint swelling  Neurologic: Sedated     ECG:    LABS:    Vital Signs Last 24 Hrs                                       14.0   16.99 )-----------( 101      ( 20 Mar 2022 06:45 )             42.2   03-20    136  |  105  |  21  ----------------------------<  144<H>  4.4   |  27  |  0.60    Ca    9.1      20 Mar 2022 06:45  Phos  3.0     03-20  Mg     2.5     03-20                      12.6   15.57 )-----------( 92       ( 19 Mar 2022 06:08 )             36.9   03-19    137  |  105  |  20  ----------------------------<  154<H>  4.2   |  28  |  0.53    Ca    9.0      19 Mar 2022 06:08  Phos  2.7     03-19  Mg     2.7     03-19    TPro  6.6  /  Alb  2.6<L>  /  TBili  1.9<H>  /  DBili  x   /  AST  37  /  ALT  132<H>  /  AlkPhos  162<H>  03-18               16.5   32.85 )-----------( 294      ( 10 Mar 2022 08:37 )             48.4   03-10    132<L>  |  96  |  18  ----------------------------<  112<H>  4.7   |  31  |  0.80    Ca    9.0      10 Mar 2022 08:37  Phos  3.3     03-10  Mg     2.9     03-10    TPro  6.9  /  Alb  3.3  /  TBili  1.6<H>  /  DBili  0.4<H>  /  AST  21  /  ALT  69  /  AlkPhos  128<H>  03-10    \        RADIOLOGY & ADDITIONAL STUDIES:    ACC: [id number] EXAM:  XR CHEST PORTABLE IMMED 1V                          PROCEDURE DATE:  03/10/2022          INTERPRETATION:  Chest one view    HISTORY: Leukocytosis    COMPARISON STUDY: 3/1/2022    Frontal expiratory view of the chest shows the heart to be similar in   size. The lungs show slight clearing of the upper right lung with   progression of basilar infiltrates and there is no evidence of   pneumothorax nor pleural effusion.    IMPRESSION:  Changing infiltrates.  CXR independently reviewed    < from: Xray Chest 1 View- PORTABLE-Urgent (Xray Chest 1 View- PORTABLE-Urgent .) (03.18.22 @ 09:08) >  PROCEDURE DATE:  03/18/2022          INTERPRETATION:  AP erect chest on March 18, 2022 at 8:28 AM. Patient has   Covid pneumonia.    Heart magnified by technique.    Scattered interstitial available infiltrates are again noted more   concentrated at lung bases. They have increased from March 10.    IMPRESSION: Increasing bilateral infiltrates.    < end of copied text >

## 2022-03-22 NOTE — PROGRESS NOTE ADULT - SUBJECTIVE AND OBJECTIVE BOX
Hospital D # 22  ICU # 6  Vent # 1  [facility] CVL # 1  L Rad A-line # 1    CC:  Respiratory Failure     HPI:    72 y/o male with COPD, HLD,  kidney stone, valvular heart dz, prostate ca s/p Radiation presented  to ED c/o progressive SOB found to have COVID PNA.  He is tx to ICU for worsening hypoxia and progressive resp failure.      3/21:  Pt seen and examined in ICU during IDR.  Vented few hrs ago--100/+ 10.  CVL and A-line placed.  PF ratio 55.  Will prone.  Tm 97.6   On prop/Fent and Cis gtts.  Very ill    3/22:  Pt seen and examined in ICU during IDR.  Vented 100/+ 10. On Prop/Fent and Cis gtts.  Labile BP.  Prone yesterday.  Tm 99.2  WBC 17  Will prone again today    PMH:  As above.     PSH:  As above.     FH: Non Contributory other than those listed in HPI    Social History:  Unobtainable due to clinical condition     MEDICATIONS  (STANDING):  atorvastatin 10 milliGRAM(s) Oral at bedtime  chlorhexidine 0.12% Liquid 15 milliLiter(s) Oral Mucosa every 12 hours  chlorhexidine 4% Liquid 1 Application(s) Topical <User Schedule>  cisatracurium Infusion 3 MICROgram(s)/kG/Min (14.9 mL/Hr) IV Continuous <Continuous>  enoxaparin Injectable 80 milliGRAM(s) SubCutaneous every 12 hours  fentaNYL   Infusion. 0.75 MICROgram(s)/kG/Hr (6.21 mL/Hr) IV Continuous <Continuous>  meropenem  IVPB 1000 milliGRAM(s) IV Intermittent every 8 hours  methylPREDNISolone sodium succinate Injectable 40 milliGRAM(s) IV Push every 12 hours  midodrine 10 milliGRAM(s) Oral every 8 hours  norepinephrine Infusion 0.05 MICROgram(s)/kG/Min (7.76 mL/Hr) IV Continuous <Continuous>  pantoprazole  Injectable 40 milliGRAM(s) IV Push daily  petrolatum Ophthalmic Ointment 1 Application(s) Both EYES four times a day  polyethylene glycol 3350 17 Gram(s) Oral daily  propofol Infusion 25 MICROgram(s)/kG/Min (12.4 mL/Hr) IV Continuous <Continuous>  senna 2 Tablet(s) Oral at bedtime  vancomycin  IVPB 1250 milliGRAM(s) IV Intermittent every 12 hours    MEDICATIONS  (PRN):  acetaminophen     Tablet .. 650 milliGRAM(s) Oral every 6 hours PRN Temp greater or equal to 38C (100.4F), Mild Pain (1 - 3)  ALBUTerol    90 MICROgram(s) HFA Inhaler 2 Puff(s) Inhalation every 6 hours PRN Shortness of Breath and/or Wheezing  ALPRAZolam 0.25 milliGRAM(s) Oral every 8 hours PRN anxiety  aluminum hydroxide/magnesium hydroxide/simethicone Suspension 30 milliLiter(s) Oral every 4 hours PRN Dyspepsia  benzonatate 100 milliGRAM(s) Oral every 8 hours PRN Cough  bisacodyl Suppository 10 milliGRAM(s) Rectal daily PRN Constipation  melatonin 3 milliGRAM(s) Oral at bedtime PRN Insomnia  ondansetron Injectable 4 milliGRAM(s) IV Push every 8 hours PRN Nausea and/or Vomiting  sodium chloride 0.65% Nasal 1 Spray(s) Both Nostrils two times a day PRN Nasal Congestion  sodium chloride 0.9% lock flush 10 milliLiter(s) IV Push every 1 hour PRN Pre/post blood products, medications, blood draw, and to maintain line patency      Allergies: NKDA    ROS:  SEE BELOW        ICU Vital Signs Last 24 Hrs  T(C): 37.3 (22 Mar 2022 04:00), Max: 37.3 (22 Mar 2022 04:00)  T(F): 99.2 (22 Mar 2022 04:00), Max: 99.2 (22 Mar 2022 04:00)  HR: 99 (22 Mar 2022 09:00) (97 - 123)  BP: 85/61 (22 Mar 2022 08:00) (85/61 - 127/80)  BP(mean): 69 (22 Mar 2022 08:00) (69 - 94)  ABP: 99/54 (22 Mar 2022 08:00) (85/56 - 155/82)  ABP(mean): 69 (22 Mar 2022 08:00) (66 - 107)  RR: 30 (22 Mar 2022 08:00) (30 - 30)  SpO2: 90% (22 Mar 2022 09:00) (84% - 96%)      Mode: AC/ CMV (Assist Control/ Continuous Mandatory Ventilation)  RR (machine): 30  TV (machine): 450  FiO2: 100  PEEP: 10  PIP: 32      I&O's Summary    21 Mar 2022 07:01  -  22 Mar 2022 07:00  --------------------------------------------------------  IN: 2374.5 mL / OUT: 1070 mL / NET: 1304.5 mL        Physical Exam:  SEE BELOW                          11.7   17.66 )-----------( 110      ( 22 Mar 2022 07:05 )             37.4       03-22    133<L>  |  102  |  21  ----------------------------<  145<H>  5.1   |  29  |  0.44<L>    Ca    8.6      22 Mar 2022 07:05  Phos  2.8     03-22  Mg     2.9     03-22              ABG - ( 22 Mar 2022 05:07 )  pH, Arterial: 7.27  pH, Blood: x     /  pCO2: 59    /  pO2: 91    / HCO3: 27    / Base Excess: -1.0  /  SaO2: 99                      DVT Prophylaxis:                                                            Contraindication:     Advanced Directives:    Discussed with:    Visit Information:  Time spent excluding procedure:      ** Time is exclusive of billed procedures and/or teaching and/or routine family updates.

## 2022-03-23 NOTE — PROGRESS NOTE ADULT - SUBJECTIVE AND OBJECTIVE BOX
Hospital D # 23  ICU # 7  Vent # 2  [facility] CVL # 2  L Rad A-line # 2    CC:  Respiratory Failure     HPI:    70 y/o male with COPD, HLD,  kidney stone, valvular heart dz, prostate ca s/p Radiation presented  to ED c/o progressive SOB found to have COVID PNA.  He is tx to ICU for worsening hypoxia and progressive resp failure.      3/21:  Pt seen and examined in ICU during IDR.  Vented few hrs ago--100/+ 10.  CVL and A-line placed.  PF ratio 55.  Will prone.  Tm 97.6   On prop/Fent and Cis gtts.  Very ill    3/22:  Pt seen and examined in ICU during IDR.  Vented 100/+ 10. On Prop/Fent and Cis gtts.  Labile BP.  Prone yesterday.  Tm 99.2  WBC 17  Will prone again today    3/23:  Pt seen and examined in ICU during IDR.  Prone yesterday.  Vented--100/ + 10  Tm 99.7  WBC 14  Norepi gtt off now    PMH:  As above.     PSH:  As above.     FH: Non Contributory other than those listed in HPI    Social History:  Unobtainable due to clinical condition     MEDICATIONS  (STANDING):  atorvastatin 10 milliGRAM(s) Oral at bedtime  chlorhexidine 0.12% Liquid 15 milliLiter(s) Oral Mucosa every 12 hours  chlorhexidine 4% Liquid 1 Application(s) Topical <User Schedule>  cisatracurium Infusion 3 MICROgram(s)/kG/Min (14.9 mL/Hr) IV Continuous <Continuous>  enoxaparin Injectable 80 milliGRAM(s) SubCutaneous every 12 hours  fentaNYL   Infusion. 0.75 MICROgram(s)/kG/Hr (6.21 mL/Hr) IV Continuous <Continuous>  meropenem  IVPB 1000 milliGRAM(s) IV Intermittent every 8 hours  methylPREDNISolone sodium succinate Injectable 40 milliGRAM(s) IV Push every 12 hours  midodrine 10 milliGRAM(s) Oral every 8 hours  norepinephrine Infusion 0.05 MICROgram(s)/kG/Min (7.76 mL/Hr) IV Continuous <Continuous>  pantoprazole  Injectable 40 milliGRAM(s) IV Push daily  petrolatum Ophthalmic Ointment 1 Application(s) Both EYES four times a day  polyethylene glycol 3350 17 Gram(s) Oral daily  propofol Infusion 25 MICROgram(s)/kG/Min (12.4 mL/Hr) IV Continuous <Continuous>  senna 2 Tablet(s) Oral at bedtime  vancomycin  IVPB 1250 milliGRAM(s) IV Intermittent every 12 hours    MEDICATIONS  (PRN):  acetaminophen     Tablet .. 650 milliGRAM(s) Oral every 6 hours PRN Temp greater or equal to 38C (100.4F), Mild Pain (1 - 3)  ALBUTerol    90 MICROgram(s) HFA Inhaler 2 Puff(s) Inhalation every 6 hours PRN Shortness of Breath and/or Wheezing  ALPRAZolam 0.25 milliGRAM(s) Oral every 8 hours PRN anxiety  aluminum hydroxide/magnesium hydroxide/simethicone Suspension 30 milliLiter(s) Oral every 4 hours PRN Dyspepsia  benzonatate 100 milliGRAM(s) Oral every 8 hours PRN Cough  bisacodyl Suppository 10 milliGRAM(s) Rectal daily PRN Constipation  melatonin 3 milliGRAM(s) Oral at bedtime PRN Insomnia  ondansetron Injectable 4 milliGRAM(s) IV Push every 8 hours PRN Nausea and/or Vomiting  sodium chloride 0.65% Nasal 1 Spray(s) Both Nostrils two times a day PRN Nasal Congestion  sodium chloride 0.9% lock flush 10 milliLiter(s) IV Push every 1 hour PRN Pre/post blood products, medications, blood draw, and to maintain line patency      Allergies: NKDA    ROS:  SEE BELOW        ICU Vital Signs Last 24 Hrs  T(C): 36.4 (23 Mar 2022 09:00), Max: 37.6 (23 Mar 2022 06:00)  T(F): 97.5 (23 Mar 2022 09:00), Max: 99.7 (23 Mar 2022 06:00)  HR: 108 (23 Mar 2022 09:00) (90 - 119)  BP: 120/76 (22 Mar 2022 14:00) (100/65 - 120/76)  BP(mean): 89 (22 Mar 2022 14:00) (76 - 89)  ABP: 97/56 (23 Mar 2022 09:00) (95/56 - 138/65)  ABP(mean): 68 (23 Mar 2022 09:00) (66 - 91)  RR: 30 (23 Mar 2022 09:00) (9 - 30)  SpO2: 95% (23 Mar 2022 09:00) (89% - 100%)      Mode: AC/ CMV (Assist Control/ Continuous Mandatory Ventilation)  RR (machine): 30  TV (machine): 450  FiO2: 100  PEEP: 10  ITime: 1  MAP: 19  PIP: 30      I&O's Summary    22 Mar 2022 07:01  -  23 Mar 2022 07:00  --------------------------------------------------------  IN: 2867 mL / OUT: 1300 mL / NET: 1567 mL        Physical Exam:  SEE BELOW                          11.0   14.05 )-----------( 124      ( 23 Mar 2022 05:30 )             34.8       03-23    134<L>  |  100  |  21  ----------------------------<  146<H>  4.5   |  34<H>  |  0.43<L>    Ca    8.7      23 Mar 2022 05:30  Phos  2.0     03-23  Mg     2.8     03-23              ABG - ( 23 Mar 2022 05:49 )  pH, Arterial: 7.31  pH, Blood: x     /  pCO2: 60    /  pO2: 93    / HCO3: 30    / Base Excess: 2.4   /  SaO2: 98                      DVT Prophylaxis:                                                            Contraindication:     Advanced Directives:    Discussed with:    Visit Information:  Time spent excluding procedure:      ** Time is exclusive of billed procedures and/or teaching and/or routine family updates.

## 2022-03-23 NOTE — PROGRESS NOTE ADULT - SUBJECTIVE AND OBJECTIVE BOX
Patient is a 71y old  Male who presents with a chief complaint of SOB (01 Mar 2022 13:52)      HPI:  72 yo pt with PMH of HLD,  kidney stone, valvular heart dz, prostate ca s/p Radiation presented  to ED c/o progressive SOB.    Diagnosed with COVID Pneumonia.   CTPE revealed pulmonary emboli in the right upper lobe pulmonary artery and bilateral segmental and subsegmental branches.   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     3/23  Patient was intubated on 3/21  Discussed with wife and daughter on 3/21 and 3/22      PAST MEDICAL & SURGICAL HISTORY:  Hypercholesterolemia    Cholelithiasis    Cholecystitis    Valvular heart disease    Psoriasis    History of kidney stones    Inguinal hernia, left    Prostate cancer  s/p XRT 1-6-2020 to 1/27/2021    History of renal calculi    S/P hernia repair  right inguinal; 5/2015    S/P colonoscopy with polypectomy  Baseline; viv polyps; 2012    History of cholecystectomy  2017    History of prostatectomy  5/2020    History of lithotripsy    MEDICATIONS  (STANDING):  atorvastatin 10 milliGRAM(s) Oral at bedtime  budesonide 160 MICROgram(s)/formoterol 4.5 MICROgram(s) Inhaler 2 Puff(s) Inhalation two times a day  dexMEDEtomidine Infusion 1 MICROgram(s)/kG/Hr (20.7 mL/Hr) IV Continuous <Continuous>  dextrose 5% + sodium chloride 0.9%. 1000 milliLiter(s) (50 mL/Hr) IV Continuous <Continuous>  enoxaparin Injectable 80 milliGRAM(s) SubCutaneous every 12 hours  guaiFENesin ER 1200 milliGRAM(s) Oral every 12 hours  LORazepam   Injectable 1 milliGRAM(s) IV Push once  meropenem  IVPB 1000 milliGRAM(s) IV Intermittent every 8 hours  methylPREDNISolone sodium succinate Injectable 40 milliGRAM(s) IV Push every 12 hours  polyethylene glycol 3350 17 Gram(s) Oral daily  senna 2 Tablet(s) Oral at bedtime  tiotropium 18 MICROgram(s) Capsule 1 Capsule(s) Inhalation daily  vancomycin  IVPB 1000 milliGRAM(s) IV Intermittent every 12 hours    MEDICATIONS  (PRN):  acetaminophen     Tablet .. 650 milliGRAM(s) Oral every 6 hours PRN Temp greater or equal to 38C (100.4F), Mild Pain (1 - 3)  ALBUTerol    90 MICROgram(s) HFA Inhaler 2 Puff(s) Inhalation every 6 hours PRN Shortness of Breath and/or Wheezing  ALPRAZolam 0.25 milliGRAM(s) Oral every 8 hours PRN anxiety  aluminum hydroxide/magnesium hydroxide/simethicone Suspension 30 milliLiter(s) Oral every 4 hours PRN Dyspepsia  benzonatate 100 milliGRAM(s) Oral every 8 hours PRN Cough  bisacodyl Suppository 10 milliGRAM(s) Rectal daily PRN Constipation  melatonin 3 milliGRAM(s) Oral at bedtime PRN Insomnia  ondansetron Injectable 4 milliGRAM(s) IV Push every 8 hours PRN Nausea and/or Vomiting  oxymetazoline 0.05% Nasal Spray 2 Spray(s) Both Nostrils two times a day PRN Nasal Congestion  sodium chloride 0.65% Nasal 1 Spray(s) Both Nostrils two times a day PRN Nasal Congestion    ICU Vital Signs Last 24 Hrs  T(C): 37.6 (23 Mar 2022 06:00), Max: 37.6 (23 Mar 2022 06:00)  T(F): 99.7 (23 Mar 2022 06:00), Max: 99.7 (23 Mar 2022 06:00)  HR: 91 (23 Mar 2022 08:00) (90 - 119)  BP: 120/76 (22 Mar 2022 14:00) (89/61 - 120/76)  BP(mean): 89 (22 Mar 2022 14:00) (69 - 89)  ABP: 100/54 (23 Mar 2022 08:00) (89/50 - 138/65)  ABP(mean): 67 (23 Mar 2022 08:00) (62 - 91)  RR: 30 (23 Mar 2022 08:00) (9 - 30)  SpO2: 95% (23 Mar 2022 08:00) (89% - 100%)      I&O's Detail    19 Mar 2022 07:01  -  20 Mar 2022 07:00  --------------------------------------------------------  IN:    Dexmedetomidine: 646.8 mL    dextrose 5% + sodium chloride 0.9%: 1179.7 mL    IV PiggyBack: 650 mL    Oral Fluid: 240 mL  Total IN: 2716.6 mL    OUT:    Incontinent per Collection Bag (mL): 1250 mL  Total OUT: 1250 mL    Total NET: 1466.6 mL              PHYSICAL EXAM  General Appearance: Intubated  Neck: Supple, , no adenopathy,   Lungs: coarse at the bases   Heart: Regular rate and rhythm, S1, S2 normal,  Abdomen: Soft, non-tender, bowel sounds active   Extremities: no cyanosis or edema, no joint swelling  Neurologic: Sedated     ECG:    LABS:    Vital Signs Last 24 Hrs                                       14.0   16.99 )-----------( 101      ( 20 Mar 2022 06:45 )             42.2   03-20    136  |  105  |  21  ----------------------------<  144<H>  4.4   |  27  |  0.60    Ca    9.1      20 Mar 2022 06:45  Phos  3.0     03-20  Mg     2.5     03-20                      12.6   15.57 )-----------( 92       ( 19 Mar 2022 06:08 )             36.9   03-19    137  |  105  |  20  ----------------------------<  154<H>  4.2   |  28  |  0.53    Ca    9.0      19 Mar 2022 06:08  Phos  2.7     03-19  Mg     2.7     03-19    TPro  6.6  /  Alb  2.6<L>  /  TBili  1.9<H>  /  DBili  x   /  AST  37  /  ALT  132<H>  /  AlkPhos  162<H>  03-18               16.5   32.85 )-----------( 294      ( 10 Mar 2022 08:37 )             48.4   03-10    132<L>  |  96  |  18  ----------------------------<  112<H>  4.7   |  31  |  0.80    Ca    9.0      10 Mar 2022 08:37  Phos  3.3     03-10  Mg     2.9     03-10    TPro  6.9  /  Alb  3.3  /  TBili  1.6<H>  /  DBili  0.4<H>  /  AST  21  /  ALT  69  /  AlkPhos  128<H>  03-10    \        RADIOLOGY & ADDITIONAL STUDIES:    ACC: [id number] EXAM:  XR CHEST PORTABLE IMMED 1V                          PROCEDURE DATE:  03/10/2022          INTERPRETATION:  Chest one view    HISTORY: Leukocytosis    COMPARISON STUDY: 3/1/2022    Frontal expiratory view of the chest shows the heart to be similar in   size. The lungs show slight clearing of the upper right lung with   progression of basilar infiltrates and there is no evidence of   pneumothorax nor pleural effusion.    IMPRESSION:  Changing infiltrates.  CXR independently reviewed    < from: Xray Chest 1 View- PORTABLE-Urgent (Xray Chest 1 View- PORTABLE-Urgent .) (03.18.22 @ 09:08) >  PROCEDURE DATE:  03/18/2022          INTERPRETATION:  AP erect chest on March 18, 2022 at 8:28 AM. Patient has   Covid pneumonia.    Heart magnified by technique.    Scattered interstitial available infiltrates are again noted more   concentrated at lung bases. They have increased from March 10.    IMPRESSION: Increasing bilateral infiltrates.    < end of copied text >

## 2022-03-23 NOTE — PROGRESS NOTE ADULT - ASSESSMENT
IMP:    70 y/o male with COPD tx to ICU for worsening resp failure secondary to COVID PNA and sepsis  Viral PNA sepsis secondary to COVID--Acute type 1 resp failure--septic shock   VTE    Critically ill.  High risk for acute decompensation and deterioration including death   Patient requires critical care for support of one or more vital organ systems with a high probability of imminent or life threatening deterioration in his/her condition     Plan:    Full vent support--LPV strategy to maintain plateau pressures < 30--High PEEP/low TV--Permissive hypercapnea and acidemia.  Will Prone again today  Cont with Prop/Fent and cis gtts.  Check Trig level in AM  Actively titrate pressors to keep MAP > 60.  Cont with Midodrine   S/P Rem/Dexa and Toci.  Will cont with solu--start decreasing tomorrow  Cont with empiric Abx IV Vanco (5) and Dylon (6).  Will check putum Cx if able to obtain sample  Cont TF   CVL and A-line in place  DVT prophy--Full dose LMWH    ICU care-- d/w ICU staff on multi disciplinary rounds and wife at bedside yesterday-- All concerns addressed including but not limited to diagnosis, treatment plan and overall prognosis

## 2022-03-24 NOTE — PROGRESS NOTE ADULT - ASSESSMENT
1) COVID Pneumonia  2) Hypoxemic Respiratory Failure  3) Pulmonary Embolism  4) Dyspnea   5) Combined Pulmonary Fibrosis/Emphysema   6) Abnormal CT Chest         72 yo pt with PMH of HLD,  kidney stone, valvular heart dz, prostate ca s/p Radiation presented  to ED c/o progressive SOB.    Diagnosed with COVID Pneumonia.   CTPE revealed pulmonary emboli in the right upper lobe pulmonary artery and bilateral segmental and subsegmental branches.   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   Not seen Pulmonary in the past.   Received Tocilizumab and remdesivir, placed back on Solumedrol  Lovenox 80 q 12 for PE  Transitioned from HFNC to BiPAP 18/12 and intubated 3/21/2022  Increased WBC noted and treated for superimposed Pneumonia with Cefepime initially now vancomycin  Reviewed CXR worsening infiltrates   Continue Spiriva/Symbicort  Due to the underlying COPD/emphysema, his recovery to normalcy will be longer than expected and prognosis is guarded  /30/80/10   30-74 minutes of critical care time provided seeing the patient, reviewing the chart, discussing with ancillary team and family.

## 2022-03-24 NOTE — PROGRESS NOTE ADULT - ASSESSMENT
IMP:    70 y/o male with COPD tx to ICU for worsening resp failure secondary to COVID PNA and sepsis  Viral PNA sepsis secondary to COVID--Acute type 1 resp failure--septic shock   VTE on Full LMWH    Critically ill.  High risk for acute decompensation and deterioration including death   Patient requires critical care for support of one or more vital organ systems with a high probability of imminent or life threatening deterioration in his/her condition     Plan:    Full vent support--LPV strategy to maintain plateau pressures < 30--High PEEP/low TV--Permissive hypercapnea and acidemia.  Will Prone again today  Cont with Prop/Fent and DC cis gtts.  Check Trig level in AM  Actively titrate pressors to keep MAP > 60.  Cont with Midodrine   S/P Rem/Dexa and Toci.  Will cont with solu--Decrease steroids to 20 IV q12  Cont with empiric Abx IV Vanco (6) and Dylon (7).  Will check putum Cx if able to obtain sample  Cont TF   CVL and A-line in place  DVT prophy--Full dose LMWH.  IF CVL site cont ot ooze, can hold LMHW for 24 hrs    ICU care-- d/w ICU staff on multi disciplinary rounds and wife at bedside yesterday-- All concerns addressed including but not limited to diagnosis, treatment plan and overall prognosis

## 2022-03-24 NOTE — PROGRESS NOTE ADULT - SUBJECTIVE AND OBJECTIVE BOX
Patient is a 71y old  Male who presents with a chief complaint of SOB (24 Mar 2022 10:17)      BRIEF HOSPITAL COURSE: 70 y/o male with COPD, HLD,  kidney stone, valvular heart dz, prostate ca s/p Radiation presented  to ED c/o progressive SOB found to have COVID PNA.  He is tx to ICU for worsening hypoxia and progressive resp failure.  Required intubation, paralyzation and proning.     Events last 24 hours: Prone position again, this is now his fourth time. Mild improvement with proning from last time now on 80% +10. Weaned off nimbex infusion. Making urine. No recent BM. [facility]J TLC site with minimal bleeding today.     PAST MEDICAL & SURGICAL HISTORY:  Hypercholesterolemia    Cholelithiasis    Cholecystitis    Valvular heart disease    Psoriasis    History of kidney stones    Inguinal hernia, left    Prostate cancer  s/p XRT 1-6-2020 to 1/27/2021    History of renal calculi    S/P hernia repair  right inguinal; 5/2015    S/P colonoscopy with polypectomy  Baseline; viv polyps; 2012    History of cholecystectomy  2017    History of prostatectomy  5/2020    History of lithotripsy        Review of Systems:  unable to obtain       Medications:  meropenem  IVPB 1000 milliGRAM(s) IV Intermittent every 8 hours  vancomycin  IVPB 1250 milliGRAM(s) IV Intermittent every 12 hours    midodrine 10 milliGRAM(s) Oral every 8 hours  norepinephrine Infusion 0.05 MICROgram(s)/kG/Min IV Continuous <Continuous>    ALBUTerol    90 MICROgram(s) HFA Inhaler 2 Puff(s) Inhalation every 6 hours PRN  benzonatate 100 milliGRAM(s) Oral every 8 hours PRN    acetaminophen     Tablet .. 650 milliGRAM(s) Oral every 6 hours PRN  ALPRAZolam 0.25 milliGRAM(s) Oral every 8 hours PRN  cisatracurium Infusion 3 MICROgram(s)/kG/Min IV Continuous <Continuous>  fentaNYL   Infusion. 0.75 MICROgram(s)/kG/Hr IV Continuous <Continuous>  melatonin 3 milliGRAM(s) Oral at bedtime PRN  ondansetron Injectable 4 milliGRAM(s) IV Push every 8 hours PRN  propofol Infusion 25 MICROgram(s)/kG/Min IV Continuous <Continuous>      enoxaparin Injectable 80 milliGRAM(s) SubCutaneous every 12 hours    aluminum hydroxide/magnesium hydroxide/simethicone Suspension 30 milliLiter(s) Oral every 4 hours PRN  bisacodyl Suppository 10 milliGRAM(s) Rectal daily PRN  pantoprazole  Injectable 40 milliGRAM(s) IV Push daily  polyethylene glycol 3350 17 Gram(s) Oral daily  senna 2 Tablet(s) Oral at bedtime      atorvastatin 10 milliGRAM(s) Oral at bedtime  methylPREDNISolone sodium succinate Injectable 20 milliGRAM(s) IV Push every 12 hours    sodium chloride 0.9% lock flush 10 milliLiter(s) IV Push every 1 hour PRN      chlorhexidine 0.12% Liquid 15 milliLiter(s) Oral Mucosa every 12 hours  chlorhexidine 4% Liquid 1 Application(s) Topical <User Schedule>  petrolatum Ophthalmic Ointment 1 Application(s) Both EYES four times a day  sodium chloride 0.65% Nasal 1 Spray(s) Both Nostrils two times a day PRN        Mode: AC/ CMV (Assist Control/ Continuous Mandatory Ventilation)  RR (machine): 30  TV (machine): 450  FiO2: 80  PEEP: 10  MAP: 19  PIP: 32      ICU Vital Signs Last 24 Hrs  T(C): 37.5 (24 Mar 2022 20:00), Max: 37.6 (24 Mar 2022 14:00)  T(F): 99.5 (24 Mar 2022 20:00), Max: 99.6 (24 Mar 2022 14:00)  HR: 102 (24 Mar 2022 22:00) (86 - 112)  BP: --  BP(mean): --  ABP: 117/57 (24 Mar 2022 22:00) (93/52 - 150/72)  ABP(mean): 76 (24 Mar 2022 22:00) (65 - 95)  RR: 30 (24 Mar 2022 22:00) (30 - 31)  SpO2: 94% (24 Mar 2022 22:00) (87% - 99%)      ABG - ( 24 Mar 2022 06:40 )  pH, Arterial: 7.36  pH, Blood: x     /  pCO2: 68    /  pO2: 53    / HCO3: 38    / Base Excess: 10.1  /  SaO2: 88                  I&O's Detail    23 Mar 2022 07:01  -  24 Mar 2022 07:00  --------------------------------------------------------  IN:    Cisatracurium: 357 mL    FentaNYL: 398 mL    IV PiggyBack: 650 mL    Nepro: 913 mL    Propofol: 587 mL  Total IN: 2905 mL    OUT:    Indwelling Catheter - Urethral (mL): 3000 mL    Norepinephrine: 0 mL  Total OUT: 3000 mL    Total NET: -95 mL      24 Mar 2022 07:01  -  24 Mar 2022 22:07  --------------------------------------------------------  IN:    Cisatracurium: 39 mL    FentaNYL: 234 mL    IV PiggyBack: 600 mL    Nepro: 509 mL    Propofol: 309 mL  Total IN: 1691 mL    OUT:    Indwelling Catheter - Urethral (mL): 800 mL  Total OUT: 800 mL    Total NET: 891 mL            LABS:                        10.4   15.12 )-----------( 144      ( 24 Mar 2022 06:00 )             32.9     03-24    135  |  97  |  19  ----------------------------<  179<H>  4.9   |  38<H>  |  0.39<L>    Ca    8.4<L>      24 Mar 2022 06:00  Phos  2.6     03-24  Mg     2.5     03-24            CAPILLARY BLOOD GLUCOSE            CULTURES:      Physical Examination:    General: ill appearing male, prone position in bed    HEENT: Pupils equal, reactive to light.  Symmetric.    PULM: Diminsihed breath sounds b/l     CVS: Regular rate and rhythm, no murmurs, rubs, or gallops    ABD: Soft, nondistended, nontender, normoactive bowel sounds    EXT: mild edema noted     SKIN: Warm      CRITICAL CARE TIME SPENT: 73 minutes assessing presenting problems of acute illness, which pose high probability of life threatening deterioration or end organ damage/dysfunction, as well as medical decision making including initiating plan of care, reviewing data, reviewing radiologic exams, discussing with multidisciplinary team,  discussing goals of care with patient/family, and writing this note.  Non-inclusive of procedures performed,

## 2022-03-24 NOTE — PROGRESS NOTE ADULT - ASSESSMENT
70 y/o male with COPD, HLD,  kidney stone, valvular heart dz, prostate ca s/p Radiation presented  to ED c/o progressive SOB found to have COVID PNA.  He is tx to ICU for worsening hypoxia and progressive resp failure.  Required intubation, paralyzation and proning.     Problem List:  1) Acute hypoxic respiratory failure  2) COVID 19 viral PNA  3) ARDS  4) Pulmonary embolism   5) lobar bacterial PNA    NEURO: Sedate with propofol and fentanyl, nimbex stopped. Appears to be tolerating the vent with sedation alone    CV: resolved shock state, on midodrine, has remained off levophed for 24 hours. Monitor BP and assess needs. HR controlled.     PULM: ARDS, hypoxia 30/400/80/+10 actively titrating FiO2 to maintain SpO2 > 88%. Attempt to wean as tolerated, Pplat 24, practice low tidal volume ventilation. lung protective strategy. Prone position for 16 hours, salvage therapy.    GI: tolerating tube feeds    RENAL: No evidence for GUILLAUME at this time, making urine, monitor electrolytes and replace as needed    ID: Remains on meropenem and vancomycin to cove for superimposed bacterial PNA. So far blood cultures without growth, attempt to obtain sputum culture    HEME: lovenox 1mg/kg BID for AC to treat PE    ENDO: monitor blood glucose, aim to keep <200mg/dl    DISPO: prognosis is poor, to remain in ICU critically ill

## 2022-03-24 NOTE — PROGRESS NOTE ADULT - SUBJECTIVE AND OBJECTIVE BOX
[facility]ospital D # 24  ICU # 8  Vent # 3  [facility] CVL # 3  L Rad A-line # 3    CC:  Respiratory Failure     HPI:    72 y/o male with COPD, HLD,  kidney stone, valvular heart dz, prostate ca s/p Radiation presented  to ED c/o progressive SOB found to have COVID PNA.  He is tx to ICU for worsening hypoxia and progressive resp failure.      3/21:  Pt seen and examined in ICU during IDR.  Vented few hrs ago--100/+ 10.  CVL and A-line placed.  PF ratio 55.  Will prone.  Tm 97.6   On prop/Fent and Cis gtts.  Very ill    3/22:  Pt seen and examined in ICU during IDR.  Vented 100/+ 10. On Prop/Fent and Cis gtts.  Labile BP.  Prone yesterday.  Tm 99.2  WBC 17  Will prone again today    3/23:  Pt seen and examined in ICU during IDR.  Prone yesterday.  Vented--100/ + 10  Tm 99.7  WBC 14  Norepi gtt off now    3/24:  Pt seen and examined in ICU during IDR.  Family at bedside.  Prone yesterday.  Vented--80/ + 10.  No pressors.  On Prop/fent/Cis gtts.  Tm 99.7  WBC 15  Was still oozing from CVL site--pressure dressing placed and it appears to be working     PMH:  As above.     PSH:  As above.     FH: Non Contributory other than those listed in HPI    Social History:  Unobtainable due to clinical condition     MEDICATIONS  (STANDING):  atorvastatin 10 milliGRAM(s) Oral at bedtime  chlorhexidine 0.12% Liquid 15 milliLiter(s) Oral Mucosa every 12 hours  chlorhexidine 4% Liquid 1 Application(s) Topical <User Schedule>  cisatracurium Infusion 3 MICROgram(s)/kG/Min (14.9 mL/Hr) IV Continuous <Continuous>  enoxaparin Injectable 80 milliGRAM(s) SubCutaneous every 12 hours  fentaNYL   Infusion. 0.75 MICROgram(s)/kG/Hr (6.21 mL/Hr) IV Continuous <Continuous>  meropenem  IVPB 1000 milliGRAM(s) IV Intermittent every 8 hours  methylPREDNISolone sodium succinate Injectable 20 milliGRAM(s) IV Push every 12 hours  midodrine 10 milliGRAM(s) Oral every 8 hours  norepinephrine Infusion 0.05 MICROgram(s)/kG/Min (7.76 mL/Hr) IV Continuous <Continuous>  pantoprazole  Injectable 40 milliGRAM(s) IV Push daily  petrolatum Ophthalmic Ointment 1 Application(s) Both EYES four times a day  polyethylene glycol 3350 17 Gram(s) Oral daily  propofol Infusion 25 MICROgram(s)/kG/Min (12.4 mL/Hr) IV Continuous <Continuous>  senna 2 Tablet(s) Oral at bedtime  vancomycin  IVPB 1250 milliGRAM(s) IV Intermittent every 12 hours    MEDICATIONS  (PRN):  acetaminophen     Tablet .. 650 milliGRAM(s) Oral every 6 hours PRN Temp greater or equal to 38C (100.4F), Mild Pain (1 - 3)  ALBUTerol    90 MICROgram(s) HFA Inhaler 2 Puff(s) Inhalation every 6 hours PRN Shortness of Breath and/or Wheezing  ALPRAZolam 0.25 milliGRAM(s) Oral every 8 hours PRN anxiety  aluminum hydroxide/magnesium hydroxide/simethicone Suspension 30 milliLiter(s) Oral every 4 hours PRN Dyspepsia  benzonatate 100 milliGRAM(s) Oral every 8 hours PRN Cough  bisacodyl Suppository 10 milliGRAM(s) Rectal daily PRN Constipation  melatonin 3 milliGRAM(s) Oral at bedtime PRN Insomnia  ondansetron Injectable 4 milliGRAM(s) IV Push every 8 hours PRN Nausea and/or Vomiting  sodium chloride 0.65% Nasal 1 Spray(s) Both Nostrils two times a day PRN Nasal Congestion  sodium chloride 0.9% lock flush 10 milliLiter(s) IV Push every 1 hour PRN Pre/post blood products, medications, blood draw, and to maintain line patency      Allergies: NKDA    ROS:  SEE BELOW        ICU Vital Signs Last 24 Hrs  T(C): 37.3 (24 Mar 2022 06:00), Max: 37.4 (23 Mar 2022 15:00)  T(F): 99.2 (24 Mar 2022 06:00), Max: 99.4 (23 Mar 2022 15:00)  HR: 94 (24 Mar 2022 10:00) (86 - 113)  BP: --  BP(mean): --  ABP: 95/53 (24 Mar 2022 10:00) (93/53 - 150/72)  ABP(mean): 65 (24 Mar 2022 10:00) (65 - 95)  RR: 30 (24 Mar 2022 10:00) (30 - 30)  SpO2: 94% (24 Mar 2022 10:00) (87% - 100%)      Mode: AC/ CMV (Assist Control/ Continuous Mandatory Ventilation)  RR (machine): 30  TV (machine): 450  FiO2: 80  PEEP: 10  MAP: 19  PIP: 34      I&O's Summary    23 Mar 2022 07:01  -  24 Mar 2022 07:00  --------------------------------------------------------  IN: 2905 mL / OUT: 3000 mL / NET: -95 mL        Physical Exam:  SEE BELOW                          10.4   15.12 )-----------( 144      ( 24 Mar 2022 06:00 )             32.9       03-24    135  |  97  |  19  ----------------------------<  179<H>  4.9   |  38<H>  |  0.39<L>    Ca    8.4<L>      24 Mar 2022 06:00  Phos  2.6     03-24  Mg     2.5     03-24              ABG - ( 24 Mar 2022 06:40 )  pH, Arterial: 7.36  pH, Blood: x     /  pCO2: 68    /  pO2: 53    / HCO3: 38    / Base Excess: 10.1  /  SaO2: 88                      DVT Prophylaxis:                                                            Contraindication:     Advanced Directives:    Discussed with:    Visit Information:  Time spent excluding procedure:      ** Time is exclusive of billed procedures and/or teaching and/or routine family updates.

## 2022-03-24 NOTE — PROGRESS NOTE ADULT - SUBJECTIVE AND OBJECTIVE BOX
Patient is a 71y old  Male who presents with a chief complaint of SOB (01 Mar 2022 13:52)      HPI:  70 yo pt with PMH of HLD,  kidney stone, valvular heart dz, prostate ca s/p Radiation presented  to ED c/o progressive SOB.    Diagnosed with COVID Pneumonia.   CTPE revealed pulmonary emboli in the right upper lobe pulmonary artery and bilateral segmental and subsegmental branches.   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     3/24  Patient was intubated on 3/21  Proned overnight  On FiO2 80%, PEEP 10  Discussed with wife and daughter on 3/21 and 3/22      PAST MEDICAL & SURGICAL HISTORY:  Hypercholesterolemia    Cholelithiasis    Cholecystitis    Valvular heart disease    Psoriasis    History of kidney stones    Inguinal hernia, left    Prostate cancer  s/p XRT 1-6-2020 to 1/27/2021    History of renal calculi    S/P hernia repair  right inguinal; 5/2015    S/P colonoscopy with polypectomy  Baseline; viv polyps; 2012    History of cholecystectomy  2017    History of prostatectomy  5/2020    History of lithotripsy    MEDICATIONS  (STANDING):  atorvastatin 10 milliGRAM(s) Oral at bedtime  chlorhexidine 0.12% Liquid 15 milliLiter(s) Oral Mucosa every 12 hours  chlorhexidine 4% Liquid 1 Application(s) Topical <User Schedule>  enoxaparin Injectable 80 milliGRAM(s) SubCutaneous every 12 hours  fentaNYL   Infusion. 0.75 MICROgram(s)/kG/Hr (6.21 mL/Hr) IV Continuous <Continuous>  furosemide   Injectable 20 milliGRAM(s) IV Push once  meropenem  IVPB 1000 milliGRAM(s) IV Intermittent every 8 hours  methylPREDNISolone sodium succinate Injectable 20 milliGRAM(s) IV Push every 12 hours  midodrine 10 milliGRAM(s) Oral every 8 hours  norepinephrine Infusion 0.05 MICROgram(s)/kG/Min (7.76 mL/Hr) IV Continuous <Continuous>  pantoprazole  Injectable 40 milliGRAM(s) IV Push daily  petrolatum Ophthalmic Ointment 1 Application(s) Both EYES four times a day  polyethylene glycol 3350 17 Gram(s) Oral daily  potassium phosphate / sodium phosphate Powder (PHOS-NaK) 1 Packet(s) Oral once  propofol Infusion 25 MICROgram(s)/kG/Min (12.4 mL/Hr) IV Continuous <Continuous>  senna 2 Tablet(s) Oral at bedtime  vancomycin  IVPB 1250 milliGRAM(s) IV Intermittent every 12 hours    MEDICATIONS  (PRN):  acetaminophen     Tablet .. 650 milliGRAM(s) Oral every 6 hours PRN Temp greater or equal to 38C (100.4F), Mild Pain (1 - 3)  ALBUTerol    90 MICROgram(s) HFA Inhaler 2 Puff(s) Inhalation every 6 hours PRN Shortness of Breath and/or Wheezing  aluminum hydroxide/magnesium hydroxide/simethicone Suspension 30 milliLiter(s) Oral every 4 hours PRN Dyspepsia  benzonatate 100 milliGRAM(s) Oral every 8 hours PRN Cough  bisacodyl Suppository 10 milliGRAM(s) Rectal daily PRN Constipation  melatonin 3 milliGRAM(s) Oral at bedtime PRN Insomnia  ondansetron Injectable 4 milliGRAM(s) IV Push every 8 hours PRN Nausea and/or Vomiting  sodium chloride 0.65% Nasal 1 Spray(s) Both Nostrils two times a day PRN Nasal Congestion  sodium chloride 0.9% lock flush 10 milliLiter(s) IV Push every 1 hour PRN Pre/post blood products, medications, blood draw, and to maintain line patency    ICU Vital Signs Last 24 Hrs  T(C): 37.6 (23 Mar 2022 06:00), Max: 37.6 (23 Mar 2022 06:00)  T(F): 99.7 (23 Mar 2022 06:00), Max: 99.7 (23 Mar 2022 06:00)  HR: 91 (23 Mar 2022 08:00) (90 - 119)  BP: 120/76 (22 Mar 2022 14:00) (89/61 - 120/76)  BP(mean): 89 (22 Mar 2022 14:00) (69 - 89)  ABP: 100/54 (23 Mar 2022 08:00) (89/50 - 138/65)  ABP(mean): 67 (23 Mar 2022 08:00) (62 - 91)  RR: 30 (23 Mar 2022 08:00) (9 - 30)  SpO2: 95% (23 Mar 2022 08:00) (89% - 100%)      I&O's Detail    19 Mar 2022 07:01  -  20 Mar 2022 07:00  --------------------------------------------------------  IN:    Dexmedetomidine: 646.8 mL    dextrose 5% + sodium chloride 0.9%: 1179.7 mL    IV PiggyBack: 650 mL    Oral Fluid: 240 mL  Total IN: 2716.6 mL    OUT:    Incontinent per Collection Bag (mL): 1250 mL  Total OUT: 1250 mL    Total NET: 1466.6 mL              PHYSICAL EXAM  General Appearance: Intubated  Neck: Supple, , no adenopathy,   Lungs: coarse at the bases   Heart: Regular rate and rhythm, S1, S2 normal,  Abdomen: Soft, non-tender, bowel sounds active   Extremities: no cyanosis or edema, no joint swelling  Neurologic: Sedated     ECG:    LABS:    Vital Signs Last 24 Hrs                                       14.0   16.99 )-----------( 101      ( 20 Mar 2022 06:45 )             42.2   03-20    136  |  105  |  21  ----------------------------<  144<H>  4.4   |  27  |  0.60    Ca    9.1      20 Mar 2022 06:45  Phos  3.0     03-20  Mg     2.5     03-20                      12.6   15.57 )-----------( 92       ( 19 Mar 2022 06:08 )             36.9   03-19    137  |  105  |  20  ----------------------------<  154<H>  4.2   |  28  |  0.53    Ca    9.0      19 Mar 2022 06:08  Phos  2.7     03-19  Mg     2.7     03-19    TPro  6.6  /  Alb  2.6<L>  /  TBili  1.9<H>  /  DBili  x   /  AST  37  /  ALT  132<H>  /  AlkPhos  162<H>  03-18               16.5   32.85 )-----------( 294      ( 10 Mar 2022 08:37 )             48.4   03-10    132<L>  |  96  |  18  ----------------------------<  112<H>  4.7   |  31  |  0.80    Ca    9.0      10 Mar 2022 08:37  Phos  3.3     03-10  Mg     2.9     03-10    TPro  6.9  /  Alb  3.3  /  TBili  1.6<H>  /  DBili  0.4<H>  /  AST  21  /  ALT  69  /  AlkPhos  128<H>  03-10    \        RADIOLOGY & ADDITIONAL STUDIES:    ACC: [id number] EXAM:  XR CHEST PORTABLE IMMED 1V                          PROCEDURE DATE:  03/10/2022          INTERPRETATION:  Chest one view    HISTORY: Leukocytosis    COMPARISON STUDY: 3/1/2022    Frontal expiratory view of the chest shows the heart to be similar in   size. The lungs show slight clearing of the upper right lung with   progression of basilar infiltrates and there is no evidence of   pneumothorax nor pleural effusion.    IMPRESSION:  Changing infiltrates.  CXR independently reviewed    < from: Xray Chest 1 View- PORTABLE-Urgent (Xray Chest 1 View- PORTABLE-Urgent .) (03.18.22 @ 09:08) >  PROCEDURE DATE:  03/18/2022          INTERPRETATION:  AP erect chest on March 18, 2022 at 8:28 AM. Patient has   Covid pneumonia.    Heart magnified by technique.    Scattered interstitial available infiltrates are again noted more   concentrated at lung bases. They have increased from March 10.    IMPRESSION: Increasing bilateral infiltrates.    < end of copied text >

## 2022-03-25 NOTE — PROGRESS NOTE ADULT - SUBJECTIVE AND OBJECTIVE BOX
[facility] D # 25  ICU # 9  Vent # 4  [facility] CVL # 4  L Rad A-line # 4    CC:  Respiratory Failure     HPI:    72 y/o male with COPD, HLD,  kidney stone, valvular heart dz, prostate ca s/p Radiation presented  to ED c/o progressive SOB found to have COVID PNA.  He is tx to ICU for worsening hypoxia and progressive resp failure.      3/21:  Pt seen and examined in ICU during IDR.  Vented few hrs ago--100/+ 10.  CVL and A-line placed.  PF ratio 55.  Will prone.  Tm 97.6   On prop/Fent and Cis gtts.  Very ill    3/22:  Pt seen and examined in ICU during IDR.  Vented 100/+ 10. On Prop/Fent and Cis gtts.  Labile BP.  Prone yesterday.  Tm 99.2  WBC 17  Will prone again today    3/23:  Pt seen and examined in ICU during IDR.  Prone yesterday.  Vented--100/ + 10  Tm 99.7  WBC 14  Norepi gtt off now    3/24:  Pt seen and examined in ICU during IDR.  Family at bedside.  Prone yesterday.  Vented--80/ + 10.  No pressors.  On Prop/fent/Cis gtts.  Tm 99.7  WBC 15  Was still oozing from CVL site--pressure dressing placed and it appears to be working     3/25:  Pt seen and examined in ICU during IDR.  Proned yesterday--Appears to be a responder.  Off Cis gtt.  Trig 269.  Tm 98.6  WBC 14  Slightly + fluid balance.  Family at bedside    PMH:  As above.     PSH:  As above.     FH: Non Contributory other than those listed in HPI    Social History:  Unobtainable due to clinical condition     MEDICATIONS  (STANDING):  atorvastatin 10 milliGRAM(s) Oral at bedtime  chlorhexidine 0.12% Liquid 15 milliLiter(s) Oral Mucosa every 12 hours  chlorhexidine 4% Liquid 1 Application(s) Topical <User Schedule>  enoxaparin Injectable 80 milliGRAM(s) SubCutaneous every 12 hours  fentaNYL   Infusion. 0.75 MICROgram(s)/kG/Hr (6.21 mL/Hr) IV Continuous <Continuous>  methylPREDNISolone sodium succinate Injectable 20 milliGRAM(s) IV Push every 12 hours  midodrine 10 milliGRAM(s) Oral every 8 hours  norepinephrine Infusion 0.05 MICROgram(s)/kG/Min (7.76 mL/Hr) IV Continuous <Continuous>  pantoprazole  Injectable 40 milliGRAM(s) IV Push daily  polyethylene glycol 3350 17 Gram(s) Oral daily  propofol Infusion 25 MICROgram(s)/kG/Min (12.4 mL/Hr) IV Continuous <Continuous>  senna 2 Tablet(s) Oral at bedtime    MEDICATIONS  (PRN):  acetaminophen     Tablet .. 650 milliGRAM(s) Oral every 6 hours PRN Temp greater or equal to 38C (100.4F), Mild Pain (1 - 3)  ALBUTerol    90 MICROgram(s) HFA Inhaler 2 Puff(s) Inhalation every 6 hours PRN Shortness of Breath and/or Wheezing  aluminum hydroxide/magnesium hydroxide/simethicone Suspension 30 milliLiter(s) Oral every 4 hours PRN Dyspepsia  benzonatate 100 milliGRAM(s) Oral every 8 hours PRN Cough  bisacodyl Suppository 10 milliGRAM(s) Rectal daily PRN Constipation  melatonin 3 milliGRAM(s) Oral at bedtime PRN Insomnia  ondansetron Injectable 4 milliGRAM(s) IV Push every 8 hours PRN Nausea and/or Vomiting  sodium chloride 0.65% Nasal 1 Spray(s) Both Nostrils two times a day PRN Nasal Congestion  sodium chloride 0.9% lock flush 10 milliLiter(s) IV Push every 1 hour PRN Pre/post blood products, medications, blood draw, and to maintain line patency      Allergies: NKDA    ROS:  SEE BELOW        ICU Vital Signs Last 24 Hrs  T(C): 37.4 (25 Mar 2022 08:30), Max: 37.6 (24 Mar 2022 14:00)  T(F): 99.3 (25 Mar 2022 08:30), Max: 99.6 (24 Mar 2022 14:00)  HR: 100 (25 Mar 2022 09:20) (90 - 111)  BP: --  BP(mean): --  ABP: 125/58 (25 Mar 2022 09:20) (93/52 - 135/62)  ABP(mean): 80 (25 Mar 2022 09:20) (65 - 85)  RR: 30 (25 Mar 2022 09:20) (28 - 31)  SpO2: 93% (25 Mar 2022 09:20) (88% - 96%)      Mode: AC/ CMV (Assist Control/ Continuous Mandatory Ventilation)  RR (machine): 30  TV (machine): 450  FiO2: 70  PEEP: 10  MAP: 19  PIP: 32      I&O's Summary    24 Mar 2022 07:01  -  25 Mar 2022 07:00  --------------------------------------------------------  IN: 2946 mL / OUT: 1800 mL / NET: 1146 mL        Physical Exam:  SEE BELOW                          9.5    14.44 )-----------( 153      ( 25 Mar 2022 05:00 )             29.9       03-25    137  |  97  |  16  ----------------------------<  164<H>  4.9   |  40<H>  |  0.38<L>    Ca    8.2<L>      25 Mar 2022 05:00  Phos  2.2     03-25  Mg     2.6     03-25              ABG - ( 25 Mar 2022 05:49 )  pH, Arterial: 7.39  pH, Blood: x     /  pCO2: 66    /  pO2: 82    / HCO3: 40    / Base Excess: 12.1  /  SaO2: 99                      DVT Prophylaxis:                                                            Contraindication:     Advanced Directives:    Discussed with:    Visit Information:  Time spent excluding procedure:      ** Time is exclusive of billed procedures and/or teaching and/or routine family updates.

## 2022-03-25 NOTE — PROGRESS NOTE ADULT - ASSESSMENT
IMP:    70 y/o male with COPD--former heavy smoekr-- tx to ICU for worsening resp failure secondary to COVID PNA and sepsis  Viral PNA sepsis secondary to COVID--Acute type 1 resp failure--septic shock   VTE on Full LMWH    Critically ill.  High risk for acute decompensation and deterioration including death   Patient requires critical care for support of one or more vital organ systems with a high probability of imminent or life threatening deterioration in his/her condition     Plan:    Full vent support--LPV strategy to maintain plateau pressures < 30--High PEEP/low TV--Permissive hypercapnea and acidemia.  Will Prone again today.  Follow up ABGs to further determine efficiency   Furosemide 20 IV X 1 today.  Keep even fluid balance   Cont with Prop/Fent gtts.  Will add PRN lorazempam today.  Actively titrate pressors to keep MAP > 60.  Cont with Midodrine   S/P Rem/Dexa and Toci.  Cont solu 20 IV q12  Agree with stopping Abx IV Vanco (6) and Dylon (7)  Cont TF   Bowel softener   CVL and A-line in place  DVT prophy--Full dose LMWH.  IF CVL site cont ot ooze, can hold LMHW for 24 hrs    ICU care-- d/w ICU staff on multi disciplinary rounds and wife/daughter at bedside-- All concerns addressed including but not limited to diagnosis, treatment plan and overall prognosis

## 2022-03-25 NOTE — PROGRESS NOTE ADULT - ASSESSMENT
70 y/o male with h/o HLD,  kidney stone, valvular heart disease, prostate Ca s/p radiation was admitted on 2/28 for progressive  SOB.  Pt reports that on 2/19 he got sick with runny nose, fever and cough and tested positive for covid-19 on 2/21. His fever improved, but his SOB got worse, had difficulty breathing with minimal exertion. He checked his O2 and was below 80. In ER he received remdesivir.    1. Acute respiratory failure. COVID-19 breakthrough viral syndrome. Multifocal pneumonia. Possible superimposed bacterial pneumonia. PE.  - vent and pressors per icu  -given tociluzumab infusion on 3/2  -s/p remdesivir protocol # 10  -tolerating abx well so far; no side effects noted  -s/p cefepime 2 gm IV q12h # 7 (completed on 3/17)  -inflammatory markers are worse  -obtain BC x 2, sputum c/s if possible  -day #8 meropenem 1 gm IV q8h and vancomycin 1 gm IV q12h  - tolerating antibiotics without rashes or side effects   - increased vancomycin given subtherapeutic trough  -AC  -droplet isolation  -respiratory care  - stop antibiotics today, 3/25  -monitor temps  -f/u CBC  -supportive care  2. Other issues:   -care per medicine

## 2022-03-25 NOTE — PROGRESS NOTE ADULT - SUBJECTIVE AND OBJECTIVE BOX
Date of service: 03-25-22 @ 14:14    Patient remains on ventilatory support, afebrile        ROS: unable to obtain secondary to patient medical condition     MEDICATIONS  (STANDING):  atorvastatin 10 milliGRAM(s) Oral at bedtime  chlorhexidine 0.12% Liquid 15 milliLiter(s) Oral Mucosa every 12 hours  chlorhexidine 4% Liquid 1 Application(s) Topical <User Schedule>  enoxaparin Injectable 80 milliGRAM(s) SubCutaneous every 12 hours  fentaNYL   Infusion. 0.75 MICROgram(s)/kG/Hr (6.21 mL/Hr) IV Continuous <Continuous>  methylPREDNISolone sodium succinate Injectable 20 milliGRAM(s) IV Push every 12 hours  midodrine 10 milliGRAM(s) Oral every 8 hours  norepinephrine Infusion 0.05 MICROgram(s)/kG/Min (7.76 mL/Hr) IV Continuous <Continuous>  pantoprazole  Injectable 40 milliGRAM(s) IV Push daily  polyethylene glycol 3350 17 Gram(s) Oral daily  propofol Infusion 25 MICROgram(s)/kG/Min (12.4 mL/Hr) IV Continuous <Continuous>  senna 2 Tablet(s) Oral at bedtime    MEDICATIONS  (PRN):  acetaminophen     Tablet .. 650 milliGRAM(s) Oral every 6 hours PRN Temp greater or equal to 38C (100.4F), Mild Pain (1 - 3)  ALBUTerol    90 MICROgram(s) HFA Inhaler 2 Puff(s) Inhalation every 6 hours PRN Shortness of Breath and/or Wheezing  aluminum hydroxide/magnesium hydroxide/simethicone Suspension 30 milliLiter(s) Oral every 4 hours PRN Dyspepsia  benzonatate 100 milliGRAM(s) Oral every 8 hours PRN Cough  bisacodyl Suppository 10 milliGRAM(s) Rectal daily PRN Constipation  LORazepam   Injectable 2 milliGRAM(s) IV Push every 4 hours PRN Agitation  melatonin 3 milliGRAM(s) Oral at bedtime PRN Insomnia  ondansetron Injectable 4 milliGRAM(s) IV Push every 8 hours PRN Nausea and/or Vomiting  sodium chloride 0.65% Nasal 1 Spray(s) Both Nostrils two times a day PRN Nasal Congestion  sodium chloride 0.9% lock flush 10 milliLiter(s) IV Push every 1 hour PRN Pre/post blood products, medications, blood draw, and to maintain line patency      Vital Signs Last 24 Hrs  T(C): 37.7 (25 Mar 2022 12:36), Max: 37.7 (25 Mar 2022 12:36)  T(F): 99.8 (25 Mar 2022 12:36), Max: 99.8 (25 Mar 2022 12:36)  HR: 111 (25 Mar 2022 12:20) (90 - 114)  BP: --  BP(mean): --  RR: 29 (25 Mar 2022 12:20) (25 - 31)  SpO2: 91% (25 Mar 2022 12:20) (86% - 96%)    Mode: AC/ CMV (Assist Control/ Continuous Mandatory Ventilation), RR (machine): 30, TV (machine): 450, FiO2: 70, PEEP: 10, MAP: 19, PIP: 30    Physical Exam:      Constitutional:  No acute distress  HEENT: orally intubated  Neck: supple; thyroid not palpable  Back: no tenderness  Respiratory: respiratory effort normal; scattered coarse breath sounds  Cardiovascular: S1S2 regular, no murmurs  Abdomen: soft, not tender, not distended, positive BS  Genitourinary: no suprapubic tenderness  Musculoskeletal: no muscle tenderness, no joint swelling or tenderness  Extremities: no pedal edema  Neurological/ Psychiatric: intubated  Skin: no rashes; no palpable lesions    Labs: reviewed                       Labs:                          Labs:                        9.5    14.44 )-----------( 153      ( 25 Mar 2022 05:00 )             29.9     03-25    137  |  97  |  16  ----------------------------<  164<H>  4.9   |  40<H>  |  0.38<L>    Ca    8.2<L>      25 Mar 2022 05:00  Phos  2.2     03-25  Mg     2.6     03-25             Cultures:       Ferritin, Serum: 1102 ng/mL (03-15-22 @ 07:49)  C-Reactive Protein, Serum: <3 mg/L (03-14-22 @ 11:39)  D-Dimer Assay, Quantitative: 538 ng/mL DDU (03-14-22 @ 11:39)          COVID (02-28 @ 08:16)  Detected      Culture - Blood (collected 28 Feb 2022 08:35)  Source: .Blood None  Preliminary Report (01 Mar 2022 13:02):    No growth to date.    Radiology: all available radiological tests reviewed    < from: Xray Chest 1 View- PORTABLE-Urgent (02.28.22 @ 09:00) >  IMPRESSION: Mild bibasilar infiltrates left greater than right.  < end of copied text >    < from: CT Angio Chest PE Protocol w/ IV Cont (03.01.22 @ 14:14) >  Pulmonary emboli in the right upper lobe pulmonary artery and bilateral   segmental and subsegmental branches. This was discussed with Dr. Hazel   at 2:36 PM 3/1/2022.  Diffuse lung abnormality consistent with COVID.  < end of copied text >      Advanced directives addressed: full resuscitation

## 2022-03-26 NOTE — PROGRESS NOTE ADULT - SUBJECTIVE AND OBJECTIVE BOX
Patient is a 71y old  Male who presents with a chief complaint of SOB (01 Mar 2022 13:52)      HPI:  70 yo pt with PMH of HLD,  kidney stone, valvular heart dz, prostate ca s/p Radiation presented  to ED c/o progressive SOB.    Diagnosed with COVID Pneumonia.   CTPE revealed pulmonary emboli in the right upper lobe pulmonary artery and bilateral segmental and subsegmental branches.   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     3/26  Patient was intubated on 3/21  Proned overnight  On FiO2 80%, PEEP 10  Discussed with wife and daughter on 3/21 and 3/22      PAST MEDICAL & SURGICAL HISTORY:  Hypercholesterolemia    Cholelithiasis    Cholecystitis    Valvular heart disease    Psoriasis    History of kidney stones    Inguinal hernia, left    Prostate cancer  s/p XRT 1-6-2020 to 1/27/2021    History of renal calculi    S/P hernia repair  right inguinal; 5/2015    S/P colonoscopy with polypectomy  Baseline; viv polyps; 2012    History of cholecystectomy  2017    History of prostatectomy  5/2020    History of lithotripsy    MEDICATIONS  (STANDING):  atorvastatin 10 milliGRAM(s) Oral at bedtime  chlorhexidine 0.12% Liquid 15 milliLiter(s) Oral Mucosa every 12 hours  chlorhexidine 4% Liquid 1 Application(s) Topical <User Schedule>  enoxaparin Injectable 80 milliGRAM(s) SubCutaneous every 12 hours  fentaNYL   Infusion. 0.75 MICROgram(s)/kG/Hr (6.21 mL/Hr) IV Continuous <Continuous>  furosemide   Injectable 20 milliGRAM(s) IV Push once  meropenem  IVPB 1000 milliGRAM(s) IV Intermittent every 8 hours  methylPREDNISolone sodium succinate Injectable 20 milliGRAM(s) IV Push every 12 hours  midodrine 10 milliGRAM(s) Oral every 8 hours  norepinephrine Infusion 0.05 MICROgram(s)/kG/Min (7.76 mL/Hr) IV Continuous <Continuous>  pantoprazole  Injectable 40 milliGRAM(s) IV Push daily  petrolatum Ophthalmic Ointment 1 Application(s) Both EYES four times a day  polyethylene glycol 3350 17 Gram(s) Oral daily  potassium phosphate / sodium phosphate Powder (PHOS-NaK) 1 Packet(s) Oral once  propofol Infusion 25 MICROgram(s)/kG/Min (12.4 mL/Hr) IV Continuous <Continuous>  senna 2 Tablet(s) Oral at bedtime  vancomycin  IVPB 1250 milliGRAM(s) IV Intermittent every 12 hours    MEDICATIONS  (PRN):  acetaminophen     Tablet .. 650 milliGRAM(s) Oral every 6 hours PRN Temp greater or equal to 38C (100.4F), Mild Pain (1 - 3)  ALBUTerol    90 MICROgram(s) HFA Inhaler 2 Puff(s) Inhalation every 6 hours PRN Shortness of Breath and/or Wheezing  aluminum hydroxide/magnesium hydroxide/simethicone Suspension 30 milliLiter(s) Oral every 4 hours PRN Dyspepsia  benzonatate 100 milliGRAM(s) Oral every 8 hours PRN Cough  bisacodyl Suppository 10 milliGRAM(s) Rectal daily PRN Constipation  melatonin 3 milliGRAM(s) Oral at bedtime PRN Insomnia  ondansetron Injectable 4 milliGRAM(s) IV Push every 8 hours PRN Nausea and/or Vomiting  sodium chloride 0.65% Nasal 1 Spray(s) Both Nostrils two times a day PRN Nasal Congestion  sodium chloride 0.9% lock flush 10 milliLiter(s) IV Push every 1 hour PRN Pre/post blood products, medications, blood draw, and to maintain line patency    ICU Vital Signs Last 24 Hrs  T(C): 37.6 (26 Mar 2022 05:00), Max: 37.7 (25 Mar 2022 12:36)  T(F): 99.6 (26 Mar 2022 05:00), Max: 99.8 (25 Mar 2022 12:36)  HR: 90 (26 Mar 2022 07:00) (90 - 122)  BP: --  BP(mean): --  ABP: 93/50 (26 Mar 2022 07:00) (90/50 - 145/69)  ABP(mean): 64 (26 Mar 2022 07:00) (64 - 93)  RR: 30 (26 Mar 2022 07:00) (13 - 31)  SpO2: 92% (26 Mar 2022 07:00) (86% - 96%)        I&O's Detail    19 Mar 2022 07:01  -  20 Mar 2022 07:00  --------------------------------------------------------  IN:    Dexmedetomidine: 646.8 mL    dextrose 5% + sodium chloride 0.9%: 1179.7 mL    IV PiggyBack: 650 mL    Oral Fluid: 240 mL  Total IN: 2716.6 mL    OUT:    Incontinent per Collection Bag (mL): 1250 mL  Total OUT: 1250 mL    Total NET: 1466.6 mL              PHYSICAL EXAM  General Appearance: Intubated  Neck: Supple, , no adenopathy,   Lungs: coarse at the bases   Heart: Regular rate and rhythm, S1, S2 normal,  Abdomen: Soft, non-tender, bowel sounds active   Extremities: no cyanosis or edema, no joint swelling  Neurologic: Sedated     ECG:    LABS:    Vital Signs Last 24 Hrs                                       14.0   16.99 )-----------( 101      ( 20 Mar 2022 06:45 )             42.2   03-20    136  |  105  |  21  ----------------------------<  144<H>  4.4   |  27  |  0.60    Ca    9.1      20 Mar 2022 06:45  Phos  3.0     03-20  Mg     2.5     03-20                      12.6   15.57 )-----------( 92       ( 19 Mar 2022 06:08 )             36.9   03-19    137  |  105  |  20  ----------------------------<  154<H>  4.2   |  28  |  0.53    Ca    9.0      19 Mar 2022 06:08  Phos  2.7     03-19  Mg     2.7     03-19    TPro  6.6  /  Alb  2.6<L>  /  TBili  1.9<H>  /  DBili  x   /  AST  37  /  ALT  132<H>  /  AlkPhos  162<H>  03-18               16.5   32.85 )-----------( 294      ( 10 Mar 2022 08:37 )             48.4   03-10    132<L>  |  96  |  18  ----------------------------<  112<H>  4.7   |  31  |  0.80    Ca    9.0      10 Mar 2022 08:37  Phos  3.3     03-10  Mg     2.9     03-10    TPro  6.9  /  Alb  3.3  /  TBili  1.6<H>  /  DBili  0.4<H>  /  AST  21  /  ALT  69  /  AlkPhos  128<H>  03-10    \        RADIOLOGY & ADDITIONAL STUDIES:    ACC: [id number] EXAM:  XR CHEST PORTABLE IMMED 1V                          PROCEDURE DATE:  03/10/2022          INTERPRETATION:  Chest one view    HISTORY: Leukocytosis    COMPARISON STUDY: 3/1/2022    Frontal expiratory view of the chest shows the heart to be similar in   size. The lungs show slight clearing of the upper right lung with   progression of basilar infiltrates and there is no evidence of   pneumothorax nor pleural effusion.    IMPRESSION:  Changing infiltrates.  CXR independently reviewed    < from: Xray Chest 1 View- PORTABLE-Urgent (Xray Chest 1 View- PORTABLE-Urgent .) (03.18.22 @ 09:08) >  PROCEDURE DATE:  03/18/2022          INTERPRETATION:  AP erect chest on March 18, 2022 at 8:28 AM. Patient has   Covid pneumonia.    Heart magnified by technique.    Scattered interstitial available infiltrates are again noted more   concentrated at lung bases. They have increased from March 10.    IMPRESSION: Increasing bilateral infiltrates.    < end of copied text >

## 2022-03-26 NOTE — PROGRESS NOTE ADULT - SUBJECTIVE AND OBJECTIVE BOX
Patient is a 71 year old male with PMH COPD, HLD, kidney stones, valvular heart disease, prostate cancer s/p radiation who is admitted to the ICU for acute resp distress, ARDS, sepsis 2/2 COVID 19 PNA. He did get 2 Pfizer vaccines but did not get a booster. Today is hospital day 27 for him. He was intubated on 3/21 and paralyzed for a few days. He is now off the Nimbex and remains on Prop and fentanyl drips. He intermittently requires Norepinephrine to allow for adequate sedation. He has Ativan 2mg q2h for sedation as well.     Overnight events: Nurse called as patient is overbreathing the vent and peak pressures are high/alarming. He is currently proned.    History/ROS cannot be obtained as pt is intubated and sedated.       PMH:  As above.     PSH:  As above.     FH: Non Contributory other than those listed in HPI    Social History:  Unobtainable due to clinical condition     MEDICATIONS  (STANDING):  atorvastatin 10 milliGRAM(s) Oral at bedtime  chlorhexidine 0.12% Liquid 15 milliLiter(s) Oral Mucosa every 12 hours  chlorhexidine 4% Liquid 1 Application(s) Topical <User Schedule>  enoxaparin Injectable 80 milliGRAM(s) SubCutaneous every 12 hours  fentaNYL   Infusion. 0.75 MICROgram(s)/kG/Hr (6.21 mL/Hr) IV Continuous <Continuous>  methylPREDNISolone sodium succinate Injectable 20 milliGRAM(s) IV Push every 12 hours  midodrine 10 milliGRAM(s) Oral every 8 hours  norepinephrine Infusion 0.05 MICROgram(s)/kG/Min (7.76 mL/Hr) IV Continuous <Continuous>  pantoprazole  Injectable 40 milliGRAM(s) IV Push daily  polyethylene glycol 3350 17 Gram(s) Oral daily  propofol Infusion 25 MICROgram(s)/kG/Min (12.4 mL/Hr) IV Continuous <Continuous>  senna 2 Tablet(s) Oral at bedtime    MEDICATIONS  (PRN):  acetaminophen     Tablet .. 650 milliGRAM(s) Oral every 6 hours PRN Temp greater or equal to 38C (100.4F), Mild Pain (1 - 3)  ALBUTerol    90 MICROgram(s) HFA Inhaler 2 Puff(s) Inhalation every 6 hours PRN Shortness of Breath and/or Wheezing  aluminum hydroxide/magnesium hydroxide/simethicone Suspension 30 milliLiter(s) Oral every 4 hours PRN Dyspepsia  benzonatate 100 milliGRAM(s) Oral every 8 hours PRN Cough  bisacodyl Suppository 10 milliGRAM(s) Rectal daily PRN Constipation  LORazepam   Injectable 2 milliGRAM(s) IV Push every 2 hours PRN Agitation  melatonin 3 milliGRAM(s) Oral at bedtime PRN Insomnia  ondansetron Injectable 4 milliGRAM(s) IV Push every 8 hours PRN Nausea and/or Vomiting  sodium chloride 0.65% Nasal 1 Spray(s) Both Nostrils two times a day PRN Nasal Congestion  sodium chloride 0.9% lock flush 10 milliLiter(s) IV Push every 1 hour PRN Pre/post blood products, medications, blood draw, and to maintain line patency      Allergies: NKDA    ROS:  SEE BELOW        ICU Vital Signs Last 24 Hrs  T(C): 37.6 (26 Mar 2022 05:00), Max: 37.7 (25 Mar 2022 12:36)  T(F): 99.6 (26 Mar 2022 05:00), Max: 99.8 (25 Mar 2022 12:36)  HR: 90 (26 Mar 2022 07:00) (90 - 122)  BP: --  BP(mean): --  ABP: 93/50 (26 Mar 2022 07:00) (90/50 - 145/69)  ABP(mean): 64 (26 Mar 2022 07:00) (64 - 93)  RR: 30 (26 Mar 2022 07:00) (13 - 31)  SpO2: 92% (26 Mar 2022 07:00) (86% - 96%)      Mode: AC/ CMV (Assist Control/ Continuous Mandatory Ventilation)  RR (machine): 30  TV (machine): 450  FiO2: 80  PEEP: 10  MAP: 18  PIP: 34      I&O's Summary    25 Mar 2022 07:01  -  26 Mar 2022 07:00  --------------------------------------------------------  IN: 2366.8 mL / OUT: 2800 mL / NET: -433.2 mL        Physical Exam:  SEE BELOW                          10.0   15.94 )-----------( 166      ( 26 Mar 2022 05:00 )             30.4       03-26    138  |  95<L>  |  20  ----------------------------<  165<H>  4.4   |  41<H>  |  0.36<L>    Ca    8.2<L>      26 Mar 2022 05:00  Phos  3.0     03-26  Mg     2.5     03-26              ABG - ( 26 Mar 2022 06:07 )  pH, Arterial: 7.35  pH, Blood: x     /  pCO2: 78    /  pO2: 80    / HCO3: 43    / Base Excess: 13.7  /  SaO2: 98        Physical Exam:  General: Proned, intubated ,sedated.  HEENT: PERRLA. Proned so cannot assess properly. ET tube in place.  Resp: Intubated. Bilateral breath sounds equal.  Cardio: RRR. S1, S2. no murmurs, rubs, gallops.  GI: Cannot examine as pt proned  : Payton in place  Skin: Warm and dry   Neuro: Cannot assess 2/2 pt condition.

## 2022-03-26 NOTE — PROGRESS NOTE ADULT - ASSESSMENT
IMP:    70 y/o male with COPD--former heavy smoekr-- tx to ICU for worsening resp failure secondary to COVID PNA and sepsis  Viral PNA sepsis secondary to COVID--Acute type 1 resp failure--septic shock   VTE on Full LMWH    Critically ill.  High risk for acute decompensation and deterioration including death   Patient requires critical care for support of one or more vital organ systems with a high probability of imminent or life threatening deterioration in his/her condition     Plan:    Full vent support--LPV strategy to maintain plateau pressures < 30--High PEEP/low TV--Permissive hypercapnea and acidemia.  Increase PEEP to 12. Will Prone again today  Follow up ABGs to further determine responsiveness  Furosemide 20 IV X 1 today.  Keep even fluid balance   Cont with Prop/Fent gtts.  Heavily sedate with PRN lorazepam--increase to 2 IV q 2  Actively titrate pressors to keep MAP > 60.  Cont with Midodrine   S/P Rem/Dexa and Toci.  Cont solu 20 IV q12  Agree with stopping Abx IV Vanco (6) and Dylon (7)  Cont TF   Bowel softener   CVL and A-line in place  DVT prophy--Full dose LMWH    ICU care-- d/w ICU staff on multi disciplinary rounds and wife/daughter at bedside-- All concerns addressed including but not limited to diagnosis, treatment plan and overall prognosis

## 2022-03-26 NOTE — PROGRESS NOTE ADULT - ASSESSMENT
Assessment: Patient is a 71 year old male with PMH COPD, HLD, kidney stones, valvular heart disease, prostate cancer s/p radiation who is admitted to the ICU for acute resp distress, ARDS, sepsis 2/2 COVID 19 PNA. Today is hospital day 27 for him. He was intubated on 3/21 and paralyzed for a few days. He is now off the Nimbex and remains on Prop and fentanyl drips. He intermittently requires Norepinephrine to allow for adequate sedation. He has Ativan 2mg q2h for sedation as well. He did get 2 Pfizer vaccines but did not get a booster.     Problems:  -ARDS  -COVID 19 PNA  -Acute hypoxic resp failure  -Septic shock     Plan:    Neuro  -Keep sedated for RASS goal of 0 to -1  -Continue Propofol and Fentanyl infusions  -Continue Ativan 2 q2h  -Will add Versed 2 PRN to avoid restarting Nimbex    Cardio  -Norepinephrine. Actively titrating to keep MAP >65.   -Continue Midodrine for pressor weaning  -Not tachycardic.     Resp  -Intubated and sedated. ARDS, Acute hypoxic resp failure, COVID 19 PNA, COPD  -Actively down titrating FiO2. Currently at 65% with a PEEP of 12.   -Allow for permissive hypercapnea   -Proned now. Due to flip at 6AM  -ABG reviewed. Improving     GI  -Continue TF  -Continue PPI for GI prophylaxis   -Continue statin      -Kidney function ok. Making urine. Continue to trend.  -Avoid nephrotoxic agents   -S/p Lasix 20 IV today to maintain even fluid balance  -Replete lytes PRN    ID  -S/p Remdesivir, Toci, Dexamethasone for COVID 19 PNA  -Continue Solumedrol 20 q12  -ID recommends stopping Vanco and Meropenem for superimposed bacterial infection   -Continue to trend temps and WBC     Heme  -Full dose LMWH for DVT prophylaxis   -Stable H&H. Transfuse PRN    Endo  -No acute interventions  -BGM     Patient Dispo: Critically ill with high risk for decompensation. To remain in ICU  Full Code       CRITICAL CARE TIME SPENT: 45 minutes assessing presenting problems of acute illness, which pose high probability of life threatening deterioration or end organ damage/dysfunction, as well as medical decision making including initiating plan of care, reviewing data, reviewing radiologic exams, discussing with multidisciplinary team,  discussing goals of care with patient/family, and writing this note.  Non-inclusive of procedures performed.

## 2022-03-26 NOTE — PROGRESS NOTE ADULT - ASSESSMENT
1) COVID Pneumonia  2) Hypoxemic Respiratory Failure  3) Pulmonary Embolism  4) Dyspnea   5) Combined Pulmonary Fibrosis/Emphysema   6) Abnormal CT Chest         70 yo pt with PMH of HLD,  kidney stone, valvular heart dz, prostate ca s/p Radiation presented  to ED c/o progressive SOB.    Diagnosed with COVID Pneumonia.   CTPE revealed pulmonary emboli in the right upper lobe pulmonary artery and bilateral segmental and subsegmental branches.   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   Not seen Pulmonary in the past.   Received Tocilizumab and remdesivir, placed back on Solumedrol  Lovenox 80 q 12 for PE  Transitioned from HFNC to BiPAP 18/12 and intubated 3/21/2022  Increased WBC noted and treated for superimposed Pneumonia with Cefepime initially now vancomycin  Reviewed CXR worsening infiltrates   Continue Spiriva/Symbicort  Due to the underlying COPD/emphysema, his recovery to normalcy will be longer than expected and prognosis is guarded  /30/80/10   Discussed with MICU  30-74 minutes of critical care time provided seeing the patient, reviewing the chart, discussing with ancillary team and family.

## 2022-03-26 NOTE — PROGRESS NOTE ADULT - SUBJECTIVE AND OBJECTIVE BOX
[facility] D # 26  ICU # 10  Vent # 5  [facility] CVL # 5  L Rad A-line # 5    CC:  Respiratory Failure     HPI:    70 y/o male with COPD, HLD,  kidney stone, valvular heart dz, prostate ca s/p Radiation presented  to ED c/o progressive SOB found to have COVID PNA.  He is tx to ICU for worsening hypoxia and progressive resp failure.      3/21:  Pt seen and examined in ICU during IDR.  Vented few hrs ago--100/+ 10.  CVL and A-line placed.  PF ratio 55.  Will prone.  Tm 97.6   On prop/Fent and Cis gtts.  Very ill    3/22:  Pt seen and examined in ICU during IDR.  Vented 100/+ 10. On Prop/Fent and Cis gtts.  Labile BP.  Prone yesterday.  Tm 99.2  WBC 17  Will prone again today    3/23:  Pt seen and examined in ICU during IDR.  Prone yesterday.  Vented--100/ + 10  Tm 99.7  WBC 14  Norepi gtt off now    3/24:  Pt seen and examined in ICU during IDR.  Family at bedside.  Prone yesterday.  Vented--80/ + 10.  No pressors.  On Prop/fent/Cis gtts.  Tm 99.7  WBC 15  Was still oozing from CVL site--pressure dressing placed and it appears to be working     3/25:  Pt seen and examined in ICU during IDR.  Proned yesterday--Appears to be a responder.  Off Cis gtt.  Trig 269.  Tm 98.6  WBC 14  Slightly + fluid balance.  Family at bedside    3/26:  Pt seen and examined in ICU during IDR.  Proned yesterday.  Vented--80/+ 10.  Back on low dose pressors.  Off Cis gtt--asynchrony with vent at times.  Tm 99.8  WBC 15  Negative 400ml with diuresis.  Family at bedside    PMH:  As above.     PSH:  As above.     FH: Non Contributory other than those listed in HPI    Social History:  Unobtainable due to clinical condition     MEDICATIONS  (STANDING):  atorvastatin 10 milliGRAM(s) Oral at bedtime  chlorhexidine 0.12% Liquid 15 milliLiter(s) Oral Mucosa every 12 hours  chlorhexidine 4% Liquid 1 Application(s) Topical <User Schedule>  enoxaparin Injectable 80 milliGRAM(s) SubCutaneous every 12 hours  fentaNYL   Infusion. 0.75 MICROgram(s)/kG/Hr (6.21 mL/Hr) IV Continuous <Continuous>  methylPREDNISolone sodium succinate Injectable 20 milliGRAM(s) IV Push every 12 hours  midodrine 10 milliGRAM(s) Oral every 8 hours  norepinephrine Infusion 0.05 MICROgram(s)/kG/Min (7.76 mL/Hr) IV Continuous <Continuous>  pantoprazole  Injectable 40 milliGRAM(s) IV Push daily  polyethylene glycol 3350 17 Gram(s) Oral daily  propofol Infusion 25 MICROgram(s)/kG/Min (12.4 mL/Hr) IV Continuous <Continuous>  senna 2 Tablet(s) Oral at bedtime    MEDICATIONS  (PRN):  acetaminophen     Tablet .. 650 milliGRAM(s) Oral every 6 hours PRN Temp greater or equal to 38C (100.4F), Mild Pain (1 - 3)  ALBUTerol    90 MICROgram(s) HFA Inhaler 2 Puff(s) Inhalation every 6 hours PRN Shortness of Breath and/or Wheezing  aluminum hydroxide/magnesium hydroxide/simethicone Suspension 30 milliLiter(s) Oral every 4 hours PRN Dyspepsia  benzonatate 100 milliGRAM(s) Oral every 8 hours PRN Cough  bisacodyl Suppository 10 milliGRAM(s) Rectal daily PRN Constipation  LORazepam   Injectable 2 milliGRAM(s) IV Push every 2 hours PRN Agitation  melatonin 3 milliGRAM(s) Oral at bedtime PRN Insomnia  ondansetron Injectable 4 milliGRAM(s) IV Push every 8 hours PRN Nausea and/or Vomiting  sodium chloride 0.65% Nasal 1 Spray(s) Both Nostrils two times a day PRN Nasal Congestion  sodium chloride 0.9% lock flush 10 milliLiter(s) IV Push every 1 hour PRN Pre/post blood products, medications, blood draw, and to maintain line patency      Allergies: NKDA    ROS:  SEE BELOW        ICU Vital Signs Last 24 Hrs  T(C): 37.6 (26 Mar 2022 05:00), Max: 37.7 (25 Mar 2022 12:36)  T(F): 99.6 (26 Mar 2022 05:00), Max: 99.8 (25 Mar 2022 12:36)  HR: 90 (26 Mar 2022 07:00) (90 - 122)  BP: --  BP(mean): --  ABP: 93/50 (26 Mar 2022 07:00) (90/50 - 145/69)  ABP(mean): 64 (26 Mar 2022 07:00) (64 - 93)  RR: 30 (26 Mar 2022 07:00) (13 - 31)  SpO2: 92% (26 Mar 2022 07:00) (86% - 96%)      Mode: AC/ CMV (Assist Control/ Continuous Mandatory Ventilation)  RR (machine): 30  TV (machine): 450  FiO2: 80  PEEP: 10  MAP: 18  PIP: 34      I&O's Summary    25 Mar 2022 07:01  -  26 Mar 2022 07:00  --------------------------------------------------------  IN: 2366.8 mL / OUT: 2800 mL / NET: -433.2 mL        Physical Exam:  SEE BELOW                          10.0   15.94 )-----------( 166      ( 26 Mar 2022 05:00 )             30.4       03-26    138  |  95<L>  |  20  ----------------------------<  165<H>  4.4   |  41<H>  |  0.36<L>    Ca    8.2<L>      26 Mar 2022 05:00  Phos  3.0     03-26  Mg     2.5     03-26              ABG - ( 26 Mar 2022 06:07 )  pH, Arterial: 7.35  pH, Blood: x     /  pCO2: 78    /  pO2: 80    / HCO3: 43    / Base Excess: 13.7  /  SaO2: 98                      DVT Prophylaxis:                                                            Contraindication:     Advanced Directives:    Discussed with:    Visit Information:  Time spent excluding procedure:      ** Time is exclusive of billed procedures and/or teaching and/or routine family updates.

## 2022-03-27 NOTE — PROGRESS NOTE ADULT - SUBJECTIVE AND OBJECTIVE BOX
HPI:    71M with PMHx HLD, sp CCY, prostate CA sp prostatectomy, Vaccinated not boosted admitted with COVID-19 Viral PNA. Course complicated by hypoxemic respiratory failure, acute PE, ARDS. Sp Remdesivir/Decadron/Toci.    Failed BIPAP intubated 3/21      Hosp day # 2/28  ICU 3/17  Vent day 3/21   [facility] CVL 3/21   Rad A-line  3/21         Recent clinical changes:  proned off paralytics   high plateua pressures.        Single organ failure       Vital signs/reviewed and physical exam performed where pertinent and urgently required.    Lab/radiology studies/ABG/meds reviewed and interpreted into the assessment and treatment plan.    Assessment/Plan/Therapeutic interventions      Neuro: Sedation with neuromuscular blockade as needed to facilitate safe lung protective ventilation.   Now just propofol, fentanyl and prn lorazepam     Cor:  Titrating Pressor support as needed to maintain MAP 65.  Avoiding fluid challenges.  Midodrine 10 Q8     Pulm:  ARDS-NET 4-6cc/kg IBW TV as able to maintain plateau pressures <30. Prone ventilation consideration as feasible.  Pa02/Fi02 < 150 on Fi02 >60% and PEEP at least 5.  ac 30/450/65%/peep 12   P plateau 33      GI:  Gi prophylaxis   Enteric feeds as tolerated.   PPI     Renal: Even to negative fluid balance as tolerated by hemodynamics and renal fx.      Heme:  Pharmacologic DVT P in addition to SCD's  following D- Dimer clinical course and doppler studied where appropriate.   Full AC for PE     ID: ABX discontinuation based on discussion with ID in conjunction with clinical features, culture data, and judicious procalcitonin monitoring.  Completed a full course of appropriate ABX for potential SI bacterail PNA  fungitel is negative       Endo Aggressive glycemic control to limit FS glucose to < 180mg/dl.        COVID 19 specific considerations and therapeutic options based on the available and rapidly changing medical literature.    Goals of care considerations:  Ongoing assessment for patient specific treatment options based on clinical progression or decline.  I have involved the family with updates and requests in guidance for medical decision making.      38 minutes of critical care time spent, (independent of any procedures),  in the management of this critically ill COVID-19 patient  with continuous assessments and interventions based on the interpretation of multiple databases.

## 2022-03-27 NOTE — PROGRESS NOTE ADULT - MS EXT PE MLT D E PC
no cyanosis/no pedal edema
no clubbing/no cyanosis
no cyanosis/no pedal edema
no cyanosis/pedal edema

## 2022-03-27 NOTE — PROGRESS NOTE ADULT - SUBJECTIVE AND OBJECTIVE BOX
[facility] D # 27  ICU # 11  Vent # 6  [facility] CVL # 6  L Rad A-line # 6    CC:  Respiratory Failure     HPI:    72 y/o male with COPD, HLD,  kidney stone, valvular heart dz, prostate ca s/p Radiation presented  to ED c/o progressive SOB found to have COVID PNA.  He is tx to ICU for worsening hypoxia and progressive resp failure.      3/21:  Pt seen and examined in ICU during IDR.  Vented few hrs ago--100/+ 10.  CVL and A-line placed.  PF ratio 55.  Will prone.  Tm 97.6   On prop/Fent and Cis gtts.  Very ill    3/22:  Pt seen and examined in ICU during IDR.  Vented 100/+ 10. On Prop/Fent and Cis gtts.  Labile BP.  Prone yesterday.  Tm 99.2  WBC 17  Will prone again today    3/23:  Pt seen and examined in ICU during IDR.  Prone yesterday.  Vented--100/ + 10  Tm 99.7  WBC 14  Norepi gtt off now    3/24:  Pt seen and examined in ICU during IDR.  Family at bedside.  Prone yesterday.  Vented--80/ + 10.  No pressors.  On Prop/fent/Cis gtts.  Tm 99.7  WBC 15  Was still oozing from CVL site--pressure dressing placed and it appears to be working     3/25:  Pt seen and examined in ICU during IDR.  Proned yesterday--Appears to be a responder.  Off Cis gtt.  Trig 269.  Tm 98.6  WBC 14  Slightly + fluid balance.  Family at bedside    3/26:  Pt seen and examined in ICU during IDR.  Proned yesterday.  Vented--80/+ 10.  Back on low dose pressors.  Off Cis gtt--asynchrony with vent at times.  Tm 99.8  WBC 15  Negative 400ml with diuresis.  Family at bedside    3/27:  Pt seen and examined in ICU during IDR.  Family at bedside.  On low dose pressors.  Prone yesterday.  Vented--60/ + 12.  Tm 100.2  WBC 12  Negative 100ml    PMH:  As above.     PSH:  As above.     FH: Non Contributory other than those listed in HPI    Social History:  Unobtainable due to clinical condition     MEDICATIONS  (STANDING):  atorvastatin 10 milliGRAM(s) Oral at bedtime  chlorhexidine 0.12% Liquid 15 milliLiter(s) Oral Mucosa every 12 hours  chlorhexidine 4% Liquid 1 Application(s) Topical <User Schedule>  enoxaparin Injectable 80 milliGRAM(s) SubCutaneous every 12 hours  fentaNYL   Infusion.. 0.5 MICROgram(s)/kG/Hr (2.07 mL/Hr) IV Continuous <Continuous>  methylPREDNISolone sodium succinate Injectable 20 milliGRAM(s) IV Push every 12 hours  midodrine 10 milliGRAM(s) Oral every 8 hours  norepinephrine Infusion 0.05 MICROgram(s)/kG/Min (7.76 mL/Hr) IV Continuous <Continuous>  pantoprazole  Injectable 40 milliGRAM(s) IV Push daily  polyethylene glycol 3350 17 Gram(s) Oral daily  propofol Infusion 25 MICROgram(s)/kG/Min (12.4 mL/Hr) IV Continuous <Continuous>  senna 2 Tablet(s) Oral at bedtime    MEDICATIONS  (PRN):  acetaminophen     Tablet .. 650 milliGRAM(s) Oral every 6 hours PRN Temp greater or equal to 38C (100.4F), Mild Pain (1 - 3)  ALBUTerol    90 MICROgram(s) HFA Inhaler 2 Puff(s) Inhalation every 6 hours PRN Shortness of Breath and/or Wheezing  aluminum hydroxide/magnesium hydroxide/simethicone Suspension 30 milliLiter(s) Oral every 4 hours PRN Dyspepsia  benzonatate 100 milliGRAM(s) Oral every 8 hours PRN Cough  bisacodyl Suppository 10 milliGRAM(s) Rectal daily PRN Constipation  LORazepam   Injectable 2 milliGRAM(s) IV Push every 2 hours PRN Agitation  melatonin 3 milliGRAM(s) Oral at bedtime PRN Insomnia  midazolam Injectable 2 milliGRAM(s) IV Push every 2 hours PRN agitation  ondansetron Injectable 4 milliGRAM(s) IV Push every 8 hours PRN Nausea and/or Vomiting  sodium chloride 0.65% Nasal 1 Spray(s) Both Nostrils two times a day PRN Nasal Congestion  sodium chloride 0.9% lock flush 10 milliLiter(s) IV Push every 1 hour PRN Pre/post blood products, medications, blood draw, and to maintain line patency      Allergies: NKDA    ROS:  SEE BELOW        ICU Vital Signs Last 24 Hrs  T(C): 37.2 (27 Mar 2022 05:00), Max: 37.9 (26 Mar 2022 17:00)  T(F): 99 (27 Mar 2022 05:00), Max: 100.2 (26 Mar 2022 17:00)  HR: 80 (27 Mar 2022 09:00) (67 - 95)  BP: --  BP(mean): --  ABP: 101/53 (27 Mar 2022 09:00) (86/49 - 161/72)  ABP(mean): 68 (27 Mar 2022 09:00) (58 - 104)  RR: 30 (27 Mar 2022 09:00) (22 - 31)  SpO2: 96% (27 Mar 2022 09:00) (91% - 100%)      Mode: AC/ CMV (Assist Control/ Continuous Mandatory Ventilation)  RR (machine): 30  TV (machine): 450  FiO2: 57  PEEP: 12  MAP: 20  PIP: 37      I&O's Summary    26 Mar 2022 07:01  -  27 Mar 2022 07:00  --------------------------------------------------------  IN: 2490.5 mL / OUT: 2595 mL / NET: -104.5 mL        Physical Exam:  SEE BELOW                          9.0    12.40 )-----------( 164      ( 27 Mar 2022 05:05 )             28.6       03-27    139  |  94<L>  |  20  ----------------------------<  170<H>  4.2   |  44<H>  |  0.31<L>    Ca    8.6      27 Mar 2022 05:05  Phos  3.0     03-27  Mg     2.7     03-27              ABG - ( 27 Mar 2022 05:18 )  pH, Arterial: 7.38  pH, Blood: x     /  pCO2: 76    /  pO2: 77    / HCO3: 45    / Base Excess: 15.9  /  SaO2: 98                      DVT Prophylaxis:                                                            Contraindication:     Advanced Directives:    Discussed with:    Visit Information:  Time spent excluding procedure:      ** Time is exclusive of billed procedures and/or teaching and/or routine family updates.

## 2022-03-27 NOTE — PROGRESS NOTE ADULT - ASSESSMENT
IMP:    72 y/o male with COPD--former heavy smoekr-- tx to ICU for worsening resp failure secondary to COVID PNA and sepsis  Viral PNA sepsis secondary to COVID--Acute type 1 resp failure--septic shock   VTE--PE on Full LMWH    Critically ill.  High risk for acute decompensation and deterioration including death   Patient requires critical care for support of one or more vital organ systems with a high probability of imminent or life threatening deterioration in his/her condition     Plan:    Full vent support--LPV strategy to maintain plateau pressures < 30--High PEEP/low TV--Permissive hypercapnea and acidemia.  Increase PEEP to 12. Will not prone today  Cont with Prop/Fent gtts.  Heavily sedate with PRN lorazepam--increase to 2 IV q 2  Actively titrate pressors to keep MAP > 60.  Cont with Midodrine   S/P Rem/Dexa and Toci.  Cont solu 20 IV q12  Agree with stopping Abx IV Vanco (6) and Dylon (7)--Completed course  Cont TF   Bowel softener   CVL and A-line in place  DVT prophy--Full dose LMWH    ICU care-- d/w ICU staff on multi disciplinary rounds and wife/daughter at bedside-- All concerns addressed including but not limited to diagnosis, treatment plan and overall prognosis

## 2022-03-27 NOTE — PROGRESS NOTE ADULT - NEUROLOGICAL DETAILS
Sedated
alert and oriented x 3/responds to pain/responds to verbal commands/no spontaneous movement

## 2022-03-27 NOTE — PROGRESS NOTE ADULT - ASSESSMENT
1) COVID Pneumonia  2) Hypoxemic Respiratory Failure  3) Pulmonary Embolism  4) Dyspnea   5) Combined Pulmonary Fibrosis/Emphysema   6) Abnormal CT Chest   7) Endotracheally Intubated       70 yo pt with PMH of HLD,  kidney stone, valvular heart dz, prostate ca s/p Radiation presented  to ED c/o progressive SOB.    Diagnosed with COVID Pneumonia.   CTPE revealed pulmonary emboli in the right upper lobe pulmonary artery and bilateral segmental and subsegmental branches.   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   Not seen Pulmonary in the past.   Received Tocilizumab and remdesivir, placed back on Solumedrol  Lovenox 80 q 12 for PE  Transitioned from HFNC to BiPAP 18/12 and intubated 3/21/2022  Increased WBC noted and treated for superimposed Pneumonia with Cefepime initially now vancomycin  Reviewed CXR worsening infiltrates   Was on Spiriva/Symbicort and will recommend to restart  Due to the underlying COPD/emphysema, his recovery to normalcy will be longer than expected and prognosis is guarded  /30/60/12, PIP 37 Mode: AC/ CMV (Assist Control/ Continuous Mandatory Ventilation)  Discussed with MICU on 3/26  30-74 minutes of critical care time provided seeing the patient, reviewing the chart, discussing with ancillary team and family.   1) COVID Pneumonia  2) Hypoxemic Respiratory Failure  3) Pulmonary Embolism  4) Dyspnea   5) Combined Pulmonary Fibrosis/Emphysema   6) Abnormal CT Chest   7) Endotracheally Intubated       70 yo pt with PMH of HLD,  kidney stone, valvular heart dz, prostate ca s/p Radiation presented  to ED c/o progressive SOB.    Diagnosed with COVID Pneumonia.   CTPE revealed pulmonary emboli in the right upper lobe pulmonary artery and bilateral segmental and subsegmental branches.   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   Not seen Pulmonary in the past.   Received Tocilizumab and remdesivir, placed back on Solumedrol  Lovenox 80 q 12 for PE  Transitioned from HFNC to BiPAP 18/12 and intubated 3/21/2022  Increased WBC noted and treated for superimposed Pneumonia with Cefepime initially now vancomycin  Reviewed CXR worsening infiltrates   Was on Spiriva/Symbicort and will recommend to restart  Due to the underlying COPD/emphysema, his recovery to normalcy will be longer than expected and prognosis is guarded  /30/60/12, PIP 37 Mode: AC/ CMV (Assist Control/ Continuous Mandatory Ventilation)  Reviewed ABG, worsening hypercapnia noted  Discussed with MICU on 3/26  30-74 minutes of critical care time provided seeing the patient, reviewing the chart, discussing with ancillary team and family.

## 2022-03-27 NOTE — PROGRESS NOTE ADULT - SUBJECTIVE AND OBJECTIVE BOX
Patient is a 71y old  Male who presents with a chief complaint of SOB (01 Mar 2022 13:52)      HPI:  70 yo pt with PMH of HLD,  kidney stone, valvular heart dz, prostate ca s/p Radiation presented  to ED c/o progressive SOB.    Diagnosed with COVID Pneumonia.   CTPE revealed pulmonary emboli in the right upper lobe pulmonary artery and bilateral segmental and subsegmental branches.   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     3/27  Patient was intubated on 3/21  Proned overnight  On FiO2 60%, PEEP 12  No acute pulmonary events occurred overnight     PAST MEDICAL & SURGICAL HISTORY:  Hypercholesterolemia    Cholelithiasis    Cholecystitis    Valvular heart disease    Psoriasis    History of kidney stones    Inguinal hernia, left    Prostate cancer  s/p XRT 1-6-2020 to 1/27/2021    History of renal calculi    S/P hernia repair  right inguinal; 5/2015    S/P colonoscopy with polypectomy  Baseline; viv polyps; 2012    History of cholecystectomy  2017    History of prostatectomy  5/2020    History of lithotripsy    MEDICATIONS  (STANDING):  atorvastatin 10 milliGRAM(s) Oral at bedtime  chlorhexidine 0.12% Liquid 15 milliLiter(s) Oral Mucosa every 12 hours  chlorhexidine 4% Liquid 1 Application(s) Topical <User Schedule>  enoxaparin Injectable 80 milliGRAM(s) SubCutaneous every 12 hours  fentaNYL   Infusion.. 0.5 MICROgram(s)/kG/Hr (2.07 mL/Hr) IV Continuous <Continuous>  methylPREDNISolone sodium succinate Injectable 20 milliGRAM(s) IV Push every 12 hours  midodrine 10 milliGRAM(s) Oral every 8 hours  norepinephrine Infusion 0.05 MICROgram(s)/kG/Min (7.76 mL/Hr) IV Continuous <Continuous>  pantoprazole  Injectable 40 milliGRAM(s) IV Push daily  polyethylene glycol 3350 17 Gram(s) Oral daily  propofol Infusion 25 MICROgram(s)/kG/Min (12.4 mL/Hr) IV Continuous <Continuous>  senna 2 Tablet(s) Oral at bedtime    MEDICATIONS  (PRN):  acetaminophen     Tablet .. 650 milliGRAM(s) Oral every 6 hours PRN Temp greater or equal to 38C (100.4F), Mild Pain (1 - 3)  ALBUTerol    90 MICROgram(s) HFA Inhaler 2 Puff(s) Inhalation every 6 hours PRN Shortness of Breath and/or Wheezing  aluminum hydroxide/magnesium hydroxide/simethicone Suspension 30 milliLiter(s) Oral every 4 hours PRN Dyspepsia  benzonatate 100 milliGRAM(s) Oral every 8 hours PRN Cough  bisacodyl Suppository 10 milliGRAM(s) Rectal daily PRN Constipation  LORazepam   Injectable 2 milliGRAM(s) IV Push every 2 hours PRN Agitation  melatonin 3 milliGRAM(s) Oral at bedtime PRN Insomnia  midazolam Injectable 2 milliGRAM(s) IV Push every 2 hours PRN agitation  ondansetron Injectable 4 milliGRAM(s) IV Push every 8 hours PRN Nausea and/or Vomiting  sodium chloride 0.65% Nasal 1 Spray(s) Both Nostrils two times a day PRN Nasal Congestion  sodium chloride 0.9% lock flush 10 milliLiter(s) IV Push every 1 hour PRN Pre/post blood products, medications, blood draw, and to maintain line patency    Vital Signs Last 24 Hrs  T(C): 37.2 (27 Mar 2022 05:00), Max: 37.9 (26 Mar 2022 17:00)  T(F): 99 (27 Mar 2022 05:00), Max: 100.2 (26 Mar 2022 17:00)  HR: 73 (27 Mar 2022 07:00) (67 - 95)  BP: --  BP(mean): --  RR: 30 (27 Mar 2022 07:00) (22 - 31)  SpO2: 94% (27 Mar 2022 07:00) (91% - 100%)      I&O's Detail    19 Mar 2022 07:01  -  20 Mar 2022 07:00  --------------------------------------------------------  IN:    Dexmedetomidine: 646.8 mL    dextrose 5% + sodium chloride 0.9%: 1179.7 mL    IV PiggyBack: 650 mL    Oral Fluid: 240 mL  Total IN: 2716.6 mL    OUT:    Incontinent per Collection Bag (mL): 1250 mL  Total OUT: 1250 mL    Total NET: 1466.6 mL              PHYSICAL EXAM  General Appearance: Intubated  Neck: Supple, , no adenopathy,   Lungs: coarse at the bases   Heart: Regular rate and rhythm, S1, S2 normal,  Abdomen: Soft, non-tender, bowel sounds active   Extremities: no cyanosis or edema, no joint swelling  Neurologic: Sedated     ECG:    LABS:    Vital Signs Last 24 Hrs                                       14.0   16.99 )-----------( 101      ( 20 Mar 2022 06:45 )             42.2   03-20    136  |  105  |  21  ----------------------------<  144<H>  4.4   |  27  |  0.60    Ca    9.1      20 Mar 2022 06:45  Phos  3.0     03-20  Mg     2.5     03-20                      12.6   15.57 )-----------( 92       ( 19 Mar 2022 06:08 )             36.9   03-19    137  |  105  |  20  ----------------------------<  154<H>  4.2   |  28  |  0.53    Ca    9.0      19 Mar 2022 06:08  Phos  2.7     03-19  Mg     2.7     03-19    TPro  6.6  /  Alb  2.6<L>  /  TBili  1.9<H>  /  DBili  x   /  AST  37  /  ALT  132<H>  /  AlkPhos  162<H>  03-18               16.5   32.85 )-----------( 294      ( 10 Mar 2022 08:37 )             48.4   03-10    132<L>  |  96  |  18  ----------------------------<  112<H>  4.7   |  31  |  0.80    Ca    9.0      10 Mar 2022 08:37  Phos  3.3     03-10  Mg     2.9     03-10    TPro  6.9  /  Alb  3.3  /  TBili  1.6<H>  /  DBili  0.4<H>  /  AST  21  /  ALT  69  /  AlkPhos  128<H>  03-10    \        RADIOLOGY & ADDITIONAL STUDIES:    ACC: [id number] EXAM:  XR CHEST PORTABLE IMMED 1V                          PROCEDURE DATE:  03/10/2022          INTERPRETATION:  Chest one view    HISTORY: Leukocytosis    COMPARISON STUDY: 3/1/2022    Frontal expiratory view of the chest shows the heart to be similar in   size. The lungs show slight clearing of the upper right lung with   progression of basilar infiltrates and there is no evidence of   pneumothorax nor pleural effusion.    IMPRESSION:  Changing infiltrates.  CXR independently reviewed    < from: Xray Chest 1 View- PORTABLE-Urgent (Xray Chest 1 View- PORTABLE-Urgent .) (03.18.22 @ 09:08) >  PROCEDURE DATE:  03/18/2022          INTERPRETATION:  AP erect chest on March 18, 2022 at 8:28 AM. Patient has   Covid pneumonia.    Heart magnified by technique.    Scattered interstitial available infiltrates are again noted more   concentrated at lung bases. They have increased from March 10.    IMPRESSION: Increasing bilateral infiltrates.    < end of copied text >

## 2022-03-27 NOTE — PROGRESS NOTE ADULT - GASTROINTESTINAL DETAILS
soft/no distention
soft/nontender/no guarding/no rigidity
soft/no distention
soft/nontender/no distention
soft/nontender/bowel sounds normal/no guarding/no rigidity
soft/nontender/no guarding/no rigidity
soft/nontender/bowel sounds normal
soft/nontender/bowel sounds normal/no guarding/no rigidity
soft/nontender/bowel sounds normal/no guarding/no rigidity

## 2022-03-27 NOTE — PROGRESS NOTE ADULT - RS GEN PE MLT RESP DETAILS PC
breath sounds equal/no rales/no rhonchi/no wheezes
breath sounds equal
breath sounds equal
breath sounds equal/stridor
breath sounds equal/no rhonchi/no wheezes/rales
breath sounds equal/no rales/no rhonchi/no wheezes
airway patent/breath sounds equal
breath sounds equal
good air movement/no intercostal retractions/no subcutaneous emphysema/no wheezes/diminished breath sounds, L/respirations labored/rales

## 2022-03-28 NOTE — PROGRESS NOTE ADULT - SUBJECTIVE AND OBJECTIVE BOX
Patient is a 71y old  Male who presents with a chief complaint of SOB (01 Mar 2022 13:52)      HPI:  70 yo pt with PMH of HLD,  kidney stone, valvular heart dz, prostate ca s/p Radiation presented  to ED c/o progressive SOB.    Diagnosed with COVID Pneumonia.   CTPE revealed pulmonary emboli in the right upper lobe pulmonary artery and bilateral segmental and subsegmental branches.   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     3/28  Patient was intubated on 3/21  Proned overnight  On FiO2 60%, PEEP 12  No acute pulmonary events occurred overnight   ABG 7.36/84/71            PAST MEDICAL & SURGICAL HISTORY:  Hypercholesterolemia    Cholelithiasis    Cholecystitis    Valvular heart disease    Psoriasis    History of kidney stones    Inguinal hernia, left    Prostate cancer  s/p XRT 1-6-2020 to 1/27/2021    History of renal calculi    S/P hernia repair  right inguinal; 5/2015    S/P colonoscopy with polypectomy  Baseline; viv polyps; 2012    History of cholecystectomy  2017    History of prostatectomy  5/2020    History of lithotripsy    MEDICATIONS  (STANDING):  atorvastatin 10 milliGRAM(s) Oral at bedtime  chlorhexidine 0.12% Liquid 15 milliLiter(s) Oral Mucosa every 12 hours  chlorhexidine 4% Liquid 1 Application(s) Topical <User Schedule>  enoxaparin Injectable 80 milliGRAM(s) SubCutaneous every 12 hours  fentaNYL   Infusion.. 0.5 MICROgram(s)/kG/Hr (2.07 mL/Hr) IV Continuous <Continuous>  methylPREDNISolone sodium succinate Injectable 20 milliGRAM(s) IV Push every 12 hours  midodrine 10 milliGRAM(s) Oral every 8 hours  norepinephrine Infusion 0.05 MICROgram(s)/kG/Min (7.76 mL/Hr) IV Continuous <Continuous>  pantoprazole  Injectable 40 milliGRAM(s) IV Push daily  polyethylene glycol 3350 17 Gram(s) Oral daily  propofol Infusion 25 MICROgram(s)/kG/Min (12.4 mL/Hr) IV Continuous <Continuous>  senna 2 Tablet(s) Oral at bedtime    MEDICATIONS  (PRN):  acetaminophen     Tablet .. 650 milliGRAM(s) Oral every 6 hours PRN Temp greater or equal to 38C (100.4F), Mild Pain (1 - 3)  ALBUTerol    90 MICROgram(s) HFA Inhaler 2 Puff(s) Inhalation every 6 hours PRN Shortness of Breath and/or Wheezing  aluminum hydroxide/magnesium hydroxide/simethicone Suspension 30 milliLiter(s) Oral every 4 hours PRN Dyspepsia  benzonatate 100 milliGRAM(s) Oral every 8 hours PRN Cough  bisacodyl Suppository 10 milliGRAM(s) Rectal daily PRN Constipation  LORazepam   Injectable 2 milliGRAM(s) IV Push every 2 hours PRN Agitation  melatonin 3 milliGRAM(s) Oral at bedtime PRN Insomnia  midazolam Injectable 2 milliGRAM(s) IV Push every 2 hours PRN agitation  ondansetron Injectable 4 milliGRAM(s) IV Push every 8 hours PRN Nausea and/or Vomiting  sodium chloride 0.65% Nasal 1 Spray(s) Both Nostrils two times a day PRN Nasal Congestion  sodium chloride 0.9% lock flush 10 milliLiter(s) IV Push every 1 hour PRN Pre/post blood products, medications, blood draw, and to maintain line patency    Vital Signs Last 24 Hrs  T(C): 37.2 (27 Mar 2022 05:00), Max: 37.9 (26 Mar 2022 17:00)  T(F): 99 (27 Mar 2022 05:00), Max: 100.2 (26 Mar 2022 17:00)  HR: 73 (27 Mar 2022 07:00) (67 - 95)  BP: --  BP(mean): --  RR: 30 (27 Mar 2022 07:00) (22 - 31)  SpO2: 94% (27 Mar 2022 07:00) (91% - 100%)      I&O's Detail    19 Mar 2022 07:01  -  20 Mar 2022 07:00  --------------------------------------------------------  IN:    Dexmedetomidine: 646.8 mL    dextrose 5% + sodium chloride 0.9%: 1179.7 mL    IV PiggyBack: 650 mL    Oral Fluid: 240 mL  Total IN: 2716.6 mL    OUT:    Incontinent per Collection Bag (mL): 1250 mL  Total OUT: 1250 mL    Total NET: 1466.6 mL              PHYSICAL EXAM  General Appearance: Intubated  Neck: Supple, , no adenopathy,   Lungs: coarse at the bases   Heart: Regular rate and rhythm, S1, S2 normal,  Abdomen: Soft, non-tender, bowel sounds active   Extremities: no cyanosis or edema, no joint swelling  Neurologic: Sedated     ECG:    LABS:    Vital Signs Last 24 Hrs                                       14.0   16.99 )-----------( 101      ( 20 Mar 2022 06:45 )             42.2   03-20    136  |  105  |  21  ----------------------------<  144<H>  4.4   |  27  |  0.60    Ca    9.1      20 Mar 2022 06:45  Phos  3.0     03-20  Mg     2.5     03-20                      12.6   15.57 )-----------( 92       ( 19 Mar 2022 06:08 )             36.9   03-19    137  |  105  |  20  ----------------------------<  154<H>  4.2   |  28  |  0.53    Ca    9.0      19 Mar 2022 06:08  Phos  2.7     03-19  Mg     2.7     03-19    TPro  6.6  /  Alb  2.6<L>  /  TBili  1.9<H>  /  DBili  x   /  AST  37  /  ALT  132<H>  /  AlkPhos  162<H>  03-18               16.5   32.85 )-----------( 294      ( 10 Mar 2022 08:37 )             48.4   03-10    132<L>  |  96  |  18  ----------------------------<  112<H>  4.7   |  31  |  0.80    Ca    9.0      10 Mar 2022 08:37  Phos  3.3     03-10  Mg     2.9     03-10    TPro  6.9  /  Alb  3.3  /  TBili  1.6<H>  /  DBili  0.4<H>  /  AST  21  /  ALT  69  /  AlkPhos  128<H>  03-10    \        RADIOLOGY & ADDITIONAL STUDIES:    ACC: [id number] EXAM:  XR CHEST PORTABLE IMMED 1V                          PROCEDURE DATE:  03/10/2022          INTERPRETATION:  Chest one view    HISTORY: Leukocytosis    COMPARISON STUDY: 3/1/2022    Frontal expiratory view of the chest shows the heart to be similar in   size. The lungs show slight clearing of the upper right lung with   progression of basilar infiltrates and there is no evidence of   pneumothorax nor pleural effusion.    IMPRESSION:  Changing infiltrates.  CXR independently reviewed    < from: Xray Chest 1 View- PORTABLE-Urgent (Xray Chest 1 View- PORTABLE-Urgent .) (03.18.22 @ 09:08) >  PROCEDURE DATE:  03/18/2022          INTERPRETATION:  AP erect chest on March 18, 2022 at 8:28 AM. Patient has   Covid pneumonia.    Heart magnified by technique.    Scattered interstitial available infiltrates are again noted more   concentrated at lung bases. They have increased from March 10.    IMPRESSION: Increasing bilateral infiltrates.    < end of copied text >

## 2022-03-28 NOTE — PROGRESS NOTE ADULT - SUBJECTIVE AND OBJECTIVE BOX
Events Overnight: on 12 peep, 60% was proned,    HPI:    72 y/o male with COPD, HLD,  kidney stone, valvular heart dz, prostate ca s/p Radiation presented  to ED c/o progressive SOB found to have COVID PNA.  He is tx to ICU for worsening hypoxia and progressive resp failure.    tested positive 2/28  3/01 positive PE studay  intubated 3/21  has been proned numerous days  Propofol d/c due to high triglycerides  ROS cant obtain     PMH:  As above.     PSH:  As above.     FH: Non Contributory other than those listed in HPI    Social History:  Unobtainable due to clinical condition       MEDICATIONS  (STANDING):  atorvastatin 10 milliGRAM(s) Oral at bedtime  chlorhexidine 0.12% Liquid 15 milliLiter(s) Oral Mucosa every 12 hours  chlorhexidine 4% Liquid 1 Application(s) Topical <User Schedule>  enoxaparin Injectable 80 milliGRAM(s) SubCutaneous every 12 hours  fentaNYL   Infusion.. 0.5 MICROgram(s)/kG/Hr (2.07 mL/Hr) IV Continuous <Continuous>  methylPREDNISolone sodium succinate Injectable 20 milliGRAM(s) IV Push every 12 hours  midazolam Infusion 0.02 mG/kG/Hr (1.66 mL/Hr) IV Continuous <Continuous>  midodrine 10 milliGRAM(s) Oral every 8 hours  norepinephrine Infusion 0.05 MICROgram(s)/kG/Min (7.76 mL/Hr) IV Continuous <Continuous>  pantoprazole  Injectable 40 milliGRAM(s) IV Push daily  polyethylene glycol 3350 17 Gram(s) Oral daily  senna 2 Tablet(s) Oral at bedtime    MEDICATIONS  (PRN):  acetaminophen     Tablet .. 650 milliGRAM(s) Oral every 6 hours PRN Temp greater or equal to 38C (100.4F), Mild Pain (1 - 3)  ALBUTerol    90 MICROgram(s) HFA Inhaler 2 Puff(s) Inhalation every 6 hours PRN Shortness of Breath and/or Wheezing  aluminum hydroxide/magnesium hydroxide/simethicone Suspension 30 milliLiter(s) Oral every 4 hours PRN Dyspepsia  benzonatate 100 milliGRAM(s) Oral every 8 hours PRN Cough  bisacodyl Suppository 10 milliGRAM(s) Rectal daily PRN Constipation  melatonin 3 milliGRAM(s) Oral at bedtime PRN Insomnia  ondansetron Injectable 4 milliGRAM(s) IV Push every 8 hours PRN Nausea and/or Vomiting  sodium chloride 0.65% Nasal 1 Spray(s) Both Nostrils two times a day PRN Nasal Congestion  sodium chloride 0.9% lock flush 10 milliLiter(s) IV Push every 1 hour PRN Pre/post blood products, medications, blood draw, and to maintain line patency      ICU Vital Signs Last 24 Hrs  T(C): 36.9 (28 Mar 2022 10:00), Max: 37.8 (27 Mar 2022 14:00)  T(F): 98.4 (28 Mar 2022 10:00), Max: 100 (27 Mar 2022 14:00)  HR: 80 (28 Mar 2022 10:00) (74 - 121)  BP: --  BP(mean): --  ABP: 95/49 (28 Mar 2022 10:00) (92/45 - 136/65)  ABP(mean): 63 (28 Mar 2022 10:00) (59 - 88)  RR: 30 (28 Mar 2022 10:00) (16 - 33)  SpO2: 92% (28 Mar 2022 10:00) (83% - 98%)      Mode: AC/ CMV (Assist Control/ Continuous Mandatory Ventilation)  RR (machine): 30  TV (machine): 450  FiO2: 60  PEEP: 12  ITime: 1  MAP: 19  PIP: 40      I&O's Summary    27 Mar 2022 07:01  -  28 Mar 2022 07:00  --------------------------------------------------------  IN: 1975 mL / OUT: 1200 mL / NET: 775 mL    Physical Exam    General - sedated, weak  neuro pupils small  heent orally intubated  neck left cvp  lungs bilateral crackles  cv rrr  abdomen soft, non tender  ext warm  skin no rashes  RAYMOND martins                          8.6    12.79 )-----------( 158      ( 28 Mar 2022 04:55 )             27.9       03-28    139  |  95<L>  |  22  ----------------------------<  171<H>  4.2   |  45<HH>  |  0.35<L>    Ca    8.4<L>      28 Mar 2022 04:55  Phos  3.0     03-28  Mg     2.7     03-28    ABG - ( 28 Mar 2022 05:02 )  pH, Arterial: 7.36  pH, Blood: x     /  pCO2: 84    /  pO2: 71    / HCO3: 48    / Base Excess: 17.5  /  SaO2: 95        DVT Prophylaxis:   Lovenox                                                              Advanced Directives: Full Code

## 2022-03-28 NOTE — PROGRESS NOTE ADULT - ASSESSMENT
1) COVID Pneumonia  2) Hypoxemic Respiratory Failure  3) Pulmonary Embolism  4) Dyspnea   5) Combined Pulmonary Fibrosis/Emphysema   6) Abnormal CT Chest   7) Endotracheally Intubated       70 yo pt with PMH of HLD,  kidney stone, valvular heart dz, prostate ca s/p Radiation presented  to ED c/o progressive SOB.    Diagnosed with COVID Pneumonia.   CTPE revealed pulmonary emboli in the right upper lobe pulmonary artery and bilateral segmental and subsegmental branches.   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   Not seen Pulmonary in the past.   Received Tocilizumab and remdesivir, placed back on Solumedrol  Lovenox 80 q 12 for PE  Transitioned from HFNC to BiPAP 18/12 and intubated 3/21/2022  Increased WBC noted and treated for superimposed Pneumonia with Cefepime initially now vancomycin  Reviewed CXR worsening infiltrates   Was on Spiriva/Symbicort and will recommend to restart  Due to the underlying COPD/emphysema, his recovery to normalcy will be longer than expected and prognosis is guarded  /30/60/12, PIP 37 Mode: AC/ CMV (Assist Control/ Continuous Mandatory Ventilation)  Reviewed ABG, worsening hypercapnia noted but this could signify worsening metabolic acidosis secondary to Propofol. Now d/c. Follow up new ABG  Discussed with MICU on 3/26  30-74 minutes of critical care time provided seeing the patient, reviewing the chart, discussing with ancillary team and family.   1) COVID Pneumonia  2) Hypoxemic Respiratory Failure  3) Pulmonary Embolism  4) Dyspnea   5) Combined Pulmonary Fibrosis/Emphysema   6) Abnormal CT Chest   7) Endotracheally Intubated       70 yo pt with PMH of HLD,  kidney stone, valvular heart dz, prostate ca s/p Radiation presented  to ED c/o progressive SOB.    Diagnosed with COVID Pneumonia.   CTPE revealed pulmonary emboli in the right upper lobe pulmonary artery and bilateral segmental and subsegmental branches.   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   Not seen Pulmonary in the past.   Received Tocilizumab and remdesivir, placed back on Solumedrol  Lovenox 80 q 12 for PE  Transitioned from HFNC to BiPAP 18/12 and intubated 3/21/2022  Increased WBC noted and treated for superimposed Pneumonia with Cefepime initially now vancomycin  Reviewed CXR worsening infiltrates   Was on Spiriva/Symbicort and will recommend to restart  Due to the underlying COPD/emphysema, his recovery to normalcy will be longer than expected and prognosis is guarded  /30/60/12, PIP 37 Mode: AC/ CMV (Assist Control/ Continuous Mandatory Ventilation)  Reviewed ABG, worsening hypercapnia noted (permissive hypercapn) but this could signify worsening metabolic acidosis secondary to Propofol. Now d/c. Follow up new ABG  Discussed with MICU on 3/26  30-74 minutes of critical care time provided seeing the patient, reviewing the chart, discussing with ancillary team and family.

## 2022-03-28 NOTE — PROGRESS NOTE ADULT - ASSESSMENT
72 y/o male with COPD--former heavy smoekr-- tx to ICU for worsening resp failure secondary to COVID PNA and sepsis  Viral PNA sepsis secondary to COVID--Acute type 1 resp failure--septic shock   VTE--PE on Full LMWH  ARDS    Critically ill.  High risk for acute decompensation and deterioration including death   Patient requires critical care for support of one or more vital organ systems with a high probability of imminent or life threatening deterioration in his/her condition     Plan:    Full vent support--LPV strategy to maintain plateau pressures < 30--High PEEP/low TV--Permissive hypercapnea and acidemia. PEEP to 12. dec tv to 420  Cont with vers/Fent gtts. prop held secondary to inc triclycerides  Actively titrate pressors to keep MAP > 60.  Cont with Midodrine   S/P Rem/Dexa and Toci.  slowly titrate down steroids vanco and meropenem  --Completed course  Cont TF   Bowel softener   CVL and A-line in place  DVT prophy--Full dose LMWH

## 2022-03-28 NOTE — PROGRESS NOTE ADULT - SUBJECTIVE AND OBJECTIVE BOX
HPI:    71M with PMHx HLD, sp CCY, prostate CA sp prostatectomy, Vaccinated not boosted admitted with COVID-19 Viral PNA. Course complicated by hypoxemic respiratory failure, acute PE, ARDS. Sp Remdesivir/Decadron/Toci.    Failed BIPAP intubated 3/21      Hosp day # 2/28  ICU 3/17  Vent day 3/21   LIJ CVL 3/21   Rad A-line  3/21         Recent clinical changes: Supine now TV decreased  propofol held for  high TG's    Versed drip started.        Single organ failure       Vital signs/reviewed and physical exam performed where pertinent and urgently required.    Lab/radiology studies/ABG/meds reviewed and interpreted into the assessment and treatment plan.    Assessment/Plan/Therapeutic interventions      Neuro: Sedation with neuromuscular blockade as needed to facilitate safe lung protective ventilation.   Now just propofol, fentanyl and prn lorazepam     Cor:  Titrating Pressor support as needed to maintain MAP 65.  Avoiding fluid challenges.  Midodrine 10 Q8     Pulm:  ARDS-NET 4-6cc/kg IBW TV as able to maintain plateau pressures <30. Prone ventilation consideration as feasible.  Pa02/Fi02 < 150 on Fi02 >60% and PEEP at least 5.  ac 30/450/65%/peep 12   P plateau 33      GI:  Gi prophylaxis   Enteric feeds as tolerated.   PPI     Renal: Even to negative fluid balance as tolerated by hemodynamics and renal fx.      Heme:  Pharmacologic DVT P in addition to SCD's  following D- Dimer clinical course and doppler studied where appropriate.   Full AC for PE     ID: ABX discontinuation based on discussion with ID in conjunction with clinical features, culture data, and judicious procalcitonin monitoring.  Completed a full course of appropriate ABX for potential SI bacterail PNA  fungitel is negative       Endo Aggressive glycemic control to limit FS glucose to < 180mg/dl.        COVID 19 specific considerations and therapeutic options based on the available and rapidly changing medical literature.    Goals of care considerations:  Ongoing assessment for patient specific treatment options based on clinical progression or decline.  I have involved the family with updates and requests in guidance for medical decision making.      32 minutes of critical care time spent, (independent of any procedures),  in the management of this critically ill COVID-19 patient  with continuous assessments and interventions based on the interpretation of multiple databases.     HPI:    71M with PMHx HLD, sp CCY, prostate CA sp prostatectomy, Vaccinated not boosted admitted with COVID-19 Viral PNA. Course complicated by hypoxemic respiratory failure, acute PE, ARDS. Sp Remdesivir/Decadron/Toci.    Failed BIPAP intubated 3/21      Hosp day # 2/28  ICU 3/17  Vent day 3/21   LIJ CVL 3/21   Rad A-line  3/21         Recent clinical changes: Supine now TV decreased  propofol held for  high TG's    Versed drip started.        Single organ failure       Vital signs/reviewed and physical exam performed where pertinent and urgently required.    Lab/radiology studies/ABG/meds reviewed and interpreted into the assessment and treatment plan.    Assessment/Plan/Therapeutic interventions      Neuro: Sedation with neuromuscular blockade as needed to facilitate safe lung protective ventilation.   Now just versed , fentanyl and prn lorazepam     Cor:  Titrating Pressor support as needed to maintain MAP 65.  Avoiding fluid challenges.  Midodrine 10 Q8     Pulm:  ARDS-NET 4-6cc/kg IBW TV as able to maintain plateau pressures <30. Prone ventilation consideration as feasible.  Pa02/Fi02 < 150 on Fi02 >60% and PEEP at least 5.  ac 30/420/65%/peep 12   P plateau 31      GI:  Gi prophylaxis   Enteric feeds as tolerated.   PPI     Renal: Even to negative fluid balance as tolerated by hemodynamics and renal fx.      Heme:  Pharmacologic DVT P in addition to SCD's  following D- Dimer clinical course and doppler studied where appropriate.   Full AC for PE     ID: ABX discontinuation based on discussion with ID in conjunction with clinical features, culture data, and judicious procalcitonin monitoring.  Completed a full course of appropriate ABX for potential SI bacterial PNA  fungitel is negative       Endo Aggressive glycemic control to limit FS glucose to < 180mg/dl.        COVID 19 specific considerations and therapeutic options based on the available and rapidly changing medical literature.    Goals of care considerations:  Ongoing assessment for patient specific treatment options based on clinical progression or decline.  I have involved the family with updates and requests in guidance for medical decision making.      34 minutes of critical care time spent, (independent of any procedures),  in the management of this critically ill COVID-19 patient  with continuous assessments and interventions based on the interpretation of multiple databases.

## 2022-03-29 NOTE — PROGRESS NOTE ADULT - SUBJECTIVE AND OBJECTIVE BOX
Events Overnight: was not proned, on 70% peep 12m     HPI:  70 yo pt with PMH of HLD,  kidney stone, valvular heart dz, prostate ca s/p Radiation presented  to ED c/o progressive  SOB.  Pt reports he got sick with runny nose and cough 2/19 and tested positive for covid19 on 2/21.  Pt reports that he did have fevers but resolved  but SOB got worse, had difficulty breathing with minimal exertion. he checked his O2 and was below 80.  No CP, no palpitations, no leg pain.   Pt is s/p  2 shots of Pfizer  vaccine with second one in april 2021. No Booster.   Pts wife was also sick at home with covid19.   s/p remdesivir, Decadron,  remains intubated in ARDS  was proned multiple times    ROS cant obtain     PAST MEDICAL & SURGICAL HISTORY:  Hypercholesterolemia  Cholelithiasis  Cholecystitis  Valvular heart disease  Psoriasis  History of kidney stones  Inguinal hernia, left  Prostate cancer  s/p XRT 1-6-2020 to 1/27/2021  History of renal calculi  S/P hernia repair  right inguinal; 5/2015  S/P colonoscopy with polypectomy  Baseline; viv polyps; 2012  History of cholecystectomy  2017  History of prostatectomy  5/2020  History of lithotripsy     MEDICATIONS  (STANDING):  atorvastatin 10 milliGRAM(s) Oral at bedtime  chlorhexidine 0.12% Liquid 15 milliLiter(s) Oral Mucosa every 12 hours  chlorhexidine 4% Liquid 1 Application(s) Topical <User Schedule>  enoxaparin Injectable 80 milliGRAM(s) SubCutaneous every 12 hours  fentaNYL   Infusion.. 0.5 MICROgram(s)/kG/Hr (2.07 mL/Hr) IV Continuous <Continuous>  methylPREDNISolone sodium succinate Injectable 20 milliGRAM(s) IV Push every 12 hours  midazolam Infusion 0.02 mG/kG/Hr (1.66 mL/Hr) IV Continuous <Continuous>  midodrine 10 milliGRAM(s) Oral every 8 hours  norepinephrine Infusion 0.05 MICROgram(s)/kG/Min (7.76 mL/Hr) IV Continuous <Continuous>  pantoprazole  Injectable 40 milliGRAM(s) IV Push daily  polyethylene glycol 3350 17 Gram(s) Oral daily  senna 2 Tablet(s) Oral at bedtime    MEDICATIONS  (PRN):  acetaminophen     Tablet .. 650 milliGRAM(s) Oral every 6 hours PRN Temp greater or equal to 38C (100.4F), Mild Pain (1 - 3)  ALBUTerol    90 MICROgram(s) HFA Inhaler 2 Puff(s) Inhalation every 6 hours PRN Shortness of Breath and/or Wheezing  aluminum hydroxide/magnesium hydroxide/simethicone Suspension 30 milliLiter(s) Oral every 4 hours PRN Dyspepsia  benzonatate 100 milliGRAM(s) Oral every 8 hours PRN Cough  bisacodyl Suppository 10 milliGRAM(s) Rectal daily PRN Constipation  melatonin 3 milliGRAM(s) Oral at bedtime PRN Insomnia  ondansetron Injectable 4 milliGRAM(s) IV Push every 8 hours PRN Nausea and/or Vomiting  sodium chloride 0.65% Nasal 1 Spray(s) Both Nostrils two times a day PRN Nasal Congestion  sodium chloride 0.9% lock flush 10 milliLiter(s) IV Push every 1 hour PRN Pre/post blood products, medications, blood draw, and to maintain line patency    Neuro: sedated  General; weak  CV: Vital Signs Last 24 Hrs  T(C): 37 (29 Mar 2022 08:00), Max: 37.7 (28 Mar 2022 18:00)  T(F): 98.6 (29 Mar 2022 08:00), Max: 99.8 (28 Mar 2022 18:00)  HR: 96 (29 Mar 2022 09:00) (80 - 107)  BP: --  BP(mean): --  RR: 30 (29 Mar 2022 09:00) (23 - 32)  SpO2: 94% (29 Mar 2022 09:00) (88% - 99%)  RRR          Respiratory: bilateral cracklses  Mode: AC/ CMV (Assist Control/ Continuous Mandatory Ventilation)  RR (machine): 30  TV (machine): 420  FiO2: 70  PEEP: 12  ITime: 1  MAP: 20  PIP: 35    ABG - ( 29 Mar 2022 05:22 )  pH, Arterial: 7.40  pH, Blood: x     /  pCO2: 75    /  pO2: 84    / HCO3: 46    / Base Excess: 17.6  /  SaO2: 98      CXR: check repeat      Renal - eliezer Genatalia nl  I&O's Summary    28 Mar 2022 07:01  -  29 Mar 2022 07:00  --------------------------------------------------------  IN: 2128.6 mL / OUT: 1300 mL / NET: 828.6 mL      03-29    142  |  97  |  25<H>  ----------------------------<  145<H>  4.4   |  >45<HH>  |  0.40<L>    Ca    8.3<L>      29 Mar 2022 05:10  Phos  2.9     03-29  Mg     2.7     03-29        GI:      Nutrition: tolerating tube feeds, abdomin soft    Heme:                           8.3    12.74 )-----------( 164      ( 29 Mar 2022 05:10 )             27.5        ID: afebrile off abx    DVT Prophylaxis: Lovenox for PE  Advanced Directives: Full Code

## 2022-03-29 NOTE — PROGRESS NOTE ADULT - SUBJECTIVE AND OBJECTIVE BOX
Patient is a 71y old  Male who presents with a chief complaint of SOB (01 Mar 2022 13:52)      HPI:  72 yo pt with PMH of HLD,  kidney stone, valvular heart dz, prostate ca s/p Radiation presented  to ED c/o progressive SOB.    Diagnosed with COVID Pneumonia.   CTPE revealed pulmonary emboli in the right upper lobe pulmonary artery and bilateral segmental and subsegmental branches.   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     3/28  Patient was intubated on 3/21  Proned overnight  On FiO2 60%, PEEP 12  No acute pulmonary events occurred overnight   ABG 7.36/84/71            PAST MEDICAL & SURGICAL HISTORY:  Hypercholesterolemia    Cholelithiasis    Cholecystitis    Valvular heart disease    Psoriasis    History of kidney stones    Inguinal hernia, left    Prostate cancer  s/p XRT 1-6-2020 to 1/27/2021    History of renal calculi    S/P hernia repair  right inguinal; 5/2015    S/P colonoscopy with polypectomy  Baseline; viv polyps; 2012    History of cholecystectomy  2017    History of prostatectomy  5/2020    History of lithotripsy    MEDICATIONS  (STANDING):  atorvastatin 10 milliGRAM(s) Oral at bedtime  chlorhexidine 0.12% Liquid 15 milliLiter(s) Oral Mucosa every 12 hours  chlorhexidine 4% Liquid 1 Application(s) Topical <User Schedule>  enoxaparin Injectable 80 milliGRAM(s) SubCutaneous every 12 hours  fentaNYL   Infusion.. 0.5 MICROgram(s)/kG/Hr (2.07 mL/Hr) IV Continuous <Continuous>  methylPREDNISolone sodium succinate Injectable 20 milliGRAM(s) IV Push every 12 hours  midodrine 10 milliGRAM(s) Oral every 8 hours  norepinephrine Infusion 0.05 MICROgram(s)/kG/Min (7.76 mL/Hr) IV Continuous <Continuous>  pantoprazole  Injectable 40 milliGRAM(s) IV Push daily  polyethylene glycol 3350 17 Gram(s) Oral daily  propofol Infusion 25 MICROgram(s)/kG/Min (12.4 mL/Hr) IV Continuous <Continuous>  senna 2 Tablet(s) Oral at bedtime    MEDICATIONS  (PRN):  acetaminophen     Tablet .. 650 milliGRAM(s) Oral every 6 hours PRN Temp greater or equal to 38C (100.4F), Mild Pain (1 - 3)  ALBUTerol    90 MICROgram(s) HFA Inhaler 2 Puff(s) Inhalation every 6 hours PRN Shortness of Breath and/or Wheezing  aluminum hydroxide/magnesium hydroxide/simethicone Suspension 30 milliLiter(s) Oral every 4 hours PRN Dyspepsia  benzonatate 100 milliGRAM(s) Oral every 8 hours PRN Cough  bisacodyl Suppository 10 milliGRAM(s) Rectal daily PRN Constipation  LORazepam   Injectable 2 milliGRAM(s) IV Push every 2 hours PRN Agitation  melatonin 3 milliGRAM(s) Oral at bedtime PRN Insomnia  midazolam Injectable 2 milliGRAM(s) IV Push every 2 hours PRN agitation  ondansetron Injectable 4 milliGRAM(s) IV Push every 8 hours PRN Nausea and/or Vomiting  sodium chloride 0.65% Nasal 1 Spray(s) Both Nostrils two times a day PRN Nasal Congestion  sodium chloride 0.9% lock flush 10 milliLiter(s) IV Push every 1 hour PRN Pre/post blood products, medications, blood draw, and to maintain line patency    Vital Signs Last 24 Hrs  T(C): 37.2 (27 Mar 2022 05:00), Max: 37.9 (26 Mar 2022 17:00)  T(F): 99 (27 Mar 2022 05:00), Max: 100.2 (26 Mar 2022 17:00)  HR: 73 (27 Mar 2022 07:00) (67 - 95)  BP: --  BP(mean): --  RR: 30 (27 Mar 2022 07:00) (22 - 31)  SpO2: 94% (27 Mar 2022 07:00) (91% - 100%)      I&O's Detail    19 Mar 2022 07:01  -  20 Mar 2022 07:00  --------------------------------------------------------  IN:    Dexmedetomidine: 646.8 mL    dextrose 5% + sodium chloride 0.9%: 1179.7 mL    IV PiggyBack: 650 mL    Oral Fluid: 240 mL  Total IN: 2716.6 mL    OUT:    Incontinent per Collection Bag (mL): 1250 mL  Total OUT: 1250 mL    Total NET: 1466.6 mL              PHYSICAL EXAM  General Appearance: Intubated  Neck: Supple, , no adenopathy,   Lungs: coarse at the bases   Heart: Regular rate and rhythm, S1, S2 normal,  Abdomen: Soft, non-tender, bowel sounds active   Extremities: no cyanosis or edema, no joint swelling  Neurologic: Sedated     ECG:    LABS:    Vital Signs Last 24 Hrs                                       14.0   16.99 )-----------( 101      ( 20 Mar 2022 06:45 )             42.2   03-20    136  |  105  |  21  ----------------------------<  144<H>  4.4   |  27  |  0.60    Ca    9.1      20 Mar 2022 06:45  Phos  3.0     03-20  Mg     2.5     03-20                      12.6   15.57 )-----------( 92       ( 19 Mar 2022 06:08 )             36.9   03-19    137  |  105  |  20  ----------------------------<  154<H>  4.2   |  28  |  0.53    Ca    9.0      19 Mar 2022 06:08  Phos  2.7     03-19  Mg     2.7     03-19    TPro  6.6  /  Alb  2.6<L>  /  TBili  1.9<H>  /  DBili  x   /  AST  37  /  ALT  132<H>  /  AlkPhos  162<H>  03-18               16.5   32.85 )-----------( 294      ( 10 Mar 2022 08:37 )             48.4   03-10    132<L>  |  96  |  18  ----------------------------<  112<H>  4.7   |  31  |  0.80    Ca    9.0      10 Mar 2022 08:37  Phos  3.3     03-10  Mg     2.9     03-10    TPro  6.9  /  Alb  3.3  /  TBili  1.6<H>  /  DBili  0.4<H>  /  AST  21  /  ALT  69  /  AlkPhos  128<H>  03-10    \        RADIOLOGY & ADDITIONAL STUDIES:    ACC: [id number] EXAM:  XR CHEST PORTABLE IMMED 1V                          PROCEDURE DATE:  03/10/2022          INTERPRETATION:  Chest one view    HISTORY: Leukocytosis    COMPARISON STUDY: 3/1/2022    Frontal expiratory view of the chest shows the heart to be similar in   size. The lungs show slight clearing of the upper right lung with   progression of basilar infiltrates and there is no evidence of   pneumothorax nor pleural effusion.    IMPRESSION:  Changing infiltrates.  CXR independently reviewed    < from: Xray Chest 1 View- PORTABLE-Urgent (Xray Chest 1 View- PORTABLE-Urgent .) (03.18.22 @ 09:08) >  PROCEDURE DATE:  03/18/2022          INTERPRETATION:  AP erect chest on March 18, 2022 at 8:28 AM. Patient has   Covid pneumonia.    Heart magnified by technique.    Scattered interstitial available infiltrates are again noted more   concentrated at lung bases. They have increased from March 10.    IMPRESSION: Increasing bilateral infiltrates.    < end of copied text >   Patient is a 71y old  Male who presents with a chief complaint of SOB (01 Mar 2022 13:52)      HPI:  72 yo pt with PMH of HLD,  kidney stone, valvular heart dz, prostate ca s/p Radiation presented  to ED c/o progressive SOB.    Diagnosed with COVID Pneumonia.   CTPE revealed pulmonary emboli in the right upper lobe pulmonary artery and bilateral segmental and subsegmental branches.   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     3/29  Patient was intubated on 3/21  Proned overnight  On FiO2 60%, PEEP 12  No acute pulmonary events occurred overnight   ABG 7.36/84/71--7.4/75/84            PAST MEDICAL & SURGICAL HISTORY:  Hypercholesterolemia    Cholelithiasis    Cholecystitis    Valvular heart disease    Psoriasis    History of kidney stones    Inguinal hernia, left    Prostate cancer  s/p XRT 1-6-2020 to 1/27/2021    History of renal calculi    S/P hernia repair  right inguinal; 5/2015    S/P colonoscopy with polypectomy  Baseline; viv polyps; 2012    History of cholecystectomy  2017    History of prostatectomy  5/2020    History of lithotripsy    MEDICATIONS  (STANDING):  atorvastatin 10 milliGRAM(s) Oral at bedtime  chlorhexidine 0.12% Liquid 15 milliLiter(s) Oral Mucosa every 12 hours  chlorhexidine 4% Liquid 1 Application(s) Topical <User Schedule>  enoxaparin Injectable 80 milliGRAM(s) SubCutaneous every 12 hours  fentaNYL   Infusion.. 0.5 MICROgram(s)/kG/Hr (2.07 mL/Hr) IV Continuous <Continuous>  methylPREDNISolone sodium succinate Injectable 20 milliGRAM(s) IV Push every 12 hours  midodrine 10 milliGRAM(s) Oral every 8 hours  norepinephrine Infusion 0.05 MICROgram(s)/kG/Min (7.76 mL/Hr) IV Continuous <Continuous>  pantoprazole  Injectable 40 milliGRAM(s) IV Push daily  polyethylene glycol 3350 17 Gram(s) Oral daily  propofol Infusion 25 MICROgram(s)/kG/Min (12.4 mL/Hr) IV Continuous <Continuous>  senna 2 Tablet(s) Oral at bedtime    MEDICATIONS  (PRN):  acetaminophen     Tablet .. 650 milliGRAM(s) Oral every 6 hours PRN Temp greater or equal to 38C (100.4F), Mild Pain (1 - 3)  ALBUTerol    90 MICROgram(s) HFA Inhaler 2 Puff(s) Inhalation every 6 hours PRN Shortness of Breath and/or Wheezing  aluminum hydroxide/magnesium hydroxide/simethicone Suspension 30 milliLiter(s) Oral every 4 hours PRN Dyspepsia  benzonatate 100 milliGRAM(s) Oral every 8 hours PRN Cough  bisacodyl Suppository 10 milliGRAM(s) Rectal daily PRN Constipation  LORazepam   Injectable 2 milliGRAM(s) IV Push every 2 hours PRN Agitation  melatonin 3 milliGRAM(s) Oral at bedtime PRN Insomnia  midazolam Injectable 2 milliGRAM(s) IV Push every 2 hours PRN agitation  ondansetron Injectable 4 milliGRAM(s) IV Push every 8 hours PRN Nausea and/or Vomiting  sodium chloride 0.65% Nasal 1 Spray(s) Both Nostrils two times a day PRN Nasal Congestion  sodium chloride 0.9% lock flush 10 milliLiter(s) IV Push every 1 hour PRN Pre/post blood products, medications, blood draw, and to maintain line patency    Vital Signs Last 24 Hrs  T(C): 37.2 (29 Mar 2022 05:00), Max: 37.9 (26 Mar 2022 17:00)  T(F): 99 (29Mar 2022 05:00), Max: 100.2 (26 Mar 2022 17:00)  HR: 73 (29 Mar 2022 07:00) (67 - 95)  BP: --  BP(mean): --  RR: 30 (27 Mar 2022 07:00) (22 - 31)  SpO2: 94% (27 Mar 2022 07:00) (91% - 100%)      I&O's Detail    19 Mar 2022 07:01  -  20 Mar 2022 07:00  --------------------------------------------------------  IN:    Dexmedetomidine: 646.8 mL    dextrose 5% + sodium chloride 0.9%: 1179.7 mL    IV PiggyBack: 650 mL    Oral Fluid: 240 mL  Total IN: 2716.6 mL    OUT:    Incontinent per Collection Bag (mL): 1250 mL  Total OUT: 1250 mL    Total NET: 1466.6 mL              PHYSICAL EXAM  General Appearance: Intubated  Neck: Supple, , no adenopathy,   Lungs: coarse at the bases   Heart: Regular rate and rhythm, S1, S2 normal,  Abdomen: Soft, non-tender, bowel sounds active   Extremities: no cyanosis or edema, no joint swelling  Neurologic: Sedated     ECG:    LABS:    Vital Signs Last 24 Hrs                                       14.0   16.99 )-----------( 101      ( 20 Mar 2022 06:45 )             42.2   03-20    136  |  105  |  21  ----------------------------<  144<H>  4.4   |  27  |  0.60    Ca    9.1      20 Mar 2022 06:45  Phos  3.0     03-20  Mg     2.5     03-20                      12.6   15.57 )-----------( 92       ( 19 Mar 2022 06:08 )             36.9   03-19    137  |  105  |  20  ----------------------------<  154<H>  4.2   |  28  |  0.53    Ca    9.0      19 Mar 2022 06:08  Phos  2.7     03-19  Mg     2.7     03-19    TPro  6.6  /  Alb  2.6<L>  /  TBili  1.9<H>  /  DBili  x   /  AST  37  /  ALT  132<H>  /  AlkPhos  162<H>  03-18               16.5   32.85 )-----------( 294      ( 10 Mar 2022 08:37 )             48.4   03-10    132<L>  |  96  |  18  ----------------------------<  112<H>  4.7   |  31  |  0.80    Ca    9.0      10 Mar 2022 08:37  Phos  3.3     03-10  Mg     2.9     03-10    TPro  6.9  /  Alb  3.3  /  TBili  1.6<H>  /  DBili  0.4<H>  /  AST  21  /  ALT  69  /  AlkPhos  128<H>  03-10    \        RADIOLOGY & ADDITIONAL STUDIES:    ACC: [id number] EXAM:  XR CHEST PORTABLE IMMED 1V                          PROCEDURE DATE:  03/10/2022          INTERPRETATION:  Chest one view    HISTORY: Leukocytosis    COMPARISON STUDY: 3/1/2022    Frontal expiratory view of the chest shows the heart to be similar in   size. The lungs show slight clearing of the upper right lung with   progression of basilar infiltrates and there is no evidence of   pneumothorax nor pleural effusion.    IMPRESSION:  Changing infiltrates.  CXR independently reviewed    < from: Xray Chest 1 View- PORTABLE-Urgent (Xray Chest 1 View- PORTABLE-Urgent .) (03.18.22 @ 09:08) >  PROCEDURE DATE:  03/18/2022          INTERPRETATION:  AP erect chest on March 18, 2022 at 8:28 AM. Patient has   Covid pneumonia.    Heart magnified by technique.    Scattered interstitial available infiltrates are again noted more   concentrated at lung bases. They have increased from March 10.    IMPRESSION: Increasing bilateral infiltrates.    < end of copied text >

## 2022-03-29 NOTE — PROGRESS NOTE ADULT - ASSESSMENT
1) COVID Pneumonia  2) Hypoxemic Respiratory Failure  3) Pulmonary Embolism  4) Dyspnea   5) Combined Pulmonary Fibrosis/Emphysema   6) Abnormal CT Chest   7) Endotracheally Intubated       72 yo pt with PMH of HLD,  kidney stone, valvular heart dz, prostate ca s/p Radiation presented  to ED c/o progressive SOB.    Diagnosed with COVID Pneumonia.   CTPE revealed pulmonary emboli in the right upper lobe pulmonary artery and bilateral segmental and subsegmental branches.   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   Not seen Pulmonary in the past.   Received Tocilizumab and remdesivir, placed back on Solumedrol  Lovenox 80 q 12 for PE  Transitioned from HFNC to BiPAP 18/12 and intubated 3/21/2022  Increased WBC noted and treated for superimposed Pneumonia with Cefepime initially now vancomycin  Reviewed CXR worsening infiltrates   Was on Spiriva/Symbicort and will recommend to restart  Due to the underlying COPD/emphysema, his recovery to normalcy will be longer than expected and prognosis is guarded  /30/60/12, PIP 37 Mode: AC/ CMV (Assist Control/ Continuous Mandatory Ventilation)  Reviewed ABG, worsening hypercapnia noted (permissive hypercapnia)  Propofol Now d/c. Follow up new ABG, reviewed 3/29  30-74 minutes of critical care time provided seeing the patient, reviewing the chart, discussing with ancillary team and family.   1) COVID Pneumonia  2) Hypoxemic Respiratory Failure  3) Pulmonary Embolism  4) Dyspnea   5) Combined Pulmonary Fibrosis/Emphysema   6) Abnormal CT Chest   7) Endotracheally Intubated       72 yo pt with PMH of HLD,  kidney stone, valvular heart dz, prostate ca s/p Radiation presented  to ED c/o progressive SOB.    Diagnosed with COVID Pneumonia.   CTPE revealed pulmonary emboli in the right upper lobe pulmonary artery and bilateral segmental and subsegmental branches.   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   Not seen Pulmonary in the past.   Received Tocilizumab and remdesivir, placed back on Solumedrol  Lovenox 80 q 12 for PE  Transitioned from HFNC to BiPAP 18/12 and intubated 3/21/2022  Increased WBC noted and treated for superimposed Pneumonia with Cefepime initially now vancomycin  Reviewed CXR worsening infiltrates   Was on Spiriva/Symbicort and will recommend to restart  Due to the underlying COPD/emphysema, his recovery to normalcy will be longer than expected and prognosis is guarded  /30/60/12, PIP 37 Mode: AC/ CMV (Assist Control/ Continuous Mandatory Ventilation)  Reviewed ABG, worsening hypercapnia noted (permissive hypercapnia)  Propofol Now d/c. Follow up new ABG, reviewed 3/29  30-74 minutes of critical care time provided seeing the patient, reviewing the chart, discussing with ancillary team and family.  Agree with MICU service, will follow peripherally

## 2022-03-29 NOTE — PROGRESS NOTE ADULT - ASSESSMENT
70 y/o male with COPD--former heavy smoekr-- tx to ICU for worsening resp failure secondary to COVID PNA and sepsis  Viral PNA sepsis secondary to COVID--Acute type 1 resp failure--septic shock   VTE--PE on Full LMWH  ARDS    Critically ill.  High risk for acute decompensation and deterioration including death   Patient requires critical care for support of one or more vital organ systems with a high probability of imminent or life threatening deterioration in his/her condition     Plan:    Full vent support--LPV strategy to maintain plateau pressures < 30--High PEEP/low TV--Permissive hypercapnea and acidemia. PEEP to 12. dec tv to 420( 6 cc/kg PBW)  Cont with vers/Fent gtts. prop held secondary to inc triclycerides  now off  pressors to keep MAP > 60.  Cont with Midodrine   S/P Rem/Dexa and Toci.  slowly titrate down steroids, finished courses  vanco and meropenem, now afebrile no further evidence of super imposed infection  Cont TF   Bowel regiment  CVL and A-line in place  DVT prophy--Full dose LMWH

## 2022-03-30 NOTE — PROGRESS NOTE ADULT - SUBJECTIVE AND OBJECTIVE BOX
Events Overnight: Patient on vent, dec o2 sat, fevers to 101    HPI:      72 yo pt with PMH of HLD,  kidney stone, valvular heart dz, prostate ca s/p Radiation presented  to ED c/o progressive  SOB.  Pt reports he got sick with runny nose and cough 2/19 and tested positive for covid19 on 2/21.  Pt reports that he did have fevers but resolved  but SOB got worse, had difficulty breathing with minimal exertion. he checked his O2 and was below 80.  No CP, no palpitations, no leg pain.   Pt is s/p  2 shots of Pfizer  vaccine with second one in april 2021. No Booster.   Pts wife was also sick at home with covid19.   s/p remdesivir, Decadron,  remains intubated in ARDS  was proned multiple times  on peep 12, 90%, plateuar pressure 29, tv 420(6 cc/kg)    ROS cant obtain      MEDICATIONS  (STANDING):  atorvastatin 10 milliGRAM(s) Oral at bedtime  cefepime   IVPB      chlorhexidine 0.12% Liquid 15 milliLiter(s) Oral Mucosa every 12 hours  chlorhexidine 4% Liquid 1 Application(s) Topical <User Schedule>  cisatracurium Injectable 10 milliGRAM(s) IV Push once  enoxaparin Injectable 80 milliGRAM(s) SubCutaneous every 12 hours  fentaNYL   Infusion.. 0.5 MICROgram(s)/kG/Hr (2.07 mL/Hr) IV Continuous <Continuous>  midazolam Infusion 0.02 mG/kG/Hr (1.66 mL/Hr) IV Continuous <Continuous>  midodrine 10 milliGRAM(s) Oral every 8 hours  norepinephrine Infusion 0.05 MICROgram(s)/kG/Min (7.76 mL/Hr) IV Continuous <Continuous>  pantoprazole  Injectable 40 milliGRAM(s) IV Push daily  polyethylene glycol 3350 17 Gram(s) Oral daily  senna 2 Tablet(s) Oral at bedtime    MEDICATIONS  (PRN):  acetaminophen     Tablet .. 650 milliGRAM(s) Oral every 6 hours PRN Temp greater or equal to 38C (100.4F), Mild Pain (1 - 3)  ALBUTerol    90 MICROgram(s) HFA Inhaler 2 Puff(s) Inhalation every 6 hours PRN Shortness of Breath and/or Wheezing  aluminum hydroxide/magnesium hydroxide/simethicone Suspension 30 milliLiter(s) Oral every 4 hours PRN Dyspepsia  benzonatate 100 milliGRAM(s) Oral every 8 hours PRN Cough  bisacodyl Suppository 10 milliGRAM(s) Rectal daily PRN Constipation  melatonin 3 milliGRAM(s) Oral at bedtime PRN Insomnia  ondansetron Injectable 4 milliGRAM(s) IV Push every 8 hours PRN Nausea and/or Vomiting  sodium chloride 0.65% Nasal 1 Spray(s) Both Nostrils two times a day PRN Nasal Congestion  sodium chloride 0.9% lock flush 10 milliLiter(s) IV Push every 1 hour PRN Pre/post blood products, medications, blood draw, and to maintain line patency    ICU Vital Signs Last 24 Hrs  T(C): 37.8 (30 Mar 2022 08:00), Max: 39.2 (29 Mar 2022 20:00)  T(F): 100 (30 Mar 2022 08:00), Max: 102.6 (29 Mar 2022 20:00)  HR: 112 (30 Mar 2022 08:00) (93 - 138)  BP: --  BP(mean): --  ABP: 147/63 (30 Mar 2022 08:00) (97/48 - 166/73)  ABP(mean): 89 (30 Mar 2022 08:00) (63 - 101)  RR: 24 (30 Mar 2022 08:00) (16 - 32)  SpO2: 91% (30 Mar 2022 08:00) (74% - 98%)      Mode: AC/ CMV (Assist Control/ Continuous Mandatory Ventilation)  RR (machine): 30  TV (machine): 420  FiO2: 100  PEEP: 12  ITime: 1  MAP: 21  PIP: 32      I&O's Summary    29 Mar 2022 07:01  -  30 Mar 2022 07:00  --------------------------------------------------------  IN: 2202 mL / OUT: 1525 mL / NET: 677 mL    30 Mar 2022 07:01  -  30 Mar 2022 08:33  --------------------------------------------------------  IN: 100 mL / OUT: 125 mL / NET: -25 mL    Physical Exam    General ill  HEENT nc/at, orally intubated  neck left ij  lungs scattered rhonchi  cv rrr  abdomen soft, non-tender  ext warm   martins  extremities warm                        9.0    20.30 )-----------( 165      ( 30 Mar 2022 05:30 )             29.4       03-30    141  |  98  |  24<H>  ----------------------------<  169<H>  3.5   |  43<H>  |  0.35<L>    Ca    8.5      30 Mar 2022 05:30  Phos  2.9     03-29  Mg     2.7     03-29    ABG - ( 30 Mar 2022 06:15 )  pH, Arterial: 7.37  pH, Blood: x     /  pCO2: 77    /  pO2: 65    / HCO3: 44    / Base Excess: 15.3  /  SaO2: 94        DVT Prophylaxis: Lovenox                                                                 Advanced Directives: Full Code

## 2022-03-30 NOTE — PROGRESS NOTE ADULT - ASSESSMENT
72 y/o male with COPD--former heavy smoekr-- tx to ICU for worsening resp failure secondary to COVID PNA and sepsis  Viral PNA sepsis secondary to COVID--Acute type 1 resp failure--septic shock   VTE--PE on Full LMWH  ARDS    Critically ill.  High risk for acute decompensation and deterioration including death   Patient requires critical care for support of one or more vital organ systems with a high probability of imminent or life threatening deterioration in his/her condition     Plan:    Full vent support--LPV strategy to maintain plateau pressures < 30--High PEEP/low TV--Permissive hypercapnea and acidemia. PEEP to 12. dec tv to 420( 6 cc/kg PBW)  Cont with vers/Fent gtts. prop held secondary to inc triclycerides, will give dose paralytic to see if it helps oxygenation, may consider repeat proning  now off  pressors to keep MAP > 60.  Cont with Midodrine   S/P Rem/Dexa and Toci.  slowly titrate down steroids, finished courses  vanco and meropenem,  now febrile again, will send sputum culture, d/c cvp, start cefepime vanco  cxr worsening infiltrates  Cont TF   Bowel regiment  CVL and A-line in place  DVT prophy--Full dose LMWH

## 2022-03-30 NOTE — PROGRESS NOTE ADULT - SUBJECTIVE AND OBJECTIVE BOX
Patient is a 71y old  Male who presents with a chief complaint of SOB (30 Mar 2022 08:33)      BRIEF HOSPITAL COURSE: 71y Male pmhx HLD, sp CCY, Prostate CA s/p prostatectomy vaccinated not boosted admitted with COVID-19 viral pneumonia and hypoxia. Upgraded to ICU for acute hypoxic respiratory failure eventually requiring COVID-19.    Events last 24 hours: Patient proned earlier today for worsening hypoxia. Patient is sedated and paralyzed     PAST MEDICAL & SURGICAL HISTORY:  Hypercholesterolemia    Cholelithiasis    Cholecystitis    Valvular heart disease    Psoriasis    History of kidney stones    Inguinal hernia, left    Prostate cancer  s/p XRT 1-6-2020 to 1/27/2021    History of renal calculi    S/P hernia repair  right inguinal; 5/2015    S/P colonoscopy with polypectomy  Baseline; begnin polyps; 2012    History of cholecystectomy  2017    History of prostatectomy  5/2020    History of lithotripsy          Hosp day #30d    Vent day #  Mode: AC/ CMV (Assist Control/ Continuous Mandatory Ventilation)  RR (machine): 30  TV (machine): 420  FiO2: 90  PEEP: 12  ITime: 1  MAP: 20  PIP: 36        Vital signs / Reviewed and Physical Exam Performed where pertinent and urgently required    Lab / Radiology  studies / ABG / Meds -  reviewed and interpreted into the assessment and treatment plan.      Assessment/Plan/Therapeutic interventions  -COVID-19  -Acute hypoxic respiratory failure  -ARDS      Neuro - Sedation w/ Versed and Fentanyl gtt. Additional NMB w/ Nimbex to facilitate safe ventilation    CV -  Pressor support w/ Levophed gtt  to maintain MAP 65    Pulm -  Acute hypoxic respiratory failure w/ ARDS. Proned earlier today for hypoxia. Currently FiO2 90% and PEEP +12. ARDS-NET 4-6cc/kg IBW TV as able to maintain plateau pressures <30. Vent bundle Reviewed. s/p course Decadron    GI -  PPI. OGT clogged tonight, replace in AM when supine    Renal - Even to negative fluid balance as tolerated by hemodynamics and renal fx.  Feeds to be provided in lieu of IVF.     Heme -  Pharmacologic DVT PPx  in addition to SCD's    ID - COVID-19+ s/p Remdisivir/Tocilizumab. Empiric bacterial PNA coverage w/ Cefepime/Vanco. ABX discontinuation based on discussion with ID in conjunction with clinical features, culture data, and judicious procalcitonin monitoring.      Endo -  Aggressive glycemic control to limit FS glucose to < 180mg/dl.      COVID 19 specific considerations and therapeutic  options based on the available and rapidly changing literature      38  Minutes of critical care time spent in the management of this critically ill COVID-19 patient/PUI patient with continuous assessments and interventions based on the interpretation of multiple databases.     Patient is a 71y old  Male who presents with a chief complaint of SOB (30 Mar 2022 08:33)      BRIEF HOSPITAL COURSE: 71y Male pmhx HLD, sp CCY, Prostate CA s/p prostatectomy vaccinated not boosted admitted with COVID-19 viral pneumonia and hypoxia. Upgraded to ICU for acute hypoxic respiratory failure eventually requiring COVID-19.    Events last 24 hours: Patient proned earlier today for worsening hypoxia. Patient is sedated and paralyzed     PAST MEDICAL & SURGICAL HISTORY:  Hypercholesterolemia    Cholelithiasis    Cholecystitis    Valvular heart disease    Psoriasis    History of kidney stones    Inguinal hernia, left    Prostate cancer  s/p XRT 1-6-2020 to 1/27/2021    History of renal calculi    S/P hernia repair  right inguinal; 5/2015    S/P colonoscopy with polypectomy  Baseline; sandynin polyps; 2012    History of cholecystectomy  2017    History of prostatectomy  5/2020    History of lithotripsy          Hosp day #30d    Vent day #  Mode: AC/ CMV (Assist Control/ Continuous Mandatory Ventilation)  RR (machine): 30  TV (machine): 420  FiO2: 90  PEEP: 12  ITime: 1  MAP: 20  PIP: 36        Vital signs / Reviewed and Physical Exam Performed where pertinent and urgently required    Lab / Radiology  studies / ABG / Meds -  reviewed and interpreted into the assessment and treatment plan.      Assessment/Plan/Therapeutic interventions  -COVID-19  -Acute hypoxic respiratory failure  -ARDS  -PE      Neuro - Sedation w/ Versed and Fentanyl gtt. Additional NMB w/ Nimbex to facilitate safe ventilation    CV -  Pressor support w/ Levophed gtt  to maintain MAP 65    Pulm -  Acute hypoxic respiratory failure w/ ARDS. Proned earlier today for hypoxia. Currently FiO2 90% and PEEP +12. ARDS-NET 4-6cc/kg IBW TV as able to maintain plateau pressures <30. Vent bundle Reviewed. s/p course Decadron    GI -  PPI. OGT clogged tonight, replace in AM when supine    Renal - Even to negative fluid balance as tolerated by hemodynamics and renal fx.  Feeds to be provided in lieu of IVF.     Heme - Full AC w/ Lovenox BID for PE    ID - COVID-19+ s/p Remdisivir/Tocilizumab. Empiric bacterial PNA coverage w/ Cefepime/Vanco. ABX discontinuation based on discussion with ID in conjunction with clinical features, culture data, and judicious procalcitonin monitoring.      Endo -  Aggressive glycemic control to limit FS glucose to < 180mg/dl.      COVID 19 specific considerations and therapeutic  options based on the available and rapidly changing literature      38  Minutes of critical care time spent in the management of this critically ill COVID-19 patient/PUI patient with continuous assessments and interventions based on the interpretation of multiple databases.

## 2022-03-31 NOTE — PROGRESS NOTE ADULT - SUBJECTIVE AND OBJECTIVE BOX
Patient is a 71y old  Male who presents with a chief complaint of SOB (31 Mar 2022 12:52)      BRIEF HOSPITAL COURSE: 71y Male pmhx HLD, sp CCY, Prostate CA s/p prostatectomy vaccinated not boosted admitted with COVID-19 viral pneumonia and hypoxia. Upgraded to ICU for acute hypoxic respiratory failure eventually requiring COVID-19.    Events last 24 hours: Patient placed supine this morning. Remains sedated on ventilator    PAST MEDICAL & SURGICAL HISTORY:  Hypercholesterolemia    Cholelithiasis    Cholecystitis    Valvular heart disease    Psoriasis    History of kidney stones    Inguinal hernia, left    Prostate cancer  s/p XRT 1-6-2020 to 1/27/2021    History of renal calculi    S/P hernia repair  right inguinal; 5/2015    S/P colonoscopy with polypectomy  Baseline; begnin polyps; 2012    History of cholecystectomy  2017    History of prostatectomy  5/2020    History of lithotripsy          Hosp day #31d    Vent day #  Mode: AC/ CMV (Assist Control/ Continuous Mandatory Ventilation)  RR (machine): 34  TV (machine): 420  FiO2: 90  PEEP: 10  MAP: 21  PIP: 31        Vital signs / Reviewed and Physical Exam Performed where pertinent and urgently required    Lab / Radiology  studies / ABG / Meds -  reviewed and interpreted into the assessment and treatment plan.      Assessment/Plan/Therapeutic interventions  -COVID-19  -Acute hypoxic respiratory failure  -ARDS  -PE      Neuro - Sedation w/ Propofol, Versed and Fentanyl gtt. NMB discontinued    CV -  Pressor support w/ Levophed gtt  to maintain MAP 65    Pulm -  Acute hypoxic respiratory failure w/ ARDS. Placed supine earlier today Improved hypercapnia after placed supine.. Currently FiO2 80% and PEEP +10. ARDS-NET 4-6cc/kg IBW TV as able to maintain plateau pressures <30. Vent bundle Reviewed. s/p course Decadron    GI -  PPI. OGT clogged tonight, replace in AM when supine    Renal - Even to negative fluid balance as tolerated by hemodynamics and renal fx.  Feeds to be provided in lieu of IVF.     Heme - Full AC w/ Lovenox BID for PE    ID - COVID-19+ s/p Remdisivir/Tocilizumab. Empiric bacterial PNA coverage w/ Cefepime/Vanco. ABX discontinuation based on discussion with ID in conjunction with clinical features, culture data, and judicious procalcitonin monitoring.      Endo -  Aggressive glycemic control to limit FS glucose to < 180mg/dl.      COVID 19 specific considerations and therapeutic  options based on the available and rapidly changing literature      38  Minutes of critical care time spent in the management of this critically ill COVID-19 patient/PUI patient with continuous assessments and interventions based on the interpretation of multiple databases.          38  Minutes of critical care tiem spent in the management of this critically ill COVID-19 patient/PUI patient with continuous assessments and interventions based on the interpretation of multiple databases.

## 2022-03-31 NOTE — PROGRESS NOTE ADULT - SUBJECTIVE AND OBJECTIVE BOX
Events Overnight: Patient with worsening cxr, was proned and paralyzed overnight, oxygenation slightly better but inc pco2, fevers better      overnight but inc wbc    HPI:      72 yo pt with PMH of HLD,  kidney stone, valvular heart dz, prostate ca s/p Radiation presented  to ED c/o progressive  SOB.  Pt reports he got sick with runny nose and cough 2/19 and tested positive for covid19 on 2/21.  Pt reports that he did have fevers but resolved  but SOB got worse, had difficulty breathing with minimal exertion. he checked his O2 and was below 80.  No CP, no palpitations, no leg pain.   Pt is s/p  2 shots of Pfizer  vaccine with second one in april 2021. No Booster.   Pts wife was also sick at home with covid19.   s/p remdesivir, Decadron,  remains intubated in ARDS worsening cxr  was proned multiple times  on peep 12, 90%, plateau pressure 26 tv 420(6 cc/kg)  3/30 sputum culture negative  central line d/c 3/29  restarted on antibiotics on 3/29    ROS cant obtain    MEDICATIONS  (STANDING):  atorvastatin 10 milliGRAM(s) Oral at bedtime  cefepime   IVPB 2000 milliGRAM(s) IV Intermittent every 12 hours  chlorhexidine 0.12% Liquid 15 milliLiter(s) Oral Mucosa every 12 hours  chlorhexidine 4% Liquid 1 Application(s) Topical <User Schedule>  cisatracurium Infusion 2.999 MICROgram(s)/kG/Min (14.9 mL/Hr) IV Continuous <Continuous>  enoxaparin Injectable 80 milliGRAM(s) SubCutaneous every 12 hours  fentaNYL   Infusion.. 0.5 MICROgram(s)/kG/Hr (2.07 mL/Hr) IV Continuous <Continuous>  midazolam Infusion 0.02 mG/kG/Hr (1.66 mL/Hr) IV Continuous <Continuous>  midodrine 10 milliGRAM(s) Oral every 8 hours  norepinephrine Infusion 0.05 MICROgram(s)/kG/Min (7.76 mL/Hr) IV Continuous <Continuous>  pantoprazole  Injectable 40 milliGRAM(s) IV Push daily  polyethylene glycol 3350 17 Gram(s) Oral daily  senna 2 Tablet(s) Oral at bedtime  vancomycin  IVPB 1250 milliGRAM(s) IV Intermittent every 12 hours    MEDICATIONS  (PRN):  acetaminophen     Tablet .. 650 milliGRAM(s) Oral every 6 hours PRN Temp greater or equal to 38C (100.4F), Mild Pain (1 - 3)  ALBUTerol    90 MICROgram(s) HFA Inhaler 2 Puff(s) Inhalation every 6 hours PRN Shortness of Breath and/or Wheezing  aluminum hydroxide/magnesium hydroxide/simethicone Suspension 30 milliLiter(s) Oral every 4 hours PRN Dyspepsia  benzonatate 100 milliGRAM(s) Oral every 8 hours PRN Cough  bisacodyl Suppository 10 milliGRAM(s) Rectal daily PRN Constipation  melatonin 3 milliGRAM(s) Oral at bedtime PRN Insomnia  ondansetron Injectable 4 milliGRAM(s) IV Push every 8 hours PRN Nausea and/or Vomiting  sodium chloride 0.65% Nasal 1 Spray(s) Both Nostrils two times a day PRN Nasal Congestion  sodium chloride 0.9% lock flush 10 milliLiter(s) IV Push every 1 hour PRN Pre/post blood products, medications, blood draw, and to maintain line patency    ICU Vital Signs Last 24 Hrs  T(C): 36.4 (31 Mar 2022 06:00), Max: 37.6 (30 Mar 2022 09:00)  T(F): 97.6 (31 Mar 2022 06:00), Max: 99.7 (30 Mar 2022 09:00)  HR: 108 (31 Mar 2022 07:00) (98 - 119)  BP: --  BP(mean): --  ABP: 109/55 (31 Mar 2022 07:00) (109/55 - 166/69)  ABP(mean): 75 (31 Mar 2022 07:00) (73 - 168)  RR: 20 (31 Mar 2022 07:00) (20 - 34)  SpO2: 100% (31 Mar 2022 07:00) (86% - 100%)      Mode: AC/ CMV (Assist Control/ Continuous Mandatory Ventilation)  RR (machine): 30  TV (machine): 420  FiO2: 90  PEEP: 12  ITime: 1  MAP: 21  PIP: 39      I&O's Summary    30 Mar 2022 07:01  -  31 Mar 2022 07:00  --------------------------------------------------------  IN: 1833 mL / OUT: 2025 mL / NET: -192 mL    Physical Exam    General ill  HEENT nc/at, orally intubated  neck no jvd, IJ is out  lungs scattered rhonchi  cv rrr  abdomen soft, non-tender  ext warm   martins  extremities warm                        8.5    24.59 )-----------( 216      ( 31 Mar 2022 06:22 )             30.3       03-31    140  |  96  |  34<H>  ----------------------------<  143<H>  5.1   |  45<HH>  |  0.67    Ca    9.2      31 Mar 2022 06:22  Phos  6.2     03-31  Mg     3.2     03-31    ABG - ( 31 Mar 2022 06:49 )  pH, Arterial: 7.18  pH, Blood: x     /  pCO2: >125  /  pO2: 90    / HCO3: see comment incalculable - PCO2A >125 / Base Excess: see comment /  SaO2: 98        DVT Prophylaxis:  Lovenox (PE)                                                               Advanced Directives: Full Code

## 2022-03-31 NOTE — PROGRESS NOTE ADULT - ASSESSMENT
70 y/o male with h/o HLD,  kidney stone, valvular heart disease, prostate Ca s/p radiation was admitted on 2/28 for progressive  SOB.  Pt reports that on 2/19 he got sick with runny nose, fever and cough and tested positive for covid-19 on 2/21. His fever improved, but his SOB got worse, had difficulty breathing with minimal exertion. He checked his O2 and was below 80. In ER he received remdesivir.    1. Acute respiratory failure. COVID-19 breakthrough viral syndrome. Multifocal pneumonia. Possible superimposed bacterial pneumonia. PE.  - vent and pressors per icu  -respiratory frail  -worsening respiratory infiltrates  -leukocytosis  -concern about superimposed bacterial pneumonia was raised  -given tociluzumab infusion on 3/2  -s/p remdesivir protocol # 10  -tolerating abx well so far; no side effects noted  -s/p cefepime 2 gm IV q12h # 7 (completed on 3/17)  -s/p meropenem 1 gm IV q8h and vancomycin 1 gm IV q12h # 8 days (complete 3/25)  -droplet isolation  -obtain sputum c/s   -respiratory care  -agree with cefepime 2 gm IV q12h and vancomycin 1 gm IV q12h  -reason for abx use and side effects reviewed; monitor BMP and vancomycin trough levels  -monitor temps  -f/u CBC  -supportive care  2. Other issues:   -care per medicine    d/w Dr. Wolf

## 2022-03-31 NOTE — PROGRESS NOTE ADULT - SUBJECTIVE AND OBJECTIVE BOX
Date of service: 03-31-22 @ 12:54    Events noted  Reported with worsening pulmonary infiltrates  Has low grade fever  Scant respiratory secretions  On ventilatory support   New leukocytosis    ROS: nunobtainable    MEDICATIONS  (STANDING):  atorvastatin 10 milliGRAM(s) Oral at bedtime  cefepime   IVPB 2000 milliGRAM(s) IV Intermittent every 12 hours  chlorhexidine 0.12% Liquid 15 milliLiter(s) Oral Mucosa every 12 hours  chlorhexidine 4% Liquid 1 Application(s) Topical <User Schedule>  cisatracurium Infusion 2.999 MICROgram(s)/kG/Min (14.9 mL/Hr) IV Continuous <Continuous>  enoxaparin Injectable 80 milliGRAM(s) SubCutaneous every 12 hours  fentaNYL   Infusion.. 0.5 MICROgram(s)/kG/Hr (2.07 mL/Hr) IV Continuous <Continuous>  midazolam Infusion 0.02 mG/kG/Hr (1.66 mL/Hr) IV Continuous <Continuous>  midodrine 10 milliGRAM(s) Oral every 8 hours  norepinephrine Infusion 0.05 MICROgram(s)/kG/Min (7.76 mL/Hr) IV Continuous <Continuous>  pantoprazole  Injectable 40 milliGRAM(s) IV Push daily  petrolatum Ophthalmic Ointment 1 Application(s) Both EYES every 12 hours  polyethylene glycol 3350 17 Gram(s) Oral daily  senna 2 Tablet(s) Oral at bedtime  vancomycin  IVPB 1250 milliGRAM(s) IV Intermittent every 12 hours    Vital Signs Last 24 Hrs  T(C): 37.2 (31 Mar 2022 12:00), Max: 37.5 (30 Mar 2022 20:00)  T(F): 99 (31 Mar 2022 12:00), Max: 99.5 (30 Mar 2022 20:00)  HR: 104 (31 Mar 2022 12:00) (98 - 119)  BP: --  BP(mean): --  RR: 34 (31 Mar 2022 12:00) (20 - 34)  SpO2: 93% (31 Mar 2022 12:00) (86% - 100%)  Mode: AC/ CMV (Assist Control/ Continuous Mandatory Ventilation), RR (machine): 34, TV (machine): 420, FiO2: 80, PEEP: 10, ITime: 1, MAP: 21, PIP: 33   Physical exam:    Constitutional:  No acute distress  HEENT: orally intubated  Neck: supple; thyroid not palpable  Back: no tenderness  Respiratory: respiratory effort normal; scattered coarse breath sounds  Cardiovascular: S1S2 regular, no murmurs  Abdomen: soft, not tender, not distended, positive BS  Genitourinary: no suprapubic tenderness  Musculoskeletal: no muscle tenderness, no joint swelling or tenderness  Extremities: no pedal edema  Neurological/ Psychiatric: intubated  Skin: no rashes; no palpable lesions    Labs: reviewed                        8.5    24.59 )-----------( 216      ( 31 Mar 2022 06:22 )             30.3     03-31    140  |  96  |  34<H>  ----------------------------<  143<H>  5.1   |  45<HH>  |  0.67    Ca    9.2      31 Mar 2022 06:22  Phos  6.2     03-31  Mg     3.2     03-31                        9.5    14.44 )-----------( 153      ( 25 Mar 2022 05:00 )             29.9     03-25    137  |  97  |  16  ----------------------------<  164<H>  4.9   |  40<H>  |  0.38<L>    Ca    8.2<L>      25 Mar 2022 05:00  Phos  2.2     03-25  Mg     2.6     03-25  Cultures:     Ferritin, Serum: 1102 ng/mL (03-15-22 @ 07:49)  C-Reactive Protein, Serum: <3 mg/L (03-14-22 @ 11:39)  D-Dimer Assay, Quantitative: 538 ng/mL DDU (03-14-22 @ 11:39)    COVID (02-28 @ 08:16)  Detected      Culture - Sputum (collected 30 Mar 2022 11:45)  Source: .Sputum Sputum  Gram Stain (30 Mar 2022 18:40):    Rare polymorphonuclear leukocytes per low power field    No Squamous epithelial cells per low power field    No organisms seen per oil power field    Radiology: all available radiological tests reviewed    < from: Xray Chest 1 View- PORTABLE-Urgent (02.28.22 @ 09:00) >  IMPRESSION: Mild bibasilar infiltrates left greater than right.  < end of copied text >    < from: CT Angio Chest PE Protocol w/ IV Cont (03.01.22 @ 14:14) >  Pulmonary emboli in the right upper lobe pulmonary artery and bilateral   segmental and subsegmental branches. This was discussed with Dr. Hazel   at 2:36 PM 3/1/2022.  Diffuse lung abnormality consistent with COVID.  < end of copied text >    < from: Xray Chest 1 View- PORTABLE-Urgent (03.30.22 @ 10:23) >  Heart magnified by technique.  Endotracheal tube, nasogastric tube, and left jugular line remain.  Persistent diffuse advanced infiltrates.  Chest similar to March 29.  < end of copied text >      Advanced directives addressed: full resuscitation

## 2022-03-31 NOTE — PROVIDER CONTACT NOTE (CRITICAL VALUE NOTIFICATION) - ACTION/TREATMENT ORDERED:
AMIRAH Retana made aware, no new interventions
Will follow orders as prescribed.
AMIRAH Membreno made aware. Pt is scheduled to be supine @ 0800.

## 2022-03-31 NOTE — PROGRESS NOTE ADULT - ASSESSMENT
70 y/o male with COPD--former heavy smoekr-- tx to ICU for worsening resp failure secondary to COVID PNA and sepsis  Viral PNA sepsis secondary to COVID--Acute type 1 resp failure--septic shock   VTE--PE on Full LMWH  ARDS    Critically ill.  High risk for acute decompensation and deterioration including death   Patient requires critical care for support of one or more vital organ systems with a high probability of imminent or life threatening deterioration in his/her condition     Plan:    Full vent support--LPV strategy to maintain plateau pressures < 30--High PEEP/low TV--Permissive hypercapnea and acidemia. PEEP at 12. dec tv to 420( 6 cc/kg PBW)  given abg will inc rr, was proned and paralyzed  Cont with vers/Fent gtts. prop held secondary to inc triclycerides, paralyzed again  now off  pressors to keep MAP > 60.  Cont with Midodrine   S/P Rem/Dexa and Toci.    finished courses  vanco and meropenem, recurrent fevers, inc wbc, line d/c, restarted abxx. vanco, cefepime  cxr worsening infiltrates  Cont TF   Bowel regiment   A-line in place  DVT prophy--Full dose LMWH for PE

## 2022-04-01 NOTE — PROGRESS NOTE ADULT - SUBJECTIVE AND OBJECTIVE BOX
Date of service: 04-01-22 @ 11:07    Lying in bed in NAD  Intubated on ventilatory support  Noted with worsening creatinine  Has low grade fever    ROS: unobtainable    MEDICATIONS  (STANDING):  atorvastatin 10 milliGRAM(s) Oral at bedtime  cefepime  Injectable. 1000 milliGRAM(s) IV Push every 12 hours  chlorhexidine 0.12% Liquid 15 milliLiter(s) Oral Mucosa every 12 hours  chlorhexidine 4% Liquid 1 Application(s) Topical <User Schedule>  cisatracurium Infusion 2.999 MICROgram(s)/kG/Min (14.9 mL/Hr) IV Continuous <Continuous>  fentaNYL   Infusion.. 0.5 MICROgram(s)/kG/Hr (2.07 mL/Hr) IV Continuous <Continuous>  heparin  Infusion.  Unit(s)/Hr (15 mL/Hr) IV Continuous <Continuous>  midazolam Infusion 0.02 mG/kG/Hr (1.66 mL/Hr) IV Continuous <Continuous>  midodrine 10 milliGRAM(s) Oral every 8 hours  norepinephrine Infusion 0.05 MICROgram(s)/kG/Min (7.76 mL/Hr) IV Continuous <Continuous>  pantoprazole  Injectable 40 milliGRAM(s) IV Push daily  petrolatum Ophthalmic Ointment 1 Application(s) Both EYES every 12 hours  polyethylene glycol 3350 17 Gram(s) Oral daily  propofol Infusion 10 MICROgram(s)/kG/Min (4.97 mL/Hr) IV Continuous <Continuous>  senna 2 Tablet(s) Oral at bedtime    Vital Signs Last 24 Hrs  T(C): 37.7 (01 Apr 2022 04:00), Max: 38.1 (01 Apr 2022 00:00)  T(F): 99.8 (01 Apr 2022 04:00), Max: 100.6 (01 Apr 2022 00:00)  HR: 107 (01 Apr 2022 08:00) (99 - 113)  BP: --  BP(mean): --  RR: 28 (01 Apr 2022 08:00) (27 - 42)  SpO2: 88% (01 Apr 2022 08:00) (86% - 93%)  Mode: AC/ CMV (Assist Control/ Continuous Mandatory Ventilation), RR (machine): 34, TV (machine): 450, FiO2: 90, PEEP: 10, PIP: 32   Physical exam:    Constitutional:  No acute distress  HEENT: orally intubated  Neck: supple; thyroid not palpable  Back: no tenderness  Respiratory: respiratory effort normal; scattered coarse breath sounds  Cardiovascular: S1S2 regular, no murmurs  Abdomen: soft, not tender, not distended, positive BS  Genitourinary: no suprapubic tenderness  Musculoskeletal: no muscle tenderness, no joint swelling or tenderness  Extremities: no pedal edema  Neurological/ Psychiatric: intubated  Skin: no rashes; no palpable lesions    Labs: reviewed                        7.1    13.59 )-----------( 146      ( 01 Apr 2022 05:40 )             23.9     04-01    139  |  96  |  63<H>  ----------------------------<  162<H>  4.2   |  42<H>  |  1.74<H>    Ca    9.2      01 Apr 2022 05:40  Phos  3.5     04-01  Mg     2.8     04-01                        8.5    24.59 )-----------( 216      ( 31 Mar 2022 06:22 )             30.3     03-31    140  |  96  |  34<H>  ----------------------------<  143<H>  5.1   |  45<HH>  |  0.67    Ca    9.2      31 Mar 2022 06:22  Phos  6.2     03-31  Mg     3.2     03-31                        9.5    14.44 )-----------( 153      ( 25 Mar 2022 05:00 )             29.9     03-25    137  |  97  |  16  ----------------------------<  164<H>  4.9   |  40<H>  |  0.38<L>    Ca    8.2<L>      25 Mar 2022 05:00  Phos  2.2     03-25  Mg     2.6     03-25  Cultures:     Ferritin, Serum: 1102 ng/mL (03-15-22 @ 07:49)  C-Reactive Protein, Serum: <3 mg/L (03-14-22 @ 11:39)  D-Dimer Assay, Quantitative: 538 ng/mL DDU (03-14-22 @ 11:39)    COVID (02-28 @ 08:16)  Detected      Culture - Sputum (collected 30 Mar 2022 11:45)  Source: .Sputum Sputum  Gram Stain (30 Mar 2022 18:40):    Rare polymorphonuclear leukocytes per low power field    No Squamous epithelial cells per low power field    No organisms seen per oil power field    Radiology: all available radiological tests reviewed    < from: Xray Chest 1 View- PORTABLE-Urgent (02.28.22 @ 09:00) >  IMPRESSION: Mild bibasilar infiltrates left greater than right.  < end of copied text >    < from: CT Angio Chest PE Protocol w/ IV Cont (03.01.22 @ 14:14) >  Pulmonary emboli in the right upper lobe pulmonary artery and bilateral   segmental and subsegmental branches. This was discussed with Dr. Hazel   at 2:36 PM 3/1/2022.  Diffuse lung abnormality consistent with COVID.  < end of copied text >    < from: Xray Chest 1 View- PORTABLE-Urgent (03.30.22 @ 10:23) >  Heart magnified by technique.  Endotracheal tube, nasogastric tube, and left jugular line remain.  Persistent diffuse advanced infiltrates.  Chest similar to March 29.  < end of copied text >      Advanced directives addressed: full resuscitation

## 2022-04-01 NOTE — PROGRESS NOTE ADULT - ASSESSMENT
72 y/o male with h/o HLD,  kidney stone, valvular heart disease, prostate Ca s/p radiation was admitted on 2/28 for progressive  SOB.  Pt reports that on 2/19 he got sick with runny nose, fever and cough and tested positive for covid-19 on 2/21. His fever improved, but his SOB got worse, had difficulty breathing with minimal exertion. He checked his O2 and was below 80. In ER he received remdesivir.    1. Acute respiratory failure. COVID-19 breakthrough viral syndrome. Multifocal pneumonia. Possible superimposed bacterial pneumonia. PE.  -ARF  -still febrile  - vent and pressors per icu  -respiratory frail  -worsening respiratory infiltrates  -leukocytosis  -concern about superimposed bacterial pneumonia was raised  -given tociluzumab infusion on 3/2  -s/p remdesivir protocol # 10  -tolerating abx well so far; no side effects noted  -s/p cefepime 2 gm IV q12h # 7 (completed on 3/17)  -s/p meropenem 1 gm IV q8h and vancomycin 1 gm IV q12h # 8 days (complete 3/25)  -droplet isolation  -obtain sputum c/s   -respiratory care  -on cefepime 2 gm IV q12h and vancomycin 1 gm IV q12h # 3  -tolerating abx well so far; no side effects noted  -d/c vancomycin  -decrease cefepime to 1 gm IV qd  -repeat COVID PCR   -monitor temps  -f/u CBC  -supportive care  2. Other issues:   -care per medicine    d/w Dr. Wolf

## 2022-04-01 NOTE — PROGRESS NOTE ADULT - ASSESSMENT
70 y/o male with COPD--former heavy smoekr-- tx to ICU for worsening resp failure secondary to COVID PNA and sepsis  Viral PNA sepsis secondary to COVID--Acute type 1 resp failure--septic shock   VTE--PE on Full LMWH  ARDS    Critically ill.  High risk for acute decompensation and deterioration including death   Patient requires critical care for support of one or more vital organ systems with a high probability of imminent or life threatening deterioration in his/her condition     Plan:    Full vent support--LPV strategy to maintain plateau pressures < 30--High PEEP/low TV--Permissive hypercapnea and acidemia. PEEP at 10. dec tv to 420( 6 cc/kg PBW)  pCO@ better, o2 saturation upper 80s this pm  Cont with vers/Fent gtts.  low dose propofol, will re paralyzed  now off  pressors to keep MAP > 60.  Cont with Midodrine   S/P Rem/Dexa and Toci.    finished courses  vanco and meropenem, recurrent fevers, inc wbc, line d/c, restarted abxx. vanco, cefepime  cxr worsening infiltrates  Cont TF   Bowel regiment   A-line in place  given kidney failure, will start IV heparin  on cefepime although sputum culture is negative

## 2022-04-01 NOTE — CHART NOTE - NSCHARTNOTEFT_GEN_A_CORE
Called by RN as patient had unremitting episodes of right nasal epistaxis with visualized passage of medium size clots. Patient is on high flow 50/100 for 15 days now for COVID and has been saturating 88-92%. Removal of HFNC caused patient to de-saturation to high 60s. Rhino Rocket was inserted into right nostril with TREMAINE Espitia and senior night resident. Patient subsequently de-satted to high 60s again as HFNC could not be re-inserted (even with NRB mask). Ordered oxymetazoline spray. Placed HFNC back with improvement of sat >88%    a/p  - patient with right sided nose bleed  - used otoscope to visual nasal cavity  - observed anterior bleeding with formation of dried clots  - unlikely posterior source of bleeding (ie sphenopalatine artery)  - oxymetazoline spray 2 puffs per nostril ordered   - HFNC after removal of rhino rocket   - c/w lovenox   - continue to monitor
Clinical Nutrition Follow Up Note:    * 71 year old male admitted for acute hypoxic respiratory failure 2/2 COVID-19 PNA and ARDS, PE. PMH significant for HLD, kidney stones, valvular heart dz, prostate CA s/p Radiation.     *current status: pt intubated since 3/21; propofol infusing at 20 cc/hr (=528 kcal/day); currently on 1 pressor (levophed). TF initiated 3/21 via OG tube. As per RN, vomiting episodes last night, held TF. Upon follow up, began to restart TF as vomiting episodes resolved. Before last night, tolerating TF well x 1 week. Will continue w/ current TF Nepro and will adjust TF prn.      *Labs Reviewed: BUN elevated, glucose elevated, phos on low end range, Mg high. All other lytes WNL. Will continue to monitor labs.   04-01    139  |  96  |  63<H>  ----------------------------<  162<H>  4.2   |  42<H>  |  1.74<H>    Ca    9.2      01 Apr 2022 05:40  Phos  3.5     04-01  Mg     2.8     04-01    Vitamin D, 25-Hydroxy: 54.2 ng/mL (03-04-22 @ 08:34)    BMI: BMI (kg/m2): 8280 (03-31-22 @ 17:48)  BP: --  Lipid Panel: Date/Time: 03-26-22 @ 05:00  Cholesterol, Serum: --  Direct LDL: --  HDL Cholesterol, Serum: --  Total Cholesterol/HDL Ration Measurement: --  Triglycerides, Serum: 530    CAPILLARY BLOOD GLUCOSE  N/A    *pertinent meds: propofol, levophed, Zofran, nimbex, fentanyl, heparin, midazolam, dextrose 5% + sodium chloride 0.9% IVF, protonix, senna, dulcolax, Miralax, maaloxlipitor, Lipitor     *I and O's:    03-31-22 @ 07:01  -  04-01-22 @ 07:00  --------------------------------------------------------  IN:    Cisatracurium: 128.1 mL    FentaNYL: 483 mL    Free Water: 50 mL    IV PiggyBack: 600 mL    Midazolam: 290.4 mL    Modular Fluid: 4 mL    Nepro: 1011 mL    Propofol: 103 mL  Total IN: 2669.5 mL    OUT:    Indwelling Catheter - Urethral (mL): 500 mL  Total OUT: 500 mL    Total NET: 2169.5 mL    *(+) BM on 4/1 ; pt on bowel regimen (senna and miralax prn)    *saulo score of 10: PU right nare stage II; right buttocks stage I; right upper lip stage II documented. (3+) generalized edema documented.    *TF intake, meeting ~90 % of estimated kcal needs, ~93% of estimated protein needs. Over past week, received an average volume of 887 mL/day of Nepro with 4 packets of ProSource, which provided 1730 kcal, 116 gm protein, 645 ml free water from formula.     *Malnutrition dx: moderate malnutrition in acute illness r/t COVID AEB mild edema; meeting<50% of ENN x 3 days; 6.8% wt loss x 9 days    Diet, NPO with Tube Feed:   Tube Feeding Modality: Orogastric  Nepro with Carb Steady (NEPRORTH)  Total Volume for 24 Hours (mL): 1200  Continuous  Until Goal Tube Feed Rate (mL per Hour): 50  Tube Feed Duration (in Hours): 24  Tube Feed Start Time: 00:00  No Carb Prosource TF     Qty per Day:  4 (03-22-22 @ 09:51) [Active]    Estimated Needs: Based on 83Kg - admit wt   Calories: 3210-0009 Kcal (25-30 Kcal/Kg)  Protein: 125-166 g (1.5-2.0 g/Kg)  Fluids: 4038-7850 mL (25-30 mL/Kg)    *Wt Hx:  Height (cm): 10 (03-31-22 @ 17:48)    Recommendations:  1. Continue with Nepro @ 10mL/hr 2/2 vomiting episode, titrate to goal rate of 50mL/hr with 4pkts Prosource TF  2. Maintain aspiration precautions, back of bed >35 degrees  3. Monitor hydration status; adjust free water flushes prn; consider free water flushes of 30mL q1hr (=600mL additional free water daily)  4. Monitor lytes/min; replete prn   5. Daily wt to track/trend changes; strict I/Os    *will continue to monitor and follow up with adjustments to treatment plan prn*  Anju Aleman, Dietetic Intern   RD contact: [phone]
Clinical Nutrition Follow Up Note:    *70yo male admitted with acute hypoxic resp failure 2/2 COVID-19 PNA and ARDS, PE.  transferred to ICU for NIV and HFNC when tolerated.    *Labs Reviewed: potassium and magnesium at upper end normal; will monitor closely.  03-20    136  |  105  |  21  ----------------------------<  144<H>  4.4   |  27  |  0.60    Ca    9.1      20 Mar 2022 06:45  Phos  3.0     03-20  Mg     2.5     03-20    Vitamin D, 25-Hydroxy: 54.2 ng/mL (03-04-22 @ 08:34)      *pertinent meds: dextrose 5% + sodium chloride 0.9% IVF, atorvastatin, senna, miralax, melatonin, morphine, zofran, dulcolax      *I and O's:    03-19-22 @ 07:01  -  03-20-22 @ 07:00  --------------------------------------------------------  IN:    Dexmedetomidine: 646.8 mL    dextrose 5% + sodium chloride 0.9%: 1179.7 mL    IV PiggyBack: 650 mL    Oral Fluid: 240 mL  Total IN: 2716.6 mL    OUT:    Incontinent per Collection Bag (mL): 1250 mL  Total OUT: 1250 mL    Total NET: 1466.6 mL    *positive fluid status; monitor and encourage PO fluids.    *(+) BM on  3/16, 4 days no BM ; pt on bowel regimen.  abd distended.    *saulo score of 15 : stage 2 Rt nare and stage 1 Rt buttocks PU documented.  (+1) Rt/Lt ankle/foot edema documented.    *Pt with fair PO intake since admission; 50-75% of tray; however, due to increasing oxygen requirements, pt has been on BIPAP continuously for the past 2 days and unable to consume nutr.  Pt observed attempting breakfast this morning.  encouraged to drink ensure at minimum.        Diet, Regular:   Prosource Gelatein Plus     Qty per Day:  2  Supplement Feeding Modality:  Oral  Ensure Enlive Cans or Servings Per Day:  1       Frequency:  Two Times a day (03-07-22 @ 09:53) [Active]      Estimated Needs: Based on 83Kg (admit wt)   Calories:  1851-5126 Kcal (25-30 Kcal/Kg)  Protein: 125-166 g (1.5-2 g/Kg)  Fluids:  1647-5992 mL (25-30 mL/Kg)    *Wt Hx:  no new wt documented  Height (cm): 172.7 (02-28-22 @ 08:17)  Weight (kg): 82.8 (03-02-22 @ 11:28)  BMI (kg/m2): 27.8 (03-02-22 @ 11:28)  BSA (m2): 1.97 (03-02-22 @ 11:28)    Recommendations:  1) encourage up to 5 ensure enlive per day to optimize PO intake when feasible  2) if PO intake remains poor, consider PPN to provide some nutr support  3) obtain new wt when feasible to track/trend changes  4) monitor BM; adjust bowel regimen prn        *will continue to monitor and follow up with adjustments to treatment plan prn*    Erma Kaminski MA, RDN, CDN, [facility] [phone]
Clinical Nutrition Follow Up Note:    *70yo male admitted with acute hypoxic resp failure 2/2 COVID-19 PNA and ARDS, PE.  transferred to ICU.    *s/p intubation on 3/21/22; propofol infusing at 12.4mL/hr (=327Kcal/day).  no pressors; MAP> 65.    *received re-consult for TF.    *Labs Reviewed: 03-21; potassium remains in upper level norm.  given COVID, will start TF with NEPRO.    137  |  104  |  21  ----------------------------<  177<H>  4.4   |  29  |  0.54    Ca    8.9      21 Mar 2022 09:30  Phos  3.0     03-20  Mg     2.5     03-20      Vitamin D, 25-Hydroxy: 54.2 ng/mL (03-04-22 @ 08:34)      *pertinent meds: dextrose 5% + sodium chloride 0.9% IVF, nimbex, fentanyl, protonix, propofol, dulcolax, miralax, senna, maaloxlipitor, melatonin, zofran      *I&O's Detail    20 Mar 2022 07:01  -  21 Mar 2022 07:00  --------------------------------------------------------  IN:    Dexmedetomidine: 748 mL    dextrose 5% + sodium chloride 0.9%: 1461 mL    IV PiggyBack: 650 mL  Total IN: 2859 mL    OUT:    Incontinent per Collection Bag (mL): 1675 mL  Total OUT: 1675 mL    Total NET: 1184 mL    *positive fluid status; monitor and adjust free water flushes prn    *(+) BM on  3/16, 5 days no BM ; pt on bowel regimen.  abd distended.    *saulo score of 15 : stage 2 Rt nare and stage 1 Rt buttocks PU documented.  (+1) Rt/Lt ankle/foot edema documented.    *Pt with fair PO intake since admission; 50-75% of tray; however, due to increasing oxygen requirements, pt has been on BIPAP continuously for the past 3 days and unable to consume nutr. Pt now intubated, given potassium level and COVID Dx, rec'd start TF with Nepro @ 10mL/hr and titrate to goal rate of 50mL/hr with 4pkts Prosource TF.  Which will provide ~1930Kcal, 125g protein, 727mL free water, and total TF volume of 1000mL.    *moderate malnutrition in acute illness r/t COVID AEB mild edema; meeting<50% of ENN x 3 days; 6.8% wt loss x 9 days    Estimated Needs: Based on 83Kg (admit wt)   Calories:  6704-1981 Kcal (25-30 Kcal/Kg)  Protein: 125-166 g (1.5-2 g/Kg)  Fluids:  2036-4633 mL (25-30 mL/Kg)    *Wt Hx:  77.3Kg (3/21): RD obtained new wt with bedscale; wt loss of ~5.5Kg in 9 days (6.8%), clinically significant.  Height (cm): 172.7 (02-28-22 @ 08:17)  Weight (kg): 82.8 (03-02-22 @ 11:28)  BMI (kg/m2): 27.8 (03-02-22 @ 11:28)  BSA (m2): 1.97 (03-02-22 @ 11:28)    Recommendations:  1) start TF with Nepro @ 10mL/hr and titrate to goal rate of 50mL/hr with 4pkts Prosource TF  2) monitor TF tolerance, keep back of bed >35 degrees  3) monitor hydration status; adjust free water flushes prn; consider free water flushes of 30mL q1hr (=600mL additional free water daily)  4) daily wt checks  5) monitor lytes/mins; replete/correct prn       *will continue to monitor and follow up with adjustments to treatment plan prn*    Erma Kaminski MA, RDN, CDN, [facility] [phone]
72 yo pt with PMH of HLD,  kidney stone, valvular heart dz, prostate ca s/p Radiation presented  to ED c/o progressive  SOB.  Pt reports he got sich with runny nose and cough 2/19 and tested positive for covid19 on 2/21.  Pt reports that he did have fevers but resolved  but SOB got worse, had difficulty breathing with minimal exertion. he checked his O2 and was below 80.  No CP, no palpitations, no leg pain.   Pt is s/p  2 shots of Pfizer  vaccine with second one in april 2021. No Booster.   Pts wife also sick at home with covid19.   presented in ED with 02 sat of 70%    Vital Signs Last 24 Hrs  T(C): 36 (28 Feb 2022 22:15), Max: 36.9 (28 Feb 2022 10:19)  T(F): 96.8 (28 Feb 2022 22:15), Max: 98.5 (28 Feb 2022 10:19)  HR: 95 (28 Feb 2022 22:15) (93 - 108)  BP: 144/85 (28 Feb 2022 22:15) (125/71 - 171/86)  BP(mean): 99 (28 Feb 2022 22:15) (83 - 99)  RR: 18 (28 Feb 2022 22:15) (18 - 25)  SpO2: 94% (28 Feb 2022 22:15) (85% - 98%)      This morning 02 sat dropped to 89% while on nasal cannula  3L /min  Patient  c/o mild dyspnea   No improvement with "prone"  Respiratory called and placed on 100 % non rebreather   O2 sat now 99%   CXR ordered

## 2022-04-01 NOTE — CONSULT NOTE ADULT - ASSESSMENT
70 yo pt with PMH of HLD,  kidney stone, valvular heart dz, prostate ca s/p Radiation presented  to ED c/o progressive  SOB.  Pt reports he got sich with runny nose and cough 2/19 and tested positive for covid19 on 2/21.  Pt reports that he did have fevers but resolved  but SOB got worse, had difficulty breathing with minimal exertion..  In ED Pt was hypoxic down to 70s on RA, was initially placed on NRB, later weaned of to 5L NC.   Pt received IV dexamethasone and Remdesivir. S/p 1L IVF (28 Feb 2022 10:13)  Pt is s/p  2 shots of Pfizer  vaccine with second one in April 2021. No Booster.     Pts wife was also sick at home with covid19.   s/p remdesivir, Decadron,  remains intubated in ARDS worsening cxr  was proned multiple times  on peep 12, 90%, plateau pressure 26 tv 420(6 cc/kg)  3/30 sputum culture negative  central line d/c 3/29  restarted on antibiotics on 3/29  4/01 inc GUILLAUME creatinine 1.7 was previously normal  vanco level 24  Acute respiratory failure. COVID-19 breakthrough viral syndrome. Multifocal pneumonia. Possible superimposed bacterial pneumonia. PE.  given tociluzumab infusion on 3/2  s/p remdesivir protocol # 10  s/p cefepime 2 gm IV q12h # 7 (completed on 3/17)  s/p meropenem 1 gm IV q8h and   vancomycin 1 gm IV q12h # 8 days (complete 3/25)  Case d/w Dr Roque 72 yo pt with PMH of HLD,  kidney stone, valvular heart dz, prostate ca s/p Radiation presented  to ED c/o progressive  SOB.  Pt reports he got sich with runny nose and cough 2/19 and tested positive for covid19 on 2/21.  Pt reports that he did have fevers but resolved  but SOB got worse, had difficulty breathing with minimal exertion..  In ED Pt was hypoxic down to 70s on RA, was initially placed on NRB, later weaned of to 5L NC.   Pt received IV dexamethasone and Remdesivir.  Pt vaccinated 1st and second dose  Pfizer,  with second one in April 2021. No Booster.     Pts wife was also sick at home with covid19.   s/p remdesivir, Decadron,  remains intubated in ARDS worsening cxr  was proned multiple times  Multifocal pneumonia. Possible superimposed bacterial pneumonia. PE, vq scan 3/1  given tociluzumab infusion on 3/2  s/p remdesivir protocol # 10  s/p cefepime 2 gm IV q12h # 7 (completed on 3/17)  s/p meropenem 1 gm IV q8h and   vancomycin 1 gm IV q12h # 8 days (complete 3/25)  central line d/c 3/29  restarted on antibiotics due to CXR infiltrates, Discussed w Dr Brody  on cefepime 2 gm IV q12h and vancomycin 1 gm IV q12h   vanco dc d after 2nd dose due to guillaume this am  4/01,  creatinine 1.7  Vanco level 24.7 today  Case d/w Dr Roque    A/P  GUILLAUME  Possibly Covid related, multifactorial  Vanco dc d due to GUILLAUME this morning   Vanco Level slightly elevated, but pt may have been   developing azotemia or GUILLAUME,  the creat doubled although in normal range from  0.35 to 0.67, and K of 5.1 yesterday  Vanco may have been additive to a developing renal insult  UO also down for 1500- 2000ml range to 500 ml  Agree w DC vanco until levels and Creat acceptable level  Avoid IV contrast and nephrotoxic meds

## 2022-04-01 NOTE — CONSULT NOTE ADULT - SUBJECTIVE AND OBJECTIVE BOX
NEPHROLOGY CONSULT  HPI:  70 yo pt with PMH of HLD,  kidney stone, valvular heart dz, prostate ca s/p Radiation presented  to ED c/o progressive  SOB.  Pt reports he got sich with runny nose and cough 2/19 and tested positive for covid19 on 2/21.  Pt reports that he did have fevers but resolved  but SOB got worse, had difficulty breathing with minimal exertion..  In ED Pt was hypoxic down to 70s on RA, was initially placed on NRB, later weaned of to 5L NC.   Pt received IV dexamethasone and Remdesivir. S/p 1L IVF (28 Feb 2022 10:13)  Pt is s/p  2 shots of Pfizer  vaccine with second one in April 2021. No Booster.     Pts wife was also sick at home with covid19.   s/p remdesivir, Decadron,  remains intubated in ARDS worsening cxr  was proned multiple times  on peep 12, 90%, plateau pressure 26 tv 420(6 cc/kg)  3/30 sputum culture negative  central line d/c 3/29  restarted on antibiotics on 3/29  4/01 inc GUILLAUME creatinine 1.7 was previously normal  Multifocal pneumonia. Possible superimposed bacterial pneumonia. PE.  given tociluzumab infusion on 3/2  s/p remdesivir protocol # 10  s/p cefepime 2 gm IV q12h # 7 (completed on 3/17)  s/p meropenem 1 gm IV q8h and   vancomycin 1 gm IV q12h # 8 days (complete 3/25)  Case d/w Dr Roque              PAST MEDICAL & SURGICAL HISTORY:  Hypercholesterolemia    Cholelithiasis    Cholecystitis    Valvular heart disease    Psoriasis    History of kidney stones    Inguinal hernia, left    Prostate cancer  s/p XRT 1-6-2020 to 1/27/2021    History of renal calculi    S/P hernia repair  right inguinal; 5/2015    S/P colonoscopy with polypectomy  Baseline; benign polyps; 2012    History of cholecystectomy  2017    History of prostatectomy  5/2020    History of lithotripsy        FAMILY HISTORY:  FHx: stroke (Father)        MEDICATIONS  (STANDING):  atorvastatin 10 milliGRAM(s) Oral at bedtime  cefepime  Injectable. 1000 milliGRAM(s) IV Push every 12 hours  chlorhexidine 0.12% Liquid 15 milliLiter(s) Oral Mucosa every 12 hours  chlorhexidine 4% Liquid 1 Application(s) Topical <User Schedule>  cisatracurium Infusion 2.999 MICROgram(s)/kG/Min (14.9 mL/Hr) IV Continuous <Continuous>  fentaNYL   Infusion.. 0.5 MICROgram(s)/kG/Hr (2.07 mL/Hr) IV Continuous <Continuous>  heparin  Infusion.  Unit(s)/Hr (15 mL/Hr) IV Continuous <Continuous>  midazolam Infusion 0.02 mG/kG/Hr (1.66 mL/Hr) IV Continuous <Continuous>  midodrine 10 milliGRAM(s) Oral every 8 hours  norepinephrine Infusion 0.05 MICROgram(s)/kG/Min (7.76 mL/Hr) IV Continuous <Continuous>  pantoprazole  Injectable 40 milliGRAM(s) IV Push daily  petrolatum Ophthalmic Ointment 1 Application(s) Both EYES every 12 hours  polyethylene glycol 3350 17 Gram(s) Oral daily  propofol Infusion 10 MICROgram(s)/kG/Min (4.97 mL/Hr) IV Continuous <Continuous>  senna 2 Tablet(s) Oral at bedtime    MEDICATIONS  (PRN):  acetaminophen     Tablet .. 650 milliGRAM(s) Oral every 6 hours PRN Temp greater or equal to 38C (100.4F), Mild Pain (1 - 3)  ALBUTerol    90 MICROgram(s) HFA Inhaler 2 Puff(s) Inhalation every 6 hours PRN Shortness of Breath and/or Wheezing  aluminum hydroxide/magnesium hydroxide/simethicone Suspension 30 milliLiter(s) Oral every 4 hours PRN Dyspepsia  benzonatate 100 milliGRAM(s) Oral every 8 hours PRN Cough  bisacodyl Suppository 10 milliGRAM(s) Rectal daily PRN Constipation  heparin   Injectable 6500 Unit(s) IV Push every 6 hours PRN For aPTT less than 40  heparin   Injectable 3000 Unit(s) IV Push every 6 hours PRN For aPTT between 40 - 57  melatonin 3 milliGRAM(s) Oral at bedtime PRN Insomnia  ondansetron Injectable 4 milliGRAM(s) IV Push every 8 hours PRN Nausea and/or Vomiting  sodium chloride 0.65% Nasal 1 Spray(s) Both Nostrils two times a day PRN Nasal Congestion  sodium chloride 0.9% lock flush 10 milliLiter(s) IV Push every 1 hour PRN Pre/post blood products, medications, blood draw, and to maintain line patency      Allergies    No Known Allergies    Intolerances        I&O's Summary    31 Mar 2022 07:01  -  01 Apr 2022 07:00  --------------------------------------------------------  IN: 2669.5 mL / OUT: 500 mL / NET: 2169.5 mL          REVIEW OF SYSTEMS:          Vital Signs Last 24 Hrs  T(C): 38 (01 Apr 2022 10:00), Max: 38.1 (01 Apr 2022 00:00)  T(F): 100.4 (01 Apr 2022 10:00), Max: 100.6 (01 Apr 2022 00:00)  HR: 119 (01 Apr 2022 14:00) (99 - 119)  BP: --  BP(mean): --  RR: 34 (01 Apr 2022 14:00) (27 - 42)  SpO2: 84% (01 Apr 2022 14:00) (84% - 93%)    Daily     Daily     I&O's Summary    31 Mar 2022 07:01  -  01 Apr 2022 07:00  --------------------------------------------------------  IN: 2669.5 mL / OUT: 500 mL / NET: 2169.5 mL        PHYSICAL EXAM:    General:  Alert, No acute distress.    Neuro:  Alert and oriented to person, place, and time. Able to communicate  well.      HEENT:  No JVD, no masses, Eyes anicteric, ,    Cardiovascular:  Regular rate and rhythm, with normal S1 and S2.    Lungs:  clear. no rales, no wheezing, .    Abdomen:  Normoactive bowel sounds. Soft, flat, non-tender, and non-distended.  positive bowel sounds    Skin:  Warm, dry    Extremities:  warm,  no cyanosis    LABS:                        7.4    13.90 )-----------( 144      ( 01 Apr 2022 11:00 )             24.9     04-01    139  |  96  |  63<H>  ----------------------------<  162<H>  4.2   |  42<H>  |  1.74<H>    Ca    9.2      01 Apr 2022 05:40  Phos  3.5     04-01  Mg     2.8     04-01      PT/INR - ( 01 Apr 2022 11:00 )   PT: 13.6 sec;   INR: 1.17 ratio         PTT - ( 01 Apr 2022 11:00 )  PTT:35.9 sec    Magnesium, Serum: 2.8 mg/dL (04-01 @ 05:40)  Phosphorus Level, Serum: 3.5 mg/dL (04-01 @ 05:40)    ABG - ( 01 Apr 2022 06:40 )  pH, Arterial: 7.37  pH, Blood: x     /  pCO2: 67    /  pO2: 109   / HCO3: 39    / Base Excess: 10.6  /  SaO2: 100                NEPHROLOGY CONSULT  HPI:  72 yo pt with PMH of HLD,  kidney stone, valvular heart dz, prostate ca s/p Radiation presented  to ED c/o progressive  SOB.  Pt reports he got sich with runny nose and cough 2/19 and tested positive for covid19 on 2/21.  Pt reports that he did have fevers but resolved  but SOB got worse, had difficulty breathing with minimal exertion..  In ED Pt was hypoxic down to 70s on RA, was initially placed on NRB, later weaned of to 5L NC.   Pt received IV dexamethasone and Remdesivir.  Pt vaccinated 1st and second dose  Pfizer,  with second one in April 2021. No Booster.     Pts wife was also sick at home with covid19.   s/p remdesivir, Decadron,  remains intubated in ARDS worsening cxr  was proned multiple times  Multifocal pneumonia. Possible superimposed bacterial pneumonia. PE.  given tociluzumab infusion on 3/2  s/p remdesivir protocol # 10  s/p cefepime 2 gm IV q12h # 7 (completed on 3/17)  s/p meropenem 1 gm IV q8h and   vancomycin 1 gm IV q12h # 8 days (complete 3/25)  central line d/c 3/29  restarted on antibiotics due to CXR infiltrates, Discussed w Dr Brody  -on cefepime 2 gm IV q12h and vancomycin 1 gm IV q12h   vanco dc d after 2nd dose due to faraz this am  4/01,  creatinine 1.7  Vanco level 24.7 today  Case d/w Dr Roque              PAST MEDICAL & SURGICAL HISTORY:  Hypercholesterolemia    Cholelithiasis    Cholecystitis    Valvular heart disease    Psoriasis    History of kidney stones    Inguinal hernia, left    Prostate cancer  s/p XRT 1-6-2020 to 1/27/2021    History of renal calculi    S/P hernia repair  right inguinal; 5/2015    S/P colonoscopy with polypectomy  Baseline; benign polyps; 2012    History of cholecystectomy  2017    History of prostatectomy  5/2020    History of lithotripsy        FAMILY HISTORY:  FHx: stroke (Father)        MEDICATIONS  (STANDING):  atorvastatin 10 milliGRAM(s) Oral at bedtime  cefepime  Injectable. 1000 milliGRAM(s) IV Push every 12 hours  chlorhexidine 0.12% Liquid 15 milliLiter(s) Oral Mucosa every 12 hours  chlorhexidine 4% Liquid 1 Application(s) Topical <User Schedule>  cisatracurium Infusion 2.999 MICROgram(s)/kG/Min (14.9 mL/Hr) IV Continuous <Continuous>  fentaNYL   Infusion.. 0.5 MICROgram(s)/kG/Hr (2.07 mL/Hr) IV Continuous <Continuous>  heparin  Infusion.  Unit(s)/Hr (15 mL/Hr) IV Continuous <Continuous>  midazolam Infusion 0.02 mG/kG/Hr (1.66 mL/Hr) IV Continuous <Continuous>  midodrine 10 milliGRAM(s) Oral every 8 hours  norepinephrine Infusion 0.05 MICROgram(s)/kG/Min (7.76 mL/Hr) IV Continuous <Continuous>  pantoprazole  Injectable 40 milliGRAM(s) IV Push daily  petrolatum Ophthalmic Ointment 1 Application(s) Both EYES every 12 hours  polyethylene glycol 3350 17 Gram(s) Oral daily  propofol Infusion 10 MICROgram(s)/kG/Min (4.97 mL/Hr) IV Continuous <Continuous>  senna 2 Tablet(s) Oral at bedtime    MEDICATIONS  (PRN):  acetaminophen     Tablet .. 650 milliGRAM(s) Oral every 6 hours PRN Temp greater or equal to 38C (100.4F), Mild Pain (1 - 3)  ALBUTerol    90 MICROgram(s) HFA Inhaler 2 Puff(s) Inhalation every 6 hours PRN Shortness of Breath and/or Wheezing  aluminum hydroxide/magnesium hydroxide/simethicone Suspension 30 milliLiter(s) Oral every 4 hours PRN Dyspepsia  benzonatate 100 milliGRAM(s) Oral every 8 hours PRN Cough  bisacodyl Suppository 10 milliGRAM(s) Rectal daily PRN Constipation  heparin   Injectable 6500 Unit(s) IV Push every 6 hours PRN For aPTT less than 40  heparin   Injectable 3000 Unit(s) IV Push every 6 hours PRN For aPTT between 40 - 57  melatonin 3 milliGRAM(s) Oral at bedtime PRN Insomnia  ondansetron Injectable 4 milliGRAM(s) IV Push every 8 hours PRN Nausea and/or Vomiting  sodium chloride 0.65% Nasal 1 Spray(s) Both Nostrils two times a day PRN Nasal Congestion  sodium chloride 0.9% lock flush 10 milliLiter(s) IV Push every 1 hour PRN Pre/post blood products, medications, blood draw, and to maintain line patency      Allergies    No Known Allergies    Intolerances        I&O's Detail    31 Mar 2022 07:01  -  01 Apr 2022 07:00  --------------------------------------------------------  IN:    Cisatracurium: 128.1 mL    FentaNYL: 483 mL    Free Water: 50 mL    IV PiggyBack: 600 mL    Midazolam: 290.4 mL    Modular Fluid: 4 mL    Nepro: 1011 mL    Propofol: 103 mL  Total IN: 2669.5 mL    OUT:    Indwelling Catheter - Urethral (mL): 500 mL  Total OUT: 500 mL    Total NET: 2169.5 mL      REVIEW OF SYSTEMS:      Vital Signs Last 24 Hrs  T(C): 38 (04-01-22 @ 10:00), Max: 38.1 (04-01-22 @ 00:00)  T(F): 100.4 (04-01-22 @ 10:00), Max: 100.6 (04-01-22 @ 00:00)  HR: 119 (04-01-22 @ 14:00) (99 - 119)  RR: 34 (04-01-22 @ 14:00) (27 - 42)  SpO2: 84% (04-01-22 @ 14:00) (84% - 92%)  BP: 90/50 110/50 range, not populating      Daily     Daily     I&O's Summary    31 Mar 2022 07:01  -  01 Apr 2022 07:00  --------------------------------------------------------  IN: 2669.5 mL / OUT: 500 mL / NET: 2169.5 mL        PHYSICAL EXAM:    General: Intubated sedated  Lungs: good air entry anteriorly  Heart RR no rubs   Abd soft  Ext warm   Payton in place dark alejandro urine    LABS:    CXR: ARDS picture, diffuse infiltrates                                   7.4    13.90 )-----------( 144      ( 01 Apr 2022 11:00 )             24.9                         7.1    13.59 )-----------( 146      ( 01 Apr 2022 05:40 )             23.9                         8.5    24.59 )-----------( 216      ( 31 Mar 2022 06:22 )             30.3         139    |  96     |  63     ----------------------------<  162       01 Apr 2022 05:40  4.2     |  42     |  1.74     140    |  96     |  34     ----------------------------<  143       31 Mar 2022 06:22  5.1     |  45     |  0.67     141    |  98     |  24     ----------------------------<  169       30 Mar 2022 05:30  3.5     |  43     |  0.35     Ca    9.2        01 Apr 2022 05:40  Ca    9.2        31 Mar 2022 06:22  Ca    8.5        30 Mar 2022 05:30    Phos  3.5       01 Apr 2022 05:40  Phos  6.2       31 Mar 2022 06:22  Phos  2.9       29 Mar 2022 05:10    Mg     2.8       01 Apr 2022 05:40  Mg     3.2       31 Mar 2022 06:22  Mg     2.7       29 Mar 2022 05:10        ABG - ( 01 Apr 2022 06:40 )  pH, Arterial: 7.37  pH, Blood: x     /  pCO2: 67    /  pO2: 109   / HCO3: 39    / Base Excess: 10.6  /  SaO2: 100         VQ scan IMPRESSION: 3/01/22  Pulmonary emboli in the right upper lobe pulmonary artery and bilateral   segmental and subsegmental branches.  Diffuse lung abnormality consistent with COVID.

## 2022-04-01 NOTE — PROGRESS NOTE ADULT - SUBJECTIVE AND OBJECTIVE BOX
Events Overnight:  o2 sat in mid 80s, dyscongruent with vent    HPI:      72 yo pt with PMH of HLD,  kidney stone, valvular heart dz, prostate ca s/p Radiation presented  to ED c/o progressive  SOB.  Pt reports he got sick with runny nose and cough 2/19 and tested positive for covid19 on 2/21.  Pt reports that he did have fevers but resolved  but SOB got worse, had difficulty breathing with minimal exertion. he checked his O2 and was below 80.  No CP, no palpitations, no leg pain.   Pt is s/p  2 shots of Pfizer  vaccine with second one in april 2021. No Booster.   Pts wife was also sick at home with covid19.   s/p remdesivir, Decadron,  remains intubated in ARDS worsening cxr  was proned multiple times  on peep 12, 90%, plateau pressure 26 tv 420(6 cc/kg)  3/30 sputum culture negative  central line d/c 3/29  restarted on antibiotics on 3/29  4/01 inc GUILLAUME creatinine 1.7 was previously normal    ROS cant obtain    MEDICATIONS  (STANDING):  atorvastatin 10 milliGRAM(s) Oral at bedtime  cefepime  Injectable. 1000 milliGRAM(s) IV Push every 12 hours  chlorhexidine 0.12% Liquid 15 milliLiter(s) Oral Mucosa every 12 hours  chlorhexidine 4% Liquid 1 Application(s) Topical <User Schedule>  cisatracurium Infusion 2.999 MICROgram(s)/kG/Min (14.9 mL/Hr) IV Continuous <Continuous>  fentaNYL   Infusion.. 0.5 MICROgram(s)/kG/Hr (2.07 mL/Hr) IV Continuous <Continuous>  heparin  Infusion.  Unit(s)/Hr (15 mL/Hr) IV Continuous <Continuous>  midazolam Infusion 0.02 mG/kG/Hr (1.66 mL/Hr) IV Continuous <Continuous>  midodrine 10 milliGRAM(s) Oral every 8 hours  norepinephrine Infusion 0.05 MICROgram(s)/kG/Min (7.76 mL/Hr) IV Continuous <Continuous>  pantoprazole  Injectable 40 milliGRAM(s) IV Push daily  petrolatum Ophthalmic Ointment 1 Application(s) Both EYES every 12 hours  polyethylene glycol 3350 17 Gram(s) Oral daily  propofol Infusion 10 MICROgram(s)/kG/Min (4.97 mL/Hr) IV Continuous <Continuous>  senna 2 Tablet(s) Oral at bedtime    MEDICATIONS  (PRN):  acetaminophen     Tablet .. 650 milliGRAM(s) Oral every 6 hours PRN Temp greater or equal to 38C (100.4F), Mild Pain (1 - 3)  ALBUTerol    90 MICROgram(s) HFA Inhaler 2 Puff(s) Inhalation every 6 hours PRN Shortness of Breath and/or Wheezing  aluminum hydroxide/magnesium hydroxide/simethicone Suspension 30 milliLiter(s) Oral every 4 hours PRN Dyspepsia  benzonatate 100 milliGRAM(s) Oral every 8 hours PRN Cough  bisacodyl Suppository 10 milliGRAM(s) Rectal daily PRN Constipation  heparin   Injectable 6500 Unit(s) IV Push every 6 hours PRN For aPTT less than 40  heparin   Injectable 3000 Unit(s) IV Push every 6 hours PRN For aPTT between 40 - 57  melatonin 3 milliGRAM(s) Oral at bedtime PRN Insomnia  ondansetron Injectable 4 milliGRAM(s) IV Push every 8 hours PRN Nausea and/or Vomiting  sodium chloride 0.65% Nasal 1 Spray(s) Both Nostrils two times a day PRN Nasal Congestion  sodium chloride 0.9% lock flush 10 milliLiter(s) IV Push every 1 hour PRN Pre/post blood products, medications, blood draw, and to maintain line patency    Height (cm): 10 (03-31 @ 17:48)    ICU Vital Signs Last 24 Hrs  T(C): 37.7 (01 Apr 2022 04:00), Max: 38.1 (01 Apr 2022 00:00)  T(F): 99.8 (01 Apr 2022 04:00), Max: 100.6 (01 Apr 2022 00:00)  HR: 107 (01 Apr 2022 08:00) (99 - 113)  BP: --  BP(mean): --  ABP: 118/49 (01 Apr 2022 08:00) (91/47 - 125/54)  ABP(mean): 69 (01 Apr 2022 08:00) (58 - 76)  RR: 28 (01 Apr 2022 08:00) (27 - 42)  SpO2: 88% (01 Apr 2022 08:00) (86% - 98%)      Mode: AC/ CMV (Assist Control/ Continuous Mandatory Ventilation)  RR (machine): 34  TV (machine): 450  FiO2: 90  PEEP: 10  PIP: 32      I&O's Summary    31 Mar 2022 07:01  -  01 Apr 2022 07:00  --------------------------------------------------------  IN: 2669.5 mL / OUT: 500 mL / NET: 2169.5 mL    Physical Exam    General ill  HEENT nc/at, orally intubated  neck no jvd, IJ is out  lungs scattered rhonchi  cv rrr  abdomen soft, non-tender  ext warm   martins  extremities warm                        7.1    13.59 )-----------( 146      ( 01 Apr 2022 05:40 )             23.9     04-01    139  |  96  |  63<H>  ----------------------------<  162<H>  4.2   |  42<H>  |  1.74<H>    Ca    9.2      01 Apr 2022 05:40  Phos  3.5     04-01  Mg     2.8     04-01    ABG - ( 01 Apr 2022 06:40 )  pH, Arterial: 7.37  pH, Blood: x     /  pCO2: 67    /  pO2: 109   / HCO3: 39    / Base Excess: 10.6  /  SaO2: 100       DVT Prophylaxis: IV heaprin                                                                 Advanced Directives: Full Code

## 2022-04-02 NOTE — PROGRESS NOTE ADULT - ASSESSMENT
Patient with severe Covid Pneumonia, with MSOF  ARDS  GUILLAUME  hematuria    still testing positive for covid  with worsening ARDS  now Acute Kidney injury, will likely need Dialysis, no absolute indication today, although hypercarbia and acidosis becoming an issue  Now becoming increasingly hypercarbic  On IV Heparin for PE  had soome hematuria, which improved.    Prognosis is worsening  will consider repeat proning  on cefepime, last sputum culture negative   Patient is now DNR Patient with severe Covid Pneumonia, with MSOF  ARDS  GUILLAUME  hematuria    still testing positive for covid  with worsening ARDS  now Acute Kidney injury, will likely need Dialysis, no absolute indication today, although hypercarbia and acidosis becoming an issue  Now becoming increasingly hypercarbic  On IV Heparin for PE  had soome hematuria, which improved.    Prognosis is worsening  will consider repeat proning  on cefepime, last sputum culture negative   Patient is now DNR    I had extensive d/w family, no dialysis, will not prone,

## 2022-04-02 NOTE — PROGRESS NOTE ADULT - SUBJECTIVE AND OBJECTIVE BOX
Date of service: 04-02-22 @ 12:31    Intubated on ventilatory support  Febrile to 100.7F  Lethargic    ROS: unobtainable    MEDICATIONS  (STANDING):  atorvastatin 10 milliGRAM(s) Oral at bedtime  cefepime  Injectable. 1000 milliGRAM(s) IV Push every 12 hours  chlorhexidine 0.12% Liquid 15 milliLiter(s) Oral Mucosa every 12 hours  chlorhexidine 4% Liquid 1 Application(s) Topical <User Schedule>  cisatracurium Infusion 2.999 MICROgram(s)/kG/Min (14.9 mL/Hr) IV Continuous <Continuous>  fentaNYL   Infusion.. 0.5 MICROgram(s)/kG/Hr (2.07 mL/Hr) IV Continuous <Continuous>  heparin  Infusion.  Unit(s)/Hr (15 mL/Hr) IV Continuous <Continuous>  midazolam Infusion 0.02 mG/kG/Hr (1.66 mL/Hr) IV Continuous <Continuous>  midodrine 10 milliGRAM(s) Oral every 8 hours  norepinephrine Infusion 0.05 MICROgram(s)/kG/Min (7.76 mL/Hr) IV Continuous <Continuous>  pantoprazole  Injectable 40 milliGRAM(s) IV Push daily  petrolatum Ophthalmic Ointment 1 Application(s) Both EYES every 12 hours  polyethylene glycol 3350 17 Gram(s) Oral daily  senna 2 Tablet(s) Oral at bedtime    Vital Signs Last 24 Hrs  T(C): 37.7 (02 Apr 2022 12:00), Max: 38.2 (01 Apr 2022 21:00)  T(F): 99.9 (02 Apr 2022 12:00), Max: 100.7 (01 Apr 2022 21:00)  HR: 113 (02 Apr 2022 12:00) (102 - 134)  BP: --  BP(mean): --  RR: 34 (02 Apr 2022 12:00) (34 - 34)  SpO2: 67% (02 Apr 2022 12:00) (67% - 87%)  Mode: AC/ CMV (Assist Control/ Continuous Mandatory Ventilation), RR (machine): 34, TV (machine): 420, FiO2: 100, PEEP: 10, ITime: 1, MAP: 18, PIP: 39   Physical exam:    Constitutional:  No acute distress  HEENT: orally intubated  Neck: supple; thyroid not palpable  Back: no tenderness  Respiratory: respiratory effort normal; scattered coarse breath sounds  Cardiovascular: S1S2 regular, no murmurs  Abdomen: soft, not tender, not distended, positive BS  Genitourinary: no suprapubic tenderness  Musculoskeletal: no muscle tenderness, no joint swelling or tenderness  Extremities: no pedal edema  Neurological/ Psychiatric: intubated  Skin: no rashes; no palpable lesions    Labs: reviewed                        8.0    12.40 )-----------( 140      ( 02 Apr 2022 05:55 )             27.2     04-02    136  |  94<L>  |  80<H>  ----------------------------<  155<H>  4.5   |  40<H>  |  2.74<H>    Ca    9.6      02 Apr 2022 05:55  Phos  5.6     04-02  Mg     3.3     04-02    Vancomycin Level, Trough: 24.2 ug/mL (04-01 @ 11:37)                        7.1    13.59 )-----------( 146      ( 01 Apr 2022 05:40 )             23.9     04-01    139  |  96  |  63<H>  ----------------------------<  162<H>  4.2   |  42<H>  |  1.74<H>    Ca    9.2      01 Apr 2022 05:40  Phos  3.5     04-01  Mg     2.8     04-01                        9.5    14.44 )-----------( 153      ( 25 Mar 2022 05:00 )             29.9     03-25    137  |  97  |  16  ----------------------------<  164<H>  4.9   |  40<H>  |  0.38<L>    Ca    8.2<L>      25 Mar 2022 05:00  Phos  2.2     03-25  Mg     2.6     03-25  Cultures:     Ferritin, Serum: 1102 ng/mL (03-15-22 @ 07:49)  C-Reactive Protein, Serum: <3 mg/L (03-14-22 @ 11:39)  D-Dimer Assay, Quantitative: 538 ng/mL DDU (03-14-22 @ 11:39)    COVID (02-28 @ 08:16)  Detected      Culture - Sputum (collected 30 Mar 2022 11:45)  Source: .Sputum Sputum  Gram Stain (30 Mar 2022 18:40):    Rare polymorphonuclear leukocytes per low power field    No Squamous epithelial cells per low power field    No organisms seen per oil power field  Final Report (01 Apr 2022 18:10):    Normal Respiratory Kyung present    Radiology: all available radiological tests reviewed    < from: Xray Chest 1 View- PORTABLE-Urgent (02.28.22 @ 09:00) >  IMPRESSION: Mild bibasilar infiltrates left greater than right.  < end of copied text >    < from: CT Angio Chest PE Protocol w/ IV Cont (03.01.22 @ 14:14) >  Pulmonary emboli in the right upper lobe pulmonary artery and bilateral   segmental and subsegmental branches. This was discussed with Dr. Hazel   at 2:36 PM 3/1/2022.  Diffuse lung abnormality consistent with COVID.  < end of copied text >    < from: Xray Chest 1 View- PORTABLE-Urgent (03.30.22 @ 10:23) >  Heart magnified by technique.  Endotracheal tube, nasogastric tube, and left jugular line remain.  Persistent diffuse advanced infiltrates.  Chest similar to March 29.  < end of copied text >      Advanced directives addressed: full resuscitation

## 2022-04-02 NOTE — PROGRESS NOTE ADULT - SUBJECTIVE AND OBJECTIVE BOX
Events Overnight:  worsening oxygenation, on 10 PEEP 100%, now GUILLAUME, made 220 cc urine overnight, remains paralyzed and sedated    HPI:  72 yo pt with PMH of HLD,  kidney stone, valvular heart dz, prostate ca s/p Radiation presented  to ED c/o progressive  SOB.  Pt reports he got sick with runny nose and cough 2/19 and tested positive for covid19 on 2/21.  Pt reports that he did have fevers but resolved  but SOB got worse, had difficulty breathing with minimal exertion. he checked his O2 and was below 80.  No CP, no palpitations, no leg pain.   Pt is s/p  2 shots of Pfizer  vaccine with second one in april 2021. No Booster.   Pts wife was also sick at home with covid19.   s/p remdesivir, Decadron,  remains intubated in ARDS worsening cxr  has been  proned multiple times  on peep 10, 90%, plateau pressure 27 tv 420(6 cc/kg)  3/30 sputum culture negative  central line d/c 3/29  restarted on antibiotics on 3/29  4/02 inc GUILLAUME creatinine 2.7  was previously normal    ROS cant obtain    MEDICATIONS  (STANDING):  atorvastatin 10 milliGRAM(s) Oral at bedtime  cefepime  Injectable. 1000 milliGRAM(s) IV Push every 12 hours  chlorhexidine 0.12% Liquid 15 milliLiter(s) Oral Mucosa every 12 hours  chlorhexidine 4% Liquid 1 Application(s) Topical <User Schedule>  cisatracurium Infusion 2.999 MICROgram(s)/kG/Min (14.9 mL/Hr) IV Continuous <Continuous>  fentaNYL   Infusion.. 0.5 MICROgram(s)/kG/Hr (2.07 mL/Hr) IV Continuous <Continuous>  heparin  Infusion.  Unit(s)/Hr (15 mL/Hr) IV Continuous <Continuous>  midazolam Infusion 0.02 mG/kG/Hr (1.66 mL/Hr) IV Continuous <Continuous>  midodrine 10 milliGRAM(s) Oral every 8 hours  norepinephrine Infusion 0.05 MICROgram(s)/kG/Min (7.76 mL/Hr) IV Continuous <Continuous>  pantoprazole  Injectable 40 milliGRAM(s) IV Push daily  petrolatum Ophthalmic Ointment 1 Application(s) Both EYES every 12 hours  polyethylene glycol 3350 17 Gram(s) Oral daily  propofol Infusion 10 MICROgram(s)/kG/Min (4.97 mL/Hr) IV Continuous <Continuous>  senna 2 Tablet(s) Oral at bedtime    MEDICATIONS  (PRN):  acetaminophen     Tablet .. 650 milliGRAM(s) Oral every 6 hours PRN Temp greater or equal to 38C (100.4F), Mild Pain (1 - 3)  ALBUTerol    90 MICROgram(s) HFA Inhaler 2 Puff(s) Inhalation every 6 hours PRN Shortness of Breath and/or Wheezing  aluminum hydroxide/magnesium hydroxide/simethicone Suspension 30 milliLiter(s) Oral every 4 hours PRN Dyspepsia  benzonatate 100 milliGRAM(s) Oral every 8 hours PRN Cough  bisacodyl Suppository 10 milliGRAM(s) Rectal daily PRN Constipation  heparin   Injectable 6500 Unit(s) IV Push every 6 hours PRN For aPTT less than 40  heparin   Injectable 3000 Unit(s) IV Push every 6 hours PRN For aPTT between 40 - 57  melatonin 3 milliGRAM(s) Oral at bedtime PRN Insomnia  ondansetron Injectable 4 milliGRAM(s) IV Push every 8 hours PRN Nausea and/or Vomiting  sodium chloride 0.65% Nasal 1 Spray(s) Both Nostrils two times a day PRN Nasal Congestion  sodium chloride 0.9% lock flush 10 milliLiter(s) IV Push every 1 hour PRN Pre/post blood products, medications, blood draw, and to maintain line patency    Height (cm): 172.7 (04-01 @ 13:23)    ICU Vital Signs Last 24 Hrs  T(C): 36.8 (02 Apr 2022 05:00), Max: 38.2 (01 Apr 2022 21:00)  T(F): 98.2 (02 Apr 2022 05:00), Max: 100.7 (01 Apr 2022 21:00)  HR: 110 (02 Apr 2022 06:00) (102 - 134)  BP: 124/54 (01 Apr 2022 08:00) (124/54 - 124/54)  BP(mean): 74 (01 Apr 2022 08:00) (74 - 74)  ABP: 117/53 (02 Apr 2022 06:00) (80/48 - 118/49)  ABP(mean): 72 (02 Apr 2022 06:00) (59 - 72)  RR: 34 (02 Apr 2022 06:00) (27 - 39)  SpO2: 79% (02 Apr 2022 06:00) (72% - 88%)      Mode: AC/ CMV (Assist Control/ Continuous Mandatory Ventilation)  RR (machine): 34  TV (machine): 420  FiO2: 100  PEEP: 10  MAP: 45  PIP: 35      I&O's Summary    01 Apr 2022 07:01  -  02 Apr 2022 07:00  --------------------------------------------------------  IN: 3215.2 mL / OUT: 375 mL / NET: 2840.2 mL    Physical Exam:    General ill, sedated and paralyzed.  HEENT - nc/at, orally intubated  neck -  no jvd, IJ is out  lungs scattered rhonchi  cv rrr  abdomen soft, non-tender, non distended  ext warm   martins  extremities warm                       8.0    12.40 )-----------( 140      ( 02 Apr 2022 05:55 )             27.2       04-02    136  |  94<L>  |  80<H>  ----------------------------<  155<H>  4.5   |  40<H>  |  2.74<H>    Ca    9.6      02 Apr 2022 05:55  Phos  5.6     04-02  Mg     3.3     04-02    ABG - ( 02 Apr 2022 05:53 )  pH, Arterial: 7.20  pH, Blood: x     /  pCO2: 99    /  pO2: 47    / HCO3: 39    / Base Excess: 6.9   /  SaO2: 79        DVT Prophylaxis:    IV Heparin                                                             Advanced Directives: DNR

## 2022-04-02 NOTE — PROGRESS NOTE ADULT - ASSESSMENT
72 y/o male with h/o HLD,  kidney stone, valvular heart disease, prostate Ca s/p radiation was admitted on 2/28 for    1. COVID 19  2. Acute hypoxic respiratory failure  3. ARDS  4. Pulmonary embolus  5. GUILLAUME/ARF  6. Hematuria  7. Hypotension    GOC had with wife/daughter 4/1/22 at 2120 wanting to continue to do everything possible through medical management, consent for blood given in the setting of gross but resolving hematuria and hypotension. Pt remains DNR.    Neuro:  - Pt sedated on Propofol Versed and Fentanyl gtt.    CV:  - Pt with Soft Bp with Arterial line monitoring not requiring pressors at this time.   - Midorine 10mg Q8H  - Low threshold to start pressors  - Pt hypotensive given 1 unit Prbc with increase in BP maintaining MAP >65    Pulm:  - Covid 19 s/p Remdisivir/Tocilizumad  - s/p decadron for enhanced anti-inflammatory effect  - Low TV lung protective strategies 4-6mL/kg,  - will utilize PEEP for alveolar recruitment is pt desats as tolerated by lung compliance  - Actively titrating ventilator settings to maintain SpO2 > 92%,  - Will tolerate permissive hypercapnia and acidosis for Goal Peak Plateau Pressures < 30,   - Vent Bundle in SITU  - End Tidal CO2 monitoring  - Nimbex gtt for Vent Synchrony                  GI:  - Cont IV Protonix 40mg IVP Daily,   - Enteral feeds as tolerated to avoid Stress ulceration and optimize nutritional state.    Renal:  - Cr doubled sine admission indicating GUILLAUME/ARF Continue to monitor Bun/Cr and UOP  - Replacing electrolytes as needed with Goal K> 4, PO> 3, Mg> 2               - Strict I&O's  - Avoid Nephro toxic medication  - Renally dose meds  - Nephrology following    Heme:  - Heparin gtt for PE in setting of GUILLAUME/ARF. aPTT 53.7  - Hgb 7.2, 1 unit Prbc given in presence of gross but  resolving hematuria. Will f/u cbc    ID:  - WBC 15.19,  intermittent fevers on cooling blanket  - Continue Cefipime for possible superimposed bacterial PNA  - Microbiology and Radiology reviewed   - trend CBC with diff, CMP  and fever curve  - ID following    Endo:  - ISS for aggressive glycemic control to limit FS glucose to < 180mg/dl.      Critical Care Time:  45 minutes were spent assessing the patient's presenting problems of acute illness that pose a high probability of life threatening  deterioration or end organ damage / dysfunction.  Medical desicion making includes initiation / continuation of plan or care review data/ labwork/ radiographic study, direct patient care bedside ,  discussions with  consultants regarding care,  evaluation and interpretation of vital signs, any necessary ventilator management,   NIV or BIPAP changes  or initiation,    discussions with multidisipliary team,  am or pm rounds, discussions of goals of care with patient and family all non-inclusive of procedures.      COVID 19 specific considerations and therapeutic  options based on the available and rapidly changing literature    Plan Discussed with Dr Wolf   70 y/o male with h/o HLD,  kidney stone, valvular heart disease, prostate Ca s/p radiation was admitted on 2/28 for    1. COVID 19  2. Acute hypoxic respiratory failure  3. ARDS  4. Pulmonary embolus  5. GUILLAUME/ARF  6. Hematuria  7. Hypotension    GOC had with wife/daughter 4/1/22 at 2120 wanting to continue to do everything possible through medical management, consent for blood given in the setting of gross but resolving hematuria and hypotension. Pt remains DNR.    Neuro:  - Pt sedated on Propofol Versed and Fentanyl gtt.    CV:  - Pt with Soft Bp with Arterial line monitoring not requiring pressors at this time.   - Midorine 10mg Q8H  - Low threshold to start pressors  - Pt hypotensive given 1 unit Prbc with increase in BP maintaining MAP >65    Pulm:  - Covid 19 s/p Remdisivir/Tocilizumad  - s/p decadron for enhanced anti-inflammatory effect  - Low TV lung protective strategies 4-6mL/kg,  - will utilize PEEP for alveolar recruitment is pt desats as tolerated by lung compliance  - Actively titrating ventilator settings to optimize oxygenation, current SPO2 84% on 100% Fio2  - Will tolerate permissive hypercapnia and acidosis for Goal Peak Plateau Pressures < 30,   - Vent Bundle in SITU  - End Tidal CO2 monitoring  - Nimbex gtt for Vent Synchrony                  GI:  - Cont IV Protonix 40mg IVP Daily,   - Enteral feeds as tolerated to avoid Stress ulceration and optimize nutritional state.    Renal:  - Cr doubled sine admission indicating GUILLAUME/ARF Continue to monitor Bun/Cr and UOP  - Replacing electrolytes as needed with Goal K> 4, PO> 3, Mg> 2               - Strict I&O's  - Avoid Nephro toxic medication  - Renally dose meds  - Nephrology following    Heme:  - Heparin gtt for PE in setting of GUILLAUME/ARF. aPTT 53.7  - Hgb 7.2, 1 unit Prbc given in presence of gross but  resolving hematuria. Will f/u cbc    ID:  - WBC 15.19,  intermittent fevers on cooling blanket  - Continue Cefipime for possible superimposed bacterial PNA  - Microbiology and Radiology reviewed   - trend CBC with diff, CMP  and fever curve  - ID following    Endo:  - ISS for aggressive glycemic control to limit FS glucose to < 180mg/dl.      Critical Care Time:  45 minutes were spent assessing the patient's presenting problems of acute illness that pose a high probability of life threatening  deterioration or end organ damage / dysfunction.  Medical desicion making includes initiation / continuation of plan or care review data/ labwork/ radiographic study, direct patient care bedside ,  discussions with  consultants regarding care,  evaluation and interpretation of vital signs, any necessary ventilator management,   NIV or BIPAP changes  or initiation,    discussions with multidisipliary team,  am or pm rounds, discussions of goals of care with patient and family all non-inclusive of procedures.      COVID 19 specific considerations and therapeutic  options based on the available and rapidly changing literature    Plan Discussed with Dr Wolf

## 2022-04-02 NOTE — PROGRESS NOTE ADULT - PROVIDER SPECIALTY LIST ADULT
Critical Care
Hospitalist
Critical Care
Hospitalist
Infectious Disease
MICU
MICU
Pulmonology
Critical Care
Hospitalist
Infectious Disease
Nephrology
Pulmonology
Critical Care
Critical Care
Hospitalist
Infectious Disease
Pulmonology
Critical Care

## 2022-04-02 NOTE — PROGRESS NOTE ADULT - ASSESSMENT
72 y/o male with h/o HLD,  kidney stone, valvular heart disease, prostate Ca s/p radiation was admitted on 2/28 for progressive  SOB.  Pt reports that on 2/19 he got sick with runny nose, fever and cough and tested positive for covid-19 on 2/21. His fever improved, but his SOB got worse, had difficulty breathing with minimal exertion. He checked his O2 and was below 80. In ER he received remdesivir.    1. Acute respiratory failure. COVID-19 breakthrough viral syndrome. Multifocal pneumonia. Possible superimposed bacterial pneumonia. PE.  -ARF  -still febrile  - vent and pressors per icu  -respiratory frail  -worsening respiratory infiltrates  -leukocytosis  -concern about superimposed bacterial pneumonia was raised  -given tociluzumab infusion on 3/2  -s/p remdesivir protocol # 10  -tolerating abx well so far; no side effects noted  -s/p cefepime 2 gm IV q12h # 7 (completed on 3/17)  -s/p meropenem 1 gm IV q8h and vancomycin 1 gm IV q12h # 8 days (complete 3/25)  -droplet isolation  -obtain sputum c/s   -respiratory care  -s/p vancomycin 1 gm IV q12h # 3  -on cefepime 1 gm IV qd # 4  -tolerating abx well so far; no side effects noted  -repeat COVID PCR is still detected  -continue abx coverage for now  -monitor temps  -f/u CBC  -supportive care  2. Other issues:   -care per medicine    d/w Dr. Wolf

## 2022-04-02 NOTE — PROGRESS NOTE ADULT - SUBJECTIVE AND OBJECTIVE BOX
NEPHROLOGY INTERVAL HPI/OVERNIGHT EVENTS:    Date of Service: 04-02-22 @ 11:05    4/2--NOw on Levophed for BP support.  FIO2 100% PEEP  10 with O2 sat in 70's. Oliguric now.    HPI:  70 yo pt with PMH of HLD,  kidney stone, valvular heart dz, prostate ca s/p Radiation presented  to ED c/o progressive  SOB.  Pt reports he got sich with runny nose and cough 2/19 and tested positive for covid19 on 2/21.  Pt reports that he did have fevers but resolved  but SOB got worse, had difficulty breathing with minimal exertion..  In ED Pt was hypoxic down to 70s on RA, was initially placed on NRB, later weaned of to 5L NC.   Pt received IV dexamethasone and Remdesivir.  Pt vaccinated 1st and second dose  Pfizer,  with second one in April 2021. No Booster.     Pts wife was also sick at home with covid19.   s/p remdesivir, Decadron,  remains intubated in ARDS worsening cxr  was proned multiple times  Multifocal pneumonia. Possible superimposed bacterial pneumonia. PE.  given tociluzumab infusion on 3/2  s/p remdesivir protocol # 10  s/p cefepime 2 gm IV q12h # 7 (completed on 3/17)  s/p meropenem 1 gm IV q8h and   vancomycin 1 gm IV q12h # 8 days (complete 3/25)  central line d/c 3/29  restarted on antibiotics due to CXR infiltrates, Discussed w Dr Brody  -on cefepime 2 gm IV q12h and vancomycin 1 gm IV q12h   vanco dc d after 2nd dose due to faraz this am  4/01,  creatinine 1.7  Vanco level 24.7 today  Case d/w Dr Roque    MEDICATIONS  (STANDING):  atorvastatin 10 milliGRAM(s) Oral at bedtime  cefepime  Injectable. 1000 milliGRAM(s) IV Push every 12 hours  chlorhexidine 0.12% Liquid 15 milliLiter(s) Oral Mucosa every 12 hours  chlorhexidine 4% Liquid 1 Application(s) Topical <User Schedule>  cisatracurium Infusion 2.999 MICROgram(s)/kG/Min (14.9 mL/Hr) IV Continuous <Continuous>  fentaNYL   Infusion.. 0.5 MICROgram(s)/kG/Hr (2.07 mL/Hr) IV Continuous <Continuous>  heparin  Infusion.  Unit(s)/Hr (15 mL/Hr) IV Continuous <Continuous>  midazolam Infusion 0.02 mG/kG/Hr (1.66 mL/Hr) IV Continuous <Continuous>  midodrine 10 milliGRAM(s) Oral every 8 hours  norepinephrine Infusion 0.05 MICROgram(s)/kG/Min (7.76 mL/Hr) IV Continuous <Continuous>  pantoprazole  Injectable 40 milliGRAM(s) IV Push daily  petrolatum Ophthalmic Ointment 1 Application(s) Both EYES every 12 hours  polyethylene glycol 3350 17 Gram(s) Oral daily  senna 2 Tablet(s) Oral at bedtime    MEDICATIONS  (PRN):  acetaminophen     Tablet .. 650 milliGRAM(s) Oral every 6 hours PRN Temp greater or equal to 38C (100.4F), Mild Pain (1 - 3)  ALBUTerol    90 MICROgram(s) HFA Inhaler 2 Puff(s) Inhalation every 6 hours PRN Shortness of Breath and/or Wheezing  aluminum hydroxide/magnesium hydroxide/simethicone Suspension 30 milliLiter(s) Oral every 4 hours PRN Dyspepsia  benzonatate 100 milliGRAM(s) Oral every 8 hours PRN Cough  bisacodyl Suppository 10 milliGRAM(s) Rectal daily PRN Constipation  heparin   Injectable 6500 Unit(s) IV Push every 6 hours PRN For aPTT less than 40  heparin   Injectable 3000 Unit(s) IV Push every 6 hours PRN For aPTT between 40 - 57  melatonin 3 milliGRAM(s) Oral at bedtime PRN Insomnia  ondansetron Injectable 4 milliGRAM(s) IV Push every 8 hours PRN Nausea and/or Vomiting  sodium chloride 0.65% Nasal 1 Spray(s) Both Nostrils two times a day PRN Nasal Congestion  sodium chloride 0.9% lock flush 10 milliLiter(s) IV Push every 1 hour PRN Pre/post blood products, medications, blood draw, and to maintain line patency    Vital Signs Last 24 Hrs  T(C): 37.4 (02 Apr 2022 08:00), Max: 38.2 (01 Apr 2022 21:00)  T(F): 99.3 (02 Apr 2022 08:00), Max: 100.7 (01 Apr 2022 21:00)  HR: 120 (02 Apr 2022 10:10) (102 - 134)  BP: --  BP(mean): --  RR: 34 (02 Apr 2022 10:10) (34 - 39)  SpO2: 73% (02 Apr 2022 10:10) (69% - 87%)    04-01 @ 07:01  -  04-02 @ 07:00  --------------------------------------------------------  IN: 3215.2 mL / OUT: 375 mL / NET: 2840.2 mL    04-02 @ 07:01  -  04-02 @ 11:05  --------------------------------------------------------  IN: 27 mL / OUT: 0 mL / NET: 27 mL    PHYSICAL EXAM:  GENERAL: On vent  CHEST/LUNG: fair екатерина entry  HEART: S1S2 tachy  ABDOMEN: soft  EXTREMITIES: no edema  SKIN:     LABS:                        8.0    12.40 )-----------( 140      ( 02 Apr 2022 05:55 )             27.2     04-02    136  |  94<L>  |  80<H>  ----------------------------<  155<H>  4.5   |  40<H>  |  2.74<H>    Ca    9.6      02 Apr 2022 05:55  Phos  5.6     04-02  Mg     3.3     04-02      PT/INR - ( 02 Apr 2022 05:55 )   PT: 14.2 sec;   INR: 1.22 ratio         PTT - ( 02 Apr 2022 09:00 )  PTT:69.0 sec    Magnesium, Serum: 3.3 mg/dL (04-02 @ 05:55)  Phosphorus Level, Serum: 5.6 mg/dL (04-02 @ 05:55)    ABG - ( 02 Apr 2022 05:53 )  pH, Arterial: 7.20  pH, Blood: x     /  pCO2: 99    /  pO2: 47    / HCO3: 39    / Base Excess: 6.9   /  SaO2: 79                    RADIOLOGY & ADDITIONAL TESTS:

## 2022-04-02 NOTE — PROGRESS NOTE ADULT - CRITICAL CARE SERVICES PROVIDED
Patient is critically ill, requiring critical care services.
Critical care services provided
Critical care services provided
Patient is critically ill, requiring critical care services.
Critical care services provided
Patient is critically ill, requiring critical care services.
Critical care services provided
Patient is critically ill, requiring critical care services.
Critical care services provided

## 2022-04-02 NOTE — PROVIDER CONTACT NOTE (CRITICAL VALUE NOTIFICATION) - NAME OF MD/NP/PA/DO NOTIFIED:
Dr Wolf
AMIRAH Jung
AMIRAH Jung
ICU AMIRAH Vergara
AMIRAH Jung
Karli Abousaid
AMIRAH Jung
Dr. Whitt
Vlad Jung
AMIRAH Membreno
AMIRAH Retana

## 2022-04-02 NOTE — PROGRESS NOTE ADULT - SUBJECTIVE AND OBJECTIVE BOX
Patient is a 71y old  Male who presents with a chief complaint of SOB (01 Apr 2022 14:31)      BRIEF HOSPITAL COURSE: 72 y/o male with h/o HLD,  kidney stone, valvular heart disease, prostate Ca s/p radiation was admitted on 2/28 for progressive  SOB.  Pt reports that on 2/19 he got sick with runny nose, fever and cough and tested positive for covid-19 on 2/21. His fever improved, but his SOB got worse, had difficulty breathing with minimal exertion. He checked his O2 and was below 80. In ER he received remdesivir.    Events last 24 hours: Pt remains intubated, sedated and paralyzed and is currently supine. Pt hypotensive with Hgb <8 and gross but resolving hematuria post heparin gtt. Phone called made to wife and daughter on 4/1/22 at 2120 who have  given consent for blood transfusion. Will follow up CBC in am.    PAST MEDICAL & SURGICAL HISTORY:  Hypercholesterolemia    Cholelithiasis    Cholecystitis    Valvular heart disease    Psoriasis    History of kidney stones    Inguinal hernia, left    Prostate cancer  s/p XRT 1-6-2020 to 1/27/2021    History of renal calculi    S/P hernia repair  right inguinal; 5/2015    S/P colonoscopy with polypectomy  Baseline; viv polyps; 2012    History of cholecystectomy  2017    History of prostatectomy  5/2020    History of lithotripsy        Review of Systems:  ROSEMARY intubated, sedated, paralyzed    Medications:  cefepime  Injectable. 1000 milliGRAM(s) IV Push every 12 hours    midodrine 10 milliGRAM(s) Oral every 8 hours  norepinephrine Infusion 0.05 MICROgram(s)/kG/Min IV Continuous <Continuous>    ALBUTerol    90 MICROgram(s) HFA Inhaler 2 Puff(s) Inhalation every 6 hours PRN  benzonatate 100 milliGRAM(s) Oral every 8 hours PRN    acetaminophen     Tablet .. 650 milliGRAM(s) Oral every 6 hours PRN  cisatracurium Infusion 2.999 MICROgram(s)/kG/Min IV Continuous <Continuous>  fentaNYL   Infusion.. 0.5 MICROgram(s)/kG/Hr IV Continuous <Continuous>  melatonin 3 milliGRAM(s) Oral at bedtime PRN  midazolam Infusion 0.02 mG/kG/Hr IV Continuous <Continuous>  ondansetron Injectable 4 milliGRAM(s) IV Push every 8 hours PRN  propofol Infusion 10 MICROgram(s)/kG/Min IV Continuous <Continuous>      heparin   Injectable 6500 Unit(s) IV Push every 6 hours PRN  heparin   Injectable 3000 Unit(s) IV Push every 6 hours PRN  heparin  Infusion.  Unit(s)/Hr IV Continuous <Continuous>    aluminum hydroxide/magnesium hydroxide/simethicone Suspension 30 milliLiter(s) Oral every 4 hours PRN  bisacodyl Suppository 10 milliGRAM(s) Rectal daily PRN  pantoprazole  Injectable 40 milliGRAM(s) IV Push daily  polyethylene glycol 3350 17 Gram(s) Oral daily  senna 2 Tablet(s) Oral at bedtime      atorvastatin 10 milliGRAM(s) Oral at bedtime    sodium chloride 0.9% lock flush 10 milliLiter(s) IV Push every 1 hour PRN      chlorhexidine 0.12% Liquid 15 milliLiter(s) Oral Mucosa every 12 hours  chlorhexidine 4% Liquid 1 Application(s) Topical <User Schedule>  petrolatum Ophthalmic Ointment 1 Application(s) Both EYES every 12 hours  sodium chloride 0.65% Nasal 1 Spray(s) Both Nostrils two times a day PRN        Mode: AC/ CMV (Assist Control/ Continuous Mandatory Ventilation)  RR (machine): 34  TV (machine): 420  FiO2: 100  PEEP: 10  MAP: 45  PIP: 38      ICU Vital Signs Last 24 Hrs  T(C): 37.1 (02 Apr 2022 00:00), Max: 38.2 (01 Apr 2022 21:00)  T(F): 98.8 (02 Apr 2022 00:00), Max: 100.7 (01 Apr 2022 21:00)  HR: 103 (02 Apr 2022 02:00) (99 - 134)  BP: 124/54 (01 Apr 2022 08:00) (124/54 - 124/54)  BP(mean): 74 (01 Apr 2022 08:00) (74 - 74)  ABP: 105/56 (02 Apr 2022 02:00) (80/48 - 124/48)  ABP(mean): 72 (02 Apr 2022 02:00) (58 - 72)  RR: 34 (02 Apr 2022 02:00) (27 - 39)  SpO2: 82% (02 Apr 2022 02:00) (80% - 90%)      ABG - ( 01 Apr 2022 06:40 )  pH, Arterial: 7.37  pH, Blood: x     /  pCO2: 67    /  pO2: 109   / HCO3: 39    / Base Excess: 10.6  /  SaO2: 100                 I&O's Detail    31 Mar 2022 07:01  -  01 Apr 2022 07:00  --------------------------------------------------------  IN:    Cisatracurium: 128.1 mL    FentaNYL: 483 mL    Free Water: 50 mL    IV PiggyBack: 600 mL    Midazolam: 290.4 mL    Modular Fluid: 4 mL    Nepro: 1011 mL    Propofol: 103 mL  Total IN: 2669.5 mL    OUT:    Indwelling Catheter - Urethral (mL): 500 mL  Total OUT: 500 mL    Total NET: 2169.5 mL      01 Apr 2022 07:01  -  02 Apr 2022 02:05  --------------------------------------------------------  IN:    Cisatracurium: 133.8 mL    FentaNYL: 237.8 mL    Free Water: 180 mL    Heparin Infusion: 56.4 mL    Midazolam: 142.8 mL    Modular Fluid: 1 mL    Nepro: 189 mL    Propofol: 114.4 mL  Total IN: 1055.2 mL    OUT:    Indwelling Catheter - Urethral (mL): 285 mL  Total OUT: 285 mL    Total NET: 770.2 mL          LABS:                        7.2    15.19 )-----------( 149      ( 01 Apr 2022 18:40 )             24.3     04-01    139  |  96  |  63<H>  ----------------------------<  162<H>  4.2   |  42<H>  |  1.74<H>    Ca    9.2      01 Apr 2022 05:40  Phos  3.5     04-01  Mg     2.8     04-01            CAPILLARY BLOOD GLUCOSE        PT/INR - ( 01 Apr 2022 11:00 )   PT: 13.6 sec;   INR: 1.17 ratio         PTT - ( 01 Apr 2022 18:40 )  PTT:53.7 sec    CULTURES:  Culture Results:   Normal Respiratory Kyung present (03-30 @ 11:45)      Physical Examination:    VITALS AT TIME OF EXAM:  HR:  BP:  RR:  SPO2:    General: Critical cappearing    HEENT: Pupils equal, reactive to light. Symmetric. No scleral icterus or injection.    PULM: Clear to auscultation B/L. No wheezes, rales, or rhonchi apprecaited. No significant sputum production or increased respiratory effort.    NECK: Supple, no lymphadenopathy, trachea midline.    CVS: Regular rate and rhythm, no murmurs appreciated, +s1/s2.    ABD: Soft, distended, nontender, normoactive bowel sounds.    EXT: mil lower extremity edema 1+.    SKIN: Warm and well perfused, no rashes noted.    NEURO: Paralyzed, sedated      RADIOLOGY: < from: Xray Chest 1 View- PORTABLE-Urgent (03.30.22 @ 10:23) >    ACC: [id number] EXAM:  XR CHEST PORTABLE URGENT 1V                          PROCEDURE DATE:  03/30/2022          INTERPRETATION:  AP erect chest on March 30, 2022 at 8:56 AM. Patient has   respiratory failure and Covid pneumonia. 2 images.    Heart magnified by technique.    Endotracheal tube, nasogastric tube, and left jugular line remain.    Persistent diffuse advanced infiltrates.    Chest similar to March 29.    IMPRESSION: Unchanged advanced infiltrates.    --- End of Report ---            CHRISTINA JORGENSEN MD; Attending Radiologist  This document has been electronically signed. Mar 30 2022  2:57PM    < end of copied text >

## 2022-04-02 NOTE — PROVIDER CONTACT NOTE (CRITICAL VALUE NOTIFICATION) - SITUATION
CO2>45
Pt pH 7.18 & CO2>125. Pt intubated and sedated. Currently in prone position. + Covid
Pt ABG CO2-77
ABG: PCO2: 75, HCO3: 43
PA made aware of above results from [facility] at 0533.

## 2022-04-02 NOTE — PROGRESS NOTE ADULT - NUTRITIONAL ASSESSMENT
This patient has been assessed with a concern for Malnutrition and has been determined to have a diagnosis/diagnoses of Moderate protein-calorie malnutrition.    This patient is being managed with:   Diet NPO with Tube Feed-  Tube Feeding Modality: Orogastric  Nepro with Carb Steady (NEPRORTH)  Total Volume for 24 Hours (mL): 1200  Continuous  Until Goal Tube Feed Rate (mL per Hour): 50  Tube Feed Duration (in Hours): 24  Tube Feed Start Time: 00:00  No Carb Prosource TF     Qty per Day:  4  Entered: Mar 22 2022  9:51AM    
This patient has been assessed with a concern for Malnutrition and has been determined to have a diagnosis/diagnoses of Moderate protein-calorie malnutrition.    This patient is being managed with:   Diet NPO with Tube Feed-  Tube Feeding Modality: Orogastric  Nepro with Carb Steady (NEPRORTH)  Total Volume for 24 Hours (mL): 1200  Continuous  Until Goal Tube Feed Rate (mL per Hour): 50  Tube Feed Duration (in Hours): 24  Tube Feed Start Time: 00:00  No Carb Prosource TF     Qty per Day:  4  Entered: Mar 22 2022  9:51AM      This patient has been assessed with a concern for Malnutrition and has been determined to have a diagnosis/diagnoses of Moderate protein-calorie malnutrition.    This patient is being managed with:   Diet NPO with Tube Feed-  Tube Feeding Modality: Orogastric  Nepro with Carb Steady (NEPRORTH)  Total Volume for 24 Hours (mL): 1200  Continuous  Until Goal Tube Feed Rate (mL per Hour): 50  Tube Feed Duration (in Hours): 24  Tube Feed Start Time: 00:00  No Carb Prosource TF     Qty per Day:  4  Entered: Mar 22 2022  9:51AM    
This patient has been assessed with a concern for Malnutrition and has been determined to have a diagnosis/diagnoses of Moderate protein-calorie malnutrition.    This patient is being managed with:   Diet NPO with Tube Feed-  Tube Feeding Modality: Orogastric  Nepro with Carb Steady (NEPRORTH)  Total Volume for 24 Hours (mL): 1200  Continuous  Until Goal Tube Feed Rate (mL per Hour): 50  Tube Feed Duration (in Hours): 24  Tube Feed Start Time: 00:00  No Carb Prosource TF     Qty per Day:  4  Entered: Mar 22 2022  9:51AM    
This patient has been assessed with a concern for Malnutrition and has been determined to have a diagnosis/diagnoses of Moderate protein-calorie malnutrition.    This patient is being managed with:   Diet NPO with Tube Feed-  Tube Feeding Modality: Orogastric  Nepro with Carb Steady (NEPRORTH)  Total Volume for 24 Hours (mL): 1200  Continuous  Until Goal Tube Feed Rate (mL per Hour): 50  Tube Feed Duration (in Hours): 24  Tube Feed Start Time: 00:00  No Carb Prosource TF     Qty per Day:  4  Entered: Mar 22 2022  9:51AM      This patient has been assessed with a concern for Malnutrition and has been determined to have a diagnosis/diagnoses of Moderate protein-calorie malnutrition.    This patient is being managed with:   Diet NPO with Tube Feed-  Tube Feeding Modality: Orogastric  Nepro with Carb Steady (NEPRORTH)  Total Volume for 24 Hours (mL): 1200  Continuous  Until Goal Tube Feed Rate (mL per Hour): 50  Tube Feed Duration (in Hours): 24  Tube Feed Start Time: 00:00  No Carb Prosource TF     Qty per Day:  4  Entered: Mar 22 2022  9:51AM    
This patient has been assessed with a concern for Malnutrition and has been determined to have a diagnosis/diagnoses of Moderate protein-calorie malnutrition.    This patient is being managed with:   Diet NPO with Tube Feed-  Tube Feeding Modality: Orogastric  Nepro with Carb Steady (NEPRORTH)  Total Volume for 24 Hours (mL): 1200  Continuous  Until Goal Tube Feed Rate (mL per Hour): 50  Tube Feed Duration (in Hours): 24  Tube Feed Start Time: 00:00  No Carb Prosource TF     Qty per Day:  4  Entered: Mar 22 2022  9:51AM    
This patient has been assessed with a concern for Malnutrition and has been determined to have a diagnosis/diagnoses of Moderate protein-calorie malnutrition.    This patient is being managed with:   Diet NPO with Tube Feed-  Tube Feeding Modality: Orogastric  Nepro with Carb Steady (NEPRORTH)  Total Volume for 24 Hours (mL): 1200  Continuous  Until Goal Tube Feed Rate (mL per Hour): 50  Tube Feed Duration (in Hours): 24  Tube Feed Start Time: 00:00  No Carb Prosource TF     Qty per Day:  4  Entered: Mar 22 2022  9:51AM    
This patient has been assessed with a concern for Malnutrition and has been determined to have a diagnosis/diagnoses of Moderate protein-calorie malnutrition.    This patient is being managed with:   Diet NPO with Tube Feed-  Tube Feeding Modality: Orogastric  Nepro with Carb Steady (NEPRORTH)  Total Volume for 24 Hours (mL): 1200  Continuous  Starting Tube Feed Rate {mL per Hour}: 10  Increase Tube Feed Rate by (mL): 10     Every 6 hours  Until Goal Tube Feed Rate (mL per Hour): 50  Tube Feed Duration (in Hours): 24  Tube Feed Start Time: 00:00  Free Water Flush  Free Water Flush Instructions:  30mL q1hr  No Carb Prosource TF     Qty per Day:  4  Entered: Mar 21 2022 10:51AM    
This patient has been assessed with a concern for Malnutrition and has been determined to have a diagnosis/diagnoses of Moderate protein-calorie malnutrition.    This patient is being managed with:   Diet NPO with Tube Feed-  Tube Feeding Modality: Orogastric  Nepro with Carb Steady (NEPRORTH)  Total Volume for 24 Hours (mL): 1200  Continuous  Until Goal Tube Feed Rate (mL per Hour): 50  Tube Feed Duration (in Hours): 24  Tube Feed Start Time: 00:00  No Carb Prosource TF     Qty per Day:  4  Entered: Mar 22 2022  9:51AM    
This patient has been assessed with a concern for Malnutrition and has been determined to have a diagnosis/diagnoses of Moderate protein-calorie malnutrition.    This patient is being managed with:   Diet NPO with Tube Feed-  Tube Feeding Modality: Orogastric  Nepro with Carb Steady (NEPRORTH)  Total Volume for 24 Hours (mL): 1200  Continuous  Until Goal Tube Feed Rate (mL per Hour): 50  Tube Feed Duration (in Hours): 24  Tube Feed Start Time: 00:00  No Carb Prosource TF     Qty per Day:  4  Entered: Mar 22 2022  9:51AM

## 2022-04-02 NOTE — PROGRESS NOTE ADULT - ASSESSMENT
72 yo pt with PMH of HLD,  kidney stone, valvular heart dz, prostate ca s/p Radiation presented  to ED c/o progressive  SOB.  Pt reports he got sich with runny nose and cough 2/19 and tested positive for covid19 on 2/21.  Pt reports that he did have fevers but resolved  but SOB got worse, had difficulty breathing with minimal exertion..  In ED Pt was hypoxic down to 70s on RA, was initially placed on NRB, later weaned of to 5L NC.   Pt received IV dexamethasone and Remdesivir.  Pt vaccinated 1st and second dose  Pfizer,  with second one in April 2021. No Booster.     Pts wife was also sick at home with covid19.   s/p remdesivir, Decadron,  remains intubated in ARDS worsening cxr  was proned multiple times  Multifocal pneumonia. Possible superimposed bacterial pneumonia. PE, vq scan 3/1  given tociluzumab infusion on 3/2  s/p remdesivir protocol # 10  s/p cefepime 2 gm IV q12h # 7 (completed on 3/17)  s/p meropenem 1 gm IV q8h and   vancomycin 1 gm IV q12h # 8 days (complete 3/25)  central line d/c 3/29  restarted on antibiotics due to CXR infiltrates, Discussed w Dr Brody  on cefepime 2 gm IV q12h and vancomycin 1 gm IV q12h   vanco dc d after 2nd dose due to guillaume this am  4/01,  creatinine 1.7  Vanco level 24.7 today  Case d/w Dr Roque    A/P  GUILLAUME  Possibly Covid related, multifactorial  Vanco dc d due to GUILLAUME this morning   Vanco Level slightly elevated, but pt may have been   developing azotemia or GUILLAUME,  the creat doubled although in normal range from  0.35 to 0.67, and K of 5.1 yesterday  Vanco may have been additive to a developing renal insult  UO also down for 1500- 2000ml range to 500 ml  Agree w DC vanco until levels and Creat acceptable level  Avoid IV contrast and nephrotoxic meds      4/2 SY  --GUILLAUME with COVID 19 with multisytem failure.  Renal function further deteriorated, now with oliguria.    Severe resp acidosis with acidemia with elevated serum CO2.    D/w Dr Wolf and pt's wife and daughter.    Explained very grave prognosis, maira with GUILLAUME and that dialysis intervention very unlikely to alter clinical course of continued deterioration.    Pt's family declined HD.

## 2022-04-02 NOTE — DISCHARGE NOTE FOR THE EXPIRED PATIENT - HOSPITAL COURSE
Patient admitted with covid pneumonia hx of underlying copd.  Patient developed progressive hypoxia  was intubated  received steroids, remdexivir  was proned multiple time  than developed faraz  family decided not to  do dialysis.  was dnr

## 2022-04-06 LAB
CULTURE RESULTS: SIGNIFICANT CHANGE UP
CULTURE RESULTS: SIGNIFICANT CHANGE UP
SPECIMEN SOURCE: SIGNIFICANT CHANGE UP
SPECIMEN SOURCE: SIGNIFICANT CHANGE UP

## 2022-04-07 DIAGNOSIS — I26.94 MULTIPLE SUBSEGMENTAL PULMONARY EMBOLI WITHOUT ACUTE COR PULMONALE: ICD-10-CM

## 2022-04-07 DIAGNOSIS — Z87.891 PERSONAL HISTORY OF NICOTINE DEPENDENCE: ICD-10-CM

## 2022-04-07 DIAGNOSIS — A41.9 SEPSIS, UNSPECIFIED ORGANISM: ICD-10-CM

## 2022-04-07 DIAGNOSIS — J84.10 PULMONARY FIBROSIS, UNSPECIFIED: ICD-10-CM

## 2022-04-07 DIAGNOSIS — R31.9 HEMATURIA, UNSPECIFIED: ICD-10-CM

## 2022-04-07 DIAGNOSIS — E44.0 MODERATE PROTEIN-CALORIE MALNUTRITION: ICD-10-CM

## 2022-04-07 DIAGNOSIS — E87.2 ACIDOSIS: ICD-10-CM

## 2022-04-07 DIAGNOSIS — J43.9 EMPHYSEMA, UNSPECIFIED: ICD-10-CM

## 2022-04-07 DIAGNOSIS — Z66 DO NOT RESUSCITATE: ICD-10-CM

## 2022-04-07 DIAGNOSIS — Z85.46 PERSONAL HISTORY OF MALIGNANT NEOPLASM OF PROSTATE: ICD-10-CM

## 2022-04-07 DIAGNOSIS — E87.5 HYPERKALEMIA: ICD-10-CM

## 2022-04-07 DIAGNOSIS — E87.1 HYPO-OSMOLALITY AND HYPONATREMIA: ICD-10-CM

## 2022-04-07 DIAGNOSIS — E78.5 HYPERLIPIDEMIA, UNSPECIFIED: ICD-10-CM

## 2022-04-07 DIAGNOSIS — U07.1 COVID-19: ICD-10-CM

## 2022-04-07 DIAGNOSIS — R65.21 SEVERE SEPSIS WITH SEPTIC SHOCK: ICD-10-CM

## 2022-04-07 DIAGNOSIS — J15.9 UNSPECIFIED BACTERIAL PNEUMONIA: ICD-10-CM

## 2022-04-07 DIAGNOSIS — J12.82 PNEUMONIA DUE TO CORONAVIRUS DISEASE 2019: ICD-10-CM

## 2022-04-07 DIAGNOSIS — N17.9 ACUTE KIDNEY FAILURE, UNSPECIFIED: ICD-10-CM

## 2022-04-07 DIAGNOSIS — J80 ACUTE RESPIRATORY DISTRESS SYNDROME: ICD-10-CM

## 2022-05-22 NOTE — PROVIDER CONTACT NOTE (CRITICAL VALUE NOTIFICATION) - TEST AND RESULT REPORTED:
co2=84, hco3=48
Aleida
Aleida
CO2 45
ca=6.3, H&H=6.2&17.6
ABG: C02: 76 bicarb: 45
ABG: pH- 7.18, CO2 >125
Hco3 serum=45
Lactate 3.4
ABG CO2-77
ABG ph 7.20 PC02 99 and PO2 47
24

## 2023-11-16 NOTE — PROVIDER CONTACT NOTE (OTHER) - SITUATION
Patient with abnormal VS, new gross hematuria on a new heparin drip. H/H from 1840 7.2/24.3
spoke with Nia, office aware of consult
Detail Level: Detailed
spoke with Anat, office aware of admission to [address] [place] [address]. fax is [phone]
Sunscreen Recommendations: Discussed importance of regular photo protection with zinc based sunscreen and photo protective clothing.
Detail Level: Generalized
Sunscreen Recommendations: Mineral based sunscreens containing zinc and titanium to face, neck, and chest

## 2023-12-12 NOTE — PROGRESS NOTE ADULT - COMMENTS
Unobtainable due to clinical condition
Patient expressed no known problems or needs
Unobtainable due to clinical condition

## 2024-04-11 NOTE — ED STATDOCS - CHPI ED RELIEVING FACTORS
Pt remains free of falls and acute events throughout the shift.   Upon morning assessment left arm flaccid and left leg weakness was documented.  Attending notified, MRI completed.   Wound care consulted for left food wound.   Pt received IV Zosyn, IV Zithro, and IV Levaquin.  Pt received IV Lasix.  Vitals signs stable.   Pt on 2L O2 via nasal cannula.   Safety measures in place, call light within reach, all questions addressed at this time.   Problem: Safety - Adult  Goal: Free from fall injury  4/11/2024 1920 by Adiel Bryant, RN  Outcome: Progressing     Problem: Discharge Planning  Goal: Discharge to home or other facility with appropriate resources  4/11/2024 1920 by Adiel Bryant, RN  Outcome: Progressing     Problem: Pain  Goal: Verbalizes/displays adequate comfort level or baseline comfort level  Outcome: Progressing     Problem: Skin/Tissue Integrity - Adult  Goal: Incisions, wounds, or drain sites healing without S/S of infection  Outcome: Progressing     Problem: Gastrointestinal - Adult  Goal: Minimal or absence of nausea and vomiting  Outcome: Progressing     Problem: Gastrointestinal - Adult  Goal: Maintains or returns to baseline bowel function  Outcome: Progressing     Problem: Infection - Adult  Goal: Absence of infection at discharge  Outcome: Progressing     Problem: Hematologic - Adult  Goal: Maintains hematologic stability  Outcome: Progressing     Problem: Skin/Tissue Integrity  Goal: Absence of new skin breakdown  Description: 1.  Monitor for areas of redness and/or skin breakdown  2.  Assess vascular access sites hourly  3.  Every 4-6 hours minimum:  Change oxygen saturation probe site  4.  Every 4-6 hours:  If on nasal continuous positive airway pressure, respiratory therapy assess nares and determine need for appliance change or resting period.  4/11/2024 1920 by Adiel Bryant, RN  Outcome: Progressing     Problem: ABCDS Injury Assessment  Goal: Absence of physical injury  Outcome:  nothing

## 2024-07-09 NOTE — PROGRESS NOTE ADULT - SUBJECTIVE AND OBJECTIVE BOX
CC: SOB (10 Mar 2022 08:27)    HPI:  70 yo pt with PMH of HLD,  kidney stone, valvular heart dz, prostate ca s/p Radiation presented  to ED c/o progressive  SOB.  Pt reports he got sich with runny nose and cough 2/19 and tested positive for covid19 on 2/21.  Pt reports that he did have fevers but resolved  but SOB got worse, had difficulty breathing with minimal exertion. he checked his O2 and was below 80.  No CP, no palpitations, no leg pain.   Pt is s/p  2 shots of Pfizer  vaccine with second one in april 2021. No Booster.   Pts wife also sick at home with covid19.   In ED: Pt is hypoxic down to 70s on RA, was initially placed on NRB, later weaned of to 5L NC. Pt received IV dexamethasone and Remdesivir. S/p 1L IVF     INTERVAL HPI/ OVERNIGHT EVENTS: Pt was seen and examined,  no changes, was very emotional and discouraged of episode of low O2 sats this am, now improved.  HFNC settings were adjusted. Emotional support provided,  explained nature of  Dz and risk for prolong recovery due to baseline Lung DZ    Vital Signs Last 24 Hrs  T(C): 36.4 (13 Mar 2022 16:43), Max: 36.7 (12 Mar 2022 23:27)  T(F): 97.6 (13 Mar 2022 16:43), Max: 98.1 (12 Mar 2022 23:27)  HR: 100 (13 Mar 2022 16:43) (88 - 100)  BP: 137/75 (13 Mar 2022 16:43) (128/80 - 144/86)  RR: 18 (13 Mar 2022 16:43) (18 - 18)  SpO2: 93% (13 Mar 2022 16:43) (93% - 96%)      REVIEW OF SYSTEMS:  All other review of systems is negative unless indicated above.      PHYSICAL EXAM:  General: Well developed; looks  ill,  in no acute distress on HFNC  Eyes:  EOMI; conjunctiva and sclera clear  Head: Normocephalic; atraumatic  ENMT: No nasal discharge; airway clear  Neck: Supple; non tender; no masses  Respiratory: Decreased BS at bases, overall air entry improved, No wheezes   Cardiovascular: Regular rate and rhythm. S1 and S2 Normal;   Gastrointestinal: Soft non-tender non-distended; Normal bowel sounds  Genitourinary: No  suprapubic  tenderness  Extremities: No edema  Vascular: Peripheral pulses palpable 2+ bilaterally  Neurological: Alert and oriented x3, non focal   Skin: Warm and dry. No acute rash  Musculoskeletal: Normal muscle tone and strength   Psychiatric: Cooperative and appropriate        LABS:                           15.0   20.14 )-----------( 152      ( 14 Mar 2022 11:39 )             44.2     03-14    133<L>  |  100  |  16  ----------------------------<  176<H>  3.6   |  24  |  0.79    Ca    8.8      14 Mar 2022 11:39    TPro  6.1  /  Alb  2.9<L>  /  TBili  1.5<H>  /  DBili  0.3  /  AST  54<H>  /  ALT  110<H>  /  AlkPhos  117  03-14        LIVER FUNCTIONS - ( 14 Mar 2022 11:39 )  Alb: 2.9 g/dL / Pro: 6.1 gm/dL / ALK PHOS: 117 U/L / ALT: 110 U/L / AST: 54 U/L / GGT: x                                 15.5   21.14 )-----------( 187      ( 12 Mar 2022 08:56 )             46.0     03-13    x   |  x   |  x   ----------------------------<  x   x    |  x   |  0.61    Ca    8.5      12 Mar 2022 08:56    TPro  5.8<L>  /  Alb  2.6<L>  /  TBili  1.5<H>  /  DBili  0.4<H>  /  AST  26  /  ALT  74  /  AlkPhos  95  03-13        LIVER FUNCTIONS - ( 13 Mar 2022 08:43 )  Alb: 2.6 g/dL / Pro: 5.8 gm/dL / ALK PHOS: 95 U/L / ALT: 74 U/L / AST: 26 U/L / GGT: x                                   15.5   21.14 )-----------( 187      ( 12 Mar 2022 08:56 )             46.0     03-12    132<L>  |  97  |  18  ----------------------------<  116<H>  4.4   |  31  |  0.85    Ca    8.5      12 Mar 2022 08:56    TPro  5.9<L>  /  Alb  2.9<L>  /  TBili  1.9<H>  /  DBili  0.4<H>  /  AST  27  /  ALT  65  /  AlkPhos  108  03-12    LIVER FUNCTIONS - ( 12 Mar 2022 08:56 )  Alb: 2.9 g/dL / Pro: 5.9 gm/dL / ALK PHOS: 108 U/L / ALT: 65 U/L / AST: 27 U/L / GGT: x                                   16.4   28.18 )-----------( 220      ( 11 Mar 2022 08:34 )             47.0     03-11    130<L>  |  97  |  18  ----------------------------<  108<H>  3.9   |  25  |  0.61    Ca    8.3<L>      11 Mar 2022 08:34  Phos  3.3     03-10  Mg     2.9     03-10    TPro  6.2  /  Alb  3.1<L>  /  TBili  1.9<H>  /  DBili  0.4<H>  /  AST  32  /  ALT  60  /  AlkPhos  128<H>  03-11        LIVER FUNCTIONS - ( 11 Mar 2022 08:34 )  Alb: 3.1 g/dL / Pro: 6.2 gm/dL / ALK PHOS: 128 U/L / ALT: 60 U/L / AST: 32 U/L / GGT: x           ABG - ( 11 Mar 2022 09:28 )  pH, Arterial: 7.47  pH, Blood: x     /  pCO2: 35    /  pO2: 74    / HCO3: 26    / Base Excess: 2.1   /  SaO2: 98                                  16.5   32.85 )-----------( 294      ( 10 Mar 2022 08:37 )             48.4     10 Mar 2022 08:37    132    |  96     |  18     ----------------------------<  112    4.7     |  31     |  0.80     Ca    9.0        10 Mar 2022 08:37  Phos  3.3       10 Mar 2022 08:37  Mg     2.9       10 Mar 2022 08:37    TPro  6.9    /  Alb  3.3    /  TBili  1.6    /  DBili  0.4    /  AST  21     /  ALT  69     /  AlkPhos  128    10 Mar 2022 08:37  PT/INR - ( 09 Mar 2022 09:05 )   PT: 13.4 sec;   INR: 1.15 ratio          LIVER FUNCTIONS - ( 10 Mar 2022 08:37 )  Alb: 3.3 g/dL / Pro: 6.9 gm/dL / ALK PHOS: 128 U/L / ALT: 69 U/L / AST: 21 U/L / GGT: x             MEDICATIONS  (STANDING):  atorvastatin 10 milliGRAM(s) Oral at bedtime  budesonide 160 MICROgram(s)/formoterol 4.5 MICROgram(s) Inhaler 2 Puff(s) Inhalation two times a day  cefepime   IVPB 2000 milliGRAM(s) IV Intermittent every 12 hours  enoxaparin Injectable 80 milliGRAM(s) SubCutaneous every 12 hours  guaiFENesin ER 1200 milliGRAM(s) Oral every 12 hours  methylPREDNISolone sodium succinate Injectable 30 milliGRAM(s) IV Push daily  polyethylene glycol 3350 17 Gram(s) Oral daily  senna 2 Tablet(s) Oral at bedtime  tiotropium 18 MICROgram(s) Capsule 1 Capsule(s) Inhalation daily    MEDICATIONS  (PRN):  acetaminophen     Tablet .. 650 milliGRAM(s) Oral every 6 hours PRN Temp greater or equal to 38C (100.4F), Mild Pain (1 - 3)  ALBUTerol    90 MICROgram(s) HFA Inhaler 2 Puff(s) Inhalation every 6 hours PRN Shortness of Breath and/or Wheezing  aluminum hydroxide/magnesium hydroxide/simethicone Suspension 30 milliLiter(s) Oral every 4 hours PRN Dyspepsia  benzonatate 100 milliGRAM(s) Oral every 8 hours PRN Cough  bisacodyl Suppository 10 milliGRAM(s) Rectal daily PRN Constipation  melatonin 3 milliGRAM(s) Oral at bedtime PRN Insomnia  ondansetron Injectable 4 milliGRAM(s) IV Push every 8 hours PRN Nausea and/or Vomiting  sodium chloride 0.65% Nasal 1 Spray(s) Both Nostrils two times a day PRN Nasal Congestion        RADIOLOGY & ADDITIONAL TESTS:      ACC: [id number] EXAM:  XR CHEST PORTABLE IMMED 1V                        PROCEDURE DATE:  03/10/2022  s    COMPARISON STUDY: 3/1/2022  Frontal expiratory view of the chest shows the heart to be similar in   size. The lungs show slight clearing of the upper right lung with   progression of basilar infiltrates and there is no evidence of   pneumothorax nor pleural effusion.    IMPRESSION:  Changing infiltrates.       Z37.9       Past Medical History:        Diagnosis Date   • Anemia        Past Surgical History:  No past surgical history on file.    Social History:    Social History     Tobacco Use   • Smoking status: Never   • Smokeless tobacco: Never   Substance Use Topics   • Alcohol use: Not Currently     Alcohol/week: 1.0 standard drink of alcohol     Types: 1 Cans of beer per week                                Counseling given: Not Answered      Vital Signs (Current):   Vitals:    07/09/24 0014   BP: 133/89   Pulse: 80   Resp: 18   Temp: 98.7 °F (37.1 °C)   TempSrc: Oral   SpO2: 98%                                              BP Readings from Last 3 Encounters:   07/09/24 133/89   07/05/24 132/84   07/02/24 (!) 141/81       NPO Status:                                                                                 BMI:   Wt Readings from Last 3 Encounters:   07/05/24 (!) 160 kg (352 lb 12.8 oz)   06/28/24 (!) 160.5 kg (353 lb 14.4 oz)   06/21/24 (!) 159.2 kg (350 lb 14.4 oz)     There is no height or weight on file to calculate BMI.    CBC:   Lab Results   Component Value Date/Time    WBC 8.8 07/09/2024 12:18 AM    RBC 4.63 07/09/2024 12:18 AM    HGB 13.1 07/09/2024 12:18 AM    HCT 39.0 07/09/2024 12:18 AM    MCV 84.2 07/09/2024 12:18 AM    RDW 15.4 07/09/2024 12:18 AM     07/09/2024 12:18 AM       CMP:   Lab Results   Component Value Date/Time     07/02/2024 11:35 PM    K 3.9 07/02/2024 11:35 PM     07/02/2024 11:35 PM    CO2 21 07/02/2024 11:35 PM    BUN 8 07/02/2024 11:35 PM    CREATININE 0.56 07/02/2024 11:35 PM    GFRAA >60 06/02/2020 06:40 AM    AGRATIO 0.6 06/02/2020 06:40 AM    LABGLOM >90 07/02/2024 11:35 PM    GLUCOSE 89 07/02/2024 11:35 PM    CALCIUM 9.0 07/02/2024 11:35 PM    BILITOT <0.2 07/02/2024 11:35 PM    ALKPHOS 111 07/02/2024 11:35 PM    ALKPHOS 103 06/02/2020 06:40 AM    AST 17 07/02/2024 11:35 PM    ALT 12 07/02/2024 11:35 PM       POC Tests: No results for input(s): \"POCGLU\",

## 2024-10-12 NOTE — ED PROVIDER NOTE - NSICDXPASTSURGICALHX_GEN_ALL_CORE_FT
PAST SURGICAL HISTORY:  History of cholecystectomy 2017    History of lithotripsy     History of prostatectomy 5/2020    S/P colonoscopy with polypectomy Baseline; viv polyps; 2012    S/P hernia repair right inguinal; 5/2015    
Yes